# Patient Record
Sex: FEMALE | Race: WHITE | NOT HISPANIC OR LATINO | Employment: UNEMPLOYED | URBAN - METROPOLITAN AREA
[De-identification: names, ages, dates, MRNs, and addresses within clinical notes are randomized per-mention and may not be internally consistent; named-entity substitution may affect disease eponyms.]

---

## 2017-03-22 RX ORDER — INSULIN ASPART 100 [IU]/ML
INJECTION, SOLUTION INTRAVENOUS; SUBCUTANEOUS
Qty: 30 ML | Refills: 6 | Status: SHIPPED | OUTPATIENT
Start: 2017-03-22 | End: 2017-05-03 | Stop reason: SDUPTHER

## 2017-03-31 ENCOUNTER — APPOINTMENT (OUTPATIENT)
Dept: ENDOCRINOLOGY | Facility: MEDICAL CENTER | Age: 72
End: 2017-03-31
Payer: COMMERCIAL

## 2017-05-03 ENCOUNTER — OFFICE VISIT (OUTPATIENT)
Dept: ENDOCRINOLOGY | Facility: MEDICAL CENTER | Age: 72
End: 2017-05-03
Payer: COMMERCIAL

## 2017-05-03 VITALS
DIASTOLIC BLOOD PRESSURE: 86 MMHG | BODY MASS INDEX: 44.41 KG/M2 | SYSTOLIC BLOOD PRESSURE: 142 MMHG | HEIGHT: 68 IN | WEIGHT: 293 LBS

## 2017-05-03 DIAGNOSIS — I10 ESSENTIAL HYPERTENSION: ICD-10-CM

## 2017-05-03 DIAGNOSIS — E03.9 ACQUIRED HYPOTHYROIDISM: ICD-10-CM

## 2017-05-03 DIAGNOSIS — Z79.4 TYPE 2 DIABETES MELLITUS WITH HYPERGLYCEMIA, WITH LONG-TERM CURRENT USE OF INSULIN (HCC): ICD-10-CM

## 2017-05-03 DIAGNOSIS — E11.65 TYPE 2 DIABETES MELLITUS WITH HYPERGLYCEMIA, WITH LONG-TERM CURRENT USE OF INSULIN (HCC): ICD-10-CM

## 2017-05-03 LAB
HBA1C MFR BLD: 7 % (ref ?–5.8)
INT CON NEG: NORMAL
INT CON POS: NORMAL

## 2017-05-03 PROCEDURE — 1101F PT FALLS ASSESS-DOCD LE1/YR: CPT | Mod: 8P | Performed by: INTERNAL MEDICINE

## 2017-05-03 PROCEDURE — 3017F COLORECTAL CA SCREEN DOC REV: CPT | Mod: 8P | Performed by: INTERNAL MEDICINE

## 2017-05-03 PROCEDURE — 3014F SCREEN MAMMO DOC REV: CPT | Mod: 8P | Performed by: INTERNAL MEDICINE

## 2017-05-03 PROCEDURE — 4040F PNEUMOC VAC/ADMIN/RCVD: CPT | Mod: 8P | Performed by: INTERNAL MEDICINE

## 2017-05-03 PROCEDURE — 3045F PR MOST RECENT HEMOGLOBIN A1C LEVEL 7.0-9.0%: CPT | Performed by: INTERNAL MEDICINE

## 2017-05-03 PROCEDURE — 1036F TOBACCO NON-USER: CPT | Performed by: INTERNAL MEDICINE

## 2017-05-03 PROCEDURE — G8432 DEP SCR NOT DOC, RNG: HCPCS | Performed by: INTERNAL MEDICINE

## 2017-05-03 PROCEDURE — G8417 CALC BMI ABV UP PARAM F/U: HCPCS | Performed by: INTERNAL MEDICINE

## 2017-05-03 PROCEDURE — 99214 OFFICE O/P EST MOD 30 MIN: CPT | Performed by: INTERNAL MEDICINE

## 2017-05-03 PROCEDURE — 83036 HEMOGLOBIN GLYCOSYLATED A1C: CPT | Performed by: INTERNAL MEDICINE

## 2017-05-03 RX ORDER — CARVEDILOL 6.25 MG/1
6.25 TABLET ORAL 2 TIMES DAILY
Qty: 120 TAB | Refills: 3 | Status: SHIPPED
Start: 2017-05-03 | End: 2017-10-30 | Stop reason: SDUPTHER

## 2017-05-03 RX ORDER — BENAZEPRIL HYDROCHLORIDE 40 MG/1
40 TABLET ORAL DAILY
Qty: 90 TAB | Refills: 3 | Status: SHIPPED
Start: 2017-05-03 | End: 2017-08-08 | Stop reason: SDUPTHER

## 2017-05-03 RX ORDER — LEVOTHYROXINE SODIUM 0.2 MG/1
200 TABLET ORAL DAILY
Qty: 90 TAB | Refills: 3 | Status: SHIPPED
Start: 2017-05-03 | End: 2017-08-08 | Stop reason: SDUPTHER

## 2017-05-03 NOTE — MR AVS SNAPSHOT
"        Maira Dodd   5/3/2017 1:20 PM   Office Visit   MRN: [id number]    Department:  Endocrinology Med [facility]   Dept Phone:  [phone]    Description:  Female : 1945   Provider:  Nora Mejia R.N.; Cecelia Meyers M.D.           Reason for Visit     Diabetes Mellitus           Allergies as of 5/3/2017     Allergen Noted Reactions    Betadine [Povidone Iodine] 07/10/2014       Iodine 07/10/2014   Swelling    Neomycin 07/10/2014   Swelling      You were diagnosed with     Type 2 diabetes mellitus with hyperglycemia, with long-term current use of insulin (CMS-HCC)   [7009072]       Acquired hypothyroidism   [8249282]       Essential hypertension   [1630050]         Vital Signs     Blood Pressure Height Weight Body Mass Index Smoking Status       142/86 mmHg 1.715 m (5' 7.5\") 169.373 kg (373 lb 6.4 oz) 57.59 kg/m2 Never Smoker        Basic Information     Date Of Birth Sex Race Ethnicity Preferred Language    1945 Female White Non- English      Your appointments     Sep 07, 2017 12:40 PM   Diabetes Care Visit with Cecelia Meyers M.D., Nora Mejia R.N.   [facility]    [address], [address]  [facility] [place] [address] [phone]           You will be receiving a confirmation call a few days before your appointment from our automated call confirmation system.              Problem List              ICD-10-CM Priority Class Noted - Resolved    Type 2 diabetes mellitus, uncontrolled (CMS-HCC) E11.65   2014 - Present    Dyslipidemia E78.5   2014 - Present    Hypothyroidism E03.9   2014 - Present    Obesities, morbid (CMS-HCC) E66.01   2014 - Present    Elevated liver enzymes R74.8   2015 - Present      Health Maintenance        Date Due Completion Dates    IMM DTaP/Tdap/Td Vaccine (1 - Tdap) 1966 ---    PAP SMEAR 1968 ---    MAMMOGRAM 1987 ---    COLONOSCOPY 1997 ---    IMM ZOSTER VACCINE 2007 " ---    BONE DENSITY 8/6/2012 ---    IMM PNEUMOCOCCAL 65+ (ADULT) LOW/MEDIUM RISK SERIES (1 of 2 - PCV13) 8/6/2012 ---    DIABETES MONOFILAMENT / LE EXAM 7/13/2015 7/13/2014    FASTING LIPID PROFILE 12/16/2015 12/16/2014    URINE ACR / MICROALBUMIN 12/16/2015 12/16/2014    SERUM CREATININE 12/16/2015 12/16/2014    A1C SCREENING 4/19/2017 10/19/2016, 12/16/2014, 10/9/2014    RETINAL SCREENING 11/16/2017 11/16/2016            Results     POCT Hemoglobin A1C      Component    Glycohemoglobin    7.0    Comment:     lot [id number]  11/8    Internal Control Negative    Internal Control Positive                        Current Immunizations     No immunizations on file.      Below and/or attached are the medications your provider expects you to take. Review all of your home medications and newly ordered medications with your provider and/or pharmacist. Follow medication instructions as directed by your provider and/or pharmacist. Please keep your medication list with you and share with your provider. Update the information when medications are discontinued, doses are changed, or new medications (including over-the-counter products) are added; and carry medication information at all times in the event of emergency situations     Allergies:  BETADINE - (reactions not documented)     IODINE - Swelling     NEOMYCIN - Swelling               Medications  Valid as of: May 03, 2017 -  1:47 PM    Generic Name Brand Name Tablet Size Instructions for use    Aspirin (Tab)  MG Take 325 mg by mouth every 6 hours as needed.        Benazepril HCl (Tab) LOTENSIN 40 MG Take 1 Tab by mouth every day.        Carvedilol (Tab) COREG 6.25 MG Take 1 Tab by mouth 2 times a day.        Insulin Aspart (Solution Pen-injector) NOVOLOG 100 UNIT/ML Using up to 22units 3 times per day        Insulin Glargine (Solution Pen-injector) LANTUS 100 UNIT/ML Using 65 units per day as directed.        Levothyroxine Sodium (Tab) SYNTHROID 200 MCG Take 1 Tab by  mouth every day.        MetFORMIN HCl (Tab) GLUCOPHAGE 1000 MG Take 1 Tab by mouth 2 times a day, with meals.        .                 Medicines prescribed today were sent to:     [id number] - [place], [place] - [address]    [address] [place] [address]    Phone: [phone] Fax: [phone]    Open 24 Hours?: No    [id number] - [place], [place] - [address]    [address] [facility] [place] [address]    Phone: [phone] Fax: [phone]    Open 24 Hours?: No      Medication refill instructions:       If your prescription bottle indicates you have medication refills left, it is not necessary to call your provider’s office. Please contact your pharmacy and they will refill your medication.    If your prescription bottle indicates you do not have any refills left, you may request refills at any time through one of the following ways: The online [employer] system (except Urgent Care), by calling your provider’s office, or by asking your pharmacy to contact your provider’s office with a refill request. Medication refills are processed only during regular business hours and may not be available until the next business day. Your provider may request additional information or to have a follow-up visit with you prior to refilling your medication.   *Please Note: Medication refills are assigned a new Rx number when refilled electronically. Your pharmacy may indicate that no refills were authorized even though a new prescription for the same medication is available at the pharmacy. Please request the medicine by name with the pharmacy before contacting your provider for a refill.        Your To Do List     Future Labs/Procedures Complete By Expires    BASIC METABOLIC PANEL  As directed 5/3/2019    FREE THYROXINE  As directed 5/3/2018    LIPID PROFILE  As directed 5/3/2019    MICROALBUMIN CREAT RATIO URINE  As directed 5/3/2018       TSH  As directed 5/3/2018         Clink Access Code: Activation code not generated  Current Clink Status: Active

## 2017-05-03 NOTE — PROGRESS NOTES
Endocrinology Clinic Progress Note  PCP: Manan Ortiz M.D.    CC: Type 2 diabetes    HPI:  Maira Dodd is a 71 y.o. old patient who comes in today for routine follow up.     Type 2 diabetes: She is currently on Levemir 32 units every morning and 33 units every evening. Since switching to Levemir she is having issues with diarrhea, Basaglar is also on her formulary. We will switch to Basaglar. She is currently on NovoLog 18 units 3 times a day with meals plus mid dose correction factor. Most commonly she takes 20-22 units of NovoLog at each meal. Fasting blood sugar in the morning is mostly in the range of 150-170. Also on metformin 1000 mg twice a day.    Hypothyroidism: She is currently on levothyroxine 200 µg daily, reports compliance with medications.    Hypertension: Blood pressure is higher than goal in the clinic today, she is on Coreg, benazepril. She reports compliance with medications.     ROS:  Constitutional: No weight loss  Cardiac: No palpitations or racing heart    Past Medical History:  Patient Active Problem List    Diagnosis Date Noted   • Elevated liver enzymes 01/07/2015   • Type 2 diabetes mellitus, uncontrolled (CMS-[facility]) 07/13/2014   • Dyslipidemia 07/13/2014   • Hypothyroidism 07/13/2014   • Obesities, morbid (CMS-[facility]) 07/13/2014       Medications:    Current outpatient prescriptions:   •  insulin glargine (BASAGLAR KWIKPEN) 100 UNIT/ML Solution Pen-injector injection, Using 65 units per day as directed., Disp: 60 mL, Rfl: 3  •  NOVOLOG, insulin aspart, (NOVOLOG FLEXPEN) 100 UNIT/ML Solution Pen-injector injection, Using up to 22units 3 times per day, Disp: 60 mL, Rfl: 3  •  levothyroxine (SYNTHROID) 200 MCG Tab, Take 1 Tab by mouth every day., Disp: 90 Tab, Rfl: 3  •  carvedilol (COREG) 6.25 MG Tab, Take 1 Tab by mouth 2 times a day., Disp: 120 Tab, Rfl: 3  •  benazepril (LOTENSIN) 40 MG tablet, Take 1 Tab by mouth every day., Disp: 90 Tab, Rfl: 3  •  metformin (GLUCOPHAGE)  "1000 MG tablet, Take 1 Tab by mouth 2 times a day, with meals., Disp: 180 Tab, Rfl: 3  •  aspirin (ASA) 325 MG Tab, Take 325 mg by mouth every 6 hours as needed., Disp: , Rfl:     Labs:  Results for ELIN JOHNSTON (MRN [id number]) as of 5/3/2017 13:47   Ref. Range 10/19/2016 09:19 5/3/2017 13:30   Glycohemoglobin Unknown 7.2 7.0     Physical Examination:  Vital signs: /86 mmHg  Ht 1.715 m (5' 7.5\")  Wt 169.373 kg (373 lb 6.4 oz)  BMI 57.59 kg/m2  General: Well nourished, no apparent distress, cooperative  Eyes: No scleral icterus, no discharge  CVS: Regular rate and rhythm, S1 S2 normal, no murmur  Resp: Normal effort, clear to auscultation bilaterally  Psych: Alert and oriented, normal mood and affect    Assessment and Plan:    Uncontrolled type 2 diabetes mellitus with diabetic neuropathy, with long-term current use of insulin (HCC)  · Hemoglobin A1c today in the clinic is 7.0%  · Goal hemoglobin A1c less than 7%  · Switched to Basaglar 34 units units twice a day, and we discussed treat to target recommendations  · Increased NovoLog to 20 units 3 times a day with meals plus mid dose correction factor  · Advised to continue checking blood sugar 4 times a day  · Continue metformin 1000 mg twice a day  · Repeat labs  -     LIPID PROFILE; Future  -     MICROALBUMIN CREAT RATIO URINE; Future  -     COMP METABOLIC PANEL; Future    Hypothyroidism, unspecified type  · Repeat labs, for now continue on levothyroxine 200 µg daily  -     FREE THYROXINE; Future  -     TSH; Future    Essential hypertension  · Blood pressure is slightly higher than goal in the clinic today  · For now continue on benazepril and Coreg    Return in about 4 months (around 9/3/2017).    Thank you for allowing me to participate in the care of this patient.    Cecelia Meyers M.D.     CC:   Manan Ortiz M.D.    This note was created using voice recognition software (Dragon). The accuracy of the dictation is limited by the abilities " of the software. I have reviewed the note prior to signing, however some errors in grammar and context are still possible. If you have any questions related to this note please do not hesitate to contact our office.

## 2017-05-03 NOTE — PROGRESS NOTES
Patient with existing type diabetes:  Type 2 diabetes here for follow up     Patient's health status since last visit: has been ill with cold symptoms for the past month.   Issues with diabetes since last visit none.   Current Diabetes Medications: Levemir 65 units per day (states it gives her diarrhea), Novolog using about 22 units with meals. Metformin 1000 mg bid.     HbA1c: @hba1c@  Lab Results   Component Value Date/Time    GLYCOHEMOGLOBIN 7.0 05/03/2017 01:30 PM        FSBS  Testing: testing blood sugars about 4 times per day    Hypoglycemia: denies.   Exercise: none.     Retinal Exam:current    Daily Foot Exam: checks feet most days.     Routine Dental Exams: past du  Flu vaccine: current.   Pneumonia vaccine unknown.         Plan: states she did not get lab request at her last visit.  Lab slips reprinted and provided to patient today  Switch to Basaglar insulin

## 2017-05-12 DIAGNOSIS — Z79.4 TYPE 2 DIABETES MELLITUS WITH HYPERGLYCEMIA, WITH LONG-TERM CURRENT USE OF INSULIN (HCC): ICD-10-CM

## 2017-05-12 DIAGNOSIS — E11.65 TYPE 2 DIABETES MELLITUS WITH HYPERGLYCEMIA, WITH LONG-TERM CURRENT USE OF INSULIN (HCC): ICD-10-CM

## 2017-05-18 ENCOUNTER — TELEPHONE (OUTPATIENT)
Dept: ENDOCRINOLOGY | Facility: MEDICAL CENTER | Age: 72
End: 2017-05-18

## 2017-05-18 DIAGNOSIS — Z79.4 TYPE 2 DIABETES MELLITUS WITH HYPERGLYCEMIA, WITH LONG-TERM CURRENT USE OF INSULIN (HCC): ICD-10-CM

## 2017-05-18 DIAGNOSIS — E11.65 TYPE 2 DIABETES MELLITUS WITH HYPERGLYCEMIA, WITH LONG-TERM CURRENT USE OF INSULIN (HCC): ICD-10-CM

## 2017-05-18 NOTE — TELEPHONE ENCOUNTER
Pt called and she mentioned that Basaglar is not covered by her Insurance.    Preferred brand is Lantus.    If [facility] could [facility]    Thank you  Ana

## 2017-06-02 LAB
CHOLEST SERPL-MCNC: 187 MG/DL
CREAT SERPL-MCNC: 0.86 MG/DL
HDLC SERPL-MCNC: 41 MG/DL
LDLC SERPL CALC-MCNC: 118 MG/DL
MICROAL CRT RATIO 4634: 25.2
TRIGL SERPL-MCNC: 141 MG/DL

## 2017-08-08 DIAGNOSIS — I10 ESSENTIAL HYPERTENSION: ICD-10-CM

## 2017-08-08 DIAGNOSIS — E03.9 ACQUIRED HYPOTHYROIDISM: ICD-10-CM

## 2017-08-08 DIAGNOSIS — E11.65 TYPE 2 DIABETES MELLITUS WITH HYPERGLYCEMIA, WITH LONG-TERM CURRENT USE OF INSULIN (HCC): ICD-10-CM

## 2017-08-08 DIAGNOSIS — Z79.4 TYPE 2 DIABETES MELLITUS WITH HYPERGLYCEMIA, WITH LONG-TERM CURRENT USE OF INSULIN (HCC): ICD-10-CM

## 2017-08-08 RX ORDER — BENAZEPRIL HYDROCHLORIDE 40 MG/1
40 TABLET ORAL DAILY
Qty: 90 TAB | Refills: 3 | Status: SHIPPED | OUTPATIENT
Start: 2017-08-08 | End: 2018-05-31 | Stop reason: SDUPTHER

## 2017-08-08 RX ORDER — LEVOTHYROXINE SODIUM 0.2 MG/1
200 TABLET ORAL DAILY
Qty: 90 TAB | Refills: 3 | Status: SHIPPED | OUTPATIENT
Start: 2017-08-08 | End: 2018-05-31 | Stop reason: SDUPTHER

## 2017-08-22 ENCOUNTER — TELEPHONE (OUTPATIENT)
Dept: ENDOCRINOLOGY | Facility: MEDICAL CENTER | Age: 72
End: 2017-08-22

## 2017-08-22 DIAGNOSIS — Z79.4 TYPE 2 DIABETES MELLITUS WITH HYPERGLYCEMIA, WITH LONG-TERM CURRENT USE OF INSULIN (HCC): ICD-10-CM

## 2017-08-22 DIAGNOSIS — E11.65 TYPE 2 DIABETES MELLITUS WITH HYPERGLYCEMIA, WITH LONG-TERM CURRENT USE OF INSULIN (HCC): ICD-10-CM

## 2017-08-22 NOTE — TELEPHONE ENCOUNTER
Pt need a new Rx for Basaglar.    Pt using Lantus 36 units BID. Rx do 30 days    Rx will be send to [facility]    Thank you  Ana

## 2017-09-05 DIAGNOSIS — E11.65 TYPE 2 DIABETES MELLITUS WITH HYPERGLYCEMIA, WITH LONG-TERM CURRENT USE OF INSULIN (HCC): ICD-10-CM

## 2017-09-05 DIAGNOSIS — Z79.4 TYPE 2 DIABETES MELLITUS WITH HYPERGLYCEMIA, WITH LONG-TERM CURRENT USE OF INSULIN (HCC): ICD-10-CM

## 2017-09-07 ENCOUNTER — APPOINTMENT (OUTPATIENT)
Dept: ENDOCRINOLOGY | Facility: MEDICAL CENTER | Age: 72
End: 2017-09-07
Payer: COMMERCIAL

## 2017-10-30 DIAGNOSIS — Z79.4 TYPE 2 DIABETES MELLITUS WITH HYPERGLYCEMIA, WITH LONG-TERM CURRENT USE OF INSULIN (HCC): ICD-10-CM

## 2017-10-30 DIAGNOSIS — E11.65 TYPE 2 DIABETES MELLITUS WITH HYPERGLYCEMIA, WITH LONG-TERM CURRENT USE OF INSULIN (HCC): ICD-10-CM

## 2017-10-30 DIAGNOSIS — I10 ESSENTIAL HYPERTENSION: ICD-10-CM

## 2017-10-30 RX ORDER — CARVEDILOL 6.25 MG/1
6.25 TABLET ORAL 2 TIMES DAILY
Qty: 120 TAB | Refills: 0 | Status: SHIPPED | OUTPATIENT
Start: 2017-10-30 | End: 2017-11-30 | Stop reason: SDUPTHER

## 2017-11-30 DIAGNOSIS — I10 ESSENTIAL HYPERTENSION: ICD-10-CM

## 2017-11-30 RX ORDER — CARVEDILOL 6.25 MG/1
TABLET ORAL
Qty: 120 TAB | Refills: 3 | Status: SHIPPED | OUTPATIENT
Start: 2017-11-30 | End: 2017-12-07 | Stop reason: SDUPTHER

## 2017-12-07 ENCOUNTER — OFFICE VISIT (OUTPATIENT)
Dept: ENDOCRINOLOGY | Facility: MEDICAL CENTER | Age: 72
End: 2017-12-07
Payer: COMMERCIAL

## 2017-12-07 VITALS
OXYGEN SATURATION: 96 % | HEIGHT: 68 IN | WEIGHT: 293 LBS | HEART RATE: 100 BPM | DIASTOLIC BLOOD PRESSURE: 88 MMHG | SYSTOLIC BLOOD PRESSURE: 128 MMHG | BODY MASS INDEX: 44.41 KG/M2

## 2017-12-07 DIAGNOSIS — Z79.4 TYPE 2 DIABETES MELLITUS WITH HYPERGLYCEMIA, WITH LONG-TERM CURRENT USE OF INSULIN (HCC): ICD-10-CM

## 2017-12-07 DIAGNOSIS — E03.9 ACQUIRED HYPOTHYROIDISM: ICD-10-CM

## 2017-12-07 DIAGNOSIS — I10 ESSENTIAL HYPERTENSION: ICD-10-CM

## 2017-12-07 DIAGNOSIS — E11.65 TYPE 2 DIABETES MELLITUS WITH HYPERGLYCEMIA, WITH LONG-TERM CURRENT USE OF INSULIN (HCC): ICD-10-CM

## 2017-12-07 LAB
HBA1C MFR BLD: 6.6 % (ref ?–5.8)
INT CON NEG: NORMAL
INT CON POS: NORMAL

## 2017-12-07 PROCEDURE — 83036 HEMOGLOBIN GLYCOSYLATED A1C: CPT | Performed by: INTERNAL MEDICINE

## 2017-12-07 PROCEDURE — 99214 OFFICE O/P EST MOD 30 MIN: CPT | Performed by: INTERNAL MEDICINE

## 2017-12-07 RX ORDER — CARVEDILOL 12.5 MG/1
12.5 TABLET ORAL 2 TIMES DAILY WITH MEALS
Qty: 180 TAB | Refills: 2 | Status: SHIPPED | OUTPATIENT
Start: 2017-12-07 | End: 2018-08-01

## 2017-12-07 NOTE — PROGRESS NOTES
Patient with existing type diabetes:  Type 2 diabetes well controlled, here for follow up.       Patient's health status since last visit: complaining of edema in her feet, complaining of right hip pain radiating to her back.   Wants to discuss weight loss medications  States hospitalized in July for hypertension.   Issues with diabetes since last visit occasional lows.   Current Diabetes Medications: Basaglar 36 units bid and Novolog 20 units with meals.  Metformin 1000 mg bid.     HbA1c: @hba1c@  Lab Results   Component Value Date/Time    HBA1C 6.6 12/07/2017 09:03 AM        FSBS  Testing: states she is checking 4-5 times per day, did not bring logs with her.     Hypoglycemia: states she has low 1-2 times week, usually associated with only having a salad for a meal and taking full dose of Novolog.   Exercise: none.     Retinal Exam:current.     Daily Foot Exam: complains of swelling in both feet.   Foot Exam:  Monofilament: done  Monofilament testing with a 10 gram force: sensation intact: intact bilaterally  Visual Inspection: Feet without maceration, ulcers, fissures.  Pedal pulses: decreased bilaterally        Education provided by RN, CDE: discussed cutting back on Novolog to only 15 units if she is only having a salad

## 2017-12-07 NOTE — PROGRESS NOTES
"Endocrinology Clinic Progress Note    CC: Diabetes    HPI:  Maira Dodd is a 72 y.o. old patient who comes in today for routine follow up.     Type 2 diabetes: She is currently on Basaglar 36 units twice a day, NovoLog 20 units with each meal, and metformin 1000 mg twice a day. Reports compliance with medications. He checks blood sugars 4-5 times a day. Blood sugars are very well controlled, she does have occasional hypoglycemia especially when she eats only salad for a meal. She can feel the low blood sugar symptoms well. She is up-to-date with eye exam.    Hypertension: Blood pressure is well controlled. She reports compliance with medications.    Hypothyroidism: She is currently on levothyroxine 200 µg daily.    ROS:  Constitutional: No unintentional weight loss  Endo: Denies excessive thirst or frequent urination    PMH:  Type 2 diabetes  Hypothyroidism  Hypertension  Obesity    EXAM:  Vital signs: /88   Pulse 100   Ht 1.715 m (5' 7.5\")   Wt (!) 145.1 kg (319 lb 12.8 oz)   SpO2 96%   BMI 49.35 kg/m²   General: No apparent distress, cooperative  Eyes: No scleral icterus, no discharge  Neck: Normal on external inspection  Resp: Normal effort  Psych: Alert and oriented, normal mood and affect    Assessment and Plan:    1. Type 2 diabetes mellitus with hyperglycemia, with long-term current use of insulin (CMS-[facility])  · Hemoglobin A1c today in the clinic is 6.6%  · Goal A1c less than 7-7.5%  · Continue Basaglar 36 units twice a day and NovoLog 20 units with each meal  · She is interested in starting some medications to help with weight loss, we will try Trulicity 0.75 mg once a week, we discussed about potential side effects including nausea/vomiting, no family history of thyroid cancer  · We discussed about keeping a close eye on blood sugar readings especially after starting Trulicity  · Repeat labs just before next appointment  - Diabetic Monofilament LE Exam  - POCT Hemoglobin A1C  - " Dulaglutide (TRULICITY) 0.75 MG/0.5ML Solution Pen-injector; Inject 0.75 mg as instructed every 7 days.  Dispense: 12 PEN; Refill: 3  - BASIC METABOLIC PANEL; Future  - MICROALBUMIN CREAT RATIO URINE; Future  - LIPID PROFILE; Future    2. Acquired hypothyroidism  · Continue levothyroxine 200 µg daily  · Repeat labs just before next appointment  - TSH; Future  - FREE THYROXINE; Future    3. Essential hypertension  · Blood pressure is well controlled  · Continue benazepril, carvedilol    Return in about 4 months (around 4/7/2018).    Thank you for allowing me to participate in the care of this patient.    Cecelia Meyers M.D.    CC:   Manan Ortiz M.D.    This note was created using voice recognition software (Dragon). The accuracy of the dictation is limited by the abilities of the software. I have reviewed the note prior to signing, however some errors in grammar and context are still possible. If you have any questions related to this note please do not hesitate to contact our office.

## 2017-12-15 DIAGNOSIS — E11.65 TYPE 2 DIABETES MELLITUS WITH HYPERGLYCEMIA, WITH LONG-TERM CURRENT USE OF INSULIN (HCC): ICD-10-CM

## 2017-12-15 DIAGNOSIS — Z79.4 TYPE 2 DIABETES MELLITUS WITH HYPERGLYCEMIA, WITH LONG-TERM CURRENT USE OF INSULIN (HCC): ICD-10-CM

## 2017-12-18 DIAGNOSIS — E11.65 TYPE 2 DIABETES MELLITUS WITH HYPERGLYCEMIA, WITH LONG-TERM CURRENT USE OF INSULIN (HCC): ICD-10-CM

## 2017-12-18 DIAGNOSIS — Z79.4 TYPE 2 DIABETES MELLITUS WITH HYPERGLYCEMIA, WITH LONG-TERM CURRENT USE OF INSULIN (HCC): ICD-10-CM

## 2017-12-18 RX ORDER — INSULIN GLARGINE 100 [IU]/ML
INJECTION, SOLUTION SUBCUTANEOUS
Qty: 75 ML | Refills: 3 | Status: SHIPPED | OUTPATIENT
Start: 2017-12-18 | End: 2019-01-31 | Stop reason: SDUPTHER

## 2017-12-25 DIAGNOSIS — E11.65 TYPE 2 DIABETES MELLITUS WITH HYPERGLYCEMIA, WITH LONG-TERM CURRENT USE OF INSULIN (HCC): ICD-10-CM

## 2017-12-25 DIAGNOSIS — Z79.4 TYPE 2 DIABETES MELLITUS WITH HYPERGLYCEMIA, WITH LONG-TERM CURRENT USE OF INSULIN (HCC): ICD-10-CM

## 2017-12-27 RX ORDER — INSULIN ASPART 100 [IU]/ML
INJECTION, SOLUTION INTRAVENOUS; SUBCUTANEOUS
Qty: 60 ML | Refills: 1 | Status: SHIPPED | OUTPATIENT
Start: 2017-12-27 | End: 2018-05-31 | Stop reason: SDUPTHER

## 2018-06-20 ENCOUNTER — TELEPHONE (OUTPATIENT)
Dept: ENDOCRINOLOGY | Facility: MEDICAL CENTER | Age: 73
End: 2018-06-20

## 2018-06-20 NOTE — TELEPHONE ENCOUNTER
Trulicity does not have any such side effect, I would recommend prompt evaluation by primary care physician.

## 2018-06-20 NOTE — TELEPHONE ENCOUNTER
Pt states her Lt foot is swelling and felt mushy at first but is becoming more solid now. Says it is the size of an egg right on top. Is wondering if Trulicity can cause this? Noticed 2-3 weeks after starting Trulicity. No pain or redness around it. Please advise.

## 2018-07-27 ENCOUNTER — APPOINTMENT (OUTPATIENT)
Dept: ENDOCRINOLOGY | Facility: MEDICAL CENTER | Age: 73
End: 2018-07-27
Payer: COMMERCIAL

## 2018-07-31 NOTE — TELEPHONE ENCOUNTER
Was the patient seen in the last year in this department? Yes    Does patient have an active prescription for medications requested? No     Received Request Via: Pharmacy     Patient still has a month left of medication needs the refill for 10/1

## 2018-08-01 RX ORDER — CARVEDILOL 6.25 MG/1
6.25 TABLET ORAL 2 TIMES DAILY
Qty: 180 TAB | Refills: 0 | Status: SHIPPED | OUTPATIENT
Start: 2018-08-01 | End: 2019-01-31 | Stop reason: SDUPTHER

## 2018-08-16 DIAGNOSIS — E03.9 ACQUIRED HYPOTHYROIDISM: ICD-10-CM

## 2018-08-16 DIAGNOSIS — E11.65 TYPE 2 DIABETES MELLITUS WITH HYPERGLYCEMIA, WITH LONG-TERM CURRENT USE OF INSULIN (HCC): ICD-10-CM

## 2018-08-16 DIAGNOSIS — Z79.4 TYPE 2 DIABETES MELLITUS WITH HYPERGLYCEMIA, WITH LONG-TERM CURRENT USE OF INSULIN (HCC): ICD-10-CM

## 2018-09-07 ENCOUNTER — OFFICE VISIT (OUTPATIENT)
Dept: ENDOCRINOLOGY | Facility: MEDICAL CENTER | Age: 73
End: 2018-09-07
Payer: COMMERCIAL

## 2018-09-07 VITALS
DIASTOLIC BLOOD PRESSURE: 70 MMHG | WEIGHT: 293 LBS | HEART RATE: 101 BPM | OXYGEN SATURATION: 94 % | SYSTOLIC BLOOD PRESSURE: 108 MMHG | HEIGHT: 68 IN | BODY MASS INDEX: 44.41 KG/M2

## 2018-09-07 DIAGNOSIS — E03.9 ACQUIRED HYPOTHYROIDISM: ICD-10-CM

## 2018-09-07 DIAGNOSIS — Z79.4 TYPE 2 DIABETES MELLITUS WITHOUT COMPLICATION, WITH LONG-TERM CURRENT USE OF INSULIN (HCC): ICD-10-CM

## 2018-09-07 DIAGNOSIS — I10 ESSENTIAL HYPERTENSION: ICD-10-CM

## 2018-09-07 DIAGNOSIS — E11.9 TYPE 2 DIABETES MELLITUS WITHOUT COMPLICATION, WITH LONG-TERM CURRENT USE OF INSULIN (HCC): ICD-10-CM

## 2018-09-07 LAB
HBA1C MFR BLD: 6.8 % (ref ?–5.8)
INT CON NEG: NORMAL
INT CON POS: NORMAL

## 2018-09-07 PROCEDURE — 83036 HEMOGLOBIN GLYCOSYLATED A1C: CPT | Performed by: INTERNAL MEDICINE

## 2018-09-07 PROCEDURE — 99214 OFFICE O/P EST MOD 30 MIN: CPT | Performed by: INTERNAL MEDICINE

## 2018-09-07 RX ORDER — DICYCLOMINE HCL 20 MG
20 TABLET ORAL EVERY 6 HOURS
COMMUNITY

## 2018-09-07 NOTE — PROGRESS NOTES
"Endocrinology Clinic Progress Note    CC: Diabetes    HPI:  Maira Dodd is a 72 y.o. old patient who comes in today for routine follow up.     Type 2 diabetes: She is currently on Basaglar 30-35 units twice a day, NovoLog 17-24 units with each meal, and metformin 1000 mg twice a day and Trulicity 0.75 mg weekly.  She has not been very good about taking metformin.  She checks blood sugars 4-5 times a day.  Very rare hypoglycemia, she can feel the low blood sugar symptoms well. She is up-to-date with eye exam.    Hypertension: Blood pressure is well controlled. She reports compliance with medications.    Hypothyroidism: She is currently on levothyroxine 200 µg daily.  Recent labs show slightly suppressed TSH of 0.28.  She denies palpitations or racing heart.  No unintentional weight loss.    ROS:  Constitutional: No unintentional weight loss  Cardiac: Negative for palpitations    PMH:  Type 2 diabetes  Hypothyroidism  Hypertension  Obesity    EXAM:  Vital signs: /70   Pulse (!) 101   Ht 1.715 m (5' 7.5\")   Wt (!) 143.8 kg (317 lb)   SpO2 94%   BMI 48.92 kg/m²   General: No apparent distress, cooperative  Eyes: No scleral icterus, no discharge  Neck: Normal on external inspection  Resp: Normal effort  Psych: Alert and oriented, normal mood and affect    LABS:  Labs from August 2018: Total cholesterol 198, HDL 43, triglycerides 135, , glucose 118, creatinine 1.09, calcium 9.4, TSH 0.28, free T4 1.5    Assessment and Plan:    1. Type 2 diabetes mellitus without complications, with long-term current use of insulin (CMS-[facility])  · Hemoglobin A1c today in the clinic is 6.8%  · Goal A1c less than 7-7.5%  · Continue Basaglar 30-35 units twice a day and NovoLog 17-24 units with each meal  · Advised to take metformin 1000 mg twice a day  · Continue Trulicity 0.75 mg weekly, she has some occasional abdominal discomfort so we are not increasing the dose of Trulicity at this time    2. Acquired " hypothyroidism  · Continue levothyroxine 200 µg daily  · Repeat labs for TSH and free T4 in 6 week  - TSH; Future  - FREE THYROXINE; Future    3. Essential hypertension  · Blood pressure is well controlled  · Continue ACEi    Return in about 4 months (around 1/7/2019).    Thank you for allowing me to participate in the care of this patient.    Cecelia Meyers M.D.    CC:   Manan Ortiz M.D.    This note was created using voice recognition software (Dragon). The accuracy of the dictation is limited by the abilities of the software. I have reviewed the note prior to signing, however some errors in grammar and context are still possible. If you have any questions related to this note please do not hesitate to contact our office.

## 2018-09-07 NOTE — PROGRESS NOTES
"RN-CDE Note    Subjective:     Health changes since last visit/interval Hx: complaining of pain in her side.     Medications (including changes made today)  Current Outpatient Prescriptions   Medication Sig Dispense Refill   • dicyclomine (BENTYL) 20 MG Tab Take 20 mg by mouth every 6 hours.     • carvedilol (COREG) 6.25 MG Tab Take 1 Tab by mouth 2 Times a Day. 180 Tab 0   • benazepril (LOTENSIN) 40 MG tablet TAKE 1 TABLET BY MOUTH  EVERY DAY 90 Tab 1   • NOVOLOG FLEXPEN 100 UNIT/ML Solution Pen-injector solution for injection INJECT SUBCUTANEOUSLY UP TO 22 UNITS 3 TIMES DAILY PER  DAY 15 PEN 3   • levothyroxine (SYNTHROID) 200 MCG Tab TAKE 1 TABLET BY MOUTH  EVERY DAY 90 Tab 1   • BASAGLAR KWIKPEN 100 UNIT/ML Solution Pen-injector injection INJECT 36 UNITS  SUBCUTANEOUSLY AS  INSTRUCTED 2 TIMES A DAY. 75 mL 3   • aspirin (ASA) 325 MG Tab Take 325 mg by mouth every 6 hours as needed.     • metformin (GLUCOPHAGE) 1000 MG tablet TAKE 1 TABLET BY MOUTH TWO  TIMES DAILY WITH MEALS 180 Tab 2   • Dulaglutide (TRULICITY) 0.75 MG/0.5ML Solution Pen-injector Inject 0.75 mg as instructed every 7 days. 12 PEN 3     No current facility-administered medications for this visit.        Taking daily ASA: Yes  Taking above medications as prescribed: no only taking Metformin prn  SIDE EFFECTS: Patient denies side effects to medications    Exercise: sporadic irregular exercise, <half hour walking weekly  Diet: \"healthy\" diet  in general  Has been eating more often recently, states it is too hot to cook at home.   Patient's body mass index is 48.92 kg/m². Exercise and nutrition counseling were performed at this visit.      Health Maintenance:   Health Maintenance Due   Topic Date Due   • IMM HEP B VACCINE (1 of 3 - Risk 3-dose series) 08/06/1966   • IMM DTaP/Tdap/Td Vaccine (1 - Tdap) 08/06/1966   • PAP SMEAR  08/06/1968   • MAMMOGRAM  08/06/1987   • COLONOSCOPY  08/06/1997   • IMM ZOSTER VACCINES (1 of 2) 08/06/1997   • BONE DENSITY  " 08/06/2012   • IMM PNEUMOCOCCAL 65+ (ADULT) LOW/MEDIUM RISK SERIES (1 of 2 - PCV13) 08/06/2012   • RETINAL SCREENING  05/25/2018   • URINE ACR / MICROALBUMIN  06/02/2018   • A1C SCREENING  06/07/2018   • IMM INFLUENZA (1) 09/01/2018       DM:   Last A1c:   Lab Results   Component Value Date/Time    HBA1C 6.8 09/07/2018 01:10 PM      A1C GOAL: < 7    Glucose monitoring frequency: testing blood sugars 4 times per day  did not bring logs in for review  Hypoglycemic episodes: yes - states blood sugar went down to 47 a couple of weeks ago.      Last Retinal Exam: in the spring Provider: jorge Abnormal: states she has a small growth on her retina that they are watching.   Daily Foot Exam: Yes , complains of feeling as if she is walking on pillows at times.   Routine Dental Exams: N/A she has dentures.     Lab Results   Component Value Date/Time    MICROALBCALC 25.2 06/02/2017        ACR Albumin/Creatinine Ratio goal <30     HTN:   Blood pressure goal <140/<80 at goal.   Currently Rx ACE/ARB: Yes    Dyslipidemia:    Lab Results   Component Value Date/Time    CHOLSTRLTOT 187 06/02/2017     06/02/2017    HDL 41 06/02/2017    TRIGLYCERIDE 141 06/02/2017       Lab Results   Component Value Date/Time    CREATININE 0.86 06/02/2017     No results found for: ALKPHOSPHAT, ASTSGOT, ALTSGPT, TBILIRUBIN     Currently Rx Statin: No    She  reports that she has never smoked. She has never used smokeless tobacco.    Objective:     Exam:  Monofilament: not done    Plan:     Discussed and educated on:   - All medications, side effects and compliance (discussed carefully)  - Annual eye examinations at Ophthalmology  - Foot Care: what to look for when checking feet every day and when to contact HCP  - HbA1C: target  - Home glucose monitoring emphasized  - Reminded pt to bring in BS diary at next visit  - Weight control and daily exercise    Recommended medication changes: none

## 2018-12-07 DIAGNOSIS — E11.65 TYPE 2 DIABETES MELLITUS WITH HYPERGLYCEMIA, WITH LONG-TERM CURRENT USE OF INSULIN (HCC): ICD-10-CM

## 2018-12-07 DIAGNOSIS — Z79.4 TYPE 2 DIABETES MELLITUS WITH HYPERGLYCEMIA, WITH LONG-TERM CURRENT USE OF INSULIN (HCC): ICD-10-CM

## 2018-12-07 NOTE — TELEPHONE ENCOUNTER
Was the patient seen in the last year in this department? Yes    Does patient have an active prescription for medications requested? Yes    Received Request Via: Pharmacy     **90 day supply. Dr. Meyers patient**

## 2019-01-05 DIAGNOSIS — Z79.4 TYPE 2 DIABETES MELLITUS WITH HYPERGLYCEMIA, WITH LONG-TERM CURRENT USE OF INSULIN (HCC): ICD-10-CM

## 2019-01-05 DIAGNOSIS — E11.65 TYPE 2 DIABETES MELLITUS WITH HYPERGLYCEMIA, WITH LONG-TERM CURRENT USE OF INSULIN (HCC): ICD-10-CM

## 2019-01-07 RX ORDER — DULAGLUTIDE 0.75 MG/.5ML
INJECTION, SOLUTION SUBCUTANEOUS
Qty: 2 ML | Refills: 2 | Status: SHIPPED | OUTPATIENT
Start: 2019-01-07 | End: 2019-03-31 | Stop reason: SDUPTHER

## 2019-01-07 NOTE — TELEPHONE ENCOUNTER
Was the patient seen in the last year in this department? Yes    Does patient have an active prescription for medications requested? No     Received Request Via: Pharmacy     TRULICITY 0.75 MG/0.5ML Solution Pen-injector  The source prescription was reordered on 12/7/2018 by Joseph Avila P.A.-C..  INJECT 0.75 MG AS INSTUCTED EVERY 7 DAYS

## 2019-01-17 ENCOUNTER — APPOINTMENT (OUTPATIENT)
Dept: ENDOCRINOLOGY | Facility: MEDICAL CENTER | Age: 74
End: 2019-01-17
Payer: COMMERCIAL

## 2019-01-31 DIAGNOSIS — Z79.4 TYPE 2 DIABETES MELLITUS WITH HYPERGLYCEMIA, WITH LONG-TERM CURRENT USE OF INSULIN (HCC): ICD-10-CM

## 2019-01-31 DIAGNOSIS — E11.65 TYPE 2 DIABETES MELLITUS WITH HYPERGLYCEMIA, WITH LONG-TERM CURRENT USE OF INSULIN (HCC): ICD-10-CM

## 2019-01-31 RX ORDER — CARVEDILOL 6.25 MG/1
6.25 TABLET ORAL 2 TIMES DAILY
Qty: 180 TAB | Refills: 0 | Status: SHIPPED | OUTPATIENT
Start: 2019-01-31 | End: 2019-04-08 | Stop reason: SDUPTHER

## 2019-01-31 RX ORDER — INSULIN GLARGINE 100 [IU]/ML
INJECTION, SOLUTION SUBCUTANEOUS
Qty: 75 ML | Refills: 3 | Status: SHIPPED | OUTPATIENT
Start: 2019-01-31 | End: 2019-04-08 | Stop reason: SDUPTHER

## 2019-01-31 NOTE — TELEPHONE ENCOUNTER
Was the patient seen in the last year in this department? Yes  Last visit 9/7/19  No future appt yet    Does patient have an active prescription for medications requested? Yes    Received Request Via: Pharmacy

## 2019-03-31 DIAGNOSIS — Z79.4 TYPE 2 DIABETES MELLITUS WITH HYPERGLYCEMIA, WITH LONG-TERM CURRENT USE OF INSULIN (HCC): ICD-10-CM

## 2019-03-31 DIAGNOSIS — E11.65 TYPE 2 DIABETES MELLITUS WITH HYPERGLYCEMIA, WITH LONG-TERM CURRENT USE OF INSULIN (HCC): ICD-10-CM

## 2019-04-01 RX ORDER — DULAGLUTIDE 0.75 MG/.5ML
INJECTION, SOLUTION SUBCUTANEOUS
Qty: 2 ML | Refills: 1 | Status: SHIPPED | OUTPATIENT
Start: 2019-04-01 | End: 2019-04-08

## 2019-04-08 ENCOUNTER — OFFICE VISIT (OUTPATIENT)
Dept: ENDOCRINOLOGY | Facility: MEDICAL CENTER | Age: 74
End: 2019-04-08
Payer: COMMERCIAL

## 2019-04-08 VITALS
WEIGHT: 293 LBS | HEART RATE: 100 BPM | SYSTOLIC BLOOD PRESSURE: 116 MMHG | HEIGHT: 68 IN | BODY MASS INDEX: 44.41 KG/M2 | DIASTOLIC BLOOD PRESSURE: 60 MMHG | OXYGEN SATURATION: 93 %

## 2019-04-08 DIAGNOSIS — I10 ESSENTIAL HYPERTENSION: ICD-10-CM

## 2019-04-08 DIAGNOSIS — E11.649 UNCONTROLLED TYPE 2 DIABETES MELLITUS WITH HYPOGLYCEMIA, UNSPECIFIED HYPOGLYCEMIA COMA STATUS (HCC): ICD-10-CM

## 2019-04-08 DIAGNOSIS — Z79.4 ENCOUNTER FOR LONG-TERM (CURRENT) USE OF INSULIN (HCC): ICD-10-CM

## 2019-04-08 DIAGNOSIS — E03.9 ACQUIRED HYPOTHYROIDISM: ICD-10-CM

## 2019-04-08 DIAGNOSIS — E11.65 TYPE 2 DIABETES MELLITUS WITH HYPERGLYCEMIA, WITH LONG-TERM CURRENT USE OF INSULIN (HCC): ICD-10-CM

## 2019-04-08 DIAGNOSIS — Z79.4 TYPE 2 DIABETES MELLITUS WITH HYPERGLYCEMIA, WITH LONG-TERM CURRENT USE OF INSULIN (HCC): ICD-10-CM

## 2019-04-08 LAB
HBA1C MFR BLD: 6.8 % (ref 0–5.6)
INT CON NEG: NEGATIVE
INT CON POS: POSITIVE

## 2019-04-08 PROCEDURE — 83036 HEMOGLOBIN GLYCOSYLATED A1C: CPT | Performed by: PHYSICIAN ASSISTANT

## 2019-04-08 PROCEDURE — 99214 OFFICE O/P EST MOD 30 MIN: CPT | Performed by: PHYSICIAN ASSISTANT

## 2019-04-08 RX ORDER — INSULIN GLARGINE 100 [IU]/ML
INJECTION, SOLUTION SUBCUTANEOUS
Qty: 75 ML | Refills: 3 | Status: SHIPPED | OUTPATIENT
Start: 2019-04-08 | End: 2019-09-19

## 2019-04-08 RX ORDER — BENAZEPRIL HYDROCHLORIDE 40 MG/1
40 TABLET ORAL
Qty: 90 TAB | Refills: 2 | Status: SHIPPED | OUTPATIENT
Start: 2019-04-08

## 2019-04-08 RX ORDER — CARVEDILOL 6.25 MG/1
6.25 TABLET ORAL 2 TIMES DAILY
Qty: 180 TAB | Refills: 0 | Status: SHIPPED | OUTPATIENT
Start: 2019-04-08

## 2019-04-08 RX ORDER — METFORMIN HYDROCHLORIDE 500 MG/1
1000 TABLET, EXTENDED RELEASE ORAL 2 TIMES DAILY
Qty: 120 TAB | Refills: 6 | Status: SHIPPED | OUTPATIENT
Start: 2019-04-08 | End: 2019-04-08 | Stop reason: SDUPTHER

## 2019-04-08 RX ORDER — METFORMIN HYDROCHLORIDE 500 MG/1
1000 TABLET, EXTENDED RELEASE ORAL 2 TIMES DAILY
Qty: 360 TAB | Refills: 4 | Status: SHIPPED | OUTPATIENT
Start: 2019-04-08 | End: 2020-03-27 | Stop reason: SDUPTHER

## 2019-04-08 RX ORDER — LEVOTHYROXINE SODIUM 0.2 MG/1
200 TABLET ORAL
Qty: 90 TAB | Refills: 1 | Status: SHIPPED | OUTPATIENT
Start: 2019-04-08

## 2019-04-08 NOTE — TELEPHONE ENCOUNTER
1. Caller Name: Maira Lisy Carlson                                              Call Back Number: [phone] (home)       Patient approves a detailed voicemail message: yes    Medication refills 90 day refill for all medications   Pt was seen today but needed refills on all mediations and her mail order pharmacy only got 2, she needs the following refilled.     Insulin Glargine (BASAGLAR KWIKPEN) 100 UNIT/ML Solution Pen-injector  Sig: INJECT 36 UNITS  SUBCUTANEOUSLY AS  INSTRUCTED 2 TIMES A DAY.       carvedilol (COREG) 6.25 MG Tab 180 Tab    Sig - Route: Take 1 Tab by mouth 2 Times a Day.      levothyroxine (SYNTHROID) 200 MCG Tab     Sig: TAKE 1 TABLET BY MOUTH  EVERY DAY     benazepril (LOTENSIN) 40 MG tablet    Sig: TAKE 1 TABLET BY MOUTH  EVERY DAY     NOVOLOG FLEXPEN 100 UNIT/ML Solution Pen-injector solution for injection  Sig: INJECT SUBCUTANEOUSLY UP TO 22 UNITS 3 TIMES DAILY PER  DAY

## 2019-04-08 NOTE — PROGRESS NOTES
New Patient Consult Note  Referred by: Manan Ortiz M.D.    Reason for consult: Diabetes Management Type 2    HPI:  Maira Dodd is a 73 y.o. old patient who is seeing us today for diabetes care.  This is a pleasant patient with diabetes and I appreciate the opportunity to participate in the care of this patient.  This is a new patient with me today.    Labs of 4/8/19 HbA1c is 6.8  Labs of 9/7/18 HbA1c was 6.8, GFR 51    BG Diary:4/8/2019  In the AM:  Just before meal:  Just before Bed:    Has been Diabetic since  Has a Glucagon pen at home: no    1. Uncontrolled type 2 diabetes mellitus with hypoglycemia, unspecified hypoglycemia coma status (HCC)  This is a new patient with me on 4/8/2019  They are on:  1.  Novolog 14-22 units three times a day before eating  2.  Trulicity 0.75 once a week  3.  Basaglar  35 twice a day  4.  Metformin 1000mg twice a day      2. Encounter for long-term (current) use of insulin ([facility])  Is on a high risk medication Insulin and we will continue to follow       ROS:   Constitutional: No change in weight , No fatigue, No night sweats.  HEENT: No Headache.  Eyes:  No blurred vision, No visual changes.  Cardiac: No chest pain, No palpitations.  Resp: No shortness of breath, No cough,   Gastro: No nausea or vomiting, No diarrhea.  Neuro: Denies numbness or tinging in bilateral feet or hands, and no loss of sensation.  Endo: No heat or cold intolerance.  : No polyuria, No polydipsia, No chronic UTI's.  Lower extremities: No lower leg edema bilateral.  All other systems were reviewed and were negative.    Past Medical History:  Patient Active Problem List    Diagnosis Date Noted   • Encounter for long-term (current) use of insulin ([facility]) 04/08/2019   • Elevated liver enzymes 01/07/2015   • Type 2 diabetes mellitus, uncontrolled ([facility]) 07/13/2014   • Dyslipidemia 07/13/2014   • Hypothyroidism 07/13/2014   • Obesities, morbid ([facility]) 07/13/2014       Past Surgical History:  Past  Surgical History:   Procedure Laterality Date   • APPENDECTOMY     • CHOLECYSTECTOMY         Allergies:  Statins [hmg-coa-r inhibitors]; Betadine [povidone iodine]; Iodine; and Neomycin    Social History:  Social History     Social History   • Marital status:      Spouse name: N/A   • Number of children: N/A   • Years of education: N/A     Occupational History   • Not on file.     Social History Main Topics   • Smoking status: Never Smoker   • Smokeless tobacco: Never Used   • Alcohol use Yes   • Drug use: No   • Sexual activity: Not on file     Other Topics Concern   • Not on file     Social History Narrative   • No narrative on file       Family History:  Family History   Problem Relation Age of Onset   • Hypertension Mother    • Diabetes Mother    • Stroke Mother    • Heart Attack Maternal Grandmother    • Heart Attack Maternal Grandfather    • Heart Attack Paternal Grandmother    • Heart Attack Paternal Grandfather        Medications:    Current Outpatient Prescriptions:   •  Dulaglutide (TRULICITY) 1.5 MG/0.5ML Solution Pen-injector, Inject 0.5 mL as instructed every 7 days., Disp: 4 PEN, Rfl: 6  •  metFORMIN ER (GLUCOPHAGE XR) 500 MG TABLET SR 24 HR, Take 2 Tabs by mouth 2 times a day. Please give Generic XR Metformin, Disp: 360 Tab, Rfl: 4  •  Insulin Glargine (BASAGLAR KWIKPEN) 100 UNIT/ML Solution Pen-injector, INJECT 36 UNITS  SUBCUTANEOUSLY AS  INSTRUCTED 2 TIMES A DAY., Disp: 75 mL, Rfl: 3  •  carvedilol (COREG) 6.25 MG Tab, Take 1 Tab by mouth 2 Times a Day., Disp: 180 Tab, Rfl: 0  •  levothyroxine (SYNTHROID) 200 MCG Tab, TAKE 1 TABLET BY MOUTH  EVERY DAY, Disp: 90 Tab, Rfl: 1  •  benazepril (LOTENSIN) 40 MG tablet, TAKE 1 TABLET BY MOUTH  EVERY DAY, Disp: 90 Tab, Rfl: 2  •  dicyclomine (BENTYL) 20 MG Tab, Take 20 mg by mouth every 6 hours., Disp: , Rfl:   •  NOVOLOG FLEXPEN 100 UNIT/ML Solution Pen-injector solution for injection, INJECT SUBCUTANEOUSLY UP TO 22 UNITS 3 TIMES DAILY PER  DAY,  "Disp: 15 PEN, Rfl: 3  •  aspirin (ASA) 325 MG Tab, Take 325 mg by mouth every 6 hours as needed., Disp: , Rfl:       Physical Examination:   Vital signs: /60 (BP Location: Left arm, Patient Position: Sitting)   Pulse 100   Ht 1.715 m (5' 7.5\")   Wt (!) 145.2 kg (320 lb)   SpO2 93%   BMI 49.38 kg/m²   General: No distress, cooperative, well dressed and well nourished.   Eyes: No scleral icterus or discharge, No hyposphagma  ENMT: Normal on external inspection of nose, lips, No nasal drainage   Neck: No abnormal masses on inspection  Resp: Normal effort, Bilateral clear to auscultation, No wheezing, No rales  CVS: Regular rate and rhythm, S1 S2 normal, No murmur. No gallop  Extremities: No edema bilateral extremities  Neuro: Alert and oriented  Skin: No rash, No Ulcers  Psych: Normal mood and affect      Assessment and Plan:    1. Uncontrolled type 2 diabetes mellitus with hypoglycemia, unspecified hypoglycemia coma status (HCC)  They are on:  1.  Novolog 14-22 units three times a day before eating  2.  Trulicity 0.75 once a week  (INCREASE to 1.5)  3.  Basaglar  35 twice a day  4.  Metformin XR 500mg two in the AM two in the PM    2. Encounter for long-term (current) use of insulin (HCC)  Is on a high risk medication Insulin and we will continue to follow       Return in about 3 months (around 7/8/2019).    Thank you kindly for allowing me to participate in the diabetes care plan for this patient.    Joseph Avila PA-C, BC-ADM  Board Certified - [facility]  04/08/19    CC:   Manan Ortiz M.D.    "

## 2019-07-12 ENCOUNTER — APPOINTMENT (OUTPATIENT)
Dept: RADIOLOGY | Facility: MEDICAL CENTER | Age: 74
DRG: 958 | End: 2019-07-12
Attending: ORTHOPAEDIC SURGERY
Payer: COMMERCIAL

## 2019-07-12 ENCOUNTER — APPOINTMENT (OUTPATIENT)
Dept: RADIOLOGY | Facility: MEDICAL CENTER | Age: 74
DRG: 958 | End: 2019-07-12
Attending: SURGERY
Payer: COMMERCIAL

## 2019-07-12 ENCOUNTER — HOSPITAL ENCOUNTER (INPATIENT)
Facility: MEDICAL CENTER | Age: 74
LOS: 11 days | DRG: 958 | End: 2019-07-23
Attending: EMERGENCY MEDICINE | Admitting: SURGERY
Payer: COMMERCIAL

## 2019-07-12 ENCOUNTER — APPOINTMENT (OUTPATIENT)
Dept: RADIOLOGY | Facility: MEDICAL CENTER | Age: 74
DRG: 958 | End: 2019-07-12
Payer: COMMERCIAL

## 2019-07-12 ENCOUNTER — APPOINTMENT (OUTPATIENT)
Dept: RADIOLOGY | Facility: MEDICAL CENTER | Age: 74
DRG: 958 | End: 2019-07-12
Attending: EMERGENCY MEDICINE
Payer: COMMERCIAL

## 2019-07-12 ENCOUNTER — APPOINTMENT (OUTPATIENT)
Dept: RADIOLOGY | Facility: MEDICAL CENTER | Age: 74
DRG: 958 | End: 2019-07-12
Attending: INTERNAL MEDICINE
Payer: COMMERCIAL

## 2019-07-12 DIAGNOSIS — E87.20 LACTIC ACIDOSIS: ICD-10-CM

## 2019-07-12 DIAGNOSIS — S22.42XA CLOSED FRACTURE OF MULTIPLE RIBS OF LEFT SIDE, INITIAL ENCOUNTER: ICD-10-CM

## 2019-07-12 DIAGNOSIS — S41.112A LACERATION OF LEFT UPPER EXTREMITY, INITIAL ENCOUNTER: ICD-10-CM

## 2019-07-12 DIAGNOSIS — S12.9XXA CLOSED FRACTURE OF MULTIPLE CERVICAL VERTEBRAE, INITIAL ENCOUNTER (HCC): ICD-10-CM

## 2019-07-12 DIAGNOSIS — S22.009A CLOSED FRACTURE OF TRANSVERSE PROCESS OF THORACIC VERTEBRA, INITIAL ENCOUNTER (HCC): ICD-10-CM

## 2019-07-12 DIAGNOSIS — T14.90XA TRAUMA: ICD-10-CM

## 2019-07-12 DIAGNOSIS — S06.6X0A SUBARACHNOID HEMORRHAGE FOLLOWING INJURY, NO LOSS OF CONSCIOUSNESS, INITIAL ENCOUNTER (HCC): ICD-10-CM

## 2019-07-12 DIAGNOSIS — S42.002A CLOSED DISPLACED FRACTURE OF LEFT CLAVICLE, UNSPECIFIED PART OF CLAVICLE, INITIAL ENCOUNTER: ICD-10-CM
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
## 2019-07-12 DIAGNOSIS — I60.9 SUBARACHNOID HEMORRHAGE (HCC): ICD-10-CM

## 2019-07-12 PROBLEM — S27.0XXA TRAUMATIC PNEUMOTHORAX: Status: ACTIVE | Noted: 2019-07-12

## 2019-07-12 PROBLEM — E66.01 MORBID OBESITY WITH BMI OF 45.0-49.9, ADULT (HCC): Status: ACTIVE | Noted: 2019-07-12

## 2019-07-12 PROBLEM — S22.5XXA CLOSED FRACTURE OF MULTIPLE RIBS WITH FLAIL CHEST: Status: ACTIVE | Noted: 2019-07-12

## 2019-07-12 PROBLEM — R93.5 ABNORMAL CT OF THE ABDOMEN: Status: ACTIVE | Noted: 2019-07-12

## 2019-07-12 PROBLEM — T50.905A PLATELET DYSFUNCTION DUE TO DRUGS: Status: ACTIVE | Noted: 2019-07-12

## 2019-07-12 PROBLEM — D69.59 PLATELET DYSFUNCTION DUE TO DRUGS: Status: ACTIVE | Noted: 2019-07-12

## 2019-07-12 PROBLEM — E03.9 HYPOTHYROID: Status: ACTIVE | Noted: 2019-07-12

## 2019-07-12 PROBLEM — Z53.09 CONTRAINDICATION TO DEEP VEIN THROMBOSIS (DVT) PROPHYLAXIS: Status: ACTIVE | Noted: 2019-07-12

## 2019-07-12 PROBLEM — I10 ESSENTIAL HYPERTENSION: Status: ACTIVE | Noted: 2019-07-12

## 2019-07-12 PROBLEM — E11.9 DM (DIABETES MELLITUS) (HCC): Status: ACTIVE | Noted: 2019-07-12

## 2019-07-12 LAB
ABO + RH BLD: NORMAL
ABO GROUP BLD: NORMAL
ALBUMIN SERPL BCP-MCNC: 3.5 G/DL (ref 3.2–4.9)
ALBUMIN/GLOB SERPL: 1.1 G/DL
ALP SERPL-CCNC: 93 U/L (ref 30–99)
ALT SERPL-CCNC: 28 U/L (ref 2–50)
ANION GAP SERPL CALC-SCNC: 11 MMOL/L (ref 0–11.9)
APTT PPP: 26.5 SEC (ref 24.7–36)
AST SERPL-CCNC: 31 U/L (ref 12–45)
BARCODED ABORH UBTYP: 9500
BARCODED PRD CODE UBPRD: NORMAL
BARCODED UNIT NUM UBUNT: NORMAL
BASE EXCESS BLDA CALC-SCNC: -7 MMOL/L (ref -4–3)
BILIRUB SERPL-MCNC: 0.9 MG/DL (ref 0.1–1.5)
BLD GP AB SCN SERPL QL: NORMAL
BODY TEMPERATURE: ABNORMAL CENTIGRADE
BUN SERPL-MCNC: 23 MG/DL (ref 8–22)
CALCIUM SERPL-MCNC: 9.5 MG/DL (ref 8.5–10.5)
CFT BLD TEG: 5.5 MIN (ref 5–10)
CHLORIDE SERPL-SCNC: 109 MMOL/L (ref 96–112)
CLOT ANGLE BLD TEG: 67.5 DEGREES (ref 53–72)
CLOT LYSIS 30M P MA LENFR BLD TEG: 0 % (ref 0–8)
CO2 SERPL-SCNC: 17 MMOL/L (ref 20–33)
COMPONENT P 8504P: NORMAL
CREAT SERPL-MCNC: 1.13 MG/DL (ref 0.5–1.4)
CT.EXTRINSIC BLD ROTEM: 1.6 MIN (ref 1–3)
ERYTHROCYTE [DISTWIDTH] IN BLOOD BY AUTOMATED COUNT: 45.1 FL (ref 35.9–50)
ETHANOL BLD-MCNC: 0.01 G/DL
GLOBULIN SER CALC-MCNC: 3.3 G/DL (ref 1.9–3.5)
GLUCOSE BLD-MCNC: 238 MG/DL (ref 65–99)
GLUCOSE SERPL-MCNC: 268 MG/DL (ref 65–99)
HCO3 BLDA-SCNC: 17 MMOL/L (ref 17–25)
HCT VFR BLD AUTO: 41.5 % (ref 37–47)
HGB BLD-MCNC: 11.7 G/DL (ref 12–16)
HGB BLD-MCNC: 12.5 G/DL (ref 12–16)
HGB BLD-MCNC: 14 G/DL (ref 12–16)
INR PPP: 1.01 (ref 0.87–1.13)
LACTATE BLD-SCNC: 2.1 MMOL/L (ref 0.5–2)
LACTATE BLD-SCNC: 2.2 MMOL/L (ref 0.5–2)
LACTATE BLD-SCNC: 4.4 MMOL/L (ref 0.5–2)
MCF BLD TEG: 72.6 MM (ref 50–70)
MCH RBC QN AUTO: 31.8 PG (ref 27–33)
MCHC RBC AUTO-ENTMCNC: 33.7 G/DL (ref 33.6–35)
MCV RBC AUTO: 94.3 FL (ref 81.4–97.8)
PA AA BLD-ACNC: 77.9 %
PA ADP BLD-ACNC: 93.3 %
PCO2 BLDA: 29.4 MMHG (ref 26–37)
PH BLDA: 7.38 [PH] (ref 7.4–7.5)
PLATELET # BLD AUTO: 291 K/UL (ref 164–446)
PMV BLD AUTO: 10.9 FL (ref 9–12.9)
PO2 BLDA: 88.5 MMHG (ref 64–87)
POTASSIUM SERPL-SCNC: 5 MMOL/L (ref 3.6–5.5)
PRODUCT TYPE UPROD: NORMAL
PROT SERPL-MCNC: 6.8 G/DL (ref 6–8.2)
PROTHROMBIN TIME: 13.5 SEC (ref 12–14.6)
RBC # BLD AUTO: 4.4 M/UL (ref 4.2–5.4)
RH BLD: NORMAL
SAO2 % BLDA: 95.8 % (ref 93–99)
SODIUM SERPL-SCNC: 137 MMOL/L (ref 135–145)
TEG ALGORITHM TGALG: ABNORMAL
UNIT STATUS USTAT: NORMAL
WBC # BLD AUTO: 23.8 K/UL (ref 4.8–10.8)

## 2019-07-12 PROCEDURE — 99291 CRITICAL CARE FIRST HOUR: CPT

## 2019-07-12 PROCEDURE — 73090 X-RAY EXAM OF FOREARM: CPT | Mod: LT

## 2019-07-12 PROCEDURE — 96365 THER/PROPH/DIAG IV INF INIT: CPT

## 2019-07-12 PROCEDURE — 80053 COMPREHEN METABOLIC PANEL: CPT

## 2019-07-12 PROCEDURE — 700111 HCHG RX REV CODE 636 W/ 250 OVERRIDE (IP): Performed by: SURGERY

## 2019-07-12 PROCEDURE — 76705 ECHO EXAM OF ABDOMEN: CPT

## 2019-07-12 PROCEDURE — 85018 HEMOGLOBIN: CPT | Mod: 91

## 2019-07-12 PROCEDURE — 700117 HCHG RX CONTRAST REV CODE 255: Performed by: INTERNAL MEDICINE

## 2019-07-12 PROCEDURE — G0390 TRAUMA RESPONS W/HOSP CRITI: HCPCS

## 2019-07-12 PROCEDURE — 86850 RBC ANTIBODY SCREEN: CPT

## 2019-07-12 PROCEDURE — 85610 PROTHROMBIN TIME: CPT

## 2019-07-12 PROCEDURE — 86900 BLOOD TYPING SEROLOGIC ABO: CPT

## 2019-07-12 PROCEDURE — 82962 GLUCOSE BLOOD TEST: CPT

## 2019-07-12 PROCEDURE — 770022 HCHG ROOM/CARE - ICU (200)

## 2019-07-12 PROCEDURE — 83036 HEMOGLOBIN GLYCOSYLATED A1C: CPT

## 2019-07-12 PROCEDURE — 85730 THROMBOPLASTIN TIME PARTIAL: CPT

## 2019-07-12 PROCEDURE — 70450 CT HEAD/BRAIN W/O DYE: CPT

## 2019-07-12 PROCEDURE — 73060 X-RAY EXAM OF HUMERUS: CPT | Mod: LT

## 2019-07-12 PROCEDURE — 36430 TRANSFUSION BLD/BLD COMPNT: CPT

## 2019-07-12 PROCEDURE — 85347 COAGULATION TIME ACTIVATED: CPT | Mod: 91

## 2019-07-12 PROCEDURE — P9034 PLATELETS, PHERESIS: HCPCS

## 2019-07-12 PROCEDURE — 73000 X-RAY EXAM OF COLLAR BONE: CPT | Mod: LT

## 2019-07-12 PROCEDURE — 83605 ASSAY OF LACTIC ACID: CPT | Mod: 91

## 2019-07-12 PROCEDURE — 86901 BLOOD TYPING SEROLOGIC RH(D): CPT

## 2019-07-12 PROCEDURE — 72128 CT CHEST SPINE W/O DYE: CPT

## 2019-07-12 PROCEDURE — A9270 NON-COVERED ITEM OR SERVICE: HCPCS | Performed by: NURSE PRACTITIONER

## 2019-07-12 PROCEDURE — 85027 COMPLETE CBC AUTOMATED: CPT

## 2019-07-12 PROCEDURE — 700101 HCHG RX REV CODE 250: Performed by: SURGERY

## 2019-07-12 PROCEDURE — 82803 BLOOD GASES ANY COMBINATION: CPT

## 2019-07-12 PROCEDURE — 96375 TX/PRO/DX INJ NEW DRUG ADDON: CPT

## 2019-07-12 PROCEDURE — 71045 X-RAY EXAM CHEST 1 VIEW: CPT

## 2019-07-12 PROCEDURE — 72170 X-RAY EXAM OF PELVIS: CPT

## 2019-07-12 PROCEDURE — 71260 CT THORAX DX C+: CPT

## 2019-07-12 PROCEDURE — 72131 CT LUMBAR SPINE W/O DYE: CPT

## 2019-07-12 PROCEDURE — 80307 DRUG TEST PRSMV CHEM ANLYZR: CPT

## 2019-07-12 PROCEDURE — 700105 HCHG RX REV CODE 258: Performed by: SURGERY

## 2019-07-12 PROCEDURE — 700111 HCHG RX REV CODE 636 W/ 250 OVERRIDE (IP): Performed by: EMERGENCY MEDICINE

## 2019-07-12 PROCEDURE — A9270 NON-COVERED ITEM OR SERVICE: HCPCS | Performed by: SURGERY

## 2019-07-12 PROCEDURE — 85576 BLOOD PLATELET AGGREGATION: CPT | Mod: 91

## 2019-07-12 PROCEDURE — 700102 HCHG RX REV CODE 250 W/ 637 OVERRIDE(OP): Performed by: SURGERY

## 2019-07-12 PROCEDURE — 72125 CT NECK SPINE W/O DYE: CPT

## 2019-07-12 PROCEDURE — 700102 HCHG RX REV CODE 250 W/ 637 OVERRIDE(OP): Performed by: NURSE PRACTITIONER

## 2019-07-12 PROCEDURE — 85384 FIBRINOGEN ACTIVITY: CPT | Mod: 91

## 2019-07-12 RX ORDER — POLYETHYLENE GLYCOL 3350 17 G/17G
1 POWDER, FOR SOLUTION ORAL 2 TIMES DAILY
Status: DISCONTINUED | OUTPATIENT
Start: 2019-07-12 | End: 2019-07-15

## 2019-07-12 RX ORDER — CEFAZOLIN SODIUM 2 G/100ML
2 INJECTION, SOLUTION INTRAVENOUS ONCE
Status: COMPLETED | OUTPATIENT
Start: 2019-07-12 | End: 2019-07-12

## 2019-07-12 RX ORDER — LEVOTHYROXINE SODIUM 0.2 MG/1
200 TABLET ORAL
Status: ON HOLD | COMMUNITY
End: 2019-07-23

## 2019-07-12 RX ORDER — ENEMA 19; 7 G/133ML; G/133ML
1 ENEMA RECTAL
Status: DISCONTINUED | OUTPATIENT
Start: 2019-07-12 | End: 2019-07-23 | Stop reason: HOSPADM

## 2019-07-12 RX ORDER — ASPIRIN 325 MG
325 TABLET ORAL DAILY
Status: ON HOLD | COMMUNITY
End: 2019-07-23

## 2019-07-12 RX ORDER — BENAZEPRIL HYDROCHLORIDE 20 MG/1
40 TABLET ORAL DAILY
Status: DISCONTINUED | OUTPATIENT
Start: 2019-07-13 | End: 2019-07-12

## 2019-07-12 RX ORDER — DIPHENHYDRAMINE HYDROCHLORIDE 50 MG/ML
50 INJECTION INTRAMUSCULAR; INTRAVENOUS ONCE
Status: COMPLETED | OUTPATIENT
Start: 2019-07-12 | End: 2019-07-12

## 2019-07-12 RX ORDER — BISACODYL 10 MG
10 SUPPOSITORY, RECTAL RECTAL
Status: DISCONTINUED | OUTPATIENT
Start: 2019-07-12 | End: 2019-07-23 | Stop reason: HOSPADM

## 2019-07-12 RX ORDER — CARVEDILOL 6.25 MG/1
6.25 TABLET ORAL 2 TIMES DAILY WITH MEALS
Status: DISCONTINUED | OUTPATIENT
Start: 2019-07-12 | End: 2019-07-15

## 2019-07-12 RX ORDER — LEVOTHYROXINE SODIUM 0.2 MG/1
200 TABLET ORAL
Status: DISCONTINUED | OUTPATIENT
Start: 2019-07-13 | End: 2019-07-15

## 2019-07-12 RX ORDER — ONDANSETRON 2 MG/ML
4 INJECTION INTRAMUSCULAR; INTRAVENOUS EVERY 4 HOURS PRN
Status: DISCONTINUED | OUTPATIENT
Start: 2019-07-12 | End: 2019-07-23 | Stop reason: HOSPADM

## 2019-07-12 RX ORDER — LABETALOL HYDROCHLORIDE 5 MG/ML
5-10 INJECTION, SOLUTION INTRAVENOUS EVERY 4 HOURS PRN
Status: DISCONTINUED | OUTPATIENT
Start: 2019-07-12 | End: 2019-07-23 | Stop reason: HOSPADM

## 2019-07-12 RX ORDER — AMOXICILLIN 250 MG
1 CAPSULE ORAL
Status: DISCONTINUED | OUTPATIENT
Start: 2019-07-12 | End: 2019-07-15

## 2019-07-12 RX ORDER — AMOXICILLIN 250 MG
1 CAPSULE ORAL NIGHTLY
Status: DISCONTINUED | OUTPATIENT
Start: 2019-07-12 | End: 2019-07-15

## 2019-07-12 RX ORDER — BENAZEPRIL HYDROCHLORIDE 40 MG/1
40 TABLET ORAL DAILY
Status: ON HOLD | COMMUNITY
End: 2019-07-23

## 2019-07-12 RX ORDER — SODIUM CHLORIDE 9 MG/ML
INJECTION, SOLUTION INTRAVENOUS CONTINUOUS
Status: DISCONTINUED | OUTPATIENT
Start: 2019-07-12 | End: 2019-07-14

## 2019-07-12 RX ORDER — FAMOTIDINE 20 MG/1
20 TABLET, FILM COATED ORAL 2 TIMES DAILY
Status: DISCONTINUED | OUTPATIENT
Start: 2019-07-12 | End: 2019-07-13

## 2019-07-12 RX ORDER — INSULIN GLARGINE 100 [IU]/ML
36 INJECTION, SOLUTION SUBCUTANEOUS 2 TIMES DAILY
Status: ON HOLD | COMMUNITY
End: 2019-07-23

## 2019-07-12 RX ORDER — METFORMIN HYDROCHLORIDE 500 MG/1
1000 TABLET, EXTENDED RELEASE ORAL 2 TIMES DAILY
Status: ON HOLD | COMMUNITY
End: 2019-07-23

## 2019-07-12 RX ORDER — BENAZEPRIL HYDROCHLORIDE 20 MG/1
40 TABLET ORAL DAILY
Status: DISCONTINUED | OUTPATIENT
Start: 2019-07-12 | End: 2019-07-15

## 2019-07-12 RX ORDER — HYDRALAZINE HYDROCHLORIDE 20 MG/ML
10 INJECTION INTRAMUSCULAR; INTRAVENOUS EVERY 4 HOURS PRN
Status: DISCONTINUED | OUTPATIENT
Start: 2019-07-12 | End: 2019-07-19

## 2019-07-12 RX ORDER — ACETAMINOPHEN 325 MG/1
650 TABLET ORAL EVERY 6 HOURS
Status: DISCONTINUED | OUTPATIENT
Start: 2019-07-12 | End: 2019-07-15

## 2019-07-12 RX ORDER — DOCUSATE SODIUM 100 MG/1
100 CAPSULE, LIQUID FILLED ORAL 2 TIMES DAILY
Status: DISCONTINUED | OUTPATIENT
Start: 2019-07-12 | End: 2019-07-15

## 2019-07-12 RX ORDER — HYDROMORPHONE HYDROCHLORIDE 2 MG/1
2-4 TABLET ORAL
Status: DISCONTINUED | OUTPATIENT
Start: 2019-07-12 | End: 2019-07-15

## 2019-07-12 RX ORDER — OXYCODONE HYDROCHLORIDE 5 MG/1
5 TABLET ORAL
Status: DISCONTINUED | OUTPATIENT
Start: 2019-07-12 | End: 2019-07-12

## 2019-07-12 RX ORDER — CARVEDILOL 6.25 MG/1
6.25 TABLET ORAL 2 TIMES DAILY WITH MEALS
Status: ON HOLD | COMMUNITY
End: 2019-07-23

## 2019-07-12 RX ORDER — BACITRACIN ZINC AND POLYMYXIN B SULFATE 500; 1000 [USP'U]/G; [USP'U]/G
OINTMENT TOPICAL 3 TIMES DAILY
Status: DISCONTINUED | OUTPATIENT
Start: 2019-07-12 | End: 2019-07-23 | Stop reason: HOSPADM

## 2019-07-12 RX ADMIN — CEFAZOLIN SODIUM 2 G: 2 INJECTION, SOLUTION INTRAVENOUS at 14:00

## 2019-07-12 RX ADMIN — FENTANYL CITRATE 50 MCG: 0.05 INJECTION, SOLUTION INTRAMUSCULAR; INTRAVENOUS at 15:24

## 2019-07-12 RX ADMIN — BENAZEPRIL HYDROCHLORIDE 40 MG: 20 TABLET ORAL at 22:41

## 2019-07-12 RX ADMIN — SODIUM CHLORIDE 500 MG: 9 INJECTION, SOLUTION INTRAVENOUS at 19:16

## 2019-07-12 RX ADMIN — INSULIN HUMAN 2 UNITS: 100 INJECTION, SOLUTION PARENTERAL at 18:32

## 2019-07-12 RX ADMIN — FENTANYL CITRATE 50 MCG: 0.05 INJECTION, SOLUTION INTRAMUSCULAR; INTRAVENOUS at 18:54

## 2019-07-12 RX ADMIN — DIPHENHYDRAMINE HYDROCHLORIDE 50 MG: 50 INJECTION INTRAMUSCULAR; INTRAVENOUS at 13:40

## 2019-07-12 RX ADMIN — FENTANYL CITRATE 50 MCG: 0.05 INJECTION, SOLUTION INTRAMUSCULAR; INTRAVENOUS at 22:35

## 2019-07-12 RX ADMIN — CARVEDILOL 6.25 MG: 6.25 TABLET, FILM COATED ORAL at 19:15

## 2019-07-12 RX ADMIN — Medication 1 EACH: at 18:42

## 2019-07-12 RX ADMIN — LABETALOL HYDROCHLORIDE 10 MG: 5 INJECTION INTRAVENOUS at 18:27

## 2019-07-12 RX ADMIN — INSULIN HUMAN 2 UNITS: 100 INJECTION, SOLUTION PARENTERAL at 23:44

## 2019-07-12 RX ADMIN — HYDRALAZINE HYDROCHLORIDE 10 MG: 20 INJECTION INTRAMUSCULAR; INTRAVENOUS at 19:09

## 2019-07-12 RX ADMIN — HYDROCORTISONE SODIUM SUCCINATE 100 MG: 100 INJECTION, POWDER, FOR SOLUTION INTRAMUSCULAR; INTRAVENOUS at 13:40

## 2019-07-12 RX ADMIN — SODIUM CHLORIDE: 9 INJECTION, SOLUTION INTRAVENOUS at 16:49

## 2019-07-12 RX ADMIN — FAMOTIDINE 20 MG: 10 INJECTION INTRAVENOUS at 18:41

## 2019-07-12 RX ADMIN — ACETAMINOPHEN 650 MG: 325 TABLET, FILM COATED ORAL at 23:37

## 2019-07-12 RX ADMIN — FENTANYL CITRATE 50 MCG: 0.05 INJECTION, SOLUTION INTRAMUSCULAR; INTRAVENOUS at 20:35

## 2019-07-12 RX ADMIN — IOHEXOL 100 ML: 350 INJECTION, SOLUTION INTRAVENOUS at 14:09

## 2019-07-12 ASSESSMENT — LIFESTYLE VARIABLES: EVER_SMOKED: YES

## 2019-07-12 ASSESSMENT — COPD QUESTIONNAIRES
HAVE YOU SMOKED AT LEAST 100 CIGARETTES IN YOUR ENTIRE LIFE: YES
DO YOU EVER COUGH UP ANY MUCUS OR PHLEGM?: NO/ONLY WITH OCCASIONAL COLDS OR INFECTIONS
COPD SCREENING SCORE: 4
DURING THE PAST 4 WEEKS HOW MUCH DID YOU FEEL SHORT OF BREATH: NONE/LITTLE OF THE TIME

## 2019-07-12 NOTE — ASSESSMENT & PLAN NOTE
Trace left pneumothorax noted on CT  Chest tube not indicated at time of admission   Aggressive pulmonary hygiene and serial chest radiography.  7/18 Chest x-ray without pneumothorax .

## 2019-07-12 NOTE — ED NOTES
The Medication Reconciliation process has been completed by interviewing the patient    Allergies have been reviewed  Antibiotic use in 30 days - none    Home Pharmacy:  [facility] [address] [facility] [address] in [place]

## 2019-07-12 NOTE — ASSESSMENT & PLAN NOTE
Chronic condition treated with Dulaglutide, Novolog, Lantus and Metformin.  Hold metformin  Insulin sliding scale during acute hospitalization.   7/14 Increased sliding scale dosing  7/16 Lantus added  7/17 increase Lantus, sugars remain high.  7/21 Blood sugars remain high - diet initiated  7/22 Metformin resumed  Hemoglobin A1C 6.6  (143)

## 2019-07-12 NOTE — ASSESSMENT & PLAN NOTE
Tiny focus of subarachnoid or subpial hemorrhage over the right central sulcus.  Vague small area of hemorrhagic density over the right parietal cortex which may represent subarachnoid, subpial, or parenchymal hemorrhage  Repeat head CT without significant change   Non-operative management.  Prophylactic Keppra x 7 days  7/21 Speech Language Pathology cognitive evaluation pending  Julio Ricardo MD. Neurosurgery.
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
## 2019-07-12 NOTE — ED NOTES
Robinson from Lab called with critical result of lactic at 4.4. Critical lab result read back to Robinson.   Dr. Pritchett notified of critical lab result at 1401.  Critical lab result read back by Dr. Pritchett.
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
## 2019-07-12 NOTE — DISCHARGE PLANNING
Ongoing: SW was able to get in touch w/ pt's spouse who states that he is on his way to  their son and will come to ER right after that.

## 2019-07-12 NOTE — RESPIRATORY CARE
Respiratory Trauma Red Note    Intubation No    Patient placed on 4L NC. No other respiratory interventions required at this time. See trauma narrator notes for full details.

## 2019-07-12 NOTE — ASSESSMENT & PLAN NOTE
Fractures of the left transverse processes of C7 and T1  Cervical collar placed prior to admission  7/16 CTA refused by patient  Non-operative management.   Cervical collar immobilization x 2 weeks, then repeat imaging.  Julio Ricardo MD. Neurosurgery.

## 2019-07-12 NOTE — ASSESSMENT & PLAN NOTE
Takes ASA premorbid  TEG with platelet mapping with elevated AA inhibition  Transfused 1 unit platelets on admission    7/13 Repeat platelet mapping with decreased AA inhibition

## 2019-07-12 NOTE — ASSESSMENT & PLAN NOTE
Fractures of the anterior segments of the left third through seventh ribs.   Fractures of the posterior segments of the left fourth, fifth, sixth ribs.  Fracture medial segment right first rib.  Aggressive pulmonary hygiene and multimodal pain management and serial chest radiography.

## 2019-07-13 ENCOUNTER — APPOINTMENT (OUTPATIENT)
Dept: RADIOLOGY | Facility: MEDICAL CENTER | Age: 74
DRG: 958 | End: 2019-07-13
Attending: SURGERY
Payer: COMMERCIAL

## 2019-07-13 PROBLEM — S51.812A LACERATION OF LEFT FOREARM: Status: ACTIVE | Noted: 2019-07-13

## 2019-07-13 LAB
ALBUMIN SERPL BCP-MCNC: 3 G/DL (ref 3.2–4.9)
ALBUMIN/GLOB SERPL: 1.1 G/DL
ALP SERPL-CCNC: 66 U/L (ref 30–99)
ALT SERPL-CCNC: 26 U/L (ref 2–50)
ANION GAP SERPL CALC-SCNC: 9 MMOL/L (ref 0–11.9)
AST SERPL-CCNC: 43 U/L (ref 12–45)
BASOPHILS # BLD AUTO: 0.3 % (ref 0–1.8)
BASOPHILS # BLD: 0.05 K/UL (ref 0–0.12)
BILIRUB SERPL-MCNC: 0.9 MG/DL (ref 0.1–1.5)
BUN SERPL-MCNC: 28 MG/DL (ref 8–22)
CALCIUM SERPL-MCNC: 8.4 MG/DL (ref 8.5–10.5)
CFT BLD TEG: 2.5 MIN (ref 5–10)
CHLORIDE SERPL-SCNC: 112 MMOL/L (ref 96–112)
CLOT ANGLE BLD TEG: 73.8 DEGREES (ref 53–72)
CLOT LYSIS 30M P MA LENFR BLD TEG: 0 % (ref 0–8)
CO2 SERPL-SCNC: 18 MMOL/L (ref 20–33)
CREAT SERPL-MCNC: 1.13 MG/DL (ref 0.5–1.4)
CT.EXTRINSIC BLD ROTEM: 1.2 MIN (ref 1–3)
EOSINOPHIL # BLD AUTO: 0 K/UL (ref 0–0.51)
EOSINOPHIL NFR BLD: 0 % (ref 0–6.9)
ERYTHROCYTE [DISTWIDTH] IN BLOOD BY AUTOMATED COUNT: 47.3 FL (ref 35.9–50)
EST. AVERAGE GLUCOSE BLD GHB EST-MCNC: 143 MG/DL
GLOBULIN SER CALC-MCNC: 2.7 G/DL (ref 1.9–3.5)
GLUCOSE BLD-MCNC: 213 MG/DL (ref 65–99)
GLUCOSE BLD-MCNC: 235 MG/DL (ref 65–99)
GLUCOSE BLD-MCNC: 248 MG/DL (ref 65–99)
GLUCOSE BLD-MCNC: 257 MG/DL (ref 65–99)
GLUCOSE BLD-MCNC: 261 MG/DL (ref 65–99)
GLUCOSE SERPL-MCNC: 238 MG/DL (ref 65–99)
HBA1C MFR BLD: 6.6 % (ref 0–5.6)
HCT VFR BLD AUTO: 32.6 % (ref 37–47)
HGB BLD-MCNC: 10.6 G/DL (ref 12–16)
IMM GRANULOCYTES # BLD AUTO: 0.04 K/UL (ref 0–0.11)
IMM GRANULOCYTES NFR BLD AUTO: 0.3 % (ref 0–0.9)
LACTATE BLD-SCNC: 2.3 MMOL/L (ref 0.5–2)
LACTATE BLD-SCNC: 2.7 MMOL/L (ref 0.5–2)
LYMPHOCYTES # BLD AUTO: 2.14 K/UL (ref 1–4.8)
LYMPHOCYTES NFR BLD: 14.2 % (ref 22–41)
MAGNESIUM SERPL-MCNC: 1.9 MG/DL (ref 1.5–2.5)
MCF BLD TEG: 69.5 MM (ref 50–70)
MCH RBC QN AUTO: 31.4 PG (ref 27–33)
MCHC RBC AUTO-ENTMCNC: 32.5 G/DL (ref 33.6–35)
MCV RBC AUTO: 96.4 FL (ref 81.4–97.8)
MONOCYTES # BLD AUTO: 1.37 K/UL (ref 0–0.85)
MONOCYTES NFR BLD AUTO: 9.1 % (ref 0–13.4)
NEUTROPHILS # BLD AUTO: 11.49 K/UL (ref 2–7.15)
NEUTROPHILS NFR BLD: 76.1 % (ref 44–72)
NRBC # BLD AUTO: 0 K/UL
NRBC BLD-RTO: 0 /100 WBC
PA AA BLD-ACNC: 41.4 %
PA ADP BLD-ACNC: 55.3 %
PHOSPHATE SERPL-MCNC: 4.3 MG/DL (ref 2.5–4.5)
PLATELET # BLD AUTO: 231 K/UL (ref 164–446)
PMV BLD AUTO: 10.8 FL (ref 9–12.9)
POTASSIUM SERPL-SCNC: 5.2 MMOL/L (ref 3.6–5.5)
PROT SERPL-MCNC: 5.7 G/DL (ref 6–8.2)
RBC # BLD AUTO: 3.38 M/UL (ref 4.2–5.4)
SODIUM SERPL-SCNC: 139 MMOL/L (ref 135–145)
TEG ALGORITHM TGALG: ABNORMAL
WBC # BLD AUTO: 15.1 K/UL (ref 4.8–10.8)

## 2019-07-13 PROCEDURE — A6250 SKIN SEAL PROTECT MOISTURIZR: HCPCS | Performed by: SURGERY

## 2019-07-13 PROCEDURE — 71045 X-RAY EXAM CHEST 1 VIEW: CPT

## 2019-07-13 PROCEDURE — A9270 NON-COVERED ITEM OR SERVICE: HCPCS | Performed by: NURSE PRACTITIONER

## 2019-07-13 PROCEDURE — 700102 HCHG RX REV CODE 250 W/ 637 OVERRIDE(OP): Performed by: SURGERY

## 2019-07-13 PROCEDURE — A9270 NON-COVERED ITEM OR SERVICE: HCPCS | Performed by: SURGERY

## 2019-07-13 PROCEDURE — 80053 COMPREHEN METABOLIC PANEL: CPT

## 2019-07-13 PROCEDURE — 700105 HCHG RX REV CODE 258: Performed by: SURGERY

## 2019-07-13 PROCEDURE — 92610 EVALUATE SWALLOWING FUNCTION: CPT

## 2019-07-13 PROCEDURE — 770022 HCHG ROOM/CARE - ICU (200)

## 2019-07-13 PROCEDURE — 83605 ASSAY OF LACTIC ACID: CPT | Mod: 91

## 2019-07-13 PROCEDURE — 82962 GLUCOSE BLOOD TEST: CPT

## 2019-07-13 PROCEDURE — 85025 COMPLETE CBC W/AUTO DIFF WBC: CPT

## 2019-07-13 PROCEDURE — 83735 ASSAY OF MAGNESIUM: CPT

## 2019-07-13 PROCEDURE — 700101 HCHG RX REV CODE 250

## 2019-07-13 PROCEDURE — 700101 HCHG RX REV CODE 250: Performed by: SURGERY

## 2019-07-13 PROCEDURE — 700112 HCHG RX REV CODE 229: Performed by: SURGERY

## 2019-07-13 PROCEDURE — 84100 ASSAY OF PHOSPHORUS: CPT

## 2019-07-13 PROCEDURE — 700102 HCHG RX REV CODE 250 W/ 637 OVERRIDE(OP): Performed by: NURSE PRACTITIONER

## 2019-07-13 PROCEDURE — 700111 HCHG RX REV CODE 636 W/ 250 OVERRIDE (IP): Performed by: SURGERY

## 2019-07-13 PROCEDURE — 12002 RPR S/N/AX/GEN/TRNK2.6-7.5CM: CPT | Performed by: SURGERY

## 2019-07-13 PROCEDURE — 99233 SBSQ HOSP IP/OBS HIGH 50: CPT | Mod: 25 | Performed by: SURGERY

## 2019-07-13 RX ORDER — LIDOCAINE HYDROCHLORIDE AND EPINEPHRINE 10; 10 MG/ML; UG/ML
INJECTION, SOLUTION INFILTRATION; PERINEURAL
Status: COMPLETED
Start: 2019-07-13 | End: 2019-07-13

## 2019-07-13 RX ORDER — LEVETIRACETAM 500 MG/1
500 TABLET ORAL 2 TIMES DAILY
Status: DISCONTINUED | OUTPATIENT
Start: 2019-07-13 | End: 2019-07-14

## 2019-07-13 RX ORDER — NYSTATIN 100000 [USP'U]/G
POWDER TOPICAL 2 TIMES DAILY
Status: DISCONTINUED | OUTPATIENT
Start: 2019-07-13 | End: 2019-07-23 | Stop reason: HOSPADM

## 2019-07-13 RX ADMIN — SODIUM CHLORIDE 500 MG: 9 INJECTION, SOLUTION INTRAVENOUS at 05:40

## 2019-07-13 RX ADMIN — LIDOCAINE HYDROCHLORIDE,EPINEPHRINE BITARTRATE: 10; .01 INJECTION, SOLUTION INFILTRATION; PERINEURAL at 10:45

## 2019-07-13 RX ADMIN — INSULIN HUMAN 3 UNITS: 100 INJECTION, SOLUTION PARENTERAL at 23:29

## 2019-07-13 RX ADMIN — HYDROMORPHONE HYDROCHLORIDE 4 MG: 2 TABLET ORAL at 02:50

## 2019-07-13 RX ADMIN — DOCUSATE SODIUM 100 MG: 100 CAPSULE, LIQUID FILLED ORAL at 18:11

## 2019-07-13 RX ADMIN — NYSTATIN: 100000 POWDER TOPICAL at 20:30

## 2019-07-13 RX ADMIN — Medication 1 EACH: at 05:40

## 2019-07-13 RX ADMIN — INSULIN HUMAN 2 UNITS: 100 INJECTION, SOLUTION PARENTERAL at 05:45

## 2019-07-13 RX ADMIN — HYDROMORPHONE HYDROCHLORIDE 2 MG: 2 TABLET ORAL at 23:25

## 2019-07-13 RX ADMIN — INSULIN HUMAN 3 UNITS: 100 INJECTION, SOLUTION PARENTERAL at 18:13

## 2019-07-13 RX ADMIN — ONDANSETRON 4 MG: 2 INJECTION INTRAMUSCULAR; INTRAVENOUS at 13:14

## 2019-07-13 RX ADMIN — LEVETIRACETAM 500 MG: 500 TABLET ORAL at 18:11

## 2019-07-13 RX ADMIN — CARVEDILOL 6.25 MG: 6.25 TABLET, FILM COATED ORAL at 18:11

## 2019-07-13 RX ADMIN — HYDROMORPHONE HYDROCHLORIDE 2 MG: 2 TABLET ORAL at 13:11

## 2019-07-13 RX ADMIN — LEVOTHYROXINE SODIUM 200 MCG: 200 TABLET ORAL at 05:39

## 2019-07-13 RX ADMIN — ACETAMINOPHEN 650 MG: 325 TABLET, FILM COATED ORAL at 12:23

## 2019-07-13 RX ADMIN — ACETAMINOPHEN 650 MG: 325 TABLET, FILM COATED ORAL at 23:25

## 2019-07-13 RX ADMIN — Medication 1 EACH: at 18:11

## 2019-07-13 RX ADMIN — FENTANYL CITRATE 50 MCG: 0.05 INJECTION, SOLUTION INTRAMUSCULAR; INTRAVENOUS at 01:21

## 2019-07-13 RX ADMIN — ONDANSETRON 4 MG: 2 INJECTION INTRAMUSCULAR; INTRAVENOUS at 06:49

## 2019-07-13 RX ADMIN — DOCUSATE SODIUM 100 MG: 100 CAPSULE, LIQUID FILLED ORAL at 05:39

## 2019-07-13 RX ADMIN — Medication 1 EACH: at 12:23

## 2019-07-13 RX ADMIN — ACETAMINOPHEN 650 MG: 325 TABLET, FILM COATED ORAL at 05:37

## 2019-07-13 RX ADMIN — ONDANSETRON 4 MG: 2 INJECTION INTRAMUSCULAR; INTRAVENOUS at 02:40

## 2019-07-13 RX ADMIN — SENNOSIDES, DOCUSATE SODIUM 1 TABLET: 50; 8.6 TABLET, FILM COATED ORAL at 21:30

## 2019-07-13 RX ADMIN — CARVEDILOL 6.25 MG: 6.25 TABLET, FILM COATED ORAL at 06:44

## 2019-07-13 RX ADMIN — FAMOTIDINE 20 MG: 20 TABLET, FILM COATED ORAL at 05:40

## 2019-07-13 RX ADMIN — POLYETHYLENE GLYCOL 3350 1 PACKET: 17 POWDER, FOR SOLUTION ORAL at 18:11

## 2019-07-13 RX ADMIN — INSULIN HUMAN 2 UNITS: 100 INJECTION, SOLUTION PARENTERAL at 12:25

## 2019-07-13 RX ADMIN — SODIUM CHLORIDE: 9 INJECTION, SOLUTION INTRAVENOUS at 08:19

## 2019-07-13 RX ADMIN — ACETAMINOPHEN 650 MG: 325 TABLET, FILM COATED ORAL at 18:11

## 2019-07-13 ASSESSMENT — ENCOUNTER SYMPTOMS
FATIGUE: 1
NUMBNESS: 0
ARTHRALGIAS: 1
NECK STIFFNESS: 1
WEAKNESS: 0
ABDOMINAL PAIN: 0
ABDOMINAL DISTENTION: 0
MYALGIAS: 1
TROUBLE SWALLOWING: 0
NAUSEA: 1
SPEECH DIFFICULTY: 0
HEADACHES: 1
NECK PAIN: 1

## 2019-07-13 NOTE — PROGRESS NOTES
"TRAUMA TERTIARY SURVEY     Mental status adequate for full examination?: Yes, but sleepy    Spine cleared (radiologically and/or clinically): No    PHYSICAL EXAMINATION:  Vitals: /53   Pulse 85   Temp 36.1 °C (97 °F) (Temporal)   Resp 12   Ht 1.702 m (5' 7\")   Wt (!) 145.4 kg (320 lb 8.8 oz)   SpO2 95%   BMI 50.21 kg/m²   Constitutional:     General Appearance: appears stated age, is in no apparent distress.  HEENT:     No significant external craniofacial trauma. The pupils are equal, round, and reactive to light bilaterally. The extraocular muscles are intact bilaterally. The nares and oropharynx are clear. The midface and jaw are stable. No malocclusion is evident.  Neck:    The cervical spine is immobilized with a hard collar.  Respiratory:   Inspection: Unlabored respirations, no intercostal retractions, paradoxical motion, or accessory muscle use.   Palpation:  The chest is tender near rib fractures. Midshaft left clavicle pain on palpation .   Auscultation: normal.  Cardiovascular:   Auscultation: normal and regular rate and rhythm.   Peripheral Pulses: normal  Abdomen:   Abdomen is soft, nontender, without organomegaly or masses.  Genitourinary:   (FEMALE): normal female external genitalia without lesions.  Musculoskeletal:   The pelvis is stable. right forearm laceration with significant subq bruising   Back:   The thoracolumbar spine was examined. Examination is remarkable for tenderness in the cervical region.  Skin:   The skin is warm, dry and  remarkable for abrasions of the left forearm.  Neurologic:    [place] Coma Scale (GCS) 15  . Neurologic examination revealed no focal deficits noted, mental status intact.  Psychiatric:   The patient does not appear depressed or anxious.    IMAGING:  DX-CHEST-PORTABLE (1 VIEW)   Final Result         1.  New 6.8 mm left pneumothorax.   2.  Hazy linear density at the left lung base compatible with atelectasis and/or infiltrate.   3.  Cardiomegaly   4.  " Atherosclerosis      These findings were discussed with the patient's clinician, Omkar Melvin, on 7/13/2019 6:11 AM.      CT-HEAD W/O   Final Result         1.  Subtle hyperdensity in the bilateral parietal sulci, appearance most compatible with subarachnoid hemorrhage.      DX-CLAVICLE LEFT   Final Result         1.  Mid shaft left clavicular diaphyseal fracture.      US-ABDOMEN [facility].A.S[facility] LTD (FOR ED USE ONLY)   Final Result      No free fluid identified.      CT-CHEST,ABDOMEN,PELVIS WITH   Final Result   Addendum 1 of 1   Findings were discussed with OMKAR MELVIN on 7/12/2019 2:55 PM.      Final      Fractures of the left transverse processes of C7 and T1.   Fracture medial segment right first rib.   Fractures of the anterior segments of the left third through seventh ribs.   Fractures of the posterior segments of the left fourth, fifth, sixth ribs.   Small contusions adjacent to the posterior rib fractures.   Trace left pneumothorax and possible trace left pleural effusion.   Atelectasis both lower lobes and lingula. Mild pneumonitis not excluded.   Aorta appears intact. No mediastinal or retroperitoneal hematoma.   No intra-abdominal solid organ injury no free fluid within the abdomen and pelvis.   Hepatic steatosis.   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   1.1 cm enhancing nodule left kidney with neoplasm not excluded. Follow-up imaging recommended.      CT-TSPINE W/O PLUS RECONS   Final Result      Fractures of the transverse processes of C7 and T1 on the left.      Fractures of the right first rib and the fourth, fifth and sixth ribs on the left.      Trace left pneumothorax.      Degenerative changes in the thoracic spine.         CT-CSPINE WITHOUT PLUS RECONS   Final Result      Fractures of the left transverse processes of C7 and T1.   Fracture medial segment of the right first rib.   No other fractures identified.   Motion and misregistration artifact on some images.   Mild  C6-7 retrolisthesis and C6-7 degenerative disc disease.      CT-HEAD W/O   Final Result      1.  Scalp hematomas over the left parietal region and right frontal convexity.   2.  Tiny focus of subarachnoid or subpial hemorrhage over the right central sulcus.   3.  Vague small area of hemorrhagic density over the right parietal cortex which may represent subarachnoid, subpial, or parenchymal hemorrhage.   4.  Possible nonhemorrhagic acute posttraumatic brain contusion in the left low anterior frontal lobe.      CT-LSPINE W/O PLUS RECONS   Final Result      Multilevel degenerative changes as above described.      8 x 3.2 cm left adrenal lesion likely represents a myelolipoma given the presence of intralesional fat.      1.2 cm heterogeneously enhancing lesion in the posterior lower pole of the left kidney suspicious for renal cell carcinoma. Interventional radiology consultation recommended.      Atherosclerotic plaque.      Hepatic steatosis.         DX-FOREARM LEFT   Final Result      No evidence of acute fracture or dislocation.      Soft tissue swelling with a lateral proximal forearm laceration with overlying debris.      DX-HUMERUS 2+ LEFT   Final Result      Displaced and distracted Fracture of the midshaft of the left clavicle.      Acromioclavicular and mild glenohumeral degenerative changes.      Soft tissue swelling.         DX-PELVIS-1 OR 2 VIEWS   Final Result      No fracture or dislocation is seen.      Mild degenerative changes.         DX-CHEST-LIMITED (1 VIEW)   Final Result      Mild cardiomegaly.      Atherosclerotic plaque.      Displaced fracture of the left clavicle.            US-TRAUMA VEIN SCREEN LOWER BILAT EXTREMITY    (Results Pending)     All current laboratory studies/radiology exams reviewed: Yes    Completed Consultations:  Neurosurgical  Orthopedics  Pending Consultations:  none    Newly Identified Diagnoses and Injuries:  Forearm laceration- stapled    Total Score: 12      ETOH  Screening     Assessment complete date: 7/13/2019

## 2019-07-13 NOTE — PROGRESS NOTES
Neurosurgery    Patient seen and examined  She is sleepy and sore.   No severe headache    Alert  Perrl  Speech fluent  Motor 5/5        Stable  Trace sah over convexity unchanged  C7-T1 fx which is probably stable.   Will leave in cervical collar  Ok to q 4 neuro checks

## 2019-07-13 NOTE — DIETARY
NUTRITION SERVICES: BMI - Pt with BMI >40 (=Body mass index is 50.21 kg/m².), morbid obesity. Weight loss counseling not appropriate in acute care setting.     RECOMMEND - Referral to outpatient nutrition services for weight management after D/C.

## 2019-07-13 NOTE — ASSESSMENT & PLAN NOTE
Systemic anticoagulation contraindicated secondary to elevated bleeding risk.  7/15 Trauma screening bilateral lower extremity venous duplex negative for above knee DVT.  7/16 Prophylactic Lovenox initiated
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
## 2019-07-13 NOTE — CARE PLAN
Problem: Pain Management  Goal: Pain level will decrease to patient's comfort goal    Intervention: Educate and implement non-pharmacologic comfort measures. Examples: relaxation, distration, play therapy, activity therapy, massage, etc.  Pharmacological and non pharmacological interventions in place.       Problem: Mobility  Goal: Risk for activity intolerance will decrease    Intervention: Encourage patient to increase activity level in collaboration with Interdisciplinary Team  Encouraged patient to increase activity. Education provided on importance of mobilizing and health benefits.

## 2019-07-13 NOTE — CARE PLAN
Problem: Mobility  Goal: Risk for activity intolerance will decrease  Patient mobilize every shift, 2 person assist.  Patient up to commode and edge of bed,     Problem: Skin Integrity  Goal: Risk for impaired skin integrity will decrease  Patient has low air loss bed, left forearm laceration, staples placed.  Interdry and nystatin powder ordered for panus, assessed around all medical devices every shift, mepilex in place underneath c-collar.
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
## 2019-07-13 NOTE — PROGRESS NOTES
Rita HUANG at bedside, left forearm laceration irrigated and stapled.     “You can access the Follow[employer] Patient Portal, offered by [facility], by registering with the following website: [email or web address]”
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
## 2019-07-13 NOTE — PROGRESS NOTES
MD updated on pts blood pressures. Per MD, will place PRNs with parameters. In addition it is okay to D/C log roll precautions.
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
## 2019-07-13 NOTE — PROGRESS NOTES
2 RN skin check complete with ALEX King.  Devices in place:   *C-collar   *nasal cannula   *BP cuff   *Pulse ox   *Cardiac leads   *SCDs  Skin assessed under the devices listed above    Following areas of concern:    *Right ear abrasion, scant drainage/old drainage.   *Hair on right side of scalp has dried blood   *Bruising and redness around c-collar   *Brusing noted on back and scattered on trunk   *LUE abrasion, scant drainage. LUE forearm laceration   *RUE bruising    *Area under panus is moist and has skin tears on the right region.    *Sacral region is blanching and intact   *Lower extremities have scattered bruising     The following interventions in place:   *Saline irrigated abrasion on right ear   *Shower cap to head   *Preventative mepilex placed around c-collar   *Redressed right forearm laceration with wet to dry and curlex gauze. Dressing is now CDI.   *Area around panus cleaned with soap and water. Wound photos taken.    *Preventative mepilex placed on sacral region   *Pillos used to float elbows and heels.   *Patient participating in Q 2 hour turning     Wound consult placedYES/NO: yes    Wound reported YES/NO: yes  Appropriate LDAs opened YES/NO: yes

## 2019-07-13 NOTE — ASSESSMENT & PLAN NOTE
1.2 cm heterogeneously enhancing lesion in the posterior lower pole of the left kidney   Further outpatient imaging recommended

## 2019-07-13 NOTE — PROGRESS NOTES
Pt transported back to S107 from CT with ACLS RN and transport tech on monitor. VSS stable. AOx4. 3L NC.

## 2019-07-13 NOTE — PROGRESS NOTES
2 RN skin check complete with ALEX Shepard.  Devices in place [place] J, bilateral sequential stockings, nasal cannula, blood pressure cuff, pulse oximeter, PIV x2 .   Skin assessed under the following devices all listed above.   Preventative measures in place including mepilex around c-collar, mepilex to coccyx, low air loss mattress, waffle cushion behind head.  Following areas of concern:   · Generalized Bruising to back, upper extremities, left shoulder and forearm, laceration to left forearm with staples.        Skin tears and redness underneath panus.  The following interventions in place interdry placed underneath panus, mepilex padding around c-collar, mepilex to coccyx, waffle pillow, turns every two hours.    Wound consult placedYES/NO: yes    Wound reported YES/NO: yes  Appropriate LDAs opened YES/NO: yes

## 2019-07-13 NOTE — PROGRESS NOTES
Bedside neuro exam completed at ER bedside. Pt AO4, moves all, follows commands, eyes are equal round and reactive, denies numbness and tinging except in her left arm. Pt states that she tries not to move that extremity d/t pain r/t clavicle fracture on L side and because of that sometimes experiences numbness until she moves it again.     Pt arrived to SICU at 1630. Pt transported with 2 ACLS RNs, VSS throughout transport. Skin assessed on arrival to ICU. All belonging brought with pt.     2 RN skin check complete with ALEX Newman.  Devices in place   -SCDs   -bp cuff   -C-collar   -EKG leads  Skin assessed under the following devices.  Preventative measures in place including   -q2h turns   -skin assessed under pressure points and under medical equipment  Following areas of concern:   ·skin under c-collar red but blanching  - back and sacrum red but blanching  -skin under panus red- powder applied   -laceration to right ear- bleeding, site open to air  -laceration to left arm covered with roll gauze  -generalized bruises and abrasions     Wound consult placedYES/NO: N\A    Wound reported YES/NO: N\A  Appropriate LDAs opened YES/NO: yes
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
## 2019-07-13 NOTE — OP REPORT
Preoperative diagnosis: 5 cm left forearm laceration  Postoperative diagnosis same  Operation closure of 5 cm left forearm laceration  Surgeon; Dr. Cook  Operative note:  Patient was found to have a 5 cm simple left forearm laceration, this was decontaminated with Betadine and anesthetized with half percent Xylocaine with epinephrine and closed using an automatic stapling device  Sterile dressing was applied
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
## 2019-07-13 NOTE — THERAPY
"  Speech Language Therapy Clinical Swallow Evaluation completed.  Functional Status: Patient was seen for a clinical bedside swallow evaluation this date. Patient very lethargic max stimulation to sustain NACHO. Patient was unable to follow commands and would fall back asleep without stimulation. Patient consumed one ice chip and 1/2 tsp of thin liquids, limited to NACHO and lethargy. Patient with inconsistent abnormal responses. Recommend NPO and re-eval in AM. If patients NACHO and lethargy does not improve consider NPO and alternative source of nutrition. SLP to re-assess in AM.   Recommendations - Diet:                            Strategies: to be assessed                          Medication Administration:    Plan of Care: Will benefit from Speech Therapy 3 times per week  Post-Acute Therapy: Discharge to a transitional care facility for continued skilled therapy services.    See \"Rehab Therapy-Acute\" Patient Summary Report for complete documentation.   "

## 2019-07-13 NOTE — PROGRESS NOTES
RN attempt to draw blood for labs.  Notification of charge RN of unsuccessful lab draw. Charge RN attempted to draw labs. Unsuccessful.   Phlebotomy paged for assistance with lab draws, unsuccessful.   Dr. Dumont notified.   RN page to phlebotomy for one more attempt after head CT. Phlebotomist able to get blood for P.M. Labs.

## 2019-07-14 ENCOUNTER — ANESTHESIA (OUTPATIENT)
Dept: SURGERY | Facility: MEDICAL CENTER | Age: 74
DRG: 958 | End: 2019-07-14
Payer: COMMERCIAL

## 2019-07-14 ENCOUNTER — APPOINTMENT (OUTPATIENT)
Dept: RADIOLOGY | Facility: MEDICAL CENTER | Age: 74
DRG: 958 | End: 2019-07-14
Attending: ORTHOPAEDIC SURGERY
Payer: COMMERCIAL

## 2019-07-14 ENCOUNTER — ANESTHESIA EVENT (OUTPATIENT)
Dept: SURGERY | Facility: MEDICAL CENTER | Age: 74
DRG: 958 | End: 2019-07-14
Payer: COMMERCIAL

## 2019-07-14 ENCOUNTER — APPOINTMENT (OUTPATIENT)
Dept: RADIOLOGY | Facility: MEDICAL CENTER | Age: 74
DRG: 958 | End: 2019-07-14
Attending: SURGERY
Payer: COMMERCIAL

## 2019-07-14 PROBLEM — R13.10 DYSPHAGIA: Status: ACTIVE | Noted: 2019-07-14

## 2019-07-14 LAB
ALBUMIN SERPL BCP-MCNC: 3.2 G/DL (ref 3.2–4.9)
ALBUMIN/GLOB SERPL: 1.1 G/DL
ALP SERPL-CCNC: 66 U/L (ref 30–99)
ALT SERPL-CCNC: 29 U/L (ref 2–50)
ANION GAP SERPL CALC-SCNC: 8 MMOL/L (ref 0–11.9)
AST SERPL-CCNC: 59 U/L (ref 12–45)
BASOPHILS # BLD AUTO: 0.5 % (ref 0–1.8)
BASOPHILS # BLD: 0.07 K/UL (ref 0–0.12)
BILIRUB SERPL-MCNC: 1.1 MG/DL (ref 0.1–1.5)
BUN SERPL-MCNC: 34 MG/DL (ref 8–22)
CALCIUM SERPL-MCNC: 8.1 MG/DL (ref 8.5–10.5)
CHLORIDE SERPL-SCNC: 109 MMOL/L (ref 96–112)
CO2 SERPL-SCNC: 20 MMOL/L (ref 20–33)
CREAT SERPL-MCNC: 0.97 MG/DL (ref 0.5–1.4)
EOSINOPHIL # BLD AUTO: 0.02 K/UL (ref 0–0.51)
EOSINOPHIL NFR BLD: 0.2 % (ref 0–6.9)
ERYTHROCYTE [DISTWIDTH] IN BLOOD BY AUTOMATED COUNT: 47.6 FL (ref 35.9–50)
GLOBULIN SER CALC-MCNC: 2.9 G/DL (ref 1.9–3.5)
GLUCOSE BLD-MCNC: 236 MG/DL (ref 65–99)
GLUCOSE BLD-MCNC: 242 MG/DL (ref 65–99)
GLUCOSE BLD-MCNC: 283 MG/DL (ref 65–99)
GLUCOSE SERPL-MCNC: 274 MG/DL (ref 65–99)
HCT VFR BLD AUTO: 31.1 % (ref 37–47)
HGB BLD-MCNC: 10 G/DL (ref 12–16)
IMM GRANULOCYTES # BLD AUTO: 0.07 K/UL (ref 0–0.11)
IMM GRANULOCYTES NFR BLD AUTO: 0.5 % (ref 0–0.9)
LYMPHOCYTES # BLD AUTO: 2.05 K/UL (ref 1–4.8)
LYMPHOCYTES NFR BLD: 15.8 % (ref 22–41)
MAGNESIUM SERPL-MCNC: 2.1 MG/DL (ref 1.5–2.5)
MCH RBC QN AUTO: 31.7 PG (ref 27–33)
MCHC RBC AUTO-ENTMCNC: 32.2 G/DL (ref 33.6–35)
MCV RBC AUTO: 98.7 FL (ref 81.4–97.8)
MONOCYTES # BLD AUTO: 1.34 K/UL (ref 0–0.85)
MONOCYTES NFR BLD AUTO: 10.3 % (ref 0–13.4)
NEUTROPHILS # BLD AUTO: 9.41 K/UL (ref 2–7.15)
NEUTROPHILS NFR BLD: 72.7 % (ref 44–72)
NRBC # BLD AUTO: 0 K/UL
NRBC BLD-RTO: 0 /100 WBC
PHOSPHATE SERPL-MCNC: 3 MG/DL (ref 2.5–4.5)
PLATELET # BLD AUTO: 218 K/UL (ref 164–446)
PMV BLD AUTO: 11 FL (ref 9–12.9)
POTASSIUM SERPL-SCNC: 4.7 MMOL/L (ref 3.6–5.5)
PROT SERPL-MCNC: 6.1 G/DL (ref 6–8.2)
RBC # BLD AUTO: 3.15 M/UL (ref 4.2–5.4)
SODIUM SERPL-SCNC: 137 MMOL/L (ref 135–145)
WBC # BLD AUTO: 13 K/UL (ref 4.8–10.8)

## 2019-07-14 PROCEDURE — 700105 HCHG RX REV CODE 258: Performed by: ORTHOPAEDIC SURGERY

## 2019-07-14 PROCEDURE — A9270 NON-COVERED ITEM OR SERVICE: HCPCS | Performed by: SURGERY

## 2019-07-14 PROCEDURE — 92526 ORAL FUNCTION THERAPY: CPT

## 2019-07-14 PROCEDURE — 99233 SBSQ HOSP IP/OBS HIGH 50: CPT | Mod: 24 | Performed by: SURGERY

## 2019-07-14 PROCEDURE — 160035 HCHG PACU - 1ST 60 MINS PHASE I: Performed by: ORTHOPAEDIC SURGERY

## 2019-07-14 PROCEDURE — 500447 HCHG DRESSING, TEGADERM 8X12: Performed by: ORTHOPAEDIC SURGERY

## 2019-07-14 PROCEDURE — 51798 US URINE CAPACITY MEASURE: CPT

## 2019-07-14 PROCEDURE — 83735 ASSAY OF MAGNESIUM: CPT

## 2019-07-14 PROCEDURE — 700105 HCHG RX REV CODE 258: Performed by: SURGERY

## 2019-07-14 PROCEDURE — 82962 GLUCOSE BLOOD TEST: CPT | Mod: 91

## 2019-07-14 PROCEDURE — 30233R1 TRANSFUSION OF NONAUTOLOGOUS PLATELETS INTO PERIPHERAL VEIN, PERCUTANEOUS APPROACH: ICD-10-PCS | Performed by: INTERNAL MEDICINE

## 2019-07-14 PROCEDURE — 700111 HCHG RX REV CODE 636 W/ 250 OVERRIDE (IP): Performed by: SURGERY

## 2019-07-14 PROCEDURE — 84100 ASSAY OF PHOSPHORUS: CPT

## 2019-07-14 PROCEDURE — 160048 HCHG OR STATISTICAL LEVEL 1-5: Performed by: ORTHOPAEDIC SURGERY

## 2019-07-14 PROCEDURE — A9270 NON-COVERED ITEM OR SERVICE: HCPCS | Performed by: NURSE PRACTITIONER

## 2019-07-14 PROCEDURE — 700112 HCHG RX REV CODE 229: Performed by: SURGERY

## 2019-07-14 PROCEDURE — 700101 HCHG RX REV CODE 250: Performed by: SURGERY

## 2019-07-14 PROCEDURE — 700101 HCHG RX REV CODE 250: Performed by: ORTHOPAEDIC SURGERY

## 2019-07-14 PROCEDURE — A6402 STERILE GAUZE <= 16 SQ IN: HCPCS | Performed by: ORTHOPAEDIC SURGERY

## 2019-07-14 PROCEDURE — 700111 HCHG RX REV CODE 636 W/ 250 OVERRIDE (IP): Performed by: ORTHOPAEDIC SURGERY

## 2019-07-14 PROCEDURE — 700105 HCHG RX REV CODE 258: Performed by: ANESTHESIOLOGY

## 2019-07-14 PROCEDURE — 770022 HCHG ROOM/CARE - ICU (200)

## 2019-07-14 PROCEDURE — 160041 HCHG SURGERY MINUTES - EA ADDL 1 MIN LEVEL 4: Performed by: ORTHOPAEDIC SURGERY

## 2019-07-14 PROCEDURE — 71045 X-RAY EXAM CHEST 1 VIEW: CPT

## 2019-07-14 PROCEDURE — 700102 HCHG RX REV CODE 250 W/ 637 OVERRIDE(OP): Performed by: SURGERY

## 2019-07-14 PROCEDURE — 306396 CUSHION, WAFFLE ADULT 17X17: Performed by: SURGERY

## 2019-07-14 PROCEDURE — 700102 HCHG RX REV CODE 250 W/ 637 OVERRIDE(OP): Performed by: NURSE PRACTITIONER

## 2019-07-14 PROCEDURE — 94760 N-INVAS EAR/PLS OXIMETRY 1: CPT

## 2019-07-14 PROCEDURE — 700111 HCHG RX REV CODE 636 W/ 250 OVERRIDE (IP): Performed by: ANESTHESIOLOGY

## 2019-07-14 PROCEDURE — 160009 HCHG ANES TIME/MIN: Performed by: ORTHOPAEDIC SURGERY

## 2019-07-14 PROCEDURE — 73020 X-RAY EXAM OF SHOULDER: CPT | Mod: LT

## 2019-07-14 PROCEDURE — 0PSB04Z REPOSITION LEFT CLAVICLE WITH INTERNAL FIXATION DEVICE, OPEN APPROACH: ICD-10-PCS | Performed by: ORTHOPAEDIC SURGERY

## 2019-07-14 PROCEDURE — C1713 ANCHOR/SCREW BN/BN,TIS/BN: HCPCS | Performed by: ORTHOPAEDIC SURGERY

## 2019-07-14 PROCEDURE — 85025 COMPLETE CBC W/AUTO DIFF WBC: CPT

## 2019-07-14 PROCEDURE — 501838 HCHG SUTURE GENERAL: Performed by: ORTHOPAEDIC SURGERY

## 2019-07-14 PROCEDURE — 160036 HCHG PACU - EA ADDL 30 MINS PHASE I: Performed by: ORTHOPAEDIC SURGERY

## 2019-07-14 PROCEDURE — 500891 HCHG PACK, ORTHO MAJOR: Performed by: ORTHOPAEDIC SURGERY

## 2019-07-14 PROCEDURE — 160029 HCHG SURGERY MINUTES - 1ST 30 MINS LEVEL 4: Performed by: ORTHOPAEDIC SURGERY

## 2019-07-14 PROCEDURE — 80053 COMPREHEN METABOLIC PANEL: CPT

## 2019-07-14 PROCEDURE — A4565 SLINGS: HCPCS | Performed by: ORTHOPAEDIC SURGERY

## 2019-07-14 PROCEDURE — A6454 SELF-ADHER BAND W>=3" <5"/YD: HCPCS | Performed by: ORTHOPAEDIC SURGERY

## 2019-07-14 PROCEDURE — 700101 HCHG RX REV CODE 250: Performed by: ANESTHESIOLOGY

## 2019-07-14 PROCEDURE — 160002 HCHG RECOVERY MINUTES (STAT): Performed by: ORTHOPAEDIC SURGERY

## 2019-07-14 DEVICE — IMPLANTABLE DEVICE: Type: IMPLANTABLE DEVICE | Site: CLAVICLE | Status: FUNCTIONAL

## 2019-07-14 DEVICE — SCREW CORT 3.5X16MM ST HEX (4TX6+1TX4+1TX3=31)(SDS=22): Type: IMPLANTABLE DEVICE | Site: CLAVICLE | Status: FUNCTIONAL

## 2019-07-14 DEVICE — SCREW LOCK 3.5X18MM ST T15 - (4SFLX6+LPPELVX4=28): Type: IMPLANTABLE DEVICE | Site: CLAVICLE | Status: FUNCTIONAL

## 2019-07-14 DEVICE — SCREW LOCK 3.5X16MM ST T15 - (4SFLX6+LPPELVX4=28): Type: IMPLANTABLE DEVICE | Site: CLAVICLE | Status: FUNCTIONAL

## 2019-07-14 RX ORDER — ONDANSETRON 2 MG/ML
4 INJECTION INTRAMUSCULAR; INTRAVENOUS
Status: DISCONTINUED | OUTPATIENT
Start: 2019-07-14 | End: 2019-07-14 | Stop reason: HOSPADM

## 2019-07-14 RX ORDER — DEXAMETHASONE SODIUM PHOSPHATE 4 MG/ML
INJECTION, SOLUTION INTRA-ARTICULAR; INTRALESIONAL; INTRAMUSCULAR; INTRAVENOUS; SOFT TISSUE PRN
Status: DISCONTINUED | OUTPATIENT
Start: 2019-07-14 | End: 2019-07-14 | Stop reason: SURG

## 2019-07-14 RX ORDER — SODIUM CHLORIDE, SODIUM LACTATE, POTASSIUM CHLORIDE, CALCIUM CHLORIDE 600; 310; 30; 20 MG/100ML; MG/100ML; MG/100ML; MG/100ML
INJECTION, SOLUTION INTRAVENOUS CONTINUOUS
Status: DISCONTINUED | OUTPATIENT
Start: 2019-07-14 | End: 2019-07-14 | Stop reason: HOSPADM

## 2019-07-14 RX ORDER — DEXTROSE, SODIUM CHLORIDE, SODIUM LACTATE, POTASSIUM CHLORIDE, AND CALCIUM CHLORIDE 5; .6; .31; .03; .02 G/100ML; G/100ML; G/100ML; G/100ML; G/100ML
INJECTION, SOLUTION INTRAVENOUS CONTINUOUS
Status: ACTIVE | OUTPATIENT
Start: 2019-07-14 | End: 2019-07-14

## 2019-07-14 RX ORDER — HYDROMORPHONE HYDROCHLORIDE 1 MG/ML
0.4 INJECTION, SOLUTION INTRAMUSCULAR; INTRAVENOUS; SUBCUTANEOUS
Status: DISCONTINUED | OUTPATIENT
Start: 2019-07-14 | End: 2019-07-14 | Stop reason: HOSPADM

## 2019-07-14 RX ORDER — HYDROMORPHONE HYDROCHLORIDE 1 MG/ML
0.1 INJECTION, SOLUTION INTRAMUSCULAR; INTRAVENOUS; SUBCUTANEOUS
Status: DISCONTINUED | OUTPATIENT
Start: 2019-07-14 | End: 2019-07-14 | Stop reason: HOSPADM

## 2019-07-14 RX ORDER — CEFAZOLIN SODIUM 1 G/3ML
INJECTION, POWDER, FOR SOLUTION INTRAMUSCULAR; INTRAVENOUS PRN
Status: DISCONTINUED | OUTPATIENT
Start: 2019-07-14 | End: 2019-07-14 | Stop reason: SURG

## 2019-07-14 RX ORDER — OXYCODONE HYDROCHLORIDE AND ACETAMINOPHEN 5; 325 MG/1; MG/1
2 TABLET ORAL
Status: DISCONTINUED | OUTPATIENT
Start: 2019-07-14 | End: 2019-07-14 | Stop reason: HOSPADM

## 2019-07-14 RX ORDER — HALOPERIDOL 5 MG/ML
1 INJECTION INTRAMUSCULAR
Status: DISCONTINUED | OUTPATIENT
Start: 2019-07-14 | End: 2019-07-14 | Stop reason: HOSPADM

## 2019-07-14 RX ORDER — HYDROMORPHONE HYDROCHLORIDE 1 MG/ML
0.2 INJECTION, SOLUTION INTRAMUSCULAR; INTRAVENOUS; SUBCUTANEOUS
Status: DISCONTINUED | OUTPATIENT
Start: 2019-07-14 | End: 2019-07-14 | Stop reason: HOSPADM

## 2019-07-14 RX ORDER — DIPHENHYDRAMINE HYDROCHLORIDE 50 MG/ML
12.5 INJECTION INTRAMUSCULAR; INTRAVENOUS
Status: DISCONTINUED | OUTPATIENT
Start: 2019-07-14 | End: 2019-07-14 | Stop reason: HOSPADM

## 2019-07-14 RX ORDER — OXYCODONE HYDROCHLORIDE AND ACETAMINOPHEN 5; 325 MG/1; MG/1
1 TABLET ORAL
Status: DISCONTINUED | OUTPATIENT
Start: 2019-07-14 | End: 2019-07-14 | Stop reason: HOSPADM

## 2019-07-14 RX ORDER — CEFAZOLIN SODIUM 2 G/100ML
2 INJECTION, SOLUTION INTRAVENOUS EVERY 8 HOURS
Status: COMPLETED | OUTPATIENT
Start: 2019-07-14 | End: 2019-07-15

## 2019-07-14 RX ORDER — BUPIVACAINE HYDROCHLORIDE 5 MG/ML
INJECTION, SOLUTION EPIDURAL; INTRACAUDAL
Status: DISCONTINUED | OUTPATIENT
Start: 2019-07-14 | End: 2019-07-14 | Stop reason: HOSPADM

## 2019-07-14 RX ORDER — SODIUM CHLORIDE, SODIUM LACTATE, POTASSIUM CHLORIDE, CALCIUM CHLORIDE 600; 310; 30; 20 MG/100ML; MG/100ML; MG/100ML; MG/100ML
INJECTION, SOLUTION INTRAVENOUS
Status: DISCONTINUED | OUTPATIENT
Start: 2019-07-14 | End: 2019-07-14 | Stop reason: SURG

## 2019-07-14 RX ORDER — LIDOCAINE HYDROCHLORIDE AND EPINEPHRINE 10; 10 MG/ML; UG/ML
INJECTION, SOLUTION INFILTRATION; PERINEURAL
Status: DISCONTINUED | OUTPATIENT
Start: 2019-07-14 | End: 2019-07-14 | Stop reason: HOSPADM

## 2019-07-14 RX ORDER — PHENYLEPHRINE HYDROCHLORIDE 10 MG/ML
INJECTION, SOLUTION INTRAMUSCULAR; INTRAVENOUS; SUBCUTANEOUS PRN
Status: DISCONTINUED | OUTPATIENT
Start: 2019-07-14 | End: 2019-07-14 | Stop reason: SURG

## 2019-07-14 RX ADMIN — LABETALOL HYDROCHLORIDE 10 MG: 5 INJECTION INTRAVENOUS at 15:47

## 2019-07-14 RX ADMIN — CEFAZOLIN SODIUM 2 G: 2 INJECTION, SOLUTION INTRAVENOUS at 18:30

## 2019-07-14 RX ADMIN — FENTANYL CITRATE 150 MCG: 0.05 INJECTION, SOLUTION INTRAMUSCULAR; INTRAVENOUS at 12:44

## 2019-07-14 RX ADMIN — CARVEDILOL 6.25 MG: 6.25 TABLET, FILM COATED ORAL at 06:16

## 2019-07-14 RX ADMIN — FENTANYL CITRATE 50 MCG: 0.05 INJECTION, SOLUTION INTRAMUSCULAR; INTRAVENOUS at 23:33

## 2019-07-14 RX ADMIN — BENAZEPRIL HYDROCHLORIDE 40 MG: 20 TABLET ORAL at 05:13

## 2019-07-14 RX ADMIN — INSULIN HUMAN 2 UNITS: 100 INJECTION, SOLUTION PARENTERAL at 05:31

## 2019-07-14 RX ADMIN — FENTANYL CITRATE 25 MCG: 0.05 INJECTION, SOLUTION INTRAMUSCULAR; INTRAVENOUS at 13:56

## 2019-07-14 RX ADMIN — FENTANYL CITRATE 50 MCG: 0.05 INJECTION, SOLUTION INTRAMUSCULAR; INTRAVENOUS at 19:30

## 2019-07-14 RX ADMIN — POLYETHYLENE GLYCOL 3350 1 PACKET: 17 POWDER, FOR SOLUTION ORAL at 05:13

## 2019-07-14 RX ADMIN — CEFAZOLIN 3 G: 330 INJECTION, POWDER, FOR SOLUTION INTRAMUSCULAR; INTRAVENOUS at 11:40

## 2019-07-14 RX ADMIN — Medication 1 EACH: at 05:13

## 2019-07-14 RX ADMIN — PHENYLEPHRINE HYDROCHLORIDE 100 MCG: 10 INJECTION INTRAVENOUS at 12:27

## 2019-07-14 RX ADMIN — PROPOFOL 200 MG: 10 INJECTION, EMULSION INTRAVENOUS at 11:40

## 2019-07-14 RX ADMIN — LEVOTHYROXINE SODIUM 200 MCG: 200 TABLET ORAL at 05:13

## 2019-07-14 RX ADMIN — HYDRALAZINE HYDROCHLORIDE 10 MG: 20 INJECTION INTRAMUSCULAR; INTRAVENOUS at 18:07

## 2019-07-14 RX ADMIN — FENTANYL CITRATE 100 MCG: 0.05 INJECTION, SOLUTION INTRAMUSCULAR; INTRAVENOUS at 11:40

## 2019-07-14 RX ADMIN — ACETAMINOPHEN 650 MG: 325 TABLET, FILM COATED ORAL at 05:13

## 2019-07-14 RX ADMIN — SODIUM CHLORIDE, SODIUM LACTATE, POTASSIUM CHLORIDE, CALCIUM CHLORIDE AND DEXTROSE MONOHYDRATE: 5; 600; 310; 30; 20 INJECTION, SOLUTION INTRAVENOUS at 15:15

## 2019-07-14 RX ADMIN — ROCURONIUM BROMIDE 30 MG: 10 INJECTION, SOLUTION INTRAVENOUS at 11:40

## 2019-07-14 RX ADMIN — ONDANSETRON 4 MG: 2 INJECTION INTRAMUSCULAR; INTRAVENOUS at 11:40

## 2019-07-14 RX ADMIN — SODIUM CHLORIDE 500 MG: 9 INJECTION, SOLUTION INTRAVENOUS at 17:16

## 2019-07-14 RX ADMIN — NYSTATIN: 100000 POWDER TOPICAL at 05:12

## 2019-07-14 RX ADMIN — Medication 1 EACH: at 17:24

## 2019-07-14 RX ADMIN — NYSTATIN: 100000 POWDER TOPICAL at 17:24

## 2019-07-14 RX ADMIN — LABETALOL HYDROCHLORIDE 10 MG: 5 INJECTION INTRAVENOUS at 05:19

## 2019-07-14 RX ADMIN — FENTANYL CITRATE 50 MCG: 0.05 INJECTION, SOLUTION INTRAMUSCULAR; INTRAVENOUS at 02:35

## 2019-07-14 RX ADMIN — LEVETIRACETAM 500 MG: 500 TABLET ORAL at 05:13

## 2019-07-14 RX ADMIN — DOCUSATE SODIUM 100 MG: 100 CAPSULE, LIQUID FILLED ORAL at 05:13

## 2019-07-14 RX ADMIN — DEXAMETHASONE SODIUM PHOSPHATE 8 MG: 4 INJECTION, SOLUTION INTRA-ARTICULAR; INTRALESIONAL; INTRAMUSCULAR; INTRAVENOUS; SOFT TISSUE at 11:40

## 2019-07-14 RX ADMIN — SODIUM CHLORIDE, POTASSIUM CHLORIDE, SODIUM LACTATE AND CALCIUM CHLORIDE: 600; 310; 30; 20 INJECTION, SOLUTION INTRAVENOUS at 11:31

## 2019-07-14 RX ADMIN — MAGNESIUM HYDROXIDE 30 ML: 400 SUSPENSION ORAL at 05:12

## 2019-07-14 RX ADMIN — PHENYLEPHRINE HYDROCHLORIDE 100 MCG: 10 INJECTION INTRAVENOUS at 12:28

## 2019-07-14 RX ADMIN — SODIUM CHLORIDE: 9 INJECTION, SOLUTION INTRAVENOUS at 04:35

## 2019-07-14 ASSESSMENT — ENCOUNTER SYMPTOMS
FATIGUE: 1
NUMBNESS: 0
NECK PAIN: 1
NAUSEA: 1
ABDOMINAL PAIN: 0
WEAKNESS: 0
ABDOMINAL DISTENTION: 0
HEADACHES: 1
MYALGIAS: 1
NECK STIFFNESS: 1
SPEECH DIFFICULTY: 0
TROUBLE SWALLOWING: 0
ARTHRALGIAS: 1

## 2019-07-14 ASSESSMENT — COGNITIVE AND FUNCTIONAL STATUS - GENERAL
TOILETING: A LOT
DRESSING REGULAR LOWER BODY CLOTHING: A LOT
CLIMB 3 TO 5 STEPS WITH RAILING: A LOT
SUGGESTED CMS G CODE MODIFIER DAILY ACTIVITY: CL
PERSONAL GROOMING: A LOT
MOBILITY SCORE: 12
MOVING TO AND FROM BED TO CHAIR: A LOT
DAILY ACTIVITIY SCORE: 13
WALKING IN HOSPITAL ROOM: A LOT
MOVING FROM LYING ON BACK TO SITTING ON SIDE OF FLAT BED: A LOT
SUGGESTED CMS G CODE MODIFIER MOBILITY: CL
EATING MEALS: A LITTLE
TURNING FROM BACK TO SIDE WHILE IN FLAT BAD: A LOT
DRESSING REGULAR UPPER BODY CLOTHING: A LOT
HELP NEEDED FOR BATHING: A LOT
STANDING UP FROM CHAIR USING ARMS: A LOT

## 2019-07-14 ASSESSMENT — LIFESTYLE VARIABLES: ALCOHOL_USE: NO

## 2019-07-14 ASSESSMENT — PAIN SCALES - GENERAL: PAIN_LEVEL: 3

## 2019-07-14 ASSESSMENT — PATIENT HEALTH QUESTIONNAIRE - PHQ9
2. FEELING DOWN, DEPRESSED, IRRITABLE, OR HOPELESS: NOT AT ALL
1. LITTLE INTEREST OR PLEASURE IN DOING THINGS: NOT AT ALL
SUM OF ALL RESPONSES TO PHQ9 QUESTIONS 1 AND 2: 0

## 2019-07-14 NOTE — ANESTHESIA PREPROCEDURE EVALUATION
Relevant Problems   (+) Essential hypertension   (+) Hypothyroid       Physical Exam    Airway   Mallampati: II  TM distance: >3 FB  Neck ROM: limited       Cardiovascular   Rhythm: regular  Rate: normal     Dental    Pulmonary   Breath sounds clear to auscultation     Abdominal - normal exam     Neurological - normal exam                 Anesthesia Plan    ASA 3   ASA physical status 3 criteria: morbid obesity - BMI greater than or equal to 40    Plan - general           Plan Factors:   Patient was not previously instructed to abstain from smoking on day of procedure.  Patient did not smoke on day of procedure.    Induction: intravenous          Informed Consent:    Anesthetic plan and risks discussed with patient.    Use of blood products discussed with: patient whom.

## 2019-07-14 NOTE — ANESTHESIA PROCEDURE NOTES
Airway  Date/Time: 7/14/2019 11:42 AM  Performed by: DIVYA DE LUNA  Authorized by: DIVYA DE LUNA     Location:  OR  Urgency:  Elective  Difficult Airway: No    Indications for Airway Management:  Anesthesia  Spontaneous Ventilation: present    Sedation Level:  Deep  Preoxygenated: Yes    Patient Position:  Sniffing  MILS Maintained Throughout: No    Mask Difficulty Assessment:  0 - not attempted  Final Airway Type:  Endotracheal airway  Final Endotracheal Airway:  ETT  Cuffed: Yes    Technique Used for Successful ETT Placement:  Video laryngoscopy  Blade Type:  Justo  Laryngoscope Blade/Videolaryngoscope Blade Size:  3  ETT Size (mm):  7.0  Placement Verified by: auscultation and capnometry    Number of Attempts at Approach:  1
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
## 2019-07-14 NOTE — PROGRESS NOTES
1415  Pt arrived from PACU with PACU RNs.  Per report, right IJTL placed under fluoroscopy in OR, no need for follow up x-ray.  Pt very lethargic, only answered her name but follow commands in all four extremities.

## 2019-07-14 NOTE — ANESTHESIA POSTPROCEDURE EVALUATION
Patient: Maira Dodd    Procedure Summary     Date:  07/14/19 Room / Location:  [place] [address] / SURGERY [facility]    Anesthesia Start:  1131 Anesthesia Stop:  1306    Procedure:  ORIF, FRACTURE, CLAVICLE (Left Chest) Diagnosis:  (left clavicle fracture)    Surgeon:  Manuel Gauthier M.D. Responsible Provider:  Johnathan Lopez M.D.    Anesthesia Type:  general ASA Status:  3          Final Anesthesia Type: general  Last vitals  BP   Blood Pressure : (!) 167/65, NIBP: 149/66    Temp   36.1 °C (97 °F)    Pulse   Pulse: (!) 103, Heart Rate (Monitored): (!) 103   Resp   16    SpO2   98 %      Anesthesia Post Evaluation    Patient location during evaluation: PACU  Patient participation: complete - patient participated  Level of consciousness: lethargic  Pain score: 3    Airway patency: patent  Anesthetic complications: no  Cardiovascular status: adequate  Respiratory status: acceptable  Hydration status: acceptable    PONV: none           Nurse Pain Score: 0 (NPRS)

## 2019-07-14 NOTE — PROGRESS NOTES
XL arm sling applied to pt's L UE at bedside. Any questions regarding sling please [place] [facility] [place] [phone].

## 2019-07-14 NOTE — THERAPY
"Speech Language Therapy dysphagia treatment completed.   Functional Status:  Patient was seen for swallow re-assess this AM. Covering RN okay to proceed with CSE, however after PO trials of ice and thin liquids primary RN came in room and informed SLP patient is NPO pending surgery. Limited assessment 2/2 to this. Patient demonstrated delayed onset of swallow, tongue pumping, decreased laryngeal elevation and hyolaryngeal excursion, 2-3 swallows to clear, audible gulping was noted. At this time patient presents with inconsistent s/sx of aspiration recommend continue NPO and consider a FEES after surgery to further assess oropharyngeal swallow function and r/o aspiration. Discussed with RN   Recommendations: NPO pending FEES    Plan of Care: Will benefit from Speech Therapy 3 times per week  Post-Acute Therapy: Discharge to a transitional care facility for continued skilled therapy services.    See \"Rehab Therapy-Acute\" Patient Summary Report for complete documentation.     "

## 2019-07-14 NOTE — OR SURGEON
Immediate Post OP Note    PreOp Diagnosis: left clavicle fracture     PostOp Diagnosis: same     Procedure(s):  ORIF, FRACTURE, CLAVICLE - Wound Class: Clean    Surgeon(s):  Manuel Gauthier M.D.    Anesthesiologist/Type of Anesthesia:  Anesthesiologist: Johnathan Lopez M.D./General    Surgical Staff:  Assistant: Nancy Pardo CFA  Circulator: Karley Brice R.N.  Scrub Person: Gabe Jasso  Radiology Technologist: Shawnee Mcintosh    Specimens removed if any:  * No specimens in log *    Estimated Blood Loss: minimal     Findings: displaced left clavicle fracture    Complications: no apparent         7/14/2019 1:00 PM Manuel Gauthier M.D.

## 2019-07-14 NOTE — ANESTHESIA TIME REPORT
Anesthesia Start and Stop Event Times     Date Time Event    7/14/2019 1131 Anesthesia Start     1306 Anesthesia Stop        Responsible Staff  07/14/19    Name Role Begin End    Johnathan Lopez M.D. Anesth 1131 1306        Preop Diagnosis (Free Text):  Pre-op Diagnosis     left clavicle fracture        Preop Diagnosis (Codes):  Diagnosis Information     Diagnosis Code(s):         Post op Diagnosis  Fracture of unspecified part of left clavicle, initial encounter for open fracture      Premium Reason  Non-Premium    Comments:
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
## 2019-07-14 NOTE — ANESTHESIA PROCEDURE NOTES
Central Venous Line  Performed by: DIVYA DE LUNA  Authorized by: DIVYA DE LUNA     Start Time:  7/14/2019 11:50 AM  End Time:  7/14/2019 11:55 AM  Patient Location:  OR  Indication: central venous access    provider hand hygiene performed prior to central venous catheter insertion, all 5 sterile barriers used (gloves, gown, cap, mask, large sterile drape) during central venous catheter insertion and skin prep agent completely dried prior to procedure    Medical Reason for Not Performing Maximal Sterile Barrier Technique: No    Patient Position:  Trendelenburg  Laterality:  Right  Site:  Internal jugular  Prep:  Chlorhexidine  Catheter Size:  7 Fr  Catheter Length (cm):  20  Number of Lumens:  Triple lumen  target vein identified, needle advanced into vein and blood aspirated and guidewire advanced into vein    Seldinger Technique?: Yes    Ultrasound-Guided: ultrasound-guided  Image captured, interpreted and electronically stored.

## 2019-07-14 NOTE — CARE PLAN
Problem: Oxygenation:  Goal: Maintain adequate oxygenation dependent on patient condition  5 lpm NC    Problem: Hyperinflation:  Goal: Prevent or improve atelectasis  IS [id number]
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
## 2019-07-14 NOTE — ASSESSMENT & PLAN NOTE
Failed swallow, probably secondary to collar   7/15 Failed FEES - NPO and place cortrack feeding tube  7/18 much more awake, per speech will try ice chips.  7/20 Diet intitiated  7/21 Cortrak removed   Speech therapy following.

## 2019-07-14 NOTE — ANESTHESIA QCDR
2019 [facility] Clinical Data Registry (for Quality Improvement)     Postoperative nausea/vomiting risk protocol (Adult = 18 yrs and Pediatric 3-17 yrs)- (430 and 463)  General inhalation anesthetic (NOT TIVA) with PONV risk factors: Yes  Provision of anti-emetic therapy with at least 2 different classes of agents: Yes   Patient DID NOT receive anti-emetic therapy and reason is documented in Medical Record:  N/A    Multimodal Pain Management- (AQI59)  Patient undergoing Elective Surgery (i.e. Outpatient, or ASC, or Prescheduled Surgery prior to Hospital Admission): Yes  Use of Multimodal Pain Management, two or more drugs and/or interventions, NOT including systemic opioids: No   Exception: Documented allergy to multiple classes of analgesics:         PACU assessment of acute postoperative pain prior to Anesthesia Care End- Applies to Patients Age = 18- (ABG7)  Initial PACU pain score is which of the following: < 7/10  Patient unable to report pain score: N/A    Post-anesthetic transfer of care checklist/protocol to PACU/ICU- (426 and 427)  Upon conclusion of case, patient transferred to which of the following locations: PACU/Non-ICU  Use of transfer checklist/protocol: Yes  Exclusion: Service Performed in Patient Hospital Room (and thus did not require transfer): N/A    PACU Reintubation- (AQI31)  General anesthesia requiring endotracheal intubation (ETT) along with subsequent extubation in OR or PACU: Yes  Required reintubation in the PACU: No   Extubation was a planned trial documented in the medical record prior to removal of the original airway device:  N/A    Unplanned admission to ICU related to anesthesia service up through end of PACU care- (MD51)  Unplanned admission to ICU (not initially anticipated at anesthesia start time): No
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
## 2019-07-14 NOTE — CARE PLAN
Problem: Bowel/Gastric:  Goal: Will not experience complications related to bowel motility    Intervention: Assess baseline bowel pattern  Assessment of baseline bowel patterns. Discussion with patient regarding medication side effects of constipation. Bowel protocol implemented       Problem: Mobility  Goal: Risk for activity intolerance will decrease    Intervention: Encourage patient to increase activity level in collaboration with Interdisciplinary Team  Encouraged patient to increase activity level. Edge of bed preformed and ambulation to commode

## 2019-07-14 NOTE — PROGRESS NOTES
Confirm pt already has arm sling applied to L UE. Needed strap adjustment. Questions call ortho tech at [phone].

## 2019-07-14 NOTE — CARE PLAN
Problem: Pain Management  Goal: Pain level will decrease to patient's comfort goal    Intervention: Follow pain managment plan developed in collaboration with patient and Interdisciplinary Team  Pt educated on pain management options.  Pt verbalized understanding and participates in her care.      Problem: Skin Integrity  Goal: Risk for impaired skin integrity will decrease    Intervention: Assess risk factors for impaired skin integrity and/or pressure ulcers  mepilex under c-collar and pillows to float pressure points.

## 2019-07-14 NOTE — CARE PLAN
Problem: Oxygenation:  Goal: Maintain adequate oxygenation dependent on patient condition  Outcome: PROGRESSING AS EXPECTED    Intervention: Manage oxygen therapy by monitoring pulse oximetry and/or ABG values  Pt on 3L NC, will titrate as tolerated      Problem: Hyperinflation:  Goal: Prevent or improve atelectasis  Outcome: PROGRESSING AS EXPECTED    Intervention: Instruct incentive spirometry usage  IS with RT QID

## 2019-07-14 NOTE — PROGRESS NOTES
2 RN skin check complete with ALEX Zamora.  Devices in place C-collar, nasal cannula, sling on right arm, SCDs.   Skin assessed under the following devices C-collar, nasal cannula, sling on right arm, SCDs.   Preventative measures in place including mepilex under c-collar, nasal cannula holding stickers and pillows  Following areas of concern:   Laceration/bruising on right ear  Bruising on/behing left ear  Bruising behind neck: mepilex under c-collar areas  Moisture fissures under abdomen pannus: inter try placed  Surgical incision on left shoulder, dressing clean,dry and intact  Abrasion on left forearm, dressing intact    The following interventions in place  mepilex under c-collar, nasal cannula holding stickers and pillows    Wound consult placedYES/NO: N\A    Wound reported YES/NO: N\A  Appropriate LDAs opened YES/NO: yes

## 2019-07-15 ENCOUNTER — APPOINTMENT (OUTPATIENT)
Dept: RADIOLOGY | Facility: MEDICAL CENTER | Age: 74
DRG: 958 | End: 2019-07-15
Attending: NURSE PRACTITIONER
Payer: COMMERCIAL

## 2019-07-15 ENCOUNTER — APPOINTMENT (OUTPATIENT)
Dept: RADIOLOGY | Facility: MEDICAL CENTER | Age: 74
DRG: 958 | End: 2019-07-15
Attending: SURGERY
Payer: COMMERCIAL

## 2019-07-15 PROBLEM — E83.39 HYPOPHOSPHATEMIA: Status: ACTIVE | Noted: 2019-07-15

## 2019-07-15 LAB
ALBUMIN SERPL BCP-MCNC: 3.4 G/DL (ref 3.2–4.9)
ALBUMIN/GLOB SERPL: 1.1 G/DL
ALP SERPL-CCNC: 73 U/L (ref 30–99)
ALT SERPL-CCNC: 31 U/L (ref 2–50)
ANION GAP SERPL CALC-SCNC: 10 MMOL/L (ref 0–11.9)
APPEARANCE UR: CLEAR
AST SERPL-CCNC: 42 U/L (ref 12–45)
BASOPHILS # BLD AUTO: 0.3 % (ref 0–1.8)
BASOPHILS # BLD: 0.04 K/UL (ref 0–0.12)
BILIRUB SERPL-MCNC: 0.7 MG/DL (ref 0.1–1.5)
BILIRUB UR QL STRIP.AUTO: NEGATIVE
BUN SERPL-MCNC: 28 MG/DL (ref 8–22)
CALCIUM SERPL-MCNC: 8.3 MG/DL (ref 8.5–10.5)
CHLORIDE SERPL-SCNC: 108 MMOL/L (ref 96–112)
CO2 SERPL-SCNC: 21 MMOL/L (ref 20–33)
COLOR UR: YELLOW
CREAT SERPL-MCNC: 1.01 MG/DL (ref 0.5–1.4)
EOSINOPHIL # BLD AUTO: 0 K/UL (ref 0–0.51)
EOSINOPHIL NFR BLD: 0 % (ref 0–6.9)
ERYTHROCYTE [DISTWIDTH] IN BLOOD BY AUTOMATED COUNT: 46.9 FL (ref 35.9–50)
GLOBULIN SER CALC-MCNC: 3.2 G/DL (ref 1.9–3.5)
GLUCOSE BLD-MCNC: 244 MG/DL (ref 65–99)
GLUCOSE BLD-MCNC: 284 MG/DL (ref 65–99)
GLUCOSE BLD-MCNC: 290 MG/DL (ref 65–99)
GLUCOSE BLD-MCNC: 294 MG/DL (ref 65–99)
GLUCOSE BLD-MCNC: 302 MG/DL (ref 65–99)
GLUCOSE SERPL-MCNC: 292 MG/DL (ref 65–99)
GLUCOSE UR STRIP.AUTO-MCNC: >=1000 MG/DL
HCT VFR BLD AUTO: 30.7 % (ref 37–47)
HGB BLD-MCNC: 10.2 G/DL (ref 12–16)
IMM GRANULOCYTES # BLD AUTO: 0.16 K/UL (ref 0–0.11)
IMM GRANULOCYTES NFR BLD AUTO: 1 % (ref 0–0.9)
KETONES UR STRIP.AUTO-MCNC: 15 MG/DL
LEUKOCYTE ESTERASE UR QL STRIP.AUTO: NEGATIVE
LYMPHOCYTES # BLD AUTO: 1.79 K/UL (ref 1–4.8)
LYMPHOCYTES NFR BLD: 11.6 % (ref 22–41)
MAGNESIUM SERPL-MCNC: 2.2 MG/DL (ref 1.5–2.5)
MCH RBC QN AUTO: 32.2 PG (ref 27–33)
MCHC RBC AUTO-ENTMCNC: 33.2 G/DL (ref 33.6–35)
MCV RBC AUTO: 96.8 FL (ref 81.4–97.8)
MICRO URNS: ABNORMAL
MONOCYTES # BLD AUTO: 1.17 K/UL (ref 0–0.85)
MONOCYTES NFR BLD AUTO: 7.6 % (ref 0–13.4)
NEUTROPHILS # BLD AUTO: 12.21 K/UL (ref 2–7.15)
NEUTROPHILS NFR BLD: 79.5 % (ref 44–72)
NITRITE UR QL STRIP.AUTO: NEGATIVE
NRBC # BLD AUTO: 0 K/UL
NRBC BLD-RTO: 0 /100 WBC
PH UR STRIP.AUTO: 5 [PH]
PHOSPHATE SERPL-MCNC: 1.9 MG/DL (ref 2.5–4.5)
PLATELET # BLD AUTO: 261 K/UL (ref 164–446)
PMV BLD AUTO: 10.8 FL (ref 9–12.9)
POTASSIUM SERPL-SCNC: 4.3 MMOL/L (ref 3.6–5.5)
PROT SERPL-MCNC: 6.6 G/DL (ref 6–8.2)
PROT UR QL STRIP: NEGATIVE MG/DL
RBC # BLD AUTO: 3.17 M/UL (ref 4.2–5.4)
RBC UR QL AUTO: NEGATIVE
SODIUM SERPL-SCNC: 139 MMOL/L (ref 135–145)
SP GR UR STRIP.AUTO: 1.03
UROBILINOGEN UR STRIP.AUTO-MCNC: 0.2 MG/DL
WBC # BLD AUTO: 15.4 K/UL (ref 4.8–10.8)

## 2019-07-15 PROCEDURE — 93970 EXTREMITY STUDY: CPT

## 2019-07-15 PROCEDURE — 700111 HCHG RX REV CODE 636 W/ 250 OVERRIDE (IP): Performed by: SURGERY

## 2019-07-15 PROCEDURE — 94760 N-INVAS EAR/PLS OXIMETRY 1: CPT

## 2019-07-15 PROCEDURE — 93970 EXTREMITY STUDY: CPT | Mod: 26 | Performed by: INTERNAL MEDICINE

## 2019-07-15 PROCEDURE — 83735 ASSAY OF MAGNESIUM: CPT

## 2019-07-15 PROCEDURE — 81003 URINALYSIS AUTO W/O SCOPE: CPT

## 2019-07-15 PROCEDURE — 82962 GLUCOSE BLOOD TEST: CPT | Mod: 91

## 2019-07-15 PROCEDURE — 84100 ASSAY OF PHOSPHORUS: CPT

## 2019-07-15 PROCEDURE — 97166 OT EVAL MOD COMPLEX 45 MIN: CPT

## 2019-07-15 PROCEDURE — 99233 SBSQ HOSP IP/OBS HIGH 50: CPT | Performed by: SURGERY

## 2019-07-15 PROCEDURE — 87086 URINE CULTURE/COLONY COUNT: CPT

## 2019-07-15 PROCEDURE — 302146: Performed by: SURGERY

## 2019-07-15 PROCEDURE — 87186 SC STD MICRODIL/AGAR DIL: CPT

## 2019-07-15 PROCEDURE — 700111 HCHG RX REV CODE 636 W/ 250 OVERRIDE (IP): Performed by: ORTHOPAEDIC SURGERY

## 2019-07-15 PROCEDURE — 700102 HCHG RX REV CODE 250 W/ 637 OVERRIDE(OP): Performed by: SURGERY

## 2019-07-15 PROCEDURE — 770022 HCHG ROOM/CARE - ICU (200)

## 2019-07-15 PROCEDURE — 700101 HCHG RX REV CODE 250: Performed by: SURGERY

## 2019-07-15 PROCEDURE — 700105 HCHG RX REV CODE 258: Performed by: SURGERY

## 2019-07-15 PROCEDURE — A9270 NON-COVERED ITEM OR SERVICE: HCPCS | Performed by: SURGERY

## 2019-07-15 PROCEDURE — 87077 CULTURE AEROBIC IDENTIFY: CPT

## 2019-07-15 PROCEDURE — 92612 ENDOSCOPY SWALLOW (FEES) VID: CPT

## 2019-07-15 PROCEDURE — 71045 X-RAY EXAM CHEST 1 VIEW: CPT

## 2019-07-15 PROCEDURE — 80053 COMPREHEN METABOLIC PANEL: CPT

## 2019-07-15 PROCEDURE — 302136 NUTRITION PUMP: Performed by: SURGERY

## 2019-07-15 PROCEDURE — 85025 COMPLETE CBC W/AUTO DIFF WBC: CPT

## 2019-07-15 RX ORDER — SULFAMETHOXAZOLE AND TRIMETHOPRIM 800; 160 MG/1; MG/1
1 TABLET ORAL EVERY 12 HOURS
Status: DISCONTINUED | OUTPATIENT
Start: 2019-07-15 | End: 2019-07-15

## 2019-07-15 RX ORDER — BENAZEPRIL HYDROCHLORIDE 20 MG/1
40 TABLET ORAL DAILY
Status: DISCONTINUED | OUTPATIENT
Start: 2019-07-15 | End: 2019-07-21

## 2019-07-15 RX ORDER — DOCUSATE SODIUM 50 MG/5ML
100 LIQUID ORAL 2 TIMES DAILY
Status: DISCONTINUED | OUTPATIENT
Start: 2019-07-15 | End: 2019-07-21

## 2019-07-15 RX ORDER — ACETAMINOPHEN 325 MG/1
650 TABLET ORAL EVERY 6 HOURS
Status: DISCONTINUED | OUTPATIENT
Start: 2019-07-15 | End: 2019-07-15

## 2019-07-15 RX ORDER — POLYETHYLENE GLYCOL 3350 17 G/17G
1 POWDER, FOR SOLUTION ORAL 2 TIMES DAILY
Status: DISCONTINUED | OUTPATIENT
Start: 2019-07-15 | End: 2019-07-21

## 2019-07-15 RX ORDER — CARVEDILOL 6.25 MG/1
6.25 TABLET ORAL 2 TIMES DAILY WITH MEALS
Status: DISCONTINUED | OUTPATIENT
Start: 2019-07-15 | End: 2019-07-21

## 2019-07-15 RX ORDER — SULFAMETHOXAZOLE AND TRIMETHOPRIM 800; 160 MG/1; MG/1
1 TABLET ORAL EVERY 12 HOURS
Status: DISCONTINUED | OUTPATIENT
Start: 2019-07-15 | End: 2019-07-16

## 2019-07-15 RX ORDER — BENAZEPRIL HYDROCHLORIDE 20 MG/1
40 TABLET ORAL DAILY
Status: DISCONTINUED | OUTPATIENT
Start: 2019-07-16 | End: 2019-07-15

## 2019-07-15 RX ORDER — LEVOTHYROXINE SODIUM 0.2 MG/1
200 TABLET ORAL
Status: DISCONTINUED | OUTPATIENT
Start: 2019-07-16 | End: 2019-07-15

## 2019-07-15 RX ORDER — AMOXICILLIN 250 MG
1 CAPSULE ORAL NIGHTLY
Status: DISCONTINUED | OUTPATIENT
Start: 2019-07-15 | End: 2019-07-21

## 2019-07-15 RX ORDER — CARVEDILOL 6.25 MG/1
6.25 TABLET ORAL 2 TIMES DAILY WITH MEALS
Status: DISCONTINUED | OUTPATIENT
Start: 2019-07-15 | End: 2019-07-15

## 2019-07-15 RX ORDER — ACETAMINOPHEN 325 MG/1
650 TABLET ORAL EVERY 6 HOURS
Status: COMPLETED | OUTPATIENT
Start: 2019-07-15 | End: 2019-07-17

## 2019-07-15 RX ORDER — HYDROMORPHONE HYDROCHLORIDE 2 MG/1
2-4 TABLET ORAL
Status: DISCONTINUED | OUTPATIENT
Start: 2019-07-15 | End: 2019-07-21

## 2019-07-15 RX ORDER — LEVOTHYROXINE SODIUM 0.2 MG/1
200 TABLET ORAL
Status: DISCONTINUED | OUTPATIENT
Start: 2019-07-15 | End: 2019-07-21

## 2019-07-15 RX ORDER — AMOXICILLIN 250 MG
1 CAPSULE ORAL
Status: DISCONTINUED | OUTPATIENT
Start: 2019-07-15 | End: 2019-07-21

## 2019-07-15 RX ADMIN — MAGNESIUM HYDROXIDE 30 ML: 400 SUSPENSION ORAL at 13:09

## 2019-07-15 RX ADMIN — SODIUM CHLORIDE 500 MG: 9 INJECTION, SOLUTION INTRAVENOUS at 05:17

## 2019-07-15 RX ADMIN — CARVEDILOL 6.25 MG: 6.25 TABLET, FILM COATED ORAL at 13:05

## 2019-07-15 RX ADMIN — Medication 1 EACH: at 05:17

## 2019-07-15 RX ADMIN — LABETALOL HYDROCHLORIDE 10 MG: 5 INJECTION INTRAVENOUS at 07:03

## 2019-07-15 RX ADMIN — NYSTATIN: 100000 POWDER TOPICAL at 05:17

## 2019-07-15 RX ADMIN — NYSTATIN: 100000 POWDER TOPICAL at 17:24

## 2019-07-15 RX ADMIN — LABETALOL HYDROCHLORIDE 10 MG: 5 INJECTION INTRAVENOUS at 02:10

## 2019-07-15 RX ADMIN — ACETAMINOPHEN 650 MG: 325 TABLET, FILM COATED ORAL at 17:23

## 2019-07-15 RX ADMIN — Medication 1 EACH: at 13:06

## 2019-07-15 RX ADMIN — HYDRALAZINE HYDROCHLORIDE 10 MG: 20 INJECTION INTRAMUSCULAR; INTRAVENOUS at 10:21

## 2019-07-15 RX ADMIN — ACETAMINOPHEN 650 MG: 325 TABLET, FILM COATED ORAL at 13:06

## 2019-07-15 RX ADMIN — LEVOTHYROXINE SODIUM 200 MCG: 200 TABLET ORAL at 13:05

## 2019-07-15 RX ADMIN — BENAZEPRIL HYDROCHLORIDE 40 MG: 20 TABLET ORAL at 13:05

## 2019-07-15 RX ADMIN — SULFAMETHOXAZOLE AND TRIMETHOPRIM 1 TABLET: 800; 160 TABLET ORAL at 13:41

## 2019-07-15 RX ADMIN — SODIUM CHLORIDE 500 MG: 9 INJECTION, SOLUTION INTRAVENOUS at 17:31

## 2019-07-15 RX ADMIN — LABETALOL HYDROCHLORIDE 10 MG: 5 INJECTION INTRAVENOUS at 22:04

## 2019-07-15 RX ADMIN — HYDRALAZINE HYDROCHLORIDE 10 MG: 20 INJECTION INTRAMUSCULAR; INTRAVENOUS at 04:05

## 2019-07-15 RX ADMIN — DIBASIC SODIUM PHOSPHATE, MONOBASIC POTASSIUM PHOSPHATE AND MONOBASIC SODIUM PHOSPHATE 2 TABLET: 852; 155; 130 TABLET ORAL at 13:05

## 2019-07-15 RX ADMIN — ACETAMINOPHEN 650 MG: 325 TABLET, FILM COATED ORAL at 23:09

## 2019-07-15 RX ADMIN — CARVEDILOL 6.25 MG: 6.25 TABLET, FILM COATED ORAL at 20:05

## 2019-07-15 RX ADMIN — FENTANYL CITRATE 50 MCG: 0.05 INJECTION, SOLUTION INTRAMUSCULAR; INTRAVENOUS at 04:10

## 2019-07-15 RX ADMIN — CEFAZOLIN SODIUM 2 G: 2 INJECTION, SOLUTION INTRAVENOUS at 00:00

## 2019-07-15 ASSESSMENT — ACTIVITIES OF DAILY LIVING (ADL): TOILETING: INDEPENDENT

## 2019-07-15 ASSESSMENT — COGNITIVE AND FUNCTIONAL STATUS - GENERAL
HELP NEEDED FOR BATHING: A LOT
DAILY ACTIVITIY SCORE: 14
DRESSING REGULAR LOWER BODY CLOTHING: A LOT
EATING MEALS: A LITTLE
TURNING FROM BACK TO SIDE WHILE IN FLAT BAD: A LOT
MOVING TO AND FROM BED TO CHAIR: UNABLE
MOVING FROM LYING ON BACK TO SITTING ON SIDE OF FLAT BED: UNABLE
DRESSING REGULAR UPPER BODY CLOTHING: A LOT
SUGGESTED CMS G CODE MODIFIER DAILY ACTIVITY: CK
STANDING UP FROM CHAIR USING ARMS: A LOT
WALKING IN HOSPITAL ROOM: A LOT
MOBILITY SCORE: 9
PERSONAL GROOMING: A LITTLE
CLIMB 3 TO 5 STEPS WITH RAILING: TOTAL
SUGGESTED CMS G CODE MODIFIER MOBILITY: CM
TOILETING: A LOT

## 2019-07-15 ASSESSMENT — GAIT ASSESSMENTS: GAIT LEVEL OF ASSIST: UNABLE TO PARTICIPATE

## 2019-07-15 ASSESSMENT — ENCOUNTER SYMPTOMS
FEVER: 0
SHORTNESS OF BREATH: 0
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
## 2019-07-15 NOTE — CARE PLAN
Problem: Safety  Goal: Will remain free from injury    Intervention: Provide assistance with mobility  bedalarm activated, room near nursing station and pt educated on fall prevention measures.      Problem: Mobility  Goal: Risk for activity intolerance will decrease    Intervention: Assess and monitor signs of activity intolerance  Pt mobilizes up to commode with 2 person assist.

## 2019-07-15 NOTE — PROGRESS NOTES
Neuro surgery NP at the bedside.  Updated on pt's lethargy but oriented x 4 and QUINTEROS and no focal neuro deficits.  Mobilizes to bedside commode.  NP to clarify Lovenox order with Dr. Ricardo.
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
## 2019-07-15 NOTE — PROGRESS NOTES
2 RN skin check complete with ALEX Salinas.  Devices in place:              *C-collar              *Nasal cannula              *BP cuff              *Pulse ox              *Cardiac leads              *PIVs              *SCDs  Skin assessed under the devices listed above     Following areas of concern:               *Right ear abrasion              *Bruising and redness around c-collar    *Left shoulder surgical incision               *Brusing noted on back and scattered on trunk              *LUE abrasion and skin tear. Areas is pink/red/light purple/and black. scant drainage noted from tear site.    *LUE forearm laceration closed with staples.              *RUE bruising               *Area under panus has skin tears on the right region.               *Sacral region is blanching and intact              *Lower extremities have scattered bruising R>L     The following interventions in place:               *Preventative mepilex placed around c-collar              *Redressed right forearm laceration.    *Took wound photos of left upper extremity abrasion and skin tear. Irrigated area and placed dressing.               *Area around panus; nystatin powder and interdry applied               *Preventative mepilex placed on sacral region              *Pillows used to float elbows and heels.              *Patient participating in Q 2 hour turning      Wound consult placedYES/NO: yes    Wound reported YES/NO: yes  Appropriate LDAs opened YES/NO: yes

## 2019-07-15 NOTE — CARE PLAN
Problem: Bowel/Gastric:  Goal: Will not experience complications related to bowel motility    Intervention: Assess baseline bowel pattern  Bowel protocol initiated       Problem: Skin Integrity  Goal: Risk for impaired skin integrity will decrease    Intervention: Assess risk factors for impaired skin integrity and/or pressure ulcers  Assessment of skin integrity. Interventions in place to prevent skin breakdown. Wound team consulted.

## 2019-07-15 NOTE — PROGRESS NOTES
2 RN skin check complete with ALEX Medeiros.  Devices in place C-collar, nasal cannula, sling on right arm, SCDs.              Skin assessed under the following devices C-collar, nasal cannula, sling on right arm, SCDs.              Preventative measures in place including mepilex under c-collar, nasal cannula holding stickers and pillows  Following areas of concern:   Laceration/bruising on right ear  Bruising on/behing bilateral ear  Bruising behind neck: mepilex under c-collar areas  Moisture fissures under abdomen pannus: inter try placed  Surgical incision on left shoulder, dressing clean,dry and intact  Abrasions/skin tears on left forearm, dressing intact     The following interventions in place  mepilex under c-collar, nasal cannula holding stickers and pillows     Wound consult placedYES/NO: N\A    Wound reported YES/NO: N\A  Appropriate LDAs opened YES/NO: yes

## 2019-07-15 NOTE — THERAPY
"  Speech Language Therapy FEES completed.  Functional Status: Pt with excessive, dry, yllwish-brown secretions noted within the pharynx, with mult ice chip presentations prior to partially clearing.  Pt with penetration of ntl before and during the swallow, to mid level epiglottis, and 1x deep penetration of tsp ntl to both the lat channel R and over the epiglottis with possible aspiration during the swallow.  Deep penetration of thin liquids to base of epiglottis. Silent penetration.  Pt cannot currently eat to meet nutritional needs, however 5 ice chips per hr, prefdg with SLP is appropriate as acute status improves.  RT and RN aware of copious thick secretions within the pharynx.  1x with productive cough to move yllw glob from below airway, into hypopharynx.    Recommendations - Diet: Cortrtak, NPO, Pre-Feeding Trials with SLP Only.  Ok for limited ice; note placed above HOB.                          Strategies: Head of Bed at 90 Degrees; up as tolerated to facilitate secretion/saliva management                          Medication Administration: Alon   Plan of Care: Will benefit from Speech Therapy 3 - 5 times per week  Post-Acute Therapy: Discharge to a transitional care facility for continued skilled therapy services.    See \"Rehab Therapy-Acute\" Patient Summary Report for complete documentation.   "
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
## 2019-07-15 NOTE — CARE PLAN
Problem: Oxygenation:  Goal: Maintain adequate oxygenation dependent on patient condition  Outcome: PROGRESSING AS EXPECTED    Intervention: Manage oxygen therapy by monitoring pulse oximetry and/or ABG values  60% of predicted IS value 1290, pt's best value today 1000  Intervention: Levels of oxygenation will improve to baseline  IS with RT QID

## 2019-07-16 ENCOUNTER — APPOINTMENT (OUTPATIENT)
Dept: RADIOLOGY | Facility: MEDICAL CENTER | Age: 74
DRG: 958 | End: 2019-07-16
Attending: NEUROLOGICAL SURGERY
Payer: COMMERCIAL

## 2019-07-16 ENCOUNTER — APPOINTMENT (OUTPATIENT)
Dept: RADIOLOGY | Facility: MEDICAL CENTER | Age: 74
DRG: 958 | End: 2019-07-16
Attending: SURGERY
Payer: COMMERCIAL

## 2019-07-16 PROBLEM — Z78.9 NO CONTRAINDICATION TO DEEP VEIN THROMBOSIS (DVT) PROPHYLAXIS: Status: ACTIVE | Noted: 2019-07-12

## 2019-07-16 LAB
ALBUMIN SERPL BCP-MCNC: 3 G/DL (ref 3.2–4.9)
ALBUMIN/GLOB SERPL: 1 G/DL
ALP SERPL-CCNC: 76 U/L (ref 30–99)
ALT SERPL-CCNC: 22 U/L (ref 2–50)
ANION GAP SERPL CALC-SCNC: 7 MMOL/L (ref 0–11.9)
AST SERPL-CCNC: 29 U/L (ref 12–45)
BASOPHILS # BLD AUTO: 0.5 % (ref 0–1.8)
BASOPHILS # BLD: 0.05 K/UL (ref 0–0.12)
BILIRUB SERPL-MCNC: 0.9 MG/DL (ref 0.1–1.5)
BUN SERPL-MCNC: 33 MG/DL (ref 8–22)
CALCIUM SERPL-MCNC: 8.3 MG/DL (ref 8.5–10.5)
CHLORIDE SERPL-SCNC: 109 MMOL/L (ref 96–112)
CO2 SERPL-SCNC: 23 MMOL/L (ref 20–33)
CREAT SERPL-MCNC: 0.86 MG/DL (ref 0.5–1.4)
CRP SERPL HS-MCNC: 4.99 MG/DL (ref 0–0.75)
EOSINOPHIL # BLD AUTO: 0.03 K/UL (ref 0–0.51)
EOSINOPHIL NFR BLD: 0.3 % (ref 0–6.9)
ERYTHROCYTE [DISTWIDTH] IN BLOOD BY AUTOMATED COUNT: 47.8 FL (ref 35.9–50)
GLOBULIN SER CALC-MCNC: 2.9 G/DL (ref 1.9–3.5)
GLUCOSE BLD-MCNC: 253 MG/DL (ref 65–99)
GLUCOSE BLD-MCNC: 279 MG/DL (ref 65–99)
GLUCOSE BLD-MCNC: 298 MG/DL (ref 65–99)
GLUCOSE BLD-MCNC: 318 MG/DL (ref 65–99)
GLUCOSE SERPL-MCNC: 320 MG/DL (ref 65–99)
HCT VFR BLD AUTO: 29.7 % (ref 37–47)
HGB BLD-MCNC: 9.7 G/DL (ref 12–16)
IMM GRANULOCYTES # BLD AUTO: 0.11 K/UL (ref 0–0.11)
IMM GRANULOCYTES NFR BLD AUTO: 1.1 % (ref 0–0.9)
LYMPHOCYTES # BLD AUTO: 1.64 K/UL (ref 1–4.8)
LYMPHOCYTES NFR BLD: 16.4 % (ref 22–41)
MCH RBC QN AUTO: 32.1 PG (ref 27–33)
MCHC RBC AUTO-ENTMCNC: 32.7 G/DL (ref 33.6–35)
MCV RBC AUTO: 98.3 FL (ref 81.4–97.8)
MONOCYTES # BLD AUTO: 0.96 K/UL (ref 0–0.85)
MONOCYTES NFR BLD AUTO: 9.6 % (ref 0–13.4)
NEUTROPHILS # BLD AUTO: 7.19 K/UL (ref 2–7.15)
NEUTROPHILS NFR BLD: 72.1 % (ref 44–72)
NRBC # BLD AUTO: 0.02 K/UL
NRBC BLD-RTO: 0.2 /100 WBC
PHOSPHATE SERPL-MCNC: 2.1 MG/DL (ref 2.5–4.5)
PLATELET # BLD AUTO: 250 K/UL (ref 164–446)
PMV BLD AUTO: 11 FL (ref 9–12.9)
POTASSIUM SERPL-SCNC: 4 MMOL/L (ref 3.6–5.5)
PREALB SERPL-MCNC: 10 MG/DL (ref 18–38)
PROT SERPL-MCNC: 5.9 G/DL (ref 6–8.2)
RBC # BLD AUTO: 3.02 M/UL (ref 4.2–5.4)
SODIUM SERPL-SCNC: 139 MMOL/L (ref 135–145)
WBC # BLD AUTO: 10 K/UL (ref 4.8–10.8)

## 2019-07-16 PROCEDURE — 80053 COMPREHEN METABOLIC PANEL: CPT

## 2019-07-16 PROCEDURE — A9270 NON-COVERED ITEM OR SERVICE: HCPCS | Performed by: SURGERY

## 2019-07-16 PROCEDURE — 770001 HCHG ROOM/CARE - MED/SURG/GYN PRIV*

## 2019-07-16 PROCEDURE — 71045 X-RAY EXAM CHEST 1 VIEW: CPT

## 2019-07-16 PROCEDURE — 84100 ASSAY OF PHOSPHORUS: CPT

## 2019-07-16 PROCEDURE — 700101 HCHG RX REV CODE 250: Performed by: SURGERY

## 2019-07-16 PROCEDURE — 86140 C-REACTIVE PROTEIN: CPT

## 2019-07-16 PROCEDURE — 85025 COMPLETE CBC W/AUTO DIFF WBC: CPT

## 2019-07-16 PROCEDURE — 700111 HCHG RX REV CODE 636 W/ 250 OVERRIDE (IP): Performed by: NURSE PRACTITIONER

## 2019-07-16 PROCEDURE — 700111 HCHG RX REV CODE 636 W/ 250 OVERRIDE (IP): Performed by: SURGERY

## 2019-07-16 PROCEDURE — 700102 HCHG RX REV CODE 250 W/ 637 OVERRIDE(OP): Performed by: SURGERY

## 2019-07-16 PROCEDURE — 82962 GLUCOSE BLOOD TEST: CPT

## 2019-07-16 PROCEDURE — 99233 SBSQ HOSP IP/OBS HIGH 50: CPT | Performed by: SURGERY

## 2019-07-16 PROCEDURE — 84134 ASSAY OF PREALBUMIN: CPT

## 2019-07-16 RX ORDER — INSULIN GLARGINE 100 [IU]/ML
20 INJECTION, SOLUTION SUBCUTANEOUS
Status: DISCONTINUED | OUTPATIENT
Start: 2019-07-16 | End: 2019-07-17

## 2019-07-16 RX ORDER — LEVETIRACETAM 100 MG/ML
500 SOLUTION ORAL EVERY 12 HOURS
Status: COMPLETED | OUTPATIENT
Start: 2019-07-16 | End: 2019-07-18

## 2019-07-16 RX ORDER — LEVETIRACETAM 100 MG/ML
500 SOLUTION ORAL EVERY 12 HOURS
Status: DISCONTINUED | OUTPATIENT
Start: 2019-07-16 | End: 2019-07-16

## 2019-07-16 RX ADMIN — ACETAMINOPHEN 650 MG: 325 TABLET, FILM COATED ORAL at 23:26

## 2019-07-16 RX ADMIN — ENOXAPARIN SODIUM 40 MG: 100 INJECTION SUBCUTANEOUS at 17:11

## 2019-07-16 RX ADMIN — CARVEDILOL 6.25 MG: 6.25 TABLET, FILM COATED ORAL at 08:48

## 2019-07-16 RX ADMIN — HYDROMORPHONE HYDROCHLORIDE 2 MG: 2 TABLET ORAL at 01:05

## 2019-07-16 RX ADMIN — FENTANYL CITRATE 25 MCG: 0.05 INJECTION, SOLUTION INTRAMUSCULAR; INTRAVENOUS at 12:25

## 2019-07-16 RX ADMIN — INSULIN HUMAN 10 UNITS: 100 INJECTION, SOLUTION PARENTERAL at 12:38

## 2019-07-16 RX ADMIN — HYDRALAZINE HYDROCHLORIDE 10 MG: 20 INJECTION INTRAMUSCULAR; INTRAVENOUS at 00:03

## 2019-07-16 RX ADMIN — LEVOTHYROXINE SODIUM 200 MCG: 200 TABLET ORAL at 05:15

## 2019-07-16 RX ADMIN — SULFAMETHOXAZOLE AND TRIMETHOPRIM 1 TABLET: 800; 160 TABLET ORAL at 05:15

## 2019-07-16 RX ADMIN — HYDROMORPHONE HYDROCHLORIDE 2 MG: 2 TABLET ORAL at 12:25

## 2019-07-16 RX ADMIN — LEVETIRACETAM 500 MG: 100 SOLUTION ORAL at 05:15

## 2019-07-16 RX ADMIN — LABETALOL HYDROCHLORIDE 10 MG: 5 INJECTION INTRAVENOUS at 02:12

## 2019-07-16 RX ADMIN — ACETAMINOPHEN 650 MG: 325 TABLET, FILM COATED ORAL at 05:14

## 2019-07-16 RX ADMIN — INSULIN HUMAN 7 UNITS: 100 INJECTION, SOLUTION PARENTERAL at 17:15

## 2019-07-16 RX ADMIN — NYSTATIN: 100000 POWDER TOPICAL at 05:14

## 2019-07-16 RX ADMIN — NYSTATIN: 100000 POWDER TOPICAL at 17:11

## 2019-07-16 RX ADMIN — BENAZEPRIL HYDROCHLORIDE 40 MG: 20 TABLET ORAL at 05:15

## 2019-07-16 RX ADMIN — LEVETIRACETAM 500 MG: 100 SOLUTION ORAL at 17:13

## 2019-07-16 RX ADMIN — INSULIN GLARGINE 20 UNITS: 100 INJECTION, SOLUTION SUBCUTANEOUS at 12:36

## 2019-07-16 RX ADMIN — Medication 1 EACH: at 12:42

## 2019-07-16 RX ADMIN — DIBASIC SODIUM PHOSPHATE, MONOBASIC POTASSIUM PHOSPHATE AND MONOBASIC SODIUM PHOSPHATE 2 TABLET: 852; 155; 130 TABLET ORAL at 17:11

## 2019-07-16 RX ADMIN — ACETAMINOPHEN 650 MG: 325 TABLET, FILM COATED ORAL at 12:35

## 2019-07-16 RX ADMIN — DIBASIC SODIUM PHOSPHATE, MONOBASIC POTASSIUM PHOSPHATE AND MONOBASIC SODIUM PHOSPHATE 2 TABLET: 852; 155; 130 TABLET ORAL at 05:15

## 2019-07-16 RX ADMIN — ACETAMINOPHEN 650 MG: 325 TABLET, FILM COATED ORAL at 17:11

## 2019-07-16 RX ADMIN — Medication 1 EACH: at 05:15

## 2019-07-16 RX ADMIN — INSULIN HUMAN 7 UNITS: 100 INJECTION, SOLUTION PARENTERAL at 23:32

## 2019-07-16 RX ADMIN — POLYETHYLENE GLYCOL 3350 1 PACKET: 17 POWDER, FOR SOLUTION ORAL at 17:11

## 2019-07-16 RX ADMIN — CARVEDILOL 6.25 MG: 6.25 TABLET, FILM COATED ORAL at 17:12

## 2019-07-16 ASSESSMENT — ENCOUNTER SYMPTOMS
FEVER: 0
DOUBLE VISION: 0
NECK PAIN: 1
SENSORY CHANGE: 0
FOCAL WEAKNESS: 0
NAUSEA: 0
VOMITING: 0
PALPITATIONS: 0
ABDOMINAL PAIN: 0
MYALGIAS: 1
CHILLS: 0

## 2019-07-16 NOTE — CARE PLAN
Problem: Nutritional:  Goal: Nutrition support tolerated and meeting greater than 85% of estimated needs  Outcome: MET Date Met: 07/16/19

## 2019-07-16 NOTE — DISCHARGE PLANNING
Legal NOK is pt's spouse, Felipe Dodd [phone]    LSW met with pt at bedside for assessment. Face sheet verified. Pt lives in a single level house with her spouse & son, MIKE Dodd [phone]. Prior to hospitalization pt was independent with ADLs/IADLs. Pt has cane, wc and FWW at home. No o2 at baseline. No other DME in the home. Pharmacy is [facility] in [place]. Denies substance abuse & mh hx. DC support is spouse & child. No financial concerns at this time. Family has been to bedside & is supportive. Pt's dc goal is to return home.    Plan: assist as needed     Care Transition Team Assessment    Information Source  Orientation : Disoriented to Time  Information Given By: Patient  Who is responsible for making decisions for patient? : Patient    Readmission Evaluation  Is this a readmission?: No    Elopement Risk  Legal Hold: No  Ambulatory or Self Mobile in Wheelchair: No-Not an Elopement Risk  Elopement Risk: Not at Risk for Elopement    Interdisciplinary Discharge Planning  Lives with - Patient's Self Care Capacity: Spouse  Patient or legal guardian wants to designate a caregiver (see row info): No  Housing / Facility: [address] [facility]  Prior Services: Unable To Determine At This Time    Discharge Preparedness  What is your plan after discharge?: Uncertain - pending medical team collaboration, Skilled nursing facility, Home health care  What are your discharge supports?: Child, Spouse  Prior Functional Level: Ambulatory, Drives Self, Independent with Activities of Daily Living, Independent with Medication Management  Difficulity with ADLs: Dressing, Toileting, Walking  Difficulity with IADLs: Driving    Functional Assesment  Prior Functional Level: Ambulatory, Drives Self, Independent with Activities of Daily Living, Independent with Medication Management    Finances  Financial Barriers to Discharge: No  Prescription Coverage: Yes              Advance Directive  Advance Directive?: None  Advance  Directive offered?: AD Booklet refused         Psychological Assessment  History of Substance Abuse: None  History of Psychiatric Problems: No  Non-compliant with Treatment: No  Newly Diagnosed Illness: Yes    Discharge Risks or Barriers  Discharge risks or barriers?: No    Anticipated Discharge Information  Anticipated discharge disposition: Acute rehab, Discharge needs currently unknown, C, SNF
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
## 2019-07-16 NOTE — CARE PLAN
Problem: Venous Thromboembolism (VTW)/Deep Vein Thrombosis (DVT) Prevention:  Goal: Patient will participate in Venous Thrombosis (VTE)/Deep Vein Thrombosis (DVT)Prevention Measures  Outcome: PROGRESSING AS EXPECTED   07/15/19 2000 07/16/19 0000   Mechanical/VTE Prophylaxis   Mechanical Prophylaxis  --  SCDs, Sequential Compression Device   AV Foot Pumps --  Off   MONA Hose (Graduated Compression Stockings) --  Off   SCDs, Sequential Compression Device --  On   OTHER   Risk Assessment Score 3 --    VTE RISK High --    Pharmacologic Prophylaxis Used Contraindicated per MD --        Problem: Bowel/Gastric:  Goal: Normal bowel function is maintained or improved  Outcome: PROGRESSING AS EXPECTED   07/16/19 0400   OTHER   Last BM 07/15/19   Number of Times Stooled 0

## 2019-07-16 NOTE — CARE PLAN
Problem: Oxygenation:  Goal: Maintain adequate oxygenation dependent on patient condition  Outcome: PROGRESSING AS EXPECTED    Intervention: Manage oxygen therapy by monitoring pulse oximetry and/or ABG values  Pt on 1L NC, will continue to titrate as tolerated      Problem: Hyperinflation:  Goal: Prevent or improve atelectasis  Outcome: PROGRESSING AS EXPECTED    Intervention: Instruct incentive spirometry usage  60% of predicted IS value 1290, pt's best value today 1100

## 2019-07-16 NOTE — PROGRESS NOTES
2 RN skin check complete with ALEX Schilling.    Devices in place: Pulse ox, cardiac leads, BP cuff, R IJTL, SCDs, C collar, L sling.     Skin assessed under the following devices: Pulse ox, cardiac leads, BP cuff, R IJTL, SCDs, C collar, L sling.    Preventative measures in place including mepilex on sacrum; cord and devices frequently repositioned and assessed; and padding under devices and c-collar.    Following areas of concern:    -Laceration and bruising to right ear   -Bruising to left ear   -Bruising to left upper back and left flank   -Moisture fissure and redness under pannus. Interdry placed.    -Skin under c collar intact, mepilex in place for padding   -Surgical incision to L shoulder/clavicle area. Surgical dressing in place, C/D/I.    -Abrasions, bruising, skin tear to LUE, mepilex over skin tear, C/D/I.       The following interventions in place:   -Q2 turns   -Pillows used to float heels and elbows   -Interventions as stated above.     Wound consult placedYES/NO: N\A    Wound reported YES/NO: N\A  Appropriate LDAs opened YES/NO: N\A

## 2019-07-16 NOTE — PROGRESS NOTES
"Orthopaedic Progress Note    Author: Miguel Jansen Date & Time created: 7/15/2019   6:46 PM     Interval Events:  Doing well POD#1 S/P ORIF  L clavicle    Review of Systems   Constitutional: Negative for fever.   Respiratory: Negative for shortness of breath.    Cardiovascular: Negative for chest pain.     Hemodynamics:  BP (!) 183/74   Pulse (!) 102   Temp 36.8 °C (98.2 °F) (Temporal)   Resp 18   Ht 1.702 m (5' 7\")   Wt (!) 151.3 kg (333 lb 8.9 oz)   SpO2 96%      No Active Precaution Orders    Respiratory:    Respiration: 18, Pulse Oximetry: 96 %, O2 Daily Delivery Respiratory : Silicone Nasal Cannula     Work Of Breathing / Effort: Mild (mild with excertion )  RUL Breath Sounds: Clear, RML Breath Sounds: Diminished, RLL Breath Sounds: Diminished, AINSLEY Breath Sounds: Clear, LLL Breath Sounds: Diminished  Fluids:    Intake/Output Summary (Last 24 hours) at 07/15/19 1846  Last data filed at 07/15/19 1800   Gross per 24 hour   Intake           980.83 ml   Output             2950 ml   Net         -1969.17 ml     Admit Weight: (!) 140.6 kg (310 lb)  Current Weight: (!) 151.3 kg (333 lb 8.9 oz)    Physical Exam   Musculoskeletal:   Surgical L clavicle dressing is clean, dry, and intact. Patient clearly fires forearm flexors, forearm extensors, and moves all five fingers without issue . Sensation is intact to light touch throughout median, ulnar, and radial nerve distributions. Strong and palpable radial pulses with capillary refill less than 2 seconds. No arm or hand discomfort.       Labs:  Recent Labs      07/13/19   0516  07/14/19   0425  07/15/19   0355   WBC  15.1*  13.0*  15.4*   RBC  3.38*  3.15*  3.17*   HEMOGLOBIN  10.6*  10.0*  10.2*   HEMATOCRIT  32.6*  31.1*  30.7*   MCV  96.4  98.7*  96.8   MCH  31.4  31.7  32.2   MCHC  32.5*  32.2*  33.2*   RDW  47.3  47.6  46.9   PLATELETCT  231  218  261   MPV  10.8  11.0  10.8     Recent Labs      07/13/19   0516  07/14/19   0425  07/15/19   0355   SODIUM  " 139  137  139   POTASSIUM  5.2  4.7  4.3   CHLORIDE  112  109  108   CO2  18*  20  21   GLUCOSE  238*  274*  292*   BUN  28*  34*  28*   CREATININE  1.13  0.97  1.01   CALCIUM  8.4*  8.1*  8.3*       Medical Decision Making/Problem List:    Active Hospital Problems    Diagnosis   • Dysphagia [R13.10]   • Subarachnoid hemorrhage following injury, no loss of consciousness ([facility]) [S06.6X0A]   • Closed fracture of multiple ribs with flail chest [S22.5XXA]   • Morbid obesity with BMI of 45.0-49.9, adult ([facility]) [E66.01, Z68.42]   • Laceration of left forearm [S51.812A]   • Multiple fractures of cervical spine ([facility]) [S12.9XXA]   • Fracture of left clavicle [S42.002A]   • Platelet dysfunction due to drugs [D69.59, T50.905A]   • DM (diabetes mellitus) ([facility]) [E11.9]   • Traumatic pneumothorax [S27.0XXA]   • Essential hypertension [I10]   • Contraindication to deep vein thrombosis (DVT) prophylaxis [Z53.09]   • Hypothyroid [E03.9]   • Abnormal CT of the abdomen [R93.5]   • Trauma [T14.90XA]     Core Measures & Quality Metrics:  Current DVT prophylaxis: per Trauma  Discussed patient condition with RN, Patient and orthopedics.  Clearance for lovenox/heparin: yes  Weight Bearing Status: NWB LUE, ROM ok  Wounds & Drains: DRessing left in place  Disposition and Follow-up: per Trauma

## 2019-07-16 NOTE — THERAPY
pt sleepy today and per nurs not at the level for tx or eval. Nurs has not given ice chips. Will hold ice chip recommendations and attempt to see pt Tyra spears
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
## 2019-07-16 NOTE — CARE PLAN
Problem: Venous Thromboembolism (VTW)/Deep Vein Thrombosis (DVT) Prevention:  Goal: Patient will participate in Venous Thrombosis (VTE)/Deep Vein Thrombosis (DVT)Prevention Measures    Intervention: Ensure patient wears graduated elastic stockings (MONA hose) and/or SCDs, if ordered, when in bed or chair (Remove at least once per shift for skin check)  SCDs on. Pt encouraged to move frequently move legs and change positions.         Problem: Skin Integrity  Goal: Risk for impaired skin integrity will decrease    Intervention: Assess risk factors for impaired skin integrity and/or pressure ulcers  Pt turned every two hours. Pillows used to offload arms, hips, and heels. Mepilex in place on bony prominences. Skin assessed under devices and devices frequently repositioned.
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
## 2019-07-17 ENCOUNTER — APPOINTMENT (OUTPATIENT)
Dept: RADIOLOGY | Facility: MEDICAL CENTER | Age: 74
DRG: 958 | End: 2019-07-17
Attending: SURGERY
Payer: COMMERCIAL

## 2019-07-17 LAB
ALBUMIN SERPL BCP-MCNC: 3.1 G/DL (ref 3.2–4.9)
ALBUMIN/GLOB SERPL: 1.1 G/DL
ALP SERPL-CCNC: 80 U/L (ref 30–99)
ALT SERPL-CCNC: 23 U/L (ref 2–50)
ANION GAP SERPL CALC-SCNC: 6 MMOL/L (ref 0–11.9)
AST SERPL-CCNC: 25 U/L (ref 12–45)
BASOPHILS # BLD AUTO: 0.8 % (ref 0–1.8)
BASOPHILS # BLD: 0.07 K/UL (ref 0–0.12)
BILIRUB SERPL-MCNC: 1.2 MG/DL (ref 0.1–1.5)
BUN SERPL-MCNC: 34 MG/DL (ref 8–22)
CALCIUM SERPL-MCNC: 8.7 MG/DL (ref 8.5–10.5)
CHLORIDE SERPL-SCNC: 108 MMOL/L (ref 96–112)
CO2 SERPL-SCNC: 25 MMOL/L (ref 20–33)
CREAT SERPL-MCNC: 0.78 MG/DL (ref 0.5–1.4)
EOSINOPHIL # BLD AUTO: 0.08 K/UL (ref 0–0.51)
EOSINOPHIL NFR BLD: 0.9 % (ref 0–6.9)
ERYTHROCYTE [DISTWIDTH] IN BLOOD BY AUTOMATED COUNT: 47 FL (ref 35.9–50)
GLOBULIN SER CALC-MCNC: 2.9 G/DL (ref 1.9–3.5)
GLUCOSE BLD-MCNC: 304 MG/DL (ref 65–99)
GLUCOSE BLD-MCNC: 305 MG/DL (ref 65–99)
GLUCOSE BLD-MCNC: 316 MG/DL (ref 65–99)
GLUCOSE BLD-MCNC: 327 MG/DL (ref 65–99)
GLUCOSE SERPL-MCNC: 330 MG/DL (ref 65–99)
HCT VFR BLD AUTO: 30.3 % (ref 37–47)
HGB BLD-MCNC: 9.8 G/DL (ref 12–16)
IMM GRANULOCYTES # BLD AUTO: 0.18 K/UL (ref 0–0.11)
IMM GRANULOCYTES NFR BLD AUTO: 2 % (ref 0–0.9)
LYMPHOCYTES # BLD AUTO: 2.33 K/UL (ref 1–4.8)
LYMPHOCYTES NFR BLD: 26.4 % (ref 22–41)
MCH RBC QN AUTO: 31.3 PG (ref 27–33)
MCHC RBC AUTO-ENTMCNC: 32.3 G/DL (ref 33.6–35)
MCV RBC AUTO: 96.8 FL (ref 81.4–97.8)
MONOCYTES # BLD AUTO: 0.83 K/UL (ref 0–0.85)
MONOCYTES NFR BLD AUTO: 9.4 % (ref 0–13.4)
NEUTROPHILS # BLD AUTO: 5.32 K/UL (ref 2–7.15)
NEUTROPHILS NFR BLD: 60.5 % (ref 44–72)
NRBC # BLD AUTO: 0.03 K/UL
NRBC BLD-RTO: 0.3 /100 WBC
PHOSPHATE SERPL-MCNC: 2.4 MG/DL (ref 2.5–4.5)
PLATELET # BLD AUTO: 269 K/UL (ref 164–446)
PMV BLD AUTO: 10.6 FL (ref 9–12.9)
POTASSIUM SERPL-SCNC: 4 MMOL/L (ref 3.6–5.5)
PROT SERPL-MCNC: 6 G/DL (ref 6–8.2)
RBC # BLD AUTO: 3.13 M/UL (ref 4.2–5.4)
SODIUM SERPL-SCNC: 139 MMOL/L (ref 135–145)
WBC # BLD AUTO: 8.8 K/UL (ref 4.8–10.8)

## 2019-07-17 PROCEDURE — 700102 HCHG RX REV CODE 250 W/ 637 OVERRIDE(OP): Performed by: SURGERY

## 2019-07-17 PROCEDURE — 770022 HCHG ROOM/CARE - ICU (200)

## 2019-07-17 PROCEDURE — A9270 NON-COVERED ITEM OR SERVICE: HCPCS | Performed by: SURGERY

## 2019-07-17 PROCEDURE — 700101 HCHG RX REV CODE 250: Performed by: SURGERY

## 2019-07-17 PROCEDURE — 85025 COMPLETE CBC W/AUTO DIFF WBC: CPT

## 2019-07-17 PROCEDURE — 99233 SBSQ HOSP IP/OBS HIGH 50: CPT | Performed by: SURGERY

## 2019-07-17 PROCEDURE — 700102 HCHG RX REV CODE 250 W/ 637 OVERRIDE(OP): Performed by: NURSE PRACTITIONER

## 2019-07-17 PROCEDURE — 84100 ASSAY OF PHOSPHORUS: CPT

## 2019-07-17 PROCEDURE — 97530 THERAPEUTIC ACTIVITIES: CPT

## 2019-07-17 PROCEDURE — 92526 ORAL FUNCTION THERAPY: CPT

## 2019-07-17 PROCEDURE — 71045 X-RAY EXAM CHEST 1 VIEW: CPT

## 2019-07-17 PROCEDURE — 700112 HCHG RX REV CODE 229: Performed by: SURGERY

## 2019-07-17 PROCEDURE — 700111 HCHG RX REV CODE 636 W/ 250 OVERRIDE (IP): Performed by: NURSE PRACTITIONER

## 2019-07-17 PROCEDURE — 80053 COMPREHEN METABOLIC PANEL: CPT

## 2019-07-17 PROCEDURE — 97535 SELF CARE MNGMENT TRAINING: CPT

## 2019-07-17 PROCEDURE — 82962 GLUCOSE BLOOD TEST: CPT

## 2019-07-17 RX ORDER — INSULIN GLARGINE 100 [IU]/ML
20 INJECTION, SOLUTION SUBCUTANEOUS 2 TIMES DAILY
Status: DISCONTINUED | OUTPATIENT
Start: 2019-07-17 | End: 2019-07-17

## 2019-07-17 RX ORDER — INSULIN GLARGINE 100 [IU]/ML
25 INJECTION, SOLUTION SUBCUTANEOUS 2 TIMES DAILY
Status: DISCONTINUED | OUTPATIENT
Start: 2019-07-17 | End: 2019-07-18

## 2019-07-17 RX ADMIN — BENAZEPRIL HYDROCHLORIDE 40 MG: 20 TABLET ORAL at 05:59

## 2019-07-17 RX ADMIN — ENOXAPARIN SODIUM 40 MG: 100 INJECTION SUBCUTANEOUS at 05:58

## 2019-07-17 RX ADMIN — NYSTATIN: 100000 POWDER TOPICAL at 06:00

## 2019-07-17 RX ADMIN — Medication 1 EACH: at 18:24

## 2019-07-17 RX ADMIN — Medication 1 EACH: at 12:14

## 2019-07-17 RX ADMIN — DIBASIC SODIUM PHOSPHATE, MONOBASIC POTASSIUM PHOSPHATE AND MONOBASIC SODIUM PHOSPHATE 2 TABLET: 852; 155; 130 TABLET ORAL at 18:23

## 2019-07-17 RX ADMIN — ACETAMINOPHEN 650 MG: 325 TABLET, FILM COATED ORAL at 05:58

## 2019-07-17 RX ADMIN — INSULIN GLARGINE 25 UNITS: 100 INJECTION, SOLUTION SUBCUTANEOUS at 18:13

## 2019-07-17 RX ADMIN — LEVOTHYROXINE SODIUM 200 MCG: 200 TABLET ORAL at 05:59

## 2019-07-17 RX ADMIN — INSULIN HUMAN 10 UNITS: 100 INJECTION, SOLUTION PARENTERAL at 06:06

## 2019-07-17 RX ADMIN — ENOXAPARIN SODIUM 40 MG: 100 INJECTION SUBCUTANEOUS at 18:24

## 2019-07-17 RX ADMIN — CARVEDILOL 6.25 MG: 6.25 TABLET, FILM COATED ORAL at 08:52

## 2019-07-17 RX ADMIN — LEVETIRACETAM 500 MG: 100 SOLUTION ORAL at 05:59

## 2019-07-17 RX ADMIN — INSULIN HUMAN 10 UNITS: 100 INJECTION, SOLUTION PARENTERAL at 23:05

## 2019-07-17 RX ADMIN — HYDROMORPHONE HYDROCHLORIDE 2 MG: 2 TABLET ORAL at 19:47

## 2019-07-17 RX ADMIN — LEVETIRACETAM 500 MG: 100 SOLUTION ORAL at 18:24

## 2019-07-17 RX ADMIN — NYSTATIN: 100000 POWDER TOPICAL at 18:24

## 2019-07-17 RX ADMIN — Medication 1 EACH: at 05:58

## 2019-07-17 RX ADMIN — DIBASIC SODIUM PHOSPHATE, MONOBASIC POTASSIUM PHOSPHATE AND MONOBASIC SODIUM PHOSPHATE 2 TABLET: 852; 155; 130 TABLET ORAL at 05:58

## 2019-07-17 RX ADMIN — DOCUSATE SODIUM 100 MG: 50 LIQUID ORAL at 05:58

## 2019-07-17 RX ADMIN — INSULIN HUMAN 10 UNITS: 100 INJECTION, SOLUTION PARENTERAL at 12:10

## 2019-07-17 RX ADMIN — INSULIN GLARGINE 20 UNITS: 100 INJECTION, SOLUTION SUBCUTANEOUS at 06:08

## 2019-07-17 RX ADMIN — CARVEDILOL 6.25 MG: 6.25 TABLET, FILM COATED ORAL at 18:24

## 2019-07-17 RX ADMIN — DOCUSATE SODIUM 100 MG: 50 LIQUID ORAL at 18:24

## 2019-07-17 RX ADMIN — INSULIN HUMAN 10 UNITS: 100 INJECTION, SOLUTION PARENTERAL at 18:12

## 2019-07-17 ASSESSMENT — ENCOUNTER SYMPTOMS
SENSORY CHANGE: 0
MYALGIAS: 1
FEVER: 0
GASTROINTESTINAL NEGATIVE: 1
NAUSEA: 0
NECK PAIN: 1
FOCAL WEAKNESS: 0
CONSTITUTIONAL NEGATIVE: 1
NEUROLOGICAL NEGATIVE: 1
PALPITATIONS: 0
CARDIOVASCULAR NEGATIVE: 1
DOUBLE VISION: 0
ABDOMINAL PAIN: 0

## 2019-07-17 ASSESSMENT — COGNITIVE AND FUNCTIONAL STATUS - GENERAL
CLIMB 3 TO 5 STEPS WITH RAILING: TOTAL
SUGGESTED CMS G CODE MODIFIER MOBILITY: CM
MOVING TO AND FROM BED TO CHAIR: UNABLE
DRESSING REGULAR UPPER BODY CLOTHING: A LOT
SUGGESTED CMS G CODE MODIFIER DAILY ACTIVITY: CK
PERSONAL GROOMING: A LITTLE
STANDING UP FROM CHAIR USING ARMS: A LOT
DAILY ACTIVITIY SCORE: 14
TURNING FROM BACK TO SIDE WHILE IN FLAT BAD: UNABLE
MOVING FROM LYING ON BACK TO SITTING ON SIDE OF FLAT BED: UNABLE
WALKING IN HOSPITAL ROOM: A LOT
EATING MEALS: A LITTLE
DRESSING REGULAR LOWER BODY CLOTHING: A LOT
HELP NEEDED FOR BATHING: A LOT
MOBILITY SCORE: 8
TOILETING: A LOT

## 2019-07-17 ASSESSMENT — GAIT ASSESSMENTS: GAIT LEVEL OF ASSIST: UNABLE TO PARTICIPATE

## 2019-07-17 NOTE — PROGRESS NOTES
Neurosurgery Progress Note    Subjective:  Patient is more awake and with it today. Still has core track in place.   Otherwise has been neurologically intact.       Exam:  Alert  Perrl  Speech is fluent  Motor 5/5 and follows commands       BP  Min: 144/73  Max: 157/80  Pulse  Av.5  Min: 89  Max: 108  Resp  Av.3  Min: 20  Max: 45  Temp  Av.6 °C (97.9 °F)  Min: 36.2 °C (97.1 °F)  Max: 36.9 °C (98.5 °F)  SpO2  Av.3 %  Min: 90 %  Max: 96 %    No Data Recorded    Recent Labs      07/15/19   0355  19   0500  19   0550   WBC  15.4*  10.0  8.8   RBC  3.17*  3.02*  3.13*   HEMOGLOBIN  10.2*  9.7*  9.8*   HEMATOCRIT  30.7*  29.7*  30.3*   MCV  96.8  98.3*  96.8   MCH  32.2  32.1  31.3   MCHC  33.2*  32.7*  32.3*   RDW  46.9  47.8  47.0   PLATELETCT  261  250  269   MPV  10.8  11.0  10.6     Recent Labs      07/15/19   0355  19   0500  19   0550   SODIUM  139  139  139   POTASSIUM  4.3  4.0  4.0   CHLORIDE  108  109  108   CO2  21  23  25   GLUCOSE  292*  320*  330*   BUN  28*  33*  34*   CREATININE  1.01  0.86  0.78   CALCIUM  8.3*  8.3*  8.7               Intake/Output       19 - 19 0659 19 07 - 19 0659       Total  Total       Intake    Other  300  210 510  120  -- 120    Medications (PO/Enteral Liquids) 300 210 510 60 -- 60    Flush / Irrigation Volume (Cortrak) -- -- -- 60 -- 60    NG/GT  750  455 1205  390  -- 390    Intake (mL) ([REMOVED] Enteral Tube 07/15/19 Cortrak - Gastric Right nare) 750 -- 750 -- -- --    Intake (mL) (Enteral Tube 19 Cortrak - Small Bowel/Transpyloric 10 Fr. Right nare) -- 455 455 390 -- 390    Total Intake 3049 592 3582 510 -- 510       Output    Urine  500  1250 1750  300  -- 300    Number of Times Voided 2 x 5 x 7 x 2 x -- 2 x    Number of Times Incontinent of Urine 2 x -- 2 x -- -- --    Urine Void (mL) 500 1250 1750 300 -- 300    Stool  --  -- --  --  -- --    Number of  Times Stooled 0 x 0 x 0 x 1 x -- 1 x    Total Output 500 1250 1750 300 -- 300       Net I/O     550 -585 -35 210 -- 210            Intake/Output Summary (Last 24 hours) at 07/17/19 1633  Last data filed at 07/17/19 1345   Gross per 24 hour   Intake             1425 ml   Output             1550 ml   Net             -125 ml            • insulin glargine  25 Units BID   • levETIRAcetam  500 mg Q12HRS   • insulin regular  3-14 Units Q6HRS    And   • glucose  16 g Q15 MIN PRN    And   • dextrose 10% bolus  250 mL Q15 MIN PRN   • enoxaparin (LOVENOX) injection  40 mg Q12HRS   • fentaNYL  25-50 mcg Q4HRS PRN   • Pharmacy  1 Each PHARMACY TO DOSE   • HYDROmorphone  2-4 mg Q3HRS PRN   • polyethylene glycol/lytes  1 Packet BID   • senna-docusate  1 Tab Q24HRS PRN   • senna-docusate  1 Tab Nightly   • docusate sodium 100mg/10mL  100 mg BID   • benazepril  40 mg DAILY   • carvedilol  6.25 mg BID WITH MEALS   • levothyroxine  200 mcg AM ES   • magnesium hydroxide  30 mL DAILY   • phosphorus  2 Tab BID   • nystatin   BID   • Respiratory Care per Protocol   Continuous RT   • Pharmacy Consult Request  1 Each PHARMACY TO DOSE   • bisacodyl  10 mg Q24HRS PRN   • fleet  1 Each Once PRN   • ondansetron  4 mg Q4HRS PRN   • bacitracin-polymyxin b   TID   • labetalol  5-10 mg Q4HRS PRN   • hydrALAZINE  10 mg Q4HRS PRN       Assessment and Plan:  Hospital day #5  POD #na  Patient has been stable from a neurologic standpoint and is more awake today  Does not have focal deficits.   Will follow.

## 2019-07-17 NOTE — THERAPY
"Speech Language Therapy dysphagia treatment completed.   Functional Status:  Pt up to antonina chair. Pt awake and alert. Pt able to state month, day of week, year, and president without cues. Pt reading clock accurately. Pt given single ice chips with 2 swallows triggered for each ice chip. No coughing post swallow. Pt WNL for vocal quality. Pt given 5-1/3 tsp amounts of thick cold water using the spoon. Pt following directive for 3 sec oral prep followed by double swallow with verbal cues prior to each swallow. No coughing and pt with clear voice. Nurs advised, pt fatigued, and asking to return to bed. Sign posted and SLP collaborated with nurs regarding restarting 5 single and small ice chips every hour with staff as tolerated.   Recommendations: limited ice chips with staff, pre-feeding, Cog eval moderate to higher level.   Plan of Care: Will benefit from Speech Therapy 3 times per week  Post-Acute Therapy: dysphagia. Thanks, Donnell    See \"Rehab Therapy-Acute\" Patient Summary Report for complete documentation.     "
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
## 2019-07-17 NOTE — CARE PLAN
Problem: Respiratory:  Goal: Respiratory status will improve  Outcome: PROGRESSING AS EXPECTED  Continuous pulse ox in place. Pt encouraged to use IS and cough deep breathe - taught to splint with pillow for chest discomfort.    Problem: Skin Integrity  Goal: Risk for impaired skin integrity will decrease  Outcome: PROGRESSING AS EXPECTED  Q2h turns in place. Driflow pads utilized and changed frequently to help with moisture. Wound care per WOCN order. Full skin check performed every shift and pressure points assessed throughout shift.

## 2019-07-17 NOTE — CARE PLAN
Problem: Venous Thromboembolism (VTW)/Deep Vein Thrombosis (DVT) Prevention:  Goal: Patient will participate in Venous Thrombosis (VTE)/Deep Vein Thrombosis (DVT)Prevention Measures    Intervention: Ensure patient wears graduated elastic stockings (MONA hose) and/or SCDs, if ordered, when in bed or chair (Remove at least once per shift for skin check)  SCDs on. Pt encouraged to move frequently move legs and change positions.         Problem: Pain Management  Goal: Pain level will decrease to patient's comfort goal    Intervention: Follow pain managment plan developed in collaboration with patient and Interdisciplinary Team  Pain assessments completed and medication administered per MAR.

## 2019-07-17 NOTE — PROGRESS NOTES
Cortrak Placement    Tube Team verified patient name and medical record number prior to tube placement.  Cortrak tube (55 inches, 10 Fijian) placed at 70 cm in right nare.  Per Cortrak picture, tube appears to be in the stomach.  Nursing Instructions: Awaiting KUB to confirm placement before use for medications or feeding. Once placement confirmed, flush tube with 30 ml of water, and then remove and save stylet, in patient medication drawer.
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
## 2019-07-17 NOTE — THERAPY
"Occupational Therapy Treatment completed with focus on ADLs, ADL transfers.  Status: Max A UB dressing, sling, and C-collar management. Min A Stand pivot EOB>Chair   Plan of Care: Will benefit from Occupational Therapy 3 times per week  Discharge Recommendations:  Equipment Will Continue to Assess for Equipment Needs. Post-acute therapy Recommend post-acute placement for additional occupational therapy services prior to discharge home. Patient can tolerate post-acute therapies at a 5x/week frequency.    See \"Rehab Therapy-Acute\" Patient Summary Report for complete documentation.     Pt eager and motivated to participate in OT tx session, primarily limited by pain in L UE. Pt required max A to tighten C-collar, traction present reported that they would attempt w/ aspen collar. Pt requires sling as a reminder of NWB status on L UE. Pt sat in and performed seated grooming w/ set up. Pt demo'd impaired balance, functional mobility, activity tolerance, knowledge of post op precautions and L UE function impacting functional independence. Will continue to follow. Recommend post acute placement.   "
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
## 2019-07-18 ENCOUNTER — APPOINTMENT (OUTPATIENT)
Dept: RADIOLOGY | Facility: MEDICAL CENTER | Age: 74
DRG: 958 | End: 2019-07-18
Attending: SURGERY
Payer: COMMERCIAL

## 2019-07-18 LAB
ALBUMIN SERPL BCP-MCNC: 3.1 G/DL (ref 3.2–4.9)
ALBUMIN/GLOB SERPL: 1 G/DL
ALP SERPL-CCNC: 93 U/L (ref 30–99)
ALT SERPL-CCNC: 25 U/L (ref 2–50)
ANION GAP SERPL CALC-SCNC: 9 MMOL/L (ref 0–11.9)
AST SERPL-CCNC: 22 U/L (ref 12–45)
BACTERIA UR CULT: ABNORMAL
BACTERIA UR CULT: ABNORMAL
BASOPHILS # BLD AUTO: 0.5 % (ref 0–1.8)
BASOPHILS # BLD: 0.06 K/UL (ref 0–0.12)
BILIRUB SERPL-MCNC: 1.4 MG/DL (ref 0.1–1.5)
BUN SERPL-MCNC: 34 MG/DL (ref 8–22)
CALCIUM SERPL-MCNC: 9 MG/DL (ref 8.5–10.5)
CHLORIDE SERPL-SCNC: 107 MMOL/L (ref 96–112)
CO2 SERPL-SCNC: 25 MMOL/L (ref 20–33)
CREAT SERPL-MCNC: 0.82 MG/DL (ref 0.5–1.4)
EOSINOPHIL # BLD AUTO: 0.16 K/UL (ref 0–0.51)
EOSINOPHIL NFR BLD: 1.4 % (ref 0–6.9)
ERYTHROCYTE [DISTWIDTH] IN BLOOD BY AUTOMATED COUNT: 46 FL (ref 35.9–50)
GLOBULIN SER CALC-MCNC: 3.2 G/DL (ref 1.9–3.5)
GLUCOSE BLD-MCNC: 296 MG/DL (ref 65–99)
GLUCOSE BLD-MCNC: 329 MG/DL (ref 65–99)
GLUCOSE BLD-MCNC: 345 MG/DL (ref 65–99)
GLUCOSE SERPL-MCNC: 323 MG/DL (ref 65–99)
HCT VFR BLD AUTO: 32.6 % (ref 37–47)
HGB BLD-MCNC: 10.3 G/DL (ref 12–16)
IMM GRANULOCYTES # BLD AUTO: 0.37 K/UL (ref 0–0.11)
IMM GRANULOCYTES NFR BLD AUTO: 3.3 % (ref 0–0.9)
LYMPHOCYTES # BLD AUTO: 3.07 K/UL (ref 1–4.8)
LYMPHOCYTES NFR BLD: 27.4 % (ref 22–41)
MAGNESIUM SERPL-MCNC: 1.9 MG/DL (ref 1.5–2.5)
MCH RBC QN AUTO: 30.1 PG (ref 27–33)
MCHC RBC AUTO-ENTMCNC: 31.6 G/DL (ref 33.6–35)
MCV RBC AUTO: 95.3 FL (ref 81.4–97.8)
MONOCYTES # BLD AUTO: 0.96 K/UL (ref 0–0.85)
MONOCYTES NFR BLD AUTO: 8.6 % (ref 0–13.4)
NEUTROPHILS # BLD AUTO: 6.59 K/UL (ref 2–7.15)
NEUTROPHILS NFR BLD: 58.8 % (ref 44–72)
NRBC # BLD AUTO: 0.09 K/UL
NRBC BLD-RTO: 0.8 /100 WBC
PHOSPHATE SERPL-MCNC: 2.9 MG/DL (ref 2.5–4.5)
PLATELET # BLD AUTO: 306 K/UL (ref 164–446)
PMV BLD AUTO: 10.6 FL (ref 9–12.9)
POTASSIUM SERPL-SCNC: 4 MMOL/L (ref 3.6–5.5)
PROT SERPL-MCNC: 6.3 G/DL (ref 6–8.2)
RBC # BLD AUTO: 3.42 M/UL (ref 4.2–5.4)
SIGNIFICANT IND 70042: ABNORMAL
SITE SITE: ABNORMAL
SODIUM SERPL-SCNC: 141 MMOL/L (ref 135–145)
SOURCE SOURCE: ABNORMAL
WBC # BLD AUTO: 11.2 K/UL (ref 4.8–10.8)

## 2019-07-18 PROCEDURE — 84100 ASSAY OF PHOSPHORUS: CPT

## 2019-07-18 PROCEDURE — 80053 COMPREHEN METABOLIC PANEL: CPT

## 2019-07-18 PROCEDURE — 700102 HCHG RX REV CODE 250 W/ 637 OVERRIDE(OP): Performed by: SURGERY

## 2019-07-18 PROCEDURE — 700111 HCHG RX REV CODE 636 W/ 250 OVERRIDE (IP): Performed by: NURSE PRACTITIONER

## 2019-07-18 PROCEDURE — 700102 HCHG RX REV CODE 250 W/ 637 OVERRIDE(OP): Performed by: NURSE PRACTITIONER

## 2019-07-18 PROCEDURE — 82962 GLUCOSE BLOOD TEST: CPT

## 2019-07-18 PROCEDURE — 99233 SBSQ HOSP IP/OBS HIGH 50: CPT | Performed by: SURGERY

## 2019-07-18 PROCEDURE — A9270 NON-COVERED ITEM OR SERVICE: HCPCS | Performed by: SURGERY

## 2019-07-18 PROCEDURE — 71045 X-RAY EXAM CHEST 1 VIEW: CPT

## 2019-07-18 PROCEDURE — 700101 HCHG RX REV CODE 250: Performed by: SURGERY

## 2019-07-18 PROCEDURE — 770001 HCHG ROOM/CARE - MED/SURG/GYN PRIV*

## 2019-07-18 PROCEDURE — 83735 ASSAY OF MAGNESIUM: CPT

## 2019-07-18 PROCEDURE — 85025 COMPLETE CBC W/AUTO DIFF WBC: CPT

## 2019-07-18 RX ORDER — INSULIN GLARGINE 100 [IU]/ML
30 INJECTION, SOLUTION SUBCUTANEOUS 2 TIMES DAILY
Status: DISCONTINUED | OUTPATIENT
Start: 2019-07-18 | End: 2019-07-23 | Stop reason: HOSPADM

## 2019-07-18 RX ORDER — SULFAMETHOXAZOLE AND TRIMETHOPRIM 800; 160 MG/1; MG/1
1 TABLET ORAL EVERY 12 HOURS
Status: DISCONTINUED | OUTPATIENT
Start: 2019-07-18 | End: 2019-07-21

## 2019-07-18 RX ADMIN — INSULIN HUMAN 7 UNITS: 100 INJECTION, SOLUTION PARENTERAL at 23:53

## 2019-07-18 RX ADMIN — INSULIN HUMAN 10 UNITS: 100 INJECTION, SOLUTION PARENTERAL at 12:00

## 2019-07-18 RX ADMIN — DIBASIC SODIUM PHOSPHATE, MONOBASIC POTASSIUM PHOSPHATE AND MONOBASIC SODIUM PHOSPHATE 2 TABLET: 852; 155; 130 TABLET ORAL at 18:02

## 2019-07-18 RX ADMIN — HYDROMORPHONE HYDROCHLORIDE 4 MG: 2 TABLET ORAL at 23:40

## 2019-07-18 RX ADMIN — NYSTATIN: 100000 POWDER TOPICAL at 18:02

## 2019-07-18 RX ADMIN — LEVETIRACETAM 500 MG: 100 SOLUTION ORAL at 05:05

## 2019-07-18 RX ADMIN — INSULIN HUMAN 10 UNITS: 100 INJECTION, SOLUTION PARENTERAL at 18:14

## 2019-07-18 RX ADMIN — BENAZEPRIL HYDROCHLORIDE 40 MG: 20 TABLET ORAL at 05:05

## 2019-07-18 RX ADMIN — SULFAMETHOXAZOLE AND TRIMETHOPRIM 1 TABLET: 800; 160 TABLET ORAL at 12:03

## 2019-07-18 RX ADMIN — Medication 1 EACH: at 05:04

## 2019-07-18 RX ADMIN — CARVEDILOL 6.25 MG: 6.25 TABLET, FILM COATED ORAL at 06:37

## 2019-07-18 RX ADMIN — DIBASIC SODIUM PHOSPHATE, MONOBASIC POTASSIUM PHOSPHATE AND MONOBASIC SODIUM PHOSPHATE 2 TABLET: 852; 155; 130 TABLET ORAL at 05:06

## 2019-07-18 RX ADMIN — NYSTATIN: 100000 POWDER TOPICAL at 05:05

## 2019-07-18 RX ADMIN — INSULIN HUMAN 7 UNITS: 100 INJECTION, SOLUTION PARENTERAL at 05:16

## 2019-07-18 RX ADMIN — LEVETIRACETAM 500 MG: 100 SOLUTION ORAL at 18:03

## 2019-07-18 RX ADMIN — ENOXAPARIN SODIUM 40 MG: 100 INJECTION SUBCUTANEOUS at 05:05

## 2019-07-18 RX ADMIN — LABETALOL HYDROCHLORIDE 10 MG: 5 INJECTION INTRAVENOUS at 03:06

## 2019-07-18 RX ADMIN — INSULIN GLARGINE 25 UNITS: 100 INJECTION, SOLUTION SUBCUTANEOUS at 05:17

## 2019-07-18 RX ADMIN — CARVEDILOL 6.25 MG: 6.25 TABLET, FILM COATED ORAL at 18:03

## 2019-07-18 RX ADMIN — Medication 1 EACH: at 18:02

## 2019-07-18 RX ADMIN — INSULIN GLARGINE 30 UNITS: 100 INJECTION, SOLUTION SUBCUTANEOUS at 18:14

## 2019-07-18 RX ADMIN — LEVOTHYROXINE SODIUM 200 MCG: 200 TABLET ORAL at 05:05

## 2019-07-18 RX ADMIN — SULFAMETHOXAZOLE AND TRIMETHOPRIM 1 TABLET: 800; 160 TABLET ORAL at 18:03

## 2019-07-18 RX ADMIN — Medication 1 EACH: at 12:03

## 2019-07-18 RX ADMIN — ENOXAPARIN SODIUM 40 MG: 100 INJECTION SUBCUTANEOUS at 18:02

## 2019-07-18 ASSESSMENT — ENCOUNTER SYMPTOMS
DOUBLE VISION: 0
CARDIOVASCULAR NEGATIVE: 1
FEVER: 0
NEUROLOGICAL NEGATIVE: 1
ABDOMINAL PAIN: 0
NAUSEA: 0
SENSORY CHANGE: 0
NECK PAIN: 1
PALPITATIONS: 0
CONSTITUTIONAL NEGATIVE: 1
MYALGIAS: 1
FOCAL WEAKNESS: 0
GASTROINTESTINAL NEGATIVE: 1

## 2019-07-18 NOTE — CARE PLAN
Problem: Safety  Goal: Will remain free from injury  Safety precautions in place    Problem: Skin Integrity  Goal: Risk for impaired skin integrity will decrease    Intervention: Assess risk factors for impaired skin integrity and/or pressure ulcers  Repositioned as needed and every 2 hours
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
## 2019-07-18 NOTE — PROGRESS NOTES
2 RN skin check complete with ALEX Mcfarland.  Devices in place:   -C-collar, SCDs, arm sling, central line, silicone nasal cannula, EKG leads, bp cuff, pulse ox  Skin assessed under the following devices.  Preventative measures in place including:   -Q2h turns, pillows used for support and repositioning, medical devices offloaded from skin.  Following areas of concern:    -moisture fissures to pannus, and buttocks- interdry, barrier cream, and nystatin powder in use   - left elbow and forearm wounds- dressings in place per wound team   -bilateral bruising behind ears   -scattered bruising and abrasions throughout      Wound consult placedYES/NO: N\A    Wound reported YES/NO: N\A  Appropriate LDAs opened YES/NO: N\A

## 2019-07-18 NOTE — PROGRESS NOTES
2 RN skin check complete with ALEX Donaldson.  Devices in place SCD's, C-collar, cortrak, sling, BP cuff, pulse ox, cardiac leads, left arm dressing.   Skin assessed under the following devices SCD's, C-collar, cortrak, sling, BP cuff, pulse ox, cardiac leads.   Preventative measures in place including Q2 turns, mobility, mepilex, waffle cushion under head  Following areas of concern:   · Left arm has multiple areas of open oozing wounds, dressings to be changed Q48, changed on previous shift, skin not assessed under dressings, wound following, however arm bruised and edematous throughout   -c-collar removed and all mepiliex lifted up to assess skin underneath, scattered bruising all around neck, chin intact  -bruising to upper back  -pink moisture areas in panus folds  -in and around right ear bruising  -scattered bruising and scabs to arms and shins  -Cortrak floated off nose  -Back of head intact, unamble to locate hematoma mentioned on arrival  -all other pressure areas intact   The following interventions in place Q2 turns, mobility, mepilex, waffle cushion under head    Wound consult placedYES/NO: N\A    Wound reported YES/NO: N\A  Appropriate LDAs opened YES/NO: yes

## 2019-07-18 NOTE — CARE PLAN
Problem: Safety  Goal: Will remain free from falls    Intervention: Implement fall precautions  Treaded slipper socks on pt, bed locked and in lowest position, pt educated to call prior to getting out of bed, bed alarm in reach.      Problem: Skin Integrity  Goal: Risk for impaired skin integrity will decrease    Intervention: Assess risk factors for impaired skin integrity and/or pressure ulcers  Skin assessed during each pt encounter and a comprehensive 2 RN skin assessment completed each shift taking special precaution to under medical devices and beneath pressure points.

## 2019-07-19 ENCOUNTER — APPOINTMENT (OUTPATIENT)
Dept: RADIOLOGY | Facility: MEDICAL CENTER | Age: 74
DRG: 958 | End: 2019-07-19
Attending: SURGERY
Payer: COMMERCIAL

## 2019-07-19 PROBLEM — N39.0 UTI (URINARY TRACT INFECTION): Status: ACTIVE | Noted: 2019-07-19

## 2019-07-19 LAB
ALBUMIN SERPL BCP-MCNC: 3.1 G/DL (ref 3.2–4.9)
ALBUMIN/GLOB SERPL: 1.1 G/DL
ALP SERPL-CCNC: 90 U/L (ref 30–99)
ALT SERPL-CCNC: 25 U/L (ref 2–50)
ANION GAP SERPL CALC-SCNC: 5 MMOL/L (ref 0–11.9)
AST SERPL-CCNC: 24 U/L (ref 12–45)
BASOPHILS # BLD AUTO: 0.6 % (ref 0–1.8)
BASOPHILS # BLD: 0.07 K/UL (ref 0–0.12)
BILIRUB SERPL-MCNC: 1.2 MG/DL (ref 0.1–1.5)
BUN SERPL-MCNC: 41 MG/DL (ref 8–22)
CALCIUM SERPL-MCNC: 8.5 MG/DL (ref 8.5–10.5)
CHLORIDE SERPL-SCNC: 108 MMOL/L (ref 96–112)
CO2 SERPL-SCNC: 28 MMOL/L (ref 20–33)
CREAT SERPL-MCNC: 0.88 MG/DL (ref 0.5–1.4)
EOSINOPHIL # BLD AUTO: 0.28 K/UL (ref 0–0.51)
EOSINOPHIL NFR BLD: 2.4 % (ref 0–6.9)
ERYTHROCYTE [DISTWIDTH] IN BLOOD BY AUTOMATED COUNT: 48.3 FL (ref 35.9–50)
FERRITIN SERPL-MCNC: 189.6 NG/ML (ref 10–291)
GLOBULIN SER CALC-MCNC: 2.8 G/DL (ref 1.9–3.5)
GLUCOSE BLD-MCNC: 206 MG/DL (ref 65–99)
GLUCOSE BLD-MCNC: 260 MG/DL (ref 65–99)
GLUCOSE BLD-MCNC: 266 MG/DL (ref 65–99)
GLUCOSE BLD-MCNC: 274 MG/DL (ref 65–99)
GLUCOSE BLD-MCNC: 283 MG/DL (ref 65–99)
GLUCOSE SERPL-MCNC: 301 MG/DL (ref 65–99)
HCT VFR BLD AUTO: 32.8 % (ref 37–47)
HGB BLD-MCNC: 10.2 G/DL (ref 12–16)
HGB RETIC QN AUTO: 34.7 PG/CELL (ref 29–35)
IMM GRANULOCYTES # BLD AUTO: 0.37 K/UL (ref 0–0.11)
IMM GRANULOCYTES NFR BLD AUTO: 3.2 % (ref 0–0.9)
IMM RETICS NFR: 36.9 % (ref 9.3–17.4)
IRON SATN MFR SERPL: 24 % (ref 15–55)
IRON SERPL-MCNC: 75 UG/DL (ref 40–170)
LYMPHOCYTES # BLD AUTO: 2.79 K/UL (ref 1–4.8)
LYMPHOCYTES NFR BLD: 23.8 % (ref 22–41)
MCH RBC QN AUTO: 30.6 PG (ref 27–33)
MCHC RBC AUTO-ENTMCNC: 31.1 G/DL (ref 33.6–35)
MCV RBC AUTO: 98.5 FL (ref 81.4–97.8)
MONOCYTES # BLD AUTO: 1 K/UL (ref 0–0.85)
MONOCYTES NFR BLD AUTO: 8.5 % (ref 0–13.4)
NEUTROPHILS # BLD AUTO: 7.23 K/UL (ref 2–7.15)
NEUTROPHILS NFR BLD: 61.5 % (ref 44–72)
NRBC # BLD AUTO: 0.05 K/UL
NRBC BLD-RTO: 0.4 /100 WBC
PLATELET # BLD AUTO: 291 K/UL (ref 164–446)
PMV BLD AUTO: 10.9 FL (ref 9–12.9)
POTASSIUM SERPL-SCNC: 4.4 MMOL/L (ref 3.6–5.5)
PROT SERPL-MCNC: 5.9 G/DL (ref 6–8.2)
RBC # BLD AUTO: 3.33 M/UL (ref 4.2–5.4)
RETICS # AUTO: 0.16 M/UL (ref 0.04–0.06)
RETICS/RBC NFR: 4.9 % (ref 0.8–2.1)
SODIUM SERPL-SCNC: 141 MMOL/L (ref 135–145)
TIBC SERPL-MCNC: 314 UG/DL (ref 250–450)
WBC # BLD AUTO: 11.7 K/UL (ref 4.8–10.8)

## 2019-07-19 PROCEDURE — 700112 HCHG RX REV CODE 229: Performed by: SURGERY

## 2019-07-19 PROCEDURE — 700102 HCHG RX REV CODE 250 W/ 637 OVERRIDE(OP): Performed by: SURGERY

## 2019-07-19 PROCEDURE — A9270 NON-COVERED ITEM OR SERVICE: HCPCS | Performed by: SURGERY

## 2019-07-19 PROCEDURE — 82962 GLUCOSE BLOOD TEST: CPT | Mod: 91

## 2019-07-19 PROCEDURE — 97530 THERAPEUTIC ACTIVITIES: CPT

## 2019-07-19 PROCEDURE — 99358 PROLONG SERVICE W/O CONTACT: CPT | Performed by: PHYSICAL MEDICINE & REHABILITATION

## 2019-07-19 PROCEDURE — 700102 HCHG RX REV CODE 250 W/ 637 OVERRIDE(OP): Performed by: NURSE PRACTITIONER

## 2019-07-19 PROCEDURE — 71045 X-RAY EXAM CHEST 1 VIEW: CPT

## 2019-07-19 PROCEDURE — 85025 COMPLETE CBC W/AUTO DIFF WBC: CPT

## 2019-07-19 PROCEDURE — 83550 IRON BINDING TEST: CPT

## 2019-07-19 PROCEDURE — 82728 ASSAY OF FERRITIN: CPT

## 2019-07-19 PROCEDURE — 700101 HCHG RX REV CODE 250: Performed by: SURGERY

## 2019-07-19 PROCEDURE — 770001 HCHG ROOM/CARE - MED/SURG/GYN PRIV*

## 2019-07-19 PROCEDURE — A9270 NON-COVERED ITEM OR SERVICE: HCPCS | Performed by: NURSE PRACTITIONER

## 2019-07-19 PROCEDURE — 97116 GAIT TRAINING THERAPY: CPT

## 2019-07-19 PROCEDURE — 83540 ASSAY OF IRON: CPT

## 2019-07-19 PROCEDURE — 85046 RETICYTE/HGB CONCENTRATE: CPT

## 2019-07-19 PROCEDURE — 92526 ORAL FUNCTION THERAPY: CPT

## 2019-07-19 PROCEDURE — 700111 HCHG RX REV CODE 636 W/ 250 OVERRIDE (IP): Performed by: NURSE PRACTITIONER

## 2019-07-19 PROCEDURE — 80053 COMPREHEN METABOLIC PANEL: CPT

## 2019-07-19 RX ADMIN — DOCUSATE SODIUM 100 MG: 50 LIQUID ORAL at 04:51

## 2019-07-19 RX ADMIN — DIBASIC SODIUM PHOSPHATE, MONOBASIC POTASSIUM PHOSPHATE AND MONOBASIC SODIUM PHOSPHATE 2 TABLET: 852; 155; 130 TABLET ORAL at 17:25

## 2019-07-19 RX ADMIN — CARVEDILOL 6.25 MG: 6.25 TABLET, FILM COATED ORAL at 08:28

## 2019-07-19 RX ADMIN — SULFAMETHOXAZOLE AND TRIMETHOPRIM 1 TABLET: 800; 160 TABLET ORAL at 04:51

## 2019-07-19 RX ADMIN — HYDROMORPHONE HYDROCHLORIDE 4 MG: 2 TABLET ORAL at 08:38

## 2019-07-19 RX ADMIN — LEVOTHYROXINE SODIUM 200 MCG: 200 TABLET ORAL at 04:51

## 2019-07-19 RX ADMIN — Medication 1 EACH: at 17:27

## 2019-07-19 RX ADMIN — MAGNESIUM HYDROXIDE 30 ML: 400 SUSPENSION ORAL at 04:51

## 2019-07-19 RX ADMIN — ENOXAPARIN SODIUM 40 MG: 100 INJECTION SUBCUTANEOUS at 17:30

## 2019-07-19 RX ADMIN — DOCUSATE SODIUM 100 MG: 50 LIQUID ORAL at 17:36

## 2019-07-19 RX ADMIN — INSULIN GLARGINE 30 UNITS: 100 INJECTION, SOLUTION SUBCUTANEOUS at 17:33

## 2019-07-19 RX ADMIN — DIBASIC SODIUM PHOSPHATE, MONOBASIC POTASSIUM PHOSPHATE AND MONOBASIC SODIUM PHOSPHATE 2 TABLET: 852; 155; 130 TABLET ORAL at 06:00

## 2019-07-19 RX ADMIN — NYSTATIN: 100000 POWDER TOPICAL at 04:53

## 2019-07-19 RX ADMIN — POLYETHYLENE GLYCOL 3350 1 PACKET: 17 POWDER, FOR SOLUTION ORAL at 04:51

## 2019-07-19 RX ADMIN — ENOXAPARIN SODIUM 40 MG: 100 INJECTION SUBCUTANEOUS at 06:00

## 2019-07-19 RX ADMIN — INSULIN HUMAN 7 UNITS: 100 INJECTION, SOLUTION PARENTERAL at 08:19

## 2019-07-19 RX ADMIN — INSULIN HUMAN 7 UNITS: 100 INJECTION, SOLUTION PARENTERAL at 12:17

## 2019-07-19 RX ADMIN — Medication 1 EACH: at 04:51

## 2019-07-19 RX ADMIN — BENAZEPRIL HYDROCHLORIDE 40 MG: 20 TABLET ORAL at 04:52

## 2019-07-19 RX ADMIN — SULFAMETHOXAZOLE AND TRIMETHOPRIM 1 TABLET: 800; 160 TABLET ORAL at 17:25

## 2019-07-19 RX ADMIN — Medication 1 EACH: at 12:14

## 2019-07-19 RX ADMIN — INSULIN HUMAN 7 UNITS: 100 INJECTION, SOLUTION PARENTERAL at 17:34

## 2019-07-19 RX ADMIN — INSULIN GLARGINE 30 UNITS: 100 INJECTION, SOLUTION SUBCUTANEOUS at 08:18

## 2019-07-19 ASSESSMENT — ENCOUNTER SYMPTOMS
EYES NEGATIVE: 1
CONSTITUTIONAL NEGATIVE: 1
NEUROLOGICAL NEGATIVE: 1
ROS GI COMMENTS: BM 7/19
CARDIOVASCULAR NEGATIVE: 1
GASTROINTESTINAL NEGATIVE: 1
NECK PAIN: 1
MYALGIAS: 1
PSYCHIATRIC NEGATIVE: 1
RESPIRATORY NEGATIVE: 1

## 2019-07-19 ASSESSMENT — COGNITIVE AND FUNCTIONAL STATUS - GENERAL
STANDING UP FROM CHAIR USING ARMS: A LITTLE
SUGGESTED CMS G CODE MODIFIER MOBILITY: CM
TURNING FROM BACK TO SIDE WHILE IN FLAT BAD: UNABLE
MOVING TO AND FROM BED TO CHAIR: UNABLE
WALKING IN HOSPITAL ROOM: A LOT
CLIMB 3 TO 5 STEPS WITH RAILING: TOTAL
MOBILITY SCORE: 9
MOVING FROM LYING ON BACK TO SITTING ON SIDE OF FLAT BED: UNABLE

## 2019-07-19 ASSESSMENT — GAIT ASSESSMENTS
ASSISTIVE DEVICE: HAND HELD ASSIST
DEVIATION: INCREASED BASE OF SUPPORT;BRADYKINETIC;SHUFFLED GAIT
GAIT LEVEL OF ASSIST: MINIMAL ASSIST
DISTANCE (FEET): 5

## 2019-07-19 NOTE — PREADMISSION SCREENING NOTE
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 of Rehabilitation Admission: 2019  Patient/Family oriented IRF level of care/facility/plan: Yes  Patient/Family willing to participate in IRF care/facility/plan: Yes  Patient able to tolerate IRF level of care proposed: Yes  Patient has potential to benefit IRF level of care proposed: Yes  Comments: Not Applicable    Special Needs or Precautions - Medical Necessity:  Safety Concerns/Precautions:  Fall Risk / High Risk for Falls, Balance and Cognition  Complex Wound Care: Left clavicle  incision care; left forearm laceration care.  Pain Management  Requires Oxygen  Weight-bearing Status: Non Weight Bearing, Left, Upper extremity.  Splints/Braces/Orthotics: C-Collar  Flail chest precautions     Diet:   DIET ORDERS (Through next 24h)    Start     Ordered    07/15/19 1612  Diet Tube Feeding  CONTINUOUS DIET     Question Answer Comment   Which Rate/Volume? 65 mL/hr    Formula: PEPTAMEN BARIATRIC    Specify Type: CONTINUOUS        07/15/19 1612    07/14/19 1449  Diet NPO  ALL MEALS     Question:  Restrict to:  Answer:  Strict    07/14/19 1448          Anticipated Discharge Destination / Patient/Family Goal:  Destination: Home with Assistance Support System: Spouse and Family   Anticipated home health services: OT, PT, SLP and Nursing  Previously used HH service/ provider: Not Applicable  Anticipated DME Needs: To be determined  Outpatient Services: To be determined  Alternative resources to address additional identified needs:   N/A  Pre-Screen Completed: 7/19/2019 1:18 PM Chasidy Carlson R.N.

## 2019-07-19 NOTE — CARE PLAN
Problem: Skin Integrity  Goal: Risk for impaired skin integrity will decrease  Outcome: PROGRESSING AS EXPECTED  Turned & repositioned every 2 hours,kept dry and clean. place pillows on bony prominences to prevent skin breakdown.       Problem: Psychosocial Needs:  Goal: Level of anxiety will decrease  Outcome: PROGRESSING AS EXPECTED  Allowed patient to talk about her/his feelings, educated on deep breathing techniques to cope with the anxiety. Encouraged family to visit with patient for emotional support. Anxiety PRN med given.

## 2019-07-19 NOTE — DISCHARGE PLANNING
TBI ongoing medical management as well as therapy need. Anticipate post acute services to facilitate a successful transition to community home single story with supportive family. RPG to consult per protocol.
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
## 2019-07-19 NOTE — PROGRESS NOTES
Mobility note:  2x assist for patient to sit at edge of bed, patient attempted to stand with 2x assistance and front wheel walker, was unable to stand due to generalized weakness.  Further mobilization attempts refused by patient due to weakness.

## 2019-07-19 NOTE — PROGRESS NOTES
2 RN skin check complete .  Devices in place SCD's, C-collar, cortrak, sling, nasal cannula.               Skin assessed under the following devices SCD's, C-collar, cortrak, sling, and nasal cannula.  Areas of concern:  ·  Left arm has multiple areas of open oozing wounds, dressings changed Q48hrs, last dressing change on 7/18, left arm bruised with edema throughout.  Unable to visualize wounds under dressing.   -c-collar shifted to visualizing bruising around neck and significant bruising to the upper back. back  -pink blanchable areas in scrotum, breasts, and pannus areas.    -Right ear bruising.   -scattered bruising and scabs to arms and shins  -Cortrak taped to nose, no skin breakdown noted.       Patient requests for wound consult to come and trim toenails, stating that they are causing pain.

## 2019-07-19 NOTE — DISCHARGE PLANNING
Anticipated Discharge Disposition:   IRF    Action:   Discussed with Chasidy Carlson at IRF. They do not have a bed at IRF today but possibly on Monday. Also, insurance auth was submitted and is pending.     Barriers to Discharge:   Insurance auth pending  Pending bed at IRF    Plan:   Follow up with IRF
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
## 2019-07-19 NOTE — THERAPY
"Physical Therapy Treatment completed.   Bed Mobility:  Supine to Sit: Minimal Assist  Transfers: Sit to Stand: Minimal Assist  Gait: Level Of Assist: Minimal Assist with Hand Held Assist       Plan of Care: Will benefit from Physical Therapy 3 times per week  Discharge Recommendations: Equipment: Will Continue to Assess for Equipment Needs. Post-acute therapy Recommend post-acute placement for continued physical therapy services prior to discharge home. Patient can tolerate post-acute therapies at a 5x/week frequency.       See \"Rehab Therapy-Acute\" Patient Summary Report for complete documentation.     Pt continues to progress w/ therapy. Pt was able to perform bed mobility w/ greater ease. She requires a lot of cues to manintain NWB of her L UE as she conitnually moves her arm out of the sling. Pt did notice improved pain control once sling was adjusted especially during standing positions. Pt was able to begin ambulating short distances w/ HHA. SHe is very motivated to participate and will benefit from post acute therapy.  "

## 2019-07-19 NOTE — PROGRESS NOTES
Report given to nurse for S148, will transport patient via wheelchair. All belongings with patient.

## 2019-07-19 NOTE — PROGRESS NOTES
Neurosurgery Progress Note    Subjective:  Pt asking when she can eat or drink something. Denies headache    Exam:  AAOX4, cooperative, lue in splint, otherwise intact    BP  Min: 100/48  Max: 110/50  Pulse  Av.4  Min: 64  Max: 102  Resp  Av.3  Min: 18  Max: 31  Temp  Av.4 °C (97.5 °F)  Min: 36.3 °C (97.4 °F)  Max: 36.4 °C (97.5 °F)  SpO2  Av.1 %  Min: 93 %  Max: 99 %    No Data Recorded    Recent Labs      19   0550  19   0453   WBC  8.8  11.2*   RBC  3.13*  3.42*   HEMOGLOBIN  9.8*  10.3*   HEMATOCRIT  30.3*  32.6*   MCV  96.8  95.3   MCH  31.3  30.1   MCHC  32.3*  31.6*   RDW  47.0  46.0   PLATELETCT  269  306   MPV  10.6  10.6     Recent Labs      19   0550  19   0453   SODIUM  139  141   POTASSIUM  4.0  4.0   CHLORIDE  108  107   CO2  25  25   GLUCOSE  330*  323*   BUN  34*  34*   CREATININE  0.78  0.82   CALCIUM  8.7  9.0               Intake/Output       19 - 1959 19 - 1959       [id number] Total  Total       Intake    Total Intake -- -- -- -- -- --       Output    Urine  1500  -- 1500  --  -- --    Number of Times Voided -- 3 x 3 x -- -- --    Urine Void (mL) 1500 -- 1500 -- -- --    Stool  --  -- --  --  -- --    Number of Times Stooled -- 2 x 2 x -- -- --    Total Output 1500 -- 1500 -- -- --       Net I/O     -1500 -- -1500 -- -- --            Intake/Output Summary (Last 24 hours) at 19 0829  Last data filed at 19 1730   Gross per 24 hour   Intake                0 ml   Output             1500 ml   Net            -1500 ml       $ Bladder Scan Results (mL): 228    • sulfamethoxazole-trimethoprim  1 Tab Q12HRS   • insulin glargine  30 Units BID   • insulin regular  3-14 Units Q6HRS    And   • glucose  16 g Q15 MIN PRN    And   • dextrose 10% bolus  250 mL Q15 MIN PRN   • enoxaparin (LOVENOX) injection  40 mg Q12HRS   • fentaNYL  25-50 mcg Q4HRS PRN   • Pharmacy  1 Each PHARMACY TO DOSE    • HYDROmorphone  2-4 mg Q3HRS PRN   • polyethylene glycol/lytes  1 Packet BID   • senna-docusate  1 Tab Q24HRS PRN   • senna-docusate  1 Tab Nightly   • docusate sodium 100mg/10mL  100 mg BID   • benazepril  40 mg DAILY   • carvedilol  6.25 mg BID WITH MEALS   • levothyroxine  200 mcg AM ES   • magnesium hydroxide  30 mL DAILY   • phosphorus  2 Tab BID   • nystatin   BID   • Respiratory Care per Protocol   Continuous RT   • Pharmacy Consult Request  1 Each PHARMACY TO DOSE   • bisacodyl  10 mg Q24HRS PRN   • fleet  1 Each Once PRN   • ondansetron  4 mg Q4HRS PRN   • bacitracin-polymyxin b   TID   • labetalol  5-10 mg Q4HRS PRN   • hydrALAZINE  10 mg Q4HRS PRN       Assessment and Plan:  Hospital day # 8 Very small and maybe some traumatic subarachnoid hemorrhage in a patient   with a Mo coma score of 15. She has a fracture of the left transverse process of C7 and T1  Prophylactic anticoagulation: yes         Start date/time: per trauma  NM stable  Last head CT 7/12 - stable

## 2019-07-19 NOTE — DISCHARGE PLANNING
Submitting case to [facility] for consideration of IRF level care. Pending auth determination, do not anticipate bed availability [facility] until Monday at the earliest.

## 2019-07-19 NOTE — CARE PLAN
Problem: Communication  Goal: The ability to communicate needs accurately and effectively will improve    Intervention: [place] patient and significant other/support system to call light to alert staff of needs  Patient demonstrated understanding on how to use the call light      Problem: Mobility  Goal: Risk for activity intolerance will decrease    Intervention: Assess and monitor signs of activity intolerance  Increase mobility through standing and sitting to build up strength.

## 2019-07-19 NOTE — THERAPY
"Speech Language Therapy dysphagia treatment completed.     Functional Status: Pt seen this date for dysphagia therapy. She was eager for PO. Pt appearing to have more stamina today during PO compared to previous SLP note however she did fatigue by the end of session. Consumed single ice chips x 3 and tsp sips of NTL x 8 that resulted in consistent complete hyolaryngeal elevation upon palpation and timely initiation of swallow trigger. She independently completed second swallow without cueing. Tsp sips of thins resulted in subtle increase in wet vocal quality 25% of trials that was cleared following prompt to throat clear. Pt completed lizet exercise 5 x with \"good\" accuracy; left packet at bedside. At this time, continue to recommend NPO with cortrak. However she may have 5 single ice chips OR tsp sips of NTL per hour, as requested. Pt must be alert. Hold with difficulty. SLP to continue following.     Recommendations:   1. NPO/cortrak. Pt may have 5 single ice chips OR tsp sips of NTL per hour, as requested.  2. Please encourage pt to complete dysphagia exercises as able    Plan of Care: Will benefit from Speech Therapy 3-5 times per week  Post-Acute Therapy: Recommend inpatient transitional care services for continued speech therapy services.      See \"Rehab Therapy-Acute\" Patient Summary Report for complete documentation.     "

## 2019-07-20 LAB
ALBUMIN SERPL BCP-MCNC: 2.9 G/DL (ref 3.2–4.9)
ALBUMIN/GLOB SERPL: 1 G/DL
ALP SERPL-CCNC: 94 U/L (ref 30–99)
ALT SERPL-CCNC: 23 U/L (ref 2–50)
ANION GAP SERPL CALC-SCNC: 4 MMOL/L (ref 0–11.9)
AST SERPL-CCNC: 20 U/L (ref 12–45)
BASOPHILS # BLD AUTO: 0.4 % (ref 0–1.8)
BASOPHILS # BLD: 0.05 K/UL (ref 0–0.12)
BILIRUB SERPL-MCNC: 1.1 MG/DL (ref 0.1–1.5)
BUN SERPL-MCNC: 48 MG/DL (ref 8–22)
CALCIUM SERPL-MCNC: 8.5 MG/DL (ref 8.5–10.5)
CHLORIDE SERPL-SCNC: 105 MMOL/L (ref 96–112)
CO2 SERPL-SCNC: 28 MMOL/L (ref 20–33)
CREAT SERPL-MCNC: 1.02 MG/DL (ref 0.5–1.4)
EOSINOPHIL # BLD AUTO: 0.29 K/UL (ref 0–0.51)
EOSINOPHIL NFR BLD: 2.4 % (ref 0–6.9)
ERYTHROCYTE [DISTWIDTH] IN BLOOD BY AUTOMATED COUNT: 50.3 FL (ref 35.9–50)
GLOBULIN SER CALC-MCNC: 2.8 G/DL (ref 1.9–3.5)
GLUCOSE BLD-MCNC: 285 MG/DL (ref 65–99)
GLUCOSE BLD-MCNC: 289 MG/DL (ref 65–99)
GLUCOSE BLD-MCNC: 302 MG/DL (ref 65–99)
GLUCOSE BLD-MCNC: 329 MG/DL (ref 65–99)
GLUCOSE BLD-MCNC: 353 MG/DL (ref 65–99)
GLUCOSE SERPL-MCNC: 373 MG/DL (ref 65–99)
HCT VFR BLD AUTO: 31.7 % (ref 37–47)
HGB BLD-MCNC: 9.8 G/DL (ref 12–16)
IMM GRANULOCYTES # BLD AUTO: 0.26 K/UL (ref 0–0.11)
IMM GRANULOCYTES NFR BLD AUTO: 2.2 % (ref 0–0.9)
LYMPHOCYTES # BLD AUTO: 2.9 K/UL (ref 1–4.8)
LYMPHOCYTES NFR BLD: 24.1 % (ref 22–41)
MCH RBC QN AUTO: 31.2 PG (ref 27–33)
MCHC RBC AUTO-ENTMCNC: 30.9 G/DL (ref 33.6–35)
MCV RBC AUTO: 101 FL (ref 81.4–97.8)
MONOCYTES # BLD AUTO: 0.96 K/UL (ref 0–0.85)
MONOCYTES NFR BLD AUTO: 8 % (ref 0–13.4)
NEUTROPHILS # BLD AUTO: 7.55 K/UL (ref 2–7.15)
NEUTROPHILS NFR BLD: 62.9 % (ref 44–72)
NRBC # BLD AUTO: 0.04 K/UL
NRBC BLD-RTO: 0.3 /100 WBC
PLATELET # BLD AUTO: 277 K/UL (ref 164–446)
PMV BLD AUTO: 11.3 FL (ref 9–12.9)
POTASSIUM SERPL-SCNC: 4.4 MMOL/L (ref 3.6–5.5)
PROT SERPL-MCNC: 5.7 G/DL (ref 6–8.2)
RBC # BLD AUTO: 3.14 M/UL (ref 4.2–5.4)
SODIUM SERPL-SCNC: 137 MMOL/L (ref 135–145)
WBC # BLD AUTO: 12 K/UL (ref 4.8–10.8)

## 2019-07-20 PROCEDURE — A9270 NON-COVERED ITEM OR SERVICE: HCPCS | Performed by: SURGERY

## 2019-07-20 PROCEDURE — A9270 NON-COVERED ITEM OR SERVICE: HCPCS | Performed by: NURSE PRACTITIONER

## 2019-07-20 PROCEDURE — 700111 HCHG RX REV CODE 636 W/ 250 OVERRIDE (IP): Performed by: NURSE PRACTITIONER

## 2019-07-20 PROCEDURE — 80053 COMPREHEN METABOLIC PANEL: CPT

## 2019-07-20 PROCEDURE — 82962 GLUCOSE BLOOD TEST: CPT | Mod: 91

## 2019-07-20 PROCEDURE — 85025 COMPLETE CBC W/AUTO DIFF WBC: CPT

## 2019-07-20 PROCEDURE — 700102 HCHG RX REV CODE 250 W/ 637 OVERRIDE(OP): Performed by: SURGERY

## 2019-07-20 PROCEDURE — 700112 HCHG RX REV CODE 229: Performed by: SURGERY

## 2019-07-20 PROCEDURE — 700101 HCHG RX REV CODE 250: Performed by: SURGERY

## 2019-07-20 PROCEDURE — 700102 HCHG RX REV CODE 250 W/ 637 OVERRIDE(OP): Performed by: NURSE PRACTITIONER

## 2019-07-20 PROCEDURE — 770001 HCHG ROOM/CARE - MED/SURG/GYN PRIV*

## 2019-07-20 PROCEDURE — 92526 ORAL FUNCTION THERAPY: CPT

## 2019-07-20 RX ADMIN — SENNOSIDES, DOCUSATE SODIUM 1 TABLET: 50; 8.6 TABLET, FILM COATED ORAL at 21:00

## 2019-07-20 RX ADMIN — DOCUSATE SODIUM 100 MG: 50 LIQUID ORAL at 05:06

## 2019-07-20 RX ADMIN — BENAZEPRIL HYDROCHLORIDE 40 MG: 20 TABLET ORAL at 05:11

## 2019-07-20 RX ADMIN — NYSTATIN: 100000 POWDER TOPICAL at 18:19

## 2019-07-20 RX ADMIN — INSULIN HUMAN 10 UNITS: 100 INJECTION, SOLUTION PARENTERAL at 06:59

## 2019-07-20 RX ADMIN — HYDROMORPHONE HYDROCHLORIDE 4 MG: 2 TABLET ORAL at 03:26

## 2019-07-20 RX ADMIN — DOCUSATE SODIUM 100 MG: 50 LIQUID ORAL at 18:26

## 2019-07-20 RX ADMIN — SULFAMETHOXAZOLE AND TRIMETHOPRIM 1 TABLET: 800; 160 TABLET ORAL at 05:11

## 2019-07-20 RX ADMIN — MAGNESIUM HYDROXIDE 30 ML: 400 SUSPENSION ORAL at 05:10

## 2019-07-20 RX ADMIN — ENOXAPARIN SODIUM 40 MG: 100 INJECTION SUBCUTANEOUS at 05:12

## 2019-07-20 RX ADMIN — CARVEDILOL 6.25 MG: 6.25 TABLET, FILM COATED ORAL at 18:07

## 2019-07-20 RX ADMIN — LEVOTHYROXINE SODIUM 200 MCG: 200 TABLET ORAL at 05:12

## 2019-07-20 RX ADMIN — POLYETHYLENE GLYCOL 3350 1 PACKET: 17 POWDER, FOR SOLUTION ORAL at 05:10

## 2019-07-20 RX ADMIN — POLYETHYLENE GLYCOL 3350 1 PACKET: 17 POWDER, FOR SOLUTION ORAL at 18:07

## 2019-07-20 RX ADMIN — INSULIN GLARGINE 30 UNITS: 100 INJECTION, SOLUTION SUBCUTANEOUS at 18:21

## 2019-07-20 RX ADMIN — INSULIN GLARGINE 30 UNITS: 100 INJECTION, SOLUTION SUBCUTANEOUS at 06:59

## 2019-07-20 RX ADMIN — ENOXAPARIN SODIUM 40 MG: 100 INJECTION SUBCUTANEOUS at 18:26

## 2019-07-20 RX ADMIN — INSULIN HUMAN 7 UNITS: 100 INJECTION, SOLUTION PARENTERAL at 00:59

## 2019-07-20 RX ADMIN — DIBASIC SODIUM PHOSPHATE, MONOBASIC POTASSIUM PHOSPHATE AND MONOBASIC SODIUM PHOSPHATE 2 TABLET: 852; 155; 130 TABLET ORAL at 05:11

## 2019-07-20 RX ADMIN — INSULIN HUMAN 7 UNITS: 100 INJECTION, SOLUTION PARENTERAL at 11:36

## 2019-07-20 RX ADMIN — Medication 1 EACH: at 18:07

## 2019-07-20 RX ADMIN — CARVEDILOL 6.25 MG: 6.25 TABLET, FILM COATED ORAL at 11:34

## 2019-07-20 RX ADMIN — Medication 1 EACH: at 05:11

## 2019-07-20 RX ADMIN — DIBASIC SODIUM PHOSPHATE, MONOBASIC POTASSIUM PHOSPHATE AND MONOBASIC SODIUM PHOSPHATE 2 TABLET: 852; 155; 130 TABLET ORAL at 18:07

## 2019-07-20 RX ADMIN — INSULIN HUMAN 10 UNITS: 100 INJECTION, SOLUTION PARENTERAL at 23:12

## 2019-07-20 RX ADMIN — INSULIN HUMAN 12 UNITS: 100 INJECTION, SOLUTION PARENTERAL at 18:21

## 2019-07-20 RX ADMIN — NYSTATIN: 100000 POWDER TOPICAL at 05:06

## 2019-07-20 RX ADMIN — SULFAMETHOXAZOLE AND TRIMETHOPRIM 1 TABLET: 800; 160 TABLET ORAL at 18:06

## 2019-07-20 RX ADMIN — Medication 1 EACH: at 11:36

## 2019-07-20 ASSESSMENT — ENCOUNTER SYMPTOMS
EYES NEGATIVE: 1
RESPIRATORY NEGATIVE: 1
MYALGIAS: 1
GASTROINTESTINAL NEGATIVE: 1
NEUROLOGICAL NEGATIVE: 1
NECK PAIN: 1
CONSTITUTIONAL NEGATIVE: 1
CARDIOVASCULAR NEGATIVE: 1
ROS GI COMMENTS: BM 7/19
PSYCHIATRIC NEGATIVE: 1
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
## 2019-07-20 NOTE — PROGRESS NOTES
"Orthopaedic Progress Note    Author: Jean Alvarez Date & Time created: 7/20/2019   4:32 PM     Interval Events:  POD#6 S/P ORIF left clavicle   Dressings changed by nursing  Pain well controlled  Mobilization improving    Review of Systems   Constitutional: Negative.    Cardiovascular: Negative.    Gastrointestinal: Negative.    Musculoskeletal:        Pain well controlled    Neurological: Negative.      Hemodynamics:  /52   Pulse 89   Temp 36.8 °C (98.3 °F) (Temporal)   Resp 17   Ht 1.702 m (5' 7\")   Wt (!) 141.8 kg (312 lb 9.8 oz)   SpO2 98%      No Active Precaution Orders    Respiratory:    Respiration: 17, Pulse Oximetry: 98 %        RUL Breath Sounds: Clear, RML Breath Sounds: Diminished, RLL Breath Sounds: Diminished, AINSLEY Breath Sounds: Clear, LLL Breath Sounds: Diminished    Physical Exam   Constitutional: She is oriented to person, place, and time. She appears well-developed and well-nourished. No distress.   obese   Cardiovascular: Normal rate.    Pulmonary/Chest: Effort normal. No respiratory distress.   Musculoskeletal:   LUE in sling, mod ecchymosis of arm, clavicle dressing changed by nursing, surgical incision is well approximated and is dry and clean.  There is no erythema, induration, or signs of infection, moves all fingers, DNVI, cap refill <2 sec.    Neurological: She is alert and oriented to person, place, and time.   Skin: She is not diaphoretic.     Labs:  Recent Labs      07/18/19   0453  07/19/19   1130  07/20/19   0740   WBC  11.2*  11.7*  12.0*   RBC  3.42*  3.33*  3.14*   HEMOGLOBIN  10.3*  10.2*  9.8*   HEMATOCRIT  32.6*  32.8*  31.7*   MCV  95.3  98.5*  101.0*   MCH  30.1  30.6  31.2   MCHC  31.6*  31.1*  30.9*   RDW  46.0  48.3  50.3*   PLATELETCT  306  291  277   MPV  10.6  10.9  11.3     Recent Labs      07/18/19   0453  07/19/19   1130  07/20/19   0740   SODIUM  141  141  137   POTASSIUM  4.0  4.4  4.4   CHLORIDE  107  108  105   CO2  25  28  28   GLUCOSE  323*  301*  " 373*   BUN  34*  41*  48*   CREATININE  0.82  0.88  1.02   CALCIUM  9.0  8.5  8.5       Medical Decision Making/Problem List:    Active Hospital Problems    Diagnosis   • Dysphagia [R13.10]   • Subarachnoid hemorrhage following injury, no loss of consciousness ([facility]) [S06.6X0A]   • Closed fracture of multiple ribs with flail chest [S22.5XXA]   • Hypophosphatemia [E83.39]   • Multiple fractures of cervical spine ([facility]) [S12.9XXA]   • Fracture of left clavicle [S42.002A]   • DM (diabetes mellitus) ([facility]) [E11.9]   • Traumatic pneumothorax [S27.0XXA]   • Morbid obesity with BMI of 45.0-49.9, adult ([facility]) [E66.01, Z68.42]   • Laceration of left forearm [S51.812A]   • Platelet dysfunction due to drugs [D69.59, T50.905A]   • Hypothyroid [E03.9]   • Essential hypertension [I10]   • Abnormal CT of the abdomen [R93.5]   • Trauma [T14.90XA]   • No contraindication to deep vein thrombosis (DVT) prophylaxis [Z78.9]     Core Measures & Quality Metrics:  Current DVT prophylaxis: SCDs  Discussed patient condition with Patient and orthopedics.  Clearance for lovenox/heparin: per trauma team  Weight Bearing Status: NWB LUE  Wounds & Drains: dressing left in place  Disposition and Follow-up: pending therapy recs

## 2019-07-20 NOTE — THERAPY
"Speech Language Therapy dysphagia treatment completed.   Functional Status:  Pt seen this date for swallow reassessment per request of trauma [place] HUANG. Pt on 3LO2 via nasal cannula, C-collar and Cortrak in place. Pt was awake, alert, followed directions and participated fully in session, spouse and adult son at bedside. Pt demonstrating increased alertness and reduced fatigue compared to previous notes, family reports improved energy level as well. Pt reports completing Kae exercise throughout the day, and demonstrated Kae x3 with \"good\" accuracy. PO trials of ice, NTL by cup and straw, puree, ground textures and thins via cup and straw assessed. Hyolaryngeal elevation palpated as compete and timely initiation of swallow appreciated. Pt independently used double swallow strategy in 90% of PO trials. Immediate cough appreciated in 30% of trials of thins by cup, and 50% of trials of thins by straw, which is concerning for aspiration. No s/sx of aspiration appreciated with any other consistencies consumed. SLP following.   Recommendations: Recommend initiation of D2/NTL diet with adherence to the following strategies: HOB at 90*, direct supervision and feeding assistance as necessary, must be awake and alert, double swallow, small bites, slow rate, straws okay. Pt okay for 5-7 ice chips per hour between meals with nursing. Okay to remove Cotrak after pt tolerates 2-3 meals without difficulty.    Plan of Care: Will benefit from Speech Therapy 5 times per week  Post-Acute Therapy: Recommend inpatient transitional care services for continued speech therapy services.        See \"Rehab Therapy-Acute\" Patient Summary Report for complete documentation.     "
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
## 2019-07-21 PROBLEM — E83.39 HYPOPHOSPHATEMIA: Status: RESOLVED | Noted: 2019-07-15 | Resolved: 2019-07-21

## 2019-07-21 LAB
GLUCOSE BLD-MCNC: 232 MG/DL (ref 65–99)
GLUCOSE BLD-MCNC: 248 MG/DL (ref 65–99)
GLUCOSE BLD-MCNC: 250 MG/DL (ref 65–99)
GLUCOSE BLD-MCNC: 254 MG/DL (ref 65–99)

## 2019-07-21 PROCEDURE — 700102 HCHG RX REV CODE 250 W/ 637 OVERRIDE(OP): Performed by: NURSE PRACTITIONER

## 2019-07-21 PROCEDURE — A9270 NON-COVERED ITEM OR SERVICE: HCPCS | Performed by: SURGERY

## 2019-07-21 PROCEDURE — 700111 HCHG RX REV CODE 636 W/ 250 OVERRIDE (IP): Performed by: NURSE PRACTITIONER

## 2019-07-21 PROCEDURE — 700101 HCHG RX REV CODE 250: Performed by: SURGERY

## 2019-07-21 PROCEDURE — A9270 NON-COVERED ITEM OR SERVICE: HCPCS | Performed by: NURSE PRACTITIONER

## 2019-07-21 PROCEDURE — 82962 GLUCOSE BLOOD TEST: CPT

## 2019-07-21 PROCEDURE — 770001 HCHG ROOM/CARE - MED/SURG/GYN PRIV*

## 2019-07-21 PROCEDURE — 700102 HCHG RX REV CODE 250 W/ 637 OVERRIDE(OP): Performed by: SURGERY

## 2019-07-21 RX ORDER — AMOXICILLIN 250 MG
1 CAPSULE ORAL
Status: DISCONTINUED | OUTPATIENT
Start: 2019-07-21 | End: 2019-07-23 | Stop reason: HOSPADM

## 2019-07-21 RX ORDER — SULFAMETHOXAZOLE AND TRIMETHOPRIM 800; 160 MG/1; MG/1
1 TABLET ORAL EVERY 12 HOURS
Status: DISCONTINUED | OUTPATIENT
Start: 2019-07-21 | End: 2019-07-22

## 2019-07-21 RX ORDER — DOCUSATE SODIUM 50 MG/5ML
100 LIQUID ORAL 2 TIMES DAILY
Status: DISCONTINUED | OUTPATIENT
Start: 2019-07-21 | End: 2019-07-23 | Stop reason: HOSPADM

## 2019-07-21 RX ORDER — POLYETHYLENE GLYCOL 3350 17 G/17G
1 POWDER, FOR SOLUTION ORAL 2 TIMES DAILY
Status: DISCONTINUED | OUTPATIENT
Start: 2019-07-21 | End: 2019-07-23 | Stop reason: HOSPADM

## 2019-07-21 RX ORDER — LEVOTHYROXINE SODIUM 0.2 MG/1
200 TABLET ORAL
Status: DISCONTINUED | OUTPATIENT
Start: 2019-07-22 | End: 2019-07-23 | Stop reason: HOSPADM

## 2019-07-21 RX ORDER — AMOXICILLIN 250 MG
1 CAPSULE ORAL NIGHTLY
Status: DISCONTINUED | OUTPATIENT
Start: 2019-07-21 | End: 2019-07-23 | Stop reason: HOSPADM

## 2019-07-21 RX ORDER — BENAZEPRIL HYDROCHLORIDE 20 MG/1
40 TABLET ORAL DAILY
Status: DISCONTINUED | OUTPATIENT
Start: 2019-07-22 | End: 2019-07-23 | Stop reason: HOSPADM

## 2019-07-21 RX ORDER — HYDROMORPHONE HYDROCHLORIDE 2 MG/1
2-4 TABLET ORAL
Status: DISCONTINUED | OUTPATIENT
Start: 2019-07-21 | End: 2019-07-23 | Stop reason: HOSPADM

## 2019-07-21 RX ORDER — CARVEDILOL 6.25 MG/1
6.25 TABLET ORAL 2 TIMES DAILY WITH MEALS
Status: DISCONTINUED | OUTPATIENT
Start: 2019-07-21 | End: 2019-07-23 | Stop reason: HOSPADM

## 2019-07-21 RX ADMIN — NYSTATIN: 100000 POWDER TOPICAL at 05:30

## 2019-07-21 RX ADMIN — ENOXAPARIN SODIUM 40 MG: 100 INJECTION SUBCUTANEOUS at 18:00

## 2019-07-21 RX ADMIN — SULFAMETHOXAZOLE AND TRIMETHOPRIM 1 TABLET: 800; 160 TABLET ORAL at 05:32

## 2019-07-21 RX ADMIN — INSULIN HUMAN 7 UNITS: 100 INJECTION, SOLUTION PARENTERAL at 17:52

## 2019-07-21 RX ADMIN — DIBASIC SODIUM PHOSPHATE, MONOBASIC POTASSIUM PHOSPHATE AND MONOBASIC SODIUM PHOSPHATE 2 TABLET: 852; 155; 130 TABLET ORAL at 05:32

## 2019-07-21 RX ADMIN — POLYETHYLENE GLYCOL 3350 1 PACKET: 17 POWDER, FOR SOLUTION ORAL at 18:01

## 2019-07-21 RX ADMIN — INSULIN GLARGINE 30 UNITS: 100 INJECTION, SOLUTION SUBCUTANEOUS at 08:03

## 2019-07-21 RX ADMIN — NYSTATIN: 100000 POWDER TOPICAL at 18:17

## 2019-07-21 RX ADMIN — INSULIN HUMAN 4 UNITS: 100 INJECTION, SOLUTION PARENTERAL at 23:16

## 2019-07-21 RX ADMIN — SULFAMETHOXAZOLE AND TRIMETHOPRIM 1 TABLET: 800; 160 TABLET ORAL at 18:01

## 2019-07-21 RX ADMIN — INSULIN GLARGINE 30 UNITS: 100 INJECTION, SOLUTION SUBCUTANEOUS at 17:51

## 2019-07-21 RX ADMIN — CARVEDILOL 6.25 MG: 6.25 TABLET, FILM COATED ORAL at 08:09

## 2019-07-21 RX ADMIN — INSULIN HUMAN 4 UNITS: 100 INJECTION, SOLUTION PARENTERAL at 12:38

## 2019-07-21 RX ADMIN — HYDROMORPHONE HYDROCHLORIDE 2 MG: 2 TABLET ORAL at 23:16

## 2019-07-21 RX ADMIN — HYDROMORPHONE HYDROCHLORIDE 4 MG: 2 TABLET ORAL at 18:00

## 2019-07-21 RX ADMIN — INSULIN HUMAN 4 UNITS: 100 INJECTION, SOLUTION PARENTERAL at 08:03

## 2019-07-21 RX ADMIN — ENOXAPARIN SODIUM 40 MG: 100 INJECTION SUBCUTANEOUS at 05:30

## 2019-07-21 RX ADMIN — Medication 1 EACH: at 05:32

## 2019-07-21 RX ADMIN — LEVOTHYROXINE SODIUM 200 MCG: 200 TABLET ORAL at 05:32

## 2019-07-21 RX ADMIN — SENNOSIDES, DOCUSATE SODIUM 1 TABLET: 50; 8.6 TABLET, FILM COATED ORAL at 22:00

## 2019-07-21 RX ADMIN — HYDROMORPHONE HYDROCHLORIDE 2 MG: 2 TABLET ORAL at 05:32

## 2019-07-21 RX ADMIN — Medication 1 EACH: at 18:15

## 2019-07-21 RX ADMIN — CARVEDILOL 6.25 MG: 6.25 TABLET, FILM COATED ORAL at 18:01

## 2019-07-21 ASSESSMENT — ENCOUNTER SYMPTOMS
EYES NEGATIVE: 1
NECK PAIN: 1
PSYCHIATRIC NEGATIVE: 1
RESPIRATORY NEGATIVE: 1
ROS GI COMMENTS: BM 7/20
MYALGIAS: 1
CONSTITUTIONAL NEGATIVE: 1
NEUROLOGICAL NEGATIVE: 1
GASTROINTESTINAL NEGATIVE: 1
CARDIOVASCULAR NEGATIVE: 1
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
## 2019-07-21 NOTE — CARE PLAN
Problem: Safety  Goal: Will remain free from injury  Outcome: PROGRESSING AS EXPECTED  Reinforced call light instructions, pt complies with calling, bed alarm on locked and in lowest position, hourly rounding in place    Problem: Infection  Goal: Will remain free from infection  Outcome: PROGRESSING AS EXPECTED  Hand hygiene protocols followed, no s/s of infection at this time, active in IS usage

## 2019-07-21 NOTE — PROGRESS NOTES
RN MOBILITY NOTE     Surgery patient?: yes  Date of surgery: 7/14  Ambulated 50 ft on day of surgery? (N/A if today is not date of surgery): n/a  Number of times ambulated 50 feet or greater today: 0  Patient has been up to chair, edge of bed or HOB 90 degrees for all meals?: yes  Goal met? (goal is ambulating at least 50 feet 2 times on day shift, one time on night shift): no  If patient did not meet mobility goal, why?: pt refused longer ambulation tonight d/t pain and weakness, pt was up to commode multiple times-5ft

## 2019-07-21 NOTE — PROGRESS NOTES
"Orthopaedic Progress Note    Author: Jean Alvarez Date & Time created: 7/21/2019   3:02 PM     Interval Events:  POD#7 S/P ORIF left clavicle   DressingsCDI  Dleared by ortho for DC to SNF tomorrow pending trauma clearance    Review of Systems   Constitutional: Negative.    Cardiovascular: Negative.    Gastrointestinal: Negative.    Musculoskeletal:        Pain well controlled    Neurological: Negative.      Hemodynamics:  /71   Pulse 71   Temp 37.1 °C (98.7 °F) (Temporal)   Resp 17   Ht 1.702 m (5' 7\")   Wt (!) 141.8 kg (312 lb 9.8 oz)   SpO2 96%      No Active Precaution Orders    Respiratory:    Respiration: 17, Pulse Oximetry: 96 %     Work Of Breathing / Effort: Increased Work of Breathing  RUL Breath Sounds: Clear, RML Breath Sounds: Diminished, RLL Breath Sounds: Diminished, AINSLEY Breath Sounds: Clear, LLL Breath Sounds: Diminished    Physical Exam   Constitutional: She is oriented to person, place, and time. She appears well-developed and well-nourished. No distress.   obese   Cardiovascular: Normal rate.    Pulmonary/Chest: Effort normal. No respiratory distress.   Musculoskeletal:   LUE in sling, ecchymosis of arm, clavicle dressing CDI, moves all fingers, DNVI, cap refill <2 sec.    Neurological: She is alert and oriented to person, place, and time.   Skin: She is not diaphoretic.     Labs:  Recent Labs      07/19/19   1130  07/20/19   0740   WBC  11.7*  12.0*   RBC  3.33*  3.14*   HEMOGLOBIN  10.2*  9.8*   HEMATOCRIT  32.8*  31.7*   MCV  98.5*  101.0*   MCH  30.6  31.2   MCHC  31.1*  30.9*   RDW  48.3  50.3*   PLATELETCT  291  277   MPV  10.9  11.3     Recent Labs      07/19/19   1130  07/20/19   0740   SODIUM  141  137   POTASSIUM  4.4  4.4   CHLORIDE  108  105   CO2  28  28   GLUCOSE  301*  373*   BUN  41*  48*   CREATININE  0.88  1.02   CALCIUM  8.5  8.5       Medical Decision Making/Problem List:    Active Hospital Problems    Diagnosis   • Dysphagia [R13.10]   • Subarachnoid hemorrhage " following injury, no loss of consciousness ([facility]) [S06.6X0A]   • Closed fracture of multiple ribs with flail chest [S22.5XXA]   • Hypophosphatemia [E83.39]   • Multiple fractures of cervical spine ([facility]) [S12.9XXA]   • Fracture of left clavicle [S42.002A]   • DM (diabetes mellitus) ([facility]) [E11.9]   • Traumatic pneumothorax [S27.0XXA]   • Morbid obesity with BMI of 45.0-49.9, adult ([facility]) [E66.01, Z68.42]   • Laceration of left forearm [S51.812A]   • Platelet dysfunction due to drugs [D69.59, T50.905A]   • Hypothyroid [E03.9]   • Essential hypertension [I10]   • Abnormal CT of the abdomen [R93.5]   • Trauma [T14.90XA]   • No contraindication to deep vein thrombosis (DVT) prophylaxis [Z78.9]     Core Measures & Quality Metrics:  Current DVT prophylaxis: SCDs  Discussed patient condition with Patient and orthopedics.  Clearance for lovenox/heparin: per trauma team  Weight Bearing Status: NWB NATALIO  Wounds & Drains: dressing left in place  Disposition and Follow-up: pending therapy recs
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
## 2019-07-22 ENCOUNTER — APPOINTMENT (OUTPATIENT)
Dept: RADIOLOGY | Facility: MEDICAL CENTER | Age: 74
DRG: 958 | End: 2019-07-22
Attending: NURSE PRACTITIONER
Payer: COMMERCIAL

## 2019-07-22 LAB
ANION GAP SERPL CALC-SCNC: 5 MMOL/L (ref 0–11.9)
BASOPHILS # BLD AUTO: 0.4 % (ref 0–1.8)
BASOPHILS # BLD: 0.05 K/UL (ref 0–0.12)
BUN SERPL-MCNC: 44 MG/DL (ref 8–22)
CALCIUM SERPL-MCNC: 8.7 MG/DL (ref 8.5–10.5)
CHLORIDE SERPL-SCNC: 104 MMOL/L (ref 96–112)
CO2 SERPL-SCNC: 28 MMOL/L (ref 20–33)
CREAT SERPL-MCNC: 1.14 MG/DL (ref 0.5–1.4)
EOSINOPHIL # BLD AUTO: 0.36 K/UL (ref 0–0.51)
EOSINOPHIL NFR BLD: 3.2 % (ref 0–6.9)
ERYTHROCYTE [DISTWIDTH] IN BLOOD BY AUTOMATED COUNT: 51.7 FL (ref 35.9–50)
GLUCOSE BLD-MCNC: 215 MG/DL (ref 65–99)
GLUCOSE BLD-MCNC: 223 MG/DL (ref 65–99)
GLUCOSE BLD-MCNC: 225 MG/DL (ref 65–99)
GLUCOSE BLD-MCNC: 268 MG/DL (ref 65–99)
GLUCOSE SERPL-MCNC: 213 MG/DL (ref 65–99)
HCT VFR BLD AUTO: 33.2 % (ref 37–47)
HGB BLD-MCNC: 10.4 G/DL (ref 12–16)
IMM GRANULOCYTES # BLD AUTO: 0.18 K/UL (ref 0–0.11)
IMM GRANULOCYTES NFR BLD AUTO: 1.6 % (ref 0–0.9)
LYMPHOCYTES # BLD AUTO: 3.43 K/UL (ref 1–4.8)
LYMPHOCYTES NFR BLD: 30.8 % (ref 22–41)
MCH RBC QN AUTO: 31.4 PG (ref 27–33)
MCHC RBC AUTO-ENTMCNC: 31.3 G/DL (ref 33.6–35)
MCV RBC AUTO: 100.3 FL (ref 81.4–97.8)
MONOCYTES # BLD AUTO: 1.03 K/UL (ref 0–0.85)
MONOCYTES NFR BLD AUTO: 9.2 % (ref 0–13.4)
NEUTROPHILS # BLD AUTO: 6.1 K/UL (ref 2–7.15)
NEUTROPHILS NFR BLD: 54.8 % (ref 44–72)
NRBC # BLD AUTO: 0 K/UL
NRBC BLD-RTO: 0 /100 WBC
PLATELET # BLD AUTO: 299 K/UL (ref 164–446)
PMV BLD AUTO: 10.9 FL (ref 9–12.9)
POTASSIUM SERPL-SCNC: 4.6 MMOL/L (ref 3.6–5.5)
RBC # BLD AUTO: 3.31 M/UL (ref 4.2–5.4)
SODIUM SERPL-SCNC: 137 MMOL/L (ref 135–145)
WBC # BLD AUTO: 11.2 K/UL (ref 4.8–10.8)

## 2019-07-22 PROCEDURE — 80048 BASIC METABOLIC PNL TOTAL CA: CPT

## 2019-07-22 PROCEDURE — 700102 HCHG RX REV CODE 250 W/ 637 OVERRIDE(OP): Performed by: NURSE PRACTITIONER

## 2019-07-22 PROCEDURE — 92526 ORAL FUNCTION THERAPY: CPT

## 2019-07-22 PROCEDURE — 85025 COMPLETE CBC W/AUTO DIFF WBC: CPT

## 2019-07-22 PROCEDURE — A9270 NON-COVERED ITEM OR SERVICE: HCPCS | Performed by: NURSE PRACTITIONER

## 2019-07-22 PROCEDURE — 82962 GLUCOSE BLOOD TEST: CPT | Mod: 91

## 2019-07-22 PROCEDURE — 700102 HCHG RX REV CODE 250 W/ 637 OVERRIDE(OP): Performed by: SURGERY

## 2019-07-22 PROCEDURE — 770001 HCHG ROOM/CARE - MED/SURG/GYN PRIV*

## 2019-07-22 PROCEDURE — 700112 HCHG RX REV CODE 229: Performed by: NURSE PRACTITIONER

## 2019-07-22 PROCEDURE — 700105 HCHG RX REV CODE 258: Performed by: NURSE PRACTITIONER

## 2019-07-22 PROCEDURE — 97530 THERAPEUTIC ACTIVITIES: CPT

## 2019-07-22 PROCEDURE — 71045 X-RAY EXAM CHEST 1 VIEW: CPT

## 2019-07-22 PROCEDURE — 97535 SELF CARE MNGMENT TRAINING: CPT

## 2019-07-22 PROCEDURE — 700101 HCHG RX REV CODE 250: Performed by: SURGERY

## 2019-07-22 PROCEDURE — 700111 HCHG RX REV CODE 636 W/ 250 OVERRIDE (IP): Performed by: NURSE PRACTITIONER

## 2019-07-22 PROCEDURE — 97116 GAIT TRAINING THERAPY: CPT

## 2019-07-22 RX ORDER — NYSTATIN 100000 [USP'U]/G
POWDER TOPICAL 2 TIMES DAILY
Status: CANCELLED | OUTPATIENT
Start: 2019-07-22

## 2019-07-22 RX ORDER — BENAZEPRIL HYDROCHLORIDE 20 MG/1
40 TABLET ORAL DAILY
Status: CANCELLED | OUTPATIENT
Start: 2019-07-23

## 2019-07-22 RX ORDER — SODIUM CHLORIDE 9 MG/ML
1000 INJECTION, SOLUTION INTRAVENOUS ONCE
Status: COMPLETED | OUTPATIENT
Start: 2019-07-22 | End: 2019-07-22

## 2019-07-22 RX ORDER — METFORMIN HYDROCHLORIDE 500 MG/1
1000 TABLET, EXTENDED RELEASE ORAL 2 TIMES DAILY
Status: CANCELLED | OUTPATIENT
Start: 2019-07-23

## 2019-07-22 RX ORDER — CARVEDILOL 6.25 MG/1
6.25 TABLET ORAL 2 TIMES DAILY WITH MEALS
Status: CANCELLED | OUTPATIENT
Start: 2019-07-23

## 2019-07-22 RX ORDER — METFORMIN HYDROCHLORIDE 500 MG/1
1000 TABLET, EXTENDED RELEASE ORAL 2 TIMES DAILY
Status: DISCONTINUED | OUTPATIENT
Start: 2019-07-22 | End: 2019-07-23 | Stop reason: HOSPADM

## 2019-07-22 RX ORDER — HYDROMORPHONE HYDROCHLORIDE 2 MG/1
2-4 TABLET ORAL
Status: CANCELLED | OUTPATIENT
Start: 2019-07-22

## 2019-07-22 RX ORDER — BACITRACIN ZINC AND POLYMYXIN B SULFATE 500; 1000 [USP'U]/G; [USP'U]/G
OINTMENT TOPICAL 3 TIMES DAILY
Status: CANCELLED | OUTPATIENT
Start: 2019-07-22

## 2019-07-22 RX ORDER — INSULIN GLARGINE 100 [IU]/ML
30 INJECTION, SOLUTION SUBCUTANEOUS 2 TIMES DAILY
Status: CANCELLED | OUTPATIENT
Start: 2019-07-22

## 2019-07-22 RX ORDER — LEVOTHYROXINE SODIUM 0.2 MG/1
200 TABLET ORAL
Status: CANCELLED | OUTPATIENT
Start: 2019-07-23

## 2019-07-22 RX ORDER — SULFAMETHOXAZOLE AND TRIMETHOPRIM 800; 160 MG/1; MG/1
1 TABLET ORAL EVERY 12 HOURS
Status: CANCELLED | OUTPATIENT
Start: 2019-07-22 | End: 2019-07-23

## 2019-07-22 RX ADMIN — CARVEDILOL 6.25 MG: 6.25 TABLET, FILM COATED ORAL at 18:51

## 2019-07-22 RX ADMIN — SULFAMETHOXAZOLE AND TRIMETHOPRIM 1 TABLET: 800; 160 TABLET ORAL at 04:01

## 2019-07-22 RX ADMIN — ENOXAPARIN SODIUM 40 MG: 100 INJECTION SUBCUTANEOUS at 04:01

## 2019-07-22 RX ADMIN — INSULIN GLARGINE 30 UNITS: 100 INJECTION, SOLUTION SUBCUTANEOUS at 18:43

## 2019-07-22 RX ADMIN — Medication 1 EACH: at 18:51

## 2019-07-22 RX ADMIN — ENOXAPARIN SODIUM 40 MG: 100 INJECTION SUBCUTANEOUS at 18:50

## 2019-07-22 RX ADMIN — INSULIN HUMAN 4 UNITS: 100 INJECTION, SOLUTION PARENTERAL at 23:11

## 2019-07-22 RX ADMIN — INSULIN HUMAN 4 UNITS: 100 INJECTION, SOLUTION PARENTERAL at 09:34

## 2019-07-22 RX ADMIN — METFORMIN HYDROCHLORIDE 1000 MG: 500 TABLET, EXTENDED RELEASE ORAL at 18:51

## 2019-07-22 RX ADMIN — INSULIN HUMAN 4 UNITS: 100 INJECTION, SOLUTION PARENTERAL at 18:44

## 2019-07-22 RX ADMIN — LEVOTHYROXINE SODIUM 200 MCG: 200 TABLET ORAL at 04:01

## 2019-07-22 RX ADMIN — NYSTATIN: 100000 POWDER TOPICAL at 04:01

## 2019-07-22 RX ADMIN — NYSTATIN: 100000 POWDER TOPICAL at 18:52

## 2019-07-22 RX ADMIN — Medication 1 EACH: at 04:01

## 2019-07-22 RX ADMIN — DOCUSATE SODIUM 100 MG: 50 LIQUID ORAL at 04:01

## 2019-07-22 RX ADMIN — CARVEDILOL 6.25 MG: 6.25 TABLET, FILM COATED ORAL at 09:40

## 2019-07-22 RX ADMIN — MAGNESIUM HYDROXIDE 30 ML: 400 SUSPENSION ORAL at 04:01

## 2019-07-22 RX ADMIN — SODIUM CHLORIDE 1000 ML: 9 INJECTION, SOLUTION INTRAVENOUS at 08:15

## 2019-07-22 RX ADMIN — SULFAMETHOXAZOLE AND TRIMETHOPRIM 1 TABLET: 800; 160 TABLET ORAL at 18:51

## 2019-07-22 RX ADMIN — INSULIN GLARGINE 30 UNITS: 100 INJECTION, SOLUTION SUBCUTANEOUS at 09:34

## 2019-07-22 RX ADMIN — BENAZEPRIL HYDROCHLORIDE 40 MG: 20 TABLET ORAL at 04:01

## 2019-07-22 ASSESSMENT — GAIT ASSESSMENTS
DEVIATION: INCREASED BASE OF SUPPORT;BRADYKINETIC;SHUFFLED GAIT
GAIT LEVEL OF ASSIST: MINIMAL ASSIST
ASSISTIVE DEVICE: HAND HELD ASSIST
DISTANCE (FEET): 15

## 2019-07-22 ASSESSMENT — COGNITIVE AND FUNCTIONAL STATUS - GENERAL
CLIMB 3 TO 5 STEPS WITH RAILING: TOTAL
SUGGESTED CMS G CODE MODIFIER DAILY ACTIVITY: CK
DRESSING REGULAR LOWER BODY CLOTHING: A LOT
DRESSING REGULAR UPPER BODY CLOTHING: A LITTLE
MOVING FROM LYING ON BACK TO SITTING ON SIDE OF FLAT BED: UNABLE
PERSONAL GROOMING: A LITTLE
WALKING IN HOSPITAL ROOM: A LOT
MOBILITY SCORE: 9
DAILY ACTIVITIY SCORE: 15
STANDING UP FROM CHAIR USING ARMS: A LITTLE
SUGGESTED CMS G CODE MODIFIER MOBILITY: CM
TURNING FROM BACK TO SIDE WHILE IN FLAT BAD: UNABLE
MOVING TO AND FROM BED TO CHAIR: UNABLE
HELP NEEDED FOR BATHING: A LOT
TOILETING: A LOT
EATING MEALS: A LITTLE

## 2019-07-22 ASSESSMENT — ENCOUNTER SYMPTOMS
CONSTITUTIONAL NEGATIVE: 1
RESPIRATORY NEGATIVE: 1
CARDIOVASCULAR NEGATIVE: 1
PSYCHIATRIC NEGATIVE: 1
ROS GI COMMENTS: BM 7/20
NEUROLOGICAL NEGATIVE: 1
EYES NEGATIVE: 1
NECK PAIN: 1
MYALGIAS: 1
GASTROINTESTINAL NEGATIVE: 1

## 2019-07-22 NOTE — PROGRESS NOTES
"Orthopaedic Progress Note    Author: Jean Alvarez Date & Time created: 7/22/2019   4:54 PM     Interval Events:  POD#8 S/P ORIF left clavicle   DressingsCDI  Dleared by ortho for DC to SNF pending trauma clearance    Review of Systems   Constitutional: Negative.    Cardiovascular: Negative.    Gastrointestinal: Negative.    Musculoskeletal:        Pain well controlled    Neurological: Negative.      Hemodynamics:  /46   Pulse 79   Temp 37.1 °C (98.8 °F)   Resp 18   Ht 1.702 m (5' 7\")   Wt (!) 144.5 kg (318 lb 9 oz)   SpO2 98%      No Active Precaution Orders    Respiratory:    Respiration: 18, Pulse Oximetry: 98 %     Work Of Breathing / Effort: Increased Work of Breathing  RUL Breath Sounds: Clear, RML Breath Sounds: Diminished, RLL Breath Sounds: Diminished, AINSLEY Breath Sounds: Clear, LLL Breath Sounds: Diminished    Physical Exam   Constitutional: She is oriented to person, place, and time. She appears well-developed and well-nourished. No distress.   obese   Cardiovascular: Normal rate.    Pulmonary/Chest: Effort normal. No respiratory distress.   Musculoskeletal:   LUE in sling, ecchymosis of arm, clavicle dressing CDI, moves all fingers, DNVI, cap refill <2 sec.    Neurological: She is alert and oriented to person, place, and time.   Skin: She is not diaphoretic.     Labs:  Recent Labs      07/20/19   0740  07/22/19   0345   WBC  12.0*  11.2*   RBC  3.14*  3.31*   HEMOGLOBIN  9.8*  10.4*   HEMATOCRIT  31.7*  33.2*   MCV  101.0*  100.3*   MCH  31.2  31.4   MCHC  30.9*  31.3*   RDW  50.3*  51.7*   PLATELETCT  277  299   MPV  11.3  10.9     Recent Labs      07/20/19   0740  07/22/19   0345   SODIUM  137  137   POTASSIUM  4.4  4.6   CHLORIDE  105  104   CO2  28  28   GLUCOSE  373*  213*   BUN  48*  44*   CREATININE  1.02  1.14   CALCIUM  8.5  8.7       Medical Decision Making/Problem List:    Active Hospital Problems    Diagnosis   • Dysphagia [R13.10]   • Subarachnoid hemorrhage following injury, no " loss of consciousness (HCC) [S06.6X0A]   • Closed fracture of multiple ribs with flail chest [S22.5XXA]   • Hypophosphatemia [E83.39]   • Multiple fractures of cervical spine ([facility]) [S12.9XXA]   • Fracture of left clavicle [S42.002A]   • DM (diabetes mellitus) ([facility]) [E11.9]   • Traumatic pneumothorax [S27.0XXA]   • Morbid obesity with BMI of 45.0-49.9, adult ([facility]) [E66.01, Z68.42]   • Laceration of left forearm [S51.812A]   • Platelet dysfunction due to drugs [D69.59, T50.905A]   • Hypothyroid [E03.9]   • Essential hypertension [I10]   • Abnormal CT of the abdomen [R93.5]   • Trauma [T14.90XA]   • No contraindication to deep vein thrombosis (DVT) prophylaxis [Z78.9]     Core Measures & Quality Metrics:  Current DVT prophylaxis: SCDs  Discussed patient condition with Patient and orthopedics.  Clearance for lovenox/heparin: per trauma team  Weight Bearing Status: DEVYN LANCE  Wounds & Drains: dressing left in place  Disposition and Follow-up: pending therapy recs

## 2019-07-22 NOTE — CARE PLAN
Problem: Safety  Goal: Will remain free from injury  Outcome: PROGRESSING AS EXPECTED      Problem: Mobility  Goal: Risk for activity intolerance will decrease  Outcome: PROGRESSING AS EXPECTED  Pt excited that her ambulation and transfers are becoming easier every time, we have made a goal of walking to the bathroom instead of using the bedside commode

## 2019-07-22 NOTE — DISCHARGE PLANNING
Insurance has authorized inpatient rehab. Following for medical clearance/bed availability [facility].

## 2019-07-22 NOTE — PROGRESS NOTES
Patient refused SCDs, educated regarding importance.  Education reinforced by ALEX Sandy.  Patient continues to refuse.

## 2019-07-22 NOTE — DIETARY
Nutrition Services: Brief Update    Pt previously on tube feedings.  Swallow evaluation by SLP 7/20; recommended Dysphagia 2, Nectar thick liquid diet.  Tube feedings are off and Cortrak has been removed.  Diet has been advanced to Diabetic, Dysphagia 2, nectar thick liquids with 1:1 supervision.  Per ADL flow sheet, PO has been >50% for the most recent three meals.  RD will continue to monitor for adequate PO intake.    Recommendations/Plan:  1. Encourage intake of meals.  2. Document intake of all meals as % taken in ADLs to provide interdisciplinary communication across all shifts.   3. Monitor weight.  4. Nutrition rep will continue to see patient for ongoing meal and snack preferences.     RD following.

## 2019-07-22 NOTE — CARE PLAN
Problem: Infection  Goal: Will remain free from infection  Outcome: PROGRESSING AS EXPECTED  Hand hygiene performed before and after pt care.  Gloves worn at all times while caring for pt.  Central line hubs scrubbed prior to access.

## 2019-07-22 NOTE — DISCHARGE PLANNING
Dr. Joshua Matias will accept Maira to inpatient rehab. Transport scheduled 10a Tuesday morning. Nursing to call report to [phone]. VM update to MATT Stephens. TCC will follow to assist as needed with transition to [facility].

## 2019-07-22 NOTE — DISCHARGE PLANNING
F/U with client at bedside to discuss IRF referral.explained specifics of inpatient rehab, answered questions/concerns. Maira is agreeable to IRF admission. She is aware transport is scheduled 10a tomorrow morning. Provided TCC contact information for any additional questions. Bedside RN Vika updated.

## 2019-07-22 NOTE — THERAPY
"Occupational Therapy Treatment completed with focus on ADLs, ADL transfers, patient education, cognition and upper extremity function.  Functional Status: Pt seen today for OT tx. Derrell bed mobility. Derrell sit<>stand. MaxA to adjust sling. Derrell functional mobility with HHA. Setup feeding. Participated in LUE gentle rom from elbow distally to assist with edema. SHe is making progress, remains limited by weakness, fatigue, impaired balance, and impaired cog which impacts independence in self care and functional mobility.  Plan of Care: Will benefit from Occupational Therapy 3 times per week  Discharge Recommendations:  Equipment Will Continue to Assess for Equipment Needs.  Recommend post-acute placement for additional occupational therapy services prior to discharge home. Patient can tolerate post-acute therapies at a 5x/week frequency.      See \"Rehab Therapy-Acute\" Patient Summary Report for complete documentation.   "

## 2019-07-22 NOTE — THERAPY
"Speech Language Therapy dysphagia treatment completed.   Functional Status:  Patient seen for swallowing therapy during breakfast.  Pt awake, alert and oriented in all spheres. She was repositioned to achieve a 90* angle which did allow for better positioning and neck posture.  Presentation of PO consisted of dysphagia II textures, NTL and trials of thin liquids.  Pt with intermittent subtle changes in vocal quality following sips of thin liquids following bites of food, but when taking thin liquids independently, vocal quality was clear and with use of a double swallow there were no overt S/Sx of aspiration noted. Given silent penetration on FEES and increased risk for aspiration, would recommend continuation of dysphagia II diet with NTL during meals.  OK for patient to have SIPS OF THIN LIQUIDS BETWEEN MEALS--must be sitting at 90* angle - no straws for thin liquids.  Education provided to patient regarding aspiration risk and swallowing/diet recommendations with good understanding verbalized.  SLP will follow for dysphagia therapy and comprehensive cognitive evaluation as ordered.     Recommendations: DIET:  Continue with Dys 2/NTL (ADA) diet with monitoring during meals.  OK for sips of thin liquids between meals--no straws on thin liquids. 90* angle for all PO intake.    Plan of Care: Will benefit from Speech Therapy 5 times per week  Post-Acute Therapy: Recommend inpatient transitional care services for continued speech therapy services.        See \"Rehab Therapy-Acute\" Patient Summary Report for complete documentation.     "

## 2019-07-22 NOTE — PROGRESS NOTES
RN MOBILITY NOTE     Surgery patient?: yes  Date of surgery: 7/14  Ambulated 50 ft on day of surgery? (N/A if today is not date of surgery): n/a  Number of times ambulated 50 feet or greater today: 0  Patient has been up to chair, edge of bed or HOB 90 degrees for all meals?: yes  Goal met? (goal is ambulating at least 50 feet 2 times on day shift, one time on night shift): no  If patient did not meet mobility goal, why?: pt refused longer ambulation d/t weakness and pain, stated she would like to increase the distances tomorrow during the day, up to commode for 10ft tonight

## 2019-07-22 NOTE — THERAPY
"Physical Therapy Treatment completed.   Bed Mobility:  Supine to Sit: Minimal Assist  Transfers: Sit to Stand: Minimal Assist  Gait: Level Of Assist: Minimal Assist with HHA       Plan of Care: Will benefit from Physical Therapy 3 times per week  Discharge Recommendations: Equipment: Will Continue to Assess for Equipment Needs. Post-acute therapy Recommend post-acute placement for continued physical therapy services prior to discharge home. Patient can tolerate post-acute therapies at a 5x/week frequency.       See \"Rehab Therapy-Acute\" Patient Summary Report for complete documentation.     Pt continues to progress w/ therapy. She demonstrated improved compensatory strategies and core strength to perform mobility while maintaining NWB LUE. Once in sitting, pt does require cues to maintain NWB as she moves her arm out of the sling. Pt was able to increase ambulation distances today. She uses a WBOS to maintain balance and will attempt to ambulate quickly once fatigued. Pt is very motivated to participate and will benefit from post acute therapy.  "

## 2019-07-22 NOTE — CARE PLAN
Problem: Mobility  Goal: Risk for activity intolerance will decrease  Outcome: PROGRESSING AS EXPECTED  Ambulating with steady gait with x1 assist and FWW.  Tires easily.
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
## 2019-07-23 ENCOUNTER — HOSPITAL ENCOUNTER (INPATIENT)
Facility: REHABILITATION | Age: 74
LOS: 16 days | DRG: 949 | End: 2019-08-08
Attending: PHYSICAL MEDICINE & REHABILITATION | Admitting: PHYSICAL MEDICINE & REHABILITATION
Payer: COMMERCIAL

## 2019-07-23 VITALS
SYSTOLIC BLOOD PRESSURE: 128 MMHG | OXYGEN SATURATION: 99 % | BODY MASS INDEX: 45.99 KG/M2 | WEIGHT: 293 LBS | HEIGHT: 67 IN | RESPIRATION RATE: 18 BRPM | TEMPERATURE: 98.8 F | HEART RATE: 91 BPM | DIASTOLIC BLOOD PRESSURE: 72 MMHG

## 2019-07-23 DIAGNOSIS — S12.9XXA CLOSED FRACTURE OF MULTIPLE CERVICAL VERTEBRAE, INITIAL ENCOUNTER (HCC): ICD-10-CM

## 2019-07-23 DIAGNOSIS — S42.022A CLOSED DISPLACED FRACTURE OF SHAFT OF LEFT CLAVICLE, INITIAL ENCOUNTER: ICD-10-CM

## 2019-07-23 PROBLEM — S06.6X9A SUBARACHNOID HEMORRHAGE FOLLOWING INJURY, WITH LOSS OF CONSCIOUSNESS (HCC): Status: ACTIVE | Noted: 2019-07-12

## 2019-07-23 LAB
GLUCOSE BLD-MCNC: 195 MG/DL (ref 65–99)
GLUCOSE BLD-MCNC: 199 MG/DL (ref 65–99)
GLUCOSE BLD-MCNC: 202 MG/DL (ref 65–99)

## 2019-07-23 PROCEDURE — 99223 1ST HOSP IP/OBS HIGH 75: CPT | Performed by: PHYSICAL MEDICINE & REHABILITATION

## 2019-07-23 PROCEDURE — 700102 HCHG RX REV CODE 250 W/ 637 OVERRIDE(OP): Performed by: SURGERY

## 2019-07-23 PROCEDURE — A9270 NON-COVERED ITEM OR SERVICE: HCPCS | Performed by: NURSE PRACTITIONER

## 2019-07-23 PROCEDURE — 700102 HCHG RX REV CODE 250 W/ 637 OVERRIDE(OP): Performed by: NURSE PRACTITIONER

## 2019-07-23 PROCEDURE — 82962 GLUCOSE BLOOD TEST: CPT

## 2019-07-23 PROCEDURE — 92523 SPEECH SOUND LANG COMPREHEN: CPT

## 2019-07-23 PROCEDURE — 94760 N-INVAS EAR/PLS OXIMETRY 1: CPT

## 2019-07-23 PROCEDURE — 82962 GLUCOSE BLOOD TEST: CPT | Mod: 91

## 2019-07-23 PROCEDURE — 700111 HCHG RX REV CODE 636 W/ 250 OVERRIDE (IP): Performed by: PHYSICAL MEDICINE & REHABILITATION

## 2019-07-23 PROCEDURE — 700101 HCHG RX REV CODE 250: Performed by: SURGERY

## 2019-07-23 PROCEDURE — 700101 HCHG RX REV CODE 250: Performed by: PHYSICAL MEDICINE & REHABILITATION

## 2019-07-23 PROCEDURE — 92610 EVALUATE SWALLOWING FUNCTION: CPT

## 2019-07-23 PROCEDURE — 700102 HCHG RX REV CODE 250 W/ 637 OVERRIDE(OP): Performed by: PHYSICAL MEDICINE & REHABILITATION

## 2019-07-23 PROCEDURE — A9270 NON-COVERED ITEM OR SERVICE: HCPCS | Performed by: PHYSICAL MEDICINE & REHABILITATION

## 2019-07-23 PROCEDURE — 700111 HCHG RX REV CODE 636 W/ 250 OVERRIDE (IP): Performed by: NURSE PRACTITIONER

## 2019-07-23 PROCEDURE — 770010 HCHG ROOM/CARE - REHAB SEMI PRIVAT*

## 2019-07-23 RX ORDER — LEVOTHYROXINE SODIUM 0.07 MG/1
75 TABLET ORAL
Status: DISCONTINUED | OUTPATIENT
Start: 2019-07-24 | End: 2019-08-08 | Stop reason: HOSPADM

## 2019-07-23 RX ORDER — ENEMA 19; 7 G/133ML; G/133ML
1 ENEMA RECTAL
Status: ON HOLD
Start: 2019-07-23 | End: 2019-08-08

## 2019-07-23 RX ORDER — POLYETHYLENE GLYCOL 3350 17 G/17G
17 POWDER, FOR SOLUTION ORAL 2 TIMES DAILY
Refills: 3 | Status: ON HOLD
Start: 2019-07-23 | End: 2019-08-08

## 2019-07-23 RX ORDER — HYDRALAZINE HYDROCHLORIDE 25 MG/1
25 TABLET, FILM COATED ORAL EVERY 8 HOURS PRN
Status: DISCONTINUED | OUTPATIENT
Start: 2019-07-23 | End: 2019-08-08 | Stop reason: HOSPADM

## 2019-07-23 RX ORDER — ONDANSETRON 2 MG/ML
4 INJECTION INTRAMUSCULAR; INTRAVENOUS EVERY 4 HOURS PRN
Qty: 84 ML | Status: ON HOLD
Start: 2019-07-23 | End: 2019-08-08

## 2019-07-23 RX ORDER — OXYCODONE HYDROCHLORIDE 5 MG/1
5 TABLET ORAL
Status: DISCONTINUED | OUTPATIENT
Start: 2019-07-23 | End: 2019-08-08 | Stop reason: HOSPADM

## 2019-07-23 RX ORDER — ONDANSETRON 4 MG/1
4 TABLET, ORALLY DISINTEGRATING ORAL 4 TIMES DAILY PRN
Status: DISCONTINUED | OUTPATIENT
Start: 2019-07-23 | End: 2019-08-08 | Stop reason: HOSPADM

## 2019-07-23 RX ORDER — LEVOTHYROXINE SODIUM 0.2 MG/1
200 TABLET ORAL
Qty: 30 TAB | Status: ON HOLD
Start: 2019-07-24 | End: 2019-08-29

## 2019-07-23 RX ORDER — OXYCODONE HYDROCHLORIDE 10 MG/1
10 TABLET ORAL
Status: DISCONTINUED | OUTPATIENT
Start: 2019-07-23 | End: 2019-08-08 | Stop reason: HOSPADM

## 2019-07-23 RX ORDER — ONDANSETRON 2 MG/ML
4 INJECTION INTRAMUSCULAR; INTRAVENOUS 4 TIMES DAILY PRN
Status: DISCONTINUED | OUTPATIENT
Start: 2019-07-23 | End: 2019-08-08 | Stop reason: HOSPADM

## 2019-07-23 RX ORDER — BENAZEPRIL HYDROCHLORIDE 10 MG/1
40 TABLET ORAL DAILY
Status: DISCONTINUED | OUTPATIENT
Start: 2019-07-24 | End: 2019-07-24

## 2019-07-23 RX ORDER — ECHINACEA PURPUREA EXTRACT 125 MG
2 TABLET ORAL PRN
Status: DISCONTINUED | OUTPATIENT
Start: 2019-07-23 | End: 2019-08-08 | Stop reason: HOSPADM

## 2019-07-23 RX ORDER — LABETALOL HYDROCHLORIDE 5 MG/ML
5-10 INJECTION, SOLUTION INTRAVENOUS EVERY 4 HOURS PRN
Refills: 0 | Status: ON HOLD
Start: 2019-07-23 | End: 2019-08-08

## 2019-07-23 RX ORDER — NYSTATIN 100000 [USP'U]/G
POWDER TOPICAL 2 TIMES DAILY
Status: DISCONTINUED | OUTPATIENT
Start: 2019-07-23 | End: 2019-08-08 | Stop reason: HOSPADM

## 2019-07-23 RX ORDER — BISACODYL 10 MG
10 SUPPOSITORY, RECTAL RECTAL
Refills: 0 | Status: ON HOLD
Start: 2019-07-23 | End: 2019-08-08

## 2019-07-23 RX ORDER — TRAMADOL HYDROCHLORIDE 50 MG/1
50 TABLET ORAL EVERY 4 HOURS PRN
Status: DISCONTINUED | OUTPATIENT
Start: 2019-07-23 | End: 2019-08-08 | Stop reason: HOSPADM

## 2019-07-23 RX ORDER — BACITRACIN ZINC AND POLYMYXIN B SULFATE 500; 1000 [USP'U]/G; [USP'U]/G
OINTMENT TOPICAL 3 TIMES DAILY
Status: DISCONTINUED | OUTPATIENT
Start: 2019-07-23 | End: 2019-08-04

## 2019-07-23 RX ORDER — AMOXICILLIN 250 MG
1 CAPSULE ORAL DAILY
Qty: 30 TAB | Refills: 0 | Status: ON HOLD
Start: 2019-07-23 | End: 2019-08-08

## 2019-07-23 RX ORDER — BENAZEPRIL HYDROCHLORIDE 40 MG/1
40 TABLET ORAL DAILY
Qty: 30 TAB | Status: ON HOLD
Start: 2019-07-24 | End: 2019-08-08

## 2019-07-23 RX ORDER — POLYVINYL ALCOHOL 14 MG/ML
1 SOLUTION/ DROPS OPHTHALMIC PRN
Status: DISCONTINUED | OUTPATIENT
Start: 2019-07-23 | End: 2019-08-08 | Stop reason: HOSPADM

## 2019-07-23 RX ORDER — AMOXICILLIN 250 MG
1 CAPSULE ORAL
Qty: 30 TAB | Refills: 0 | Status: ON HOLD
Start: 2019-07-23 | End: 2019-08-08

## 2019-07-23 RX ORDER — DOCUSATE SODIUM 50 MG/5ML
100 LIQUID ORAL 2 TIMES DAILY
Qty: 450 ML | Status: ON HOLD
Start: 2019-07-23 | End: 2019-08-08

## 2019-07-23 RX ORDER — HYDROMORPHONE HYDROCHLORIDE 2 MG/1
2-4 TABLET ORAL
Qty: 15 TAB | Refills: 0 | Status: ON HOLD
Start: 2019-07-23 | End: 2019-08-08

## 2019-07-23 RX ORDER — INSULIN GLARGINE 100 [IU]/ML
30 INJECTION, SOLUTION SUBCUTANEOUS 2 TIMES DAILY
Qty: 10 ML | Status: ON HOLD
Start: 2019-07-23 | End: 2019-08-08

## 2019-07-23 RX ORDER — LEVOTHYROXINE SODIUM 0.12 MG/1
125 TABLET ORAL
Status: DISCONTINUED | OUTPATIENT
Start: 2019-07-24 | End: 2019-08-08 | Stop reason: HOSPADM

## 2019-07-23 RX ORDER — HYDROMORPHONE HYDROCHLORIDE 2 MG/1
2-4 TABLET ORAL
Status: DISCONTINUED | OUTPATIENT
Start: 2019-07-23 | End: 2019-08-08 | Stop reason: HOSPADM

## 2019-07-23 RX ORDER — CARVEDILOL 6.25 MG/1
6.25 TABLET ORAL 2 TIMES DAILY WITH MEALS
Qty: 60 TAB | Status: ON HOLD
Start: 2019-07-23 | End: 2019-08-08

## 2019-07-23 RX ORDER — INSULIN GLARGINE 100 [IU]/ML
30 INJECTION, SOLUTION SUBCUTANEOUS 2 TIMES DAILY
Status: DISCONTINUED | OUTPATIENT
Start: 2019-07-23 | End: 2019-07-24

## 2019-07-23 RX ORDER — ALUMINA, MAGNESIA, AND SIMETHICONE 2400; 2400; 240 MG/30ML; MG/30ML; MG/30ML
20 SUSPENSION ORAL
Status: DISCONTINUED | OUTPATIENT
Start: 2019-07-23 | End: 2019-08-08 | Stop reason: HOSPADM

## 2019-07-23 RX ORDER — BISACODYL 10 MG
10 SUPPOSITORY, RECTAL RECTAL
Status: DISCONTINUED | OUTPATIENT
Start: 2019-07-23 | End: 2019-08-08 | Stop reason: HOSPADM

## 2019-07-23 RX ORDER — ACETAMINOPHEN 325 MG/1
650 TABLET ORAL EVERY 4 HOURS PRN
Status: DISCONTINUED | OUTPATIENT
Start: 2019-07-23 | End: 2019-08-08 | Stop reason: HOSPADM

## 2019-07-23 RX ORDER — NYSTATIN 100000 [USP'U]/G
POWDER TOPICAL 2 TIMES DAILY
Status: DISCONTINUED | OUTPATIENT
Start: 2019-07-23 | End: 2019-07-23

## 2019-07-23 RX ORDER — POLYETHYLENE GLYCOL 3350 17 G/17G
1 POWDER, FOR SOLUTION ORAL
Status: DISCONTINUED | OUTPATIENT
Start: 2019-07-23 | End: 2019-08-08 | Stop reason: HOSPADM

## 2019-07-23 RX ORDER — TRAZODONE HYDROCHLORIDE 50 MG/1
50 TABLET ORAL
Status: DISCONTINUED | OUTPATIENT
Start: 2019-07-23 | End: 2019-08-08 | Stop reason: HOSPADM

## 2019-07-23 RX ORDER — CARVEDILOL 3.12 MG/1
6.25 TABLET ORAL 2 TIMES DAILY WITH MEALS
Status: DISPENSED | OUTPATIENT
Start: 2019-07-23 | End: 2019-08-05

## 2019-07-23 RX ORDER — SULFAMETHOXAZOLE AND TRIMETHOPRIM 800; 160 MG/1; MG/1
1 TABLET ORAL EVERY 12 HOURS
Status: COMPLETED | OUTPATIENT
Start: 2019-07-23 | End: 2019-07-23

## 2019-07-23 RX ORDER — BACITRACIN ZINC AND POLYMYXIN B SULFATE 500; 1000 [USP'U]/G; [USP'U]/G
1 OINTMENT TOPICAL 3 TIMES DAILY
Qty: 1 TUBE | Refills: 0 | Status: ON HOLD
Start: 2019-07-23 | End: 2019-08-08

## 2019-07-23 RX ORDER — OMEPRAZOLE 20 MG/1
20 CAPSULE, DELAYED RELEASE ORAL DAILY
Status: DISCONTINUED | OUTPATIENT
Start: 2019-07-24 | End: 2019-07-29

## 2019-07-23 RX ORDER — METFORMIN HYDROCHLORIDE EXTENDED-RELEASE TABLETS 1000 MG/1
1000 TABLET, FILM COATED, EXTENDED RELEASE ORAL 2 TIMES DAILY
Qty: 30 TAB | Status: ON HOLD
Start: 2019-07-23 | End: 2019-08-29

## 2019-07-23 RX ORDER — DEXTROSE MONOHYDRATE 25 G/50ML
50 INJECTION, SOLUTION INTRAVENOUS
Status: DISCONTINUED | OUTPATIENT
Start: 2019-07-23 | End: 2019-07-24

## 2019-07-23 RX ORDER — AMOXICILLIN 250 MG
2 CAPSULE ORAL 2 TIMES DAILY
Status: DISCONTINUED | OUTPATIENT
Start: 2019-07-23 | End: 2019-08-08 | Stop reason: HOSPADM

## 2019-07-23 RX ORDER — METFORMIN HYDROCHLORIDE 500 MG/1
1000 TABLET, EXTENDED RELEASE ORAL 2 TIMES DAILY
Status: DISCONTINUED | OUTPATIENT
Start: 2019-07-23 | End: 2019-07-30

## 2019-07-23 RX ADMIN — METFORMIN HYDROCHLORIDE 1000 MG: 500 TABLET, EXTENDED RELEASE ORAL at 06:16

## 2019-07-23 RX ADMIN — BACITRACIN ZINC AND POLYMYXIN B SULFATE: at 15:00

## 2019-07-23 RX ADMIN — NYSTATIN: 100000 POWDER TOPICAL at 06:15

## 2019-07-23 RX ADMIN — LEVOTHYROXINE SODIUM 200 MCG: 200 TABLET ORAL at 06:15

## 2019-07-23 RX ADMIN — INSULIN GLARGINE 30 UNITS: 100 INJECTION, SOLUTION SUBCUTANEOUS at 09:01

## 2019-07-23 RX ADMIN — SULFAMETHOXAZOLE AND TRIMETHOPRIM 1 TABLET: 800; 160 TABLET ORAL at 11:08

## 2019-07-23 RX ADMIN — OXYCODONE HYDROCHLORIDE 10 MG: 10 TABLET ORAL at 11:08

## 2019-07-23 RX ADMIN — HYDROMORPHONE HYDROCHLORIDE 2 MG: 2 TABLET ORAL at 09:11

## 2019-07-23 RX ADMIN — BACITRACIN ZINC AND POLYMYXIN B SULFATE 1 EACH: at 11:09

## 2019-07-23 RX ADMIN — BACITRACIN ZINC AND POLYMYXIN B SULFATE: at 21:27

## 2019-07-23 RX ADMIN — METFORMIN HYDROCHLORIDE 1000 MG: 500 TABLET, EXTENDED RELEASE ORAL at 21:21

## 2019-07-23 RX ADMIN — ENOXAPARIN SODIUM 40 MG: 100 INJECTION SUBCUTANEOUS at 06:14

## 2019-07-23 RX ADMIN — ENOXAPARIN SODIUM 40 MG: 100 INJECTION SUBCUTANEOUS at 21:25

## 2019-07-23 RX ADMIN — HYDROMORPHONE HYDROCHLORIDE 4 MG: 2 TABLET ORAL at 00:25

## 2019-07-23 RX ADMIN — INSULIN GLARGINE 30 UNITS: 100 INJECTION, SOLUTION SUBCUTANEOUS at 21:36

## 2019-07-23 RX ADMIN — NYSTATIN: 100000 POWDER TOPICAL at 21:28

## 2019-07-23 RX ADMIN — CARVEDILOL 6.25 MG: 6.25 TABLET, FILM COATED ORAL at 09:02

## 2019-07-23 RX ADMIN — BENAZEPRIL HYDROCHLORIDE 40 MG: 20 TABLET ORAL at 06:15

## 2019-07-23 RX ADMIN — Medication 1 EACH: at 06:15

## 2019-07-23 RX ADMIN — SENNOSIDES, DOCUSATE SODIUM 2 TABLET: 50; 8.6 TABLET, FILM COATED ORAL at 21:21

## 2019-07-23 RX ADMIN — INSULIN HUMAN 4 UNITS: 100 INJECTION, SOLUTION PARENTERAL at 17:22

## 2019-07-23 RX ADMIN — OXYCODONE HYDROCHLORIDE 5 MG: 5 TABLET ORAL at 17:21

## 2019-07-23 RX ADMIN — INSULIN HUMAN 3 UNITS: 100 INJECTION, SOLUTION PARENTERAL at 09:01

## 2019-07-23 ASSESSMENT — ENCOUNTER SYMPTOMS
COUGH: 0
DIZZINESS: 0
FEVER: 0
ROS GI COMMENTS: BM 7/22
NECK PAIN: 1
SHORTNESS OF BREATH: 0
VOMITING: 0
MYALGIAS: 1
BLURRED VISION: 0
HEADACHES: 0
CONSTIPATION: 0
NAUSEA: 0

## 2019-07-23 ASSESSMENT — BRIEF INTERVIEW FOR MENTAL STATUS (BIMS)
WHAT DAY OF THE WEEK IS IT: INCORRECT
WHAT MONTH IS IT: ACCURATE WITHIN 5 DAYS
WHAT YEAR IS IT: CORRECT
BIMS SUMMARY SCORE: 12
ASKED TO RECALL BLUE: YES, NO CUE REQUIRED
INITIAL REPETITION OF BED BLUE SOCK - FIRST ATTEMPT: 2
ASKED TO RECALL BED: YES, AFTER CUEING (A PIECE OF FURNITURE")"
ASKED TO RECALL SOCK: YES, NO CUE REQUIRED

## 2019-07-23 ASSESSMENT — PATIENT HEALTH QUESTIONNAIRE - PHQ9
1. LITTLE INTEREST OR PLEASURE IN DOING THINGS: NOT AT ALL
SUM OF ALL RESPONSES TO PHQ9 QUESTIONS 1 AND 2: 0
2. FEELING DOWN, DEPRESSED, IRRITABLE, OR HOPELESS: NOT AT ALL

## 2019-07-23 ASSESSMENT — LIFESTYLE VARIABLES
EVER_SMOKED: YES
ALCOHOL_USE: NO

## 2019-07-23 NOTE — PROGRESS NOTES
RN MOBILITY NOTE     Surgery patient?: yes  Date of surgery: 7/14  Ambulated 50 ft on day of surgery? (N/A if today is not date of surgery): n/a  Number of times ambulated 50 feet or greater today: 1  Patient has been up to chair, edge of bed or HOB 90 degrees for all meals?: yes  Goal met? (goal is ambulating at least 50 feet 2 times on day shift, one time on night shift): yes  If patient did not meet mobility goal, why?:
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
## 2019-07-23 NOTE — PROGRESS NOTES
Discharge instructions reviewed with pt and .  Central line d/c per order.  Pt transported to [facility] via [facility] van in stable condition.  Report given to ALEX Souza.
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
## 2019-07-23 NOTE — DISCHARGE PLANNING
Anticipated Discharge Disposition:   IRF accepting    Action:   Received a call from Chasidy Carlson that pt has been accepted by IRF and will  tomorrow at 10am. CN aware.    Barriers to Discharge:   none    Plan:   Dc tomorrow.
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
## 2019-07-23 NOTE — DISCHARGE PLANNING
Anticipated Discharge Disposition:   IRF    Action:   Notified spouse about transfer to [facility].     Barriers to Discharge:   none    Plan:   Dc at 10am

## 2019-07-23 NOTE — DISCHARGE INSTRUCTIONS
Discharge Instructions    Discharged to other by [facility] with escort. Discharged via wheelchair, hospital escort: Yes.  Special equipment needed: C-Collar    Be sure to schedule a follow-up appointment with your primary care doctor or any specialists as instructed.     Discharge Plan:   Diet Plan: Discussed  Activity Level: Discussed  Confirmed Follow up Appointment: Patient to Call and Schedule Appointment  Confirmed Symptoms Management: Discussed  Medication Reconciliation Updated: Yes  Influenza Vaccine Indication: Patient Refuses    I understand that a diet low in cholesterol, fat, and sodium is recommended for good health. Unless I have been given specific instructions below for another diet, I accept this instruction as my diet prescription.   Other diet: Dysphagia II/Nectar thick liquids.  Thin liquids ok between meals.    Special Instructions: None    · Is patient discharged on Warfarin / Coumadin?   No     Depression / Suicide Risk    As you are discharged from this [facility] facility, it is important to learn how to keep safe from harming yourself.    Recognize the warning signs:  · Abrupt changes in personality, positive or negative- including increase in energy   · Giving away possessions  · Change in eating patterns- significant weight changes-  positive or negative  · Change in sleeping patterns- unable to sleep or sleeping all the time   · Unwillingness or inability to communicate  · Depression  · Unusual sadness, discouragement and loneliness  · Talk of wanting to die  · Neglect of personal appearance   · Rebelliousness- reckless behavior  · Withdrawal from people/activities they love  · Confusion- inability to concentrate     If you or a loved one observes any of these behaviors or has concerns about self-harm, here's what you can do:  · Talk about it- your feelings and reasons for harming yourself  · Remove any means that you might use to hurt yourself (examples: pills, rope, extension  cords, firearm)  · Get professional help from the community (Mental Health, Substance Abuse, psychological counseling)  · Do not be alone:Call your Safe Contact- someone whom you trust who will be there for you.  · Call your local CRISIS HOTLINE 533-7321 or 260-075-6698  · Call your local Children's Mobile Crisis Response Team [place] (219) 374-7063 or www.ClauseMatch  · Call the toll free National Suicide Prevention Hotlines   · National Suicide Prevention Lifeline 827-299-AQZN (5188)  · National Hope Line Network 800-SUICIDE (980-8829)

## 2019-07-23 NOTE — CARE PLAN
Problem: Communication  Goal: The ability to communicate needs accurately and effectively will improve  Patient is AOx4, able to communicate needs effectively to staff.    Problem: Safety  Goal: Will remain free from falls  Educated patient of safety precautions and fall prevention. Patient is oriented to new environment. Patient is wearing non skid socks, call light is within easy reach, encouraged pt to use call light when assistance is needed.
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
## 2019-07-23 NOTE — PROGRESS NOTES
-----------Non-Face-to-Face------------  Prolonged non-face-to-face time was spent on 7/19/19 from 1129 to 1149 and 1515 to 1535 by this reviewer.  This time included medical chart review, medication review, and decision making for the appropriateness of transfer to inpatient rehabilitation.  Please see PM&R Chart Review Consult from 7/19/19 by this provider for detailed summation of the medical chart and the reasoning for current recommendations. In addition to the above, time was spent coordinating care with case management as well as transitional care coordination team in the acceptance and transfer of the patient to the inpatient rehabilitation facility setting. Reviewed in admissions rounds and need for IRF. Submitted for authorization and was not approved until 7/22/19, bed available 7/23/19

## 2019-07-23 NOTE — PROGRESS NOTES
1017 Pt arrived at [facility] from [facility] via wheelchair. Pt oriented to room and facility routine and safety measures; pt education binder provided and discussed. Pt A/O x 4, continent of bowel and bladder. min assist for transfers. All wounds photographed and documented; photos uploaded to . Pt complains of pain 8/10 to LUE. Pt positioned for comfort in bed. Call light within reach, safety measures in place. Will continue to monitor.

## 2019-07-24 PROBLEM — R74.8 ALKALINE PHOSPHATASE ELEVATION: Status: ACTIVE | Noted: 2019-07-24

## 2019-07-24 PROBLEM — N28.9 RENAL INSUFFICIENCY: Status: ACTIVE | Noted: 2019-07-24

## 2019-07-24 PROBLEM — D53.9 MACROCYTIC ANEMIA: Status: ACTIVE | Noted: 2019-07-24

## 2019-07-24 PROBLEM — E55.9 VITAMIN D DEFICIENCY: Status: ACTIVE | Noted: 2019-07-24

## 2019-07-24 LAB
25(OH)D3 SERPL-MCNC: 13 NG/ML (ref 30–100)
ALBUMIN SERPL BCP-MCNC: 3.1 G/DL (ref 3.2–4.9)
ALBUMIN/GLOB SERPL: 1 G/DL
ALP SERPL-CCNC: 116 U/L (ref 30–99)
ALT SERPL-CCNC: 26 U/L (ref 2–50)
ANION GAP SERPL CALC-SCNC: 6 MMOL/L (ref 0–11.9)
AST SERPL-CCNC: 24 U/L (ref 12–45)
BASOPHILS # BLD AUTO: 0.5 % (ref 0–1.8)
BASOPHILS # BLD: 0.05 K/UL (ref 0–0.12)
BILIRUB SERPL-MCNC: 1.2 MG/DL (ref 0.1–1.5)
BUN SERPL-MCNC: 35 MG/DL (ref 8–22)
CALCIUM SERPL-MCNC: 8.9 MG/DL (ref 8.5–10.5)
CHLORIDE SERPL-SCNC: 104 MMOL/L (ref 96–112)
CHOLEST SERPL-MCNC: 149 MG/DL (ref 100–199)
CO2 SERPL-SCNC: 27 MMOL/L (ref 20–33)
CREAT SERPL-MCNC: 1.14 MG/DL (ref 0.5–1.4)
EOSINOPHIL # BLD AUTO: 0.17 K/UL (ref 0–0.51)
EOSINOPHIL NFR BLD: 1.6 % (ref 0–6.9)
ERYTHROCYTE [DISTWIDTH] IN BLOOD BY AUTOMATED COUNT: 53.5 FL (ref 35.9–50)
EST. AVERAGE GLUCOSE BLD GHB EST-MCNC: 166 MG/DL
GLOBULIN SER CALC-MCNC: 3 G/DL (ref 1.9–3.5)
GLUCOSE BLD-MCNC: 100 MG/DL (ref 65–99)
GLUCOSE BLD-MCNC: 153 MG/DL (ref 65–99)
GLUCOSE BLD-MCNC: 162 MG/DL (ref 65–99)
GLUCOSE BLD-MCNC: 166 MG/DL (ref 65–99)
GLUCOSE BLD-MCNC: 176 MG/DL (ref 65–99)
GLUCOSE SERPL-MCNC: 121 MG/DL (ref 65–99)
HBA1C MFR BLD: 7.4 % (ref 0–5.6)
HCT VFR BLD AUTO: 34.3 % (ref 37–47)
HDLC SERPL-MCNC: 29 MG/DL
HGB BLD-MCNC: 10.5 G/DL (ref 12–16)
IMM GRANULOCYTES # BLD AUTO: 0.08 K/UL (ref 0–0.11)
IMM GRANULOCYTES NFR BLD AUTO: 0.7 % (ref 0–0.9)
LDLC SERPL CALC-MCNC: 93 MG/DL
LYMPHOCYTES # BLD AUTO: 3.47 K/UL (ref 1–4.8)
LYMPHOCYTES NFR BLD: 32.2 % (ref 22–41)
MCH RBC QN AUTO: 30.8 PG (ref 27–33)
MCHC RBC AUTO-ENTMCNC: 30.6 G/DL (ref 33.6–35)
MCV RBC AUTO: 100.6 FL (ref 81.4–97.8)
MONOCYTES # BLD AUTO: 0.83 K/UL (ref 0–0.85)
MONOCYTES NFR BLD AUTO: 7.7 % (ref 0–13.4)
NEUTROPHILS # BLD AUTO: 6.17 K/UL (ref 2–7.15)
NEUTROPHILS NFR BLD: 57.3 % (ref 44–72)
NRBC # BLD AUTO: 0 K/UL
NRBC BLD-RTO: 0 /100 WBC
PLATELET # BLD AUTO: 278 K/UL (ref 164–446)
PMV BLD AUTO: 10.8 FL (ref 9–12.9)
POTASSIUM SERPL-SCNC: 5 MMOL/L (ref 3.6–5.5)
PROT SERPL-MCNC: 6.1 G/DL (ref 6–8.2)
RBC # BLD AUTO: 3.41 M/UL (ref 4.2–5.4)
SODIUM SERPL-SCNC: 137 MMOL/L (ref 135–145)
TRIGL SERPL-MCNC: 135 MG/DL (ref 0–149)
TSH SERPL DL<=0.005 MIU/L-ACNC: 0.6 UIU/ML (ref 0.38–5.33)
WBC # BLD AUTO: 10.8 K/UL (ref 4.8–10.8)

## 2019-07-24 PROCEDURE — 770010 HCHG ROOM/CARE - REHAB SEMI PRIVAT*

## 2019-07-24 PROCEDURE — A9270 NON-COVERED ITEM OR SERVICE: HCPCS | Performed by: PHYSICAL MEDICINE & REHABILITATION

## 2019-07-24 PROCEDURE — 80053 COMPREHEN METABOLIC PANEL: CPT

## 2019-07-24 PROCEDURE — 700102 HCHG RX REV CODE 250 W/ 637 OVERRIDE(OP): Performed by: HOSPITALIST

## 2019-07-24 PROCEDURE — 92526 ORAL FUNCTION THERAPY: CPT

## 2019-07-24 PROCEDURE — A9270 NON-COVERED ITEM OR SERVICE: HCPCS | Performed by: HOSPITALIST

## 2019-07-24 PROCEDURE — 82306 VITAMIN D 25 HYDROXY: CPT

## 2019-07-24 PROCEDURE — 700111 HCHG RX REV CODE 636 W/ 250 OVERRIDE (IP): Performed by: PHYSICAL MEDICINE & REHABILITATION

## 2019-07-24 PROCEDURE — 700102 HCHG RX REV CODE 250 W/ 637 OVERRIDE(OP): Performed by: PHYSICAL MEDICINE & REHABILITATION

## 2019-07-24 PROCEDURE — 94760 N-INVAS EAR/PLS OXIMETRY 1: CPT

## 2019-07-24 PROCEDURE — 36415 COLL VENOUS BLD VENIPUNCTURE: CPT

## 2019-07-24 PROCEDURE — 82962 GLUCOSE BLOOD TEST: CPT | Mod: 91

## 2019-07-24 PROCEDURE — 83036 HEMOGLOBIN GLYCOSYLATED A1C: CPT

## 2019-07-24 PROCEDURE — 84443 ASSAY THYROID STIM HORMONE: CPT

## 2019-07-24 PROCEDURE — 97162 PT EVAL MOD COMPLEX 30 MIN: CPT

## 2019-07-24 PROCEDURE — 99253 IP/OBS CNSLTJ NEW/EST LOW 45: CPT | Performed by: HOSPITALIST

## 2019-07-24 PROCEDURE — G0515 COGNITIVE SKILLS DEVELOPMENT: HCPCS

## 2019-07-24 PROCEDURE — 99233 SBSQ HOSP IP/OBS HIGH 50: CPT | Performed by: PHYSICAL MEDICINE & REHABILITATION

## 2019-07-24 PROCEDURE — 97167 OT EVAL HIGH COMPLEX 60 MIN: CPT

## 2019-07-24 PROCEDURE — 97530 THERAPEUTIC ACTIVITIES: CPT

## 2019-07-24 PROCEDURE — 97535 SELF CARE MNGMENT TRAINING: CPT

## 2019-07-24 PROCEDURE — 85025 COMPLETE CBC W/AUTO DIFF WBC: CPT

## 2019-07-24 PROCEDURE — 80061 LIPID PANEL: CPT

## 2019-07-24 RX ORDER — INSULIN GLARGINE 100 [IU]/ML
32 INJECTION, SOLUTION SUBCUTANEOUS 2 TIMES DAILY
Status: DISCONTINUED | OUTPATIENT
Start: 2019-07-24 | End: 2019-07-30

## 2019-07-24 RX ORDER — DEXTROSE MONOHYDRATE 25 G/50ML
50 INJECTION, SOLUTION INTRAVENOUS
Status: DISCONTINUED | OUTPATIENT
Start: 2019-07-24 | End: 2019-07-25

## 2019-07-24 RX ORDER — BENAZEPRIL HYDROCHLORIDE 10 MG/1
30 TABLET ORAL DAILY
Status: DISCONTINUED | OUTPATIENT
Start: 2019-07-25 | End: 2019-08-04

## 2019-07-24 RX ADMIN — INSULIN GLARGINE 30 UNITS: 100 INJECTION, SOLUTION SUBCUTANEOUS at 09:12

## 2019-07-24 RX ADMIN — NYSTATIN: 100000 POWDER TOPICAL at 09:11

## 2019-07-24 RX ADMIN — NYSTATIN: 100000 POWDER TOPICAL at 20:42

## 2019-07-24 RX ADMIN — LEVOTHYROXINE SODIUM 75 MCG: 75 TABLET ORAL at 05:12

## 2019-07-24 RX ADMIN — BENAZEPRIL HYDROCHLORIDE 40 MG: 10 TABLET ORAL at 09:02

## 2019-07-24 RX ADMIN — INSULIN HUMAN 3 UNITS: 100 INJECTION, SOLUTION PARENTERAL at 12:01

## 2019-07-24 RX ADMIN — METFORMIN HYDROCHLORIDE 1000 MG: 500 TABLET, EXTENDED RELEASE ORAL at 20:40

## 2019-07-24 RX ADMIN — BACITRACIN ZINC AND POLYMYXIN B SULFATE: at 16:02

## 2019-07-24 RX ADMIN — INSULIN HUMAN 3 UNITS: 100 INJECTION, SOLUTION PARENTERAL at 00:03

## 2019-07-24 RX ADMIN — OXYCODONE HYDROCHLORIDE 10 MG: 10 TABLET ORAL at 05:12

## 2019-07-24 RX ADMIN — BACITRACIN ZINC AND POLYMYXIN B SULFATE: at 20:42

## 2019-07-24 RX ADMIN — METFORMIN HYDROCHLORIDE 1000 MG: 500 TABLET, EXTENDED RELEASE ORAL at 09:02

## 2019-07-24 RX ADMIN — ENOXAPARIN SODIUM 40 MG: 100 INJECTION SUBCUTANEOUS at 20:41

## 2019-07-24 RX ADMIN — OXYCODONE HYDROCHLORIDE 10 MG: 10 TABLET ORAL at 23:26

## 2019-07-24 RX ADMIN — OMEPRAZOLE 20 MG: 20 CAPSULE, DELAYED RELEASE ORAL at 09:02

## 2019-07-24 RX ADMIN — VITAMIN D, TAB 1000IU (100/BT) 2000 UNITS: 25 TAB at 16:02

## 2019-07-24 RX ADMIN — ENOXAPARIN SODIUM 40 MG: 100 INJECTION SUBCUTANEOUS at 09:03

## 2019-07-24 RX ADMIN — INSULIN GLARGINE 32 UNITS: 100 INJECTION, SOLUTION SUBCUTANEOUS at 20:49

## 2019-07-24 RX ADMIN — LEVOTHYROXINE SODIUM 125 MCG: 125 TABLET ORAL at 05:13

## 2019-07-24 RX ADMIN — BACITRACIN ZINC AND POLYMYXIN B SULFATE: at 09:03

## 2019-07-24 RX ADMIN — CARVEDILOL 6.25 MG: 3.12 TABLET, FILM COATED ORAL at 17:02

## 2019-07-24 RX ADMIN — CARVEDILOL 6.25 MG: 3.12 TABLET, FILM COATED ORAL at 09:01

## 2019-07-24 RX ADMIN — SENNOSIDES, DOCUSATE SODIUM 2 TABLET: 50; 8.6 TABLET, FILM COATED ORAL at 09:02

## 2019-07-24 ASSESSMENT — ENCOUNTER SYMPTOMS
FEVER: 0
CHILLS: 0
SHORTNESS OF BREATH: 0
EYES NEGATIVE: 1
NAUSEA: 0
VOMITING: 0
ABDOMINAL PAIN: 0
PALPITATIONS: 0
COUGH: 0
NECK PAIN: 1

## 2019-07-24 ASSESSMENT — ACTIVITIES OF DAILY LIVING (ADL): TOILETING: INDEPENDENT

## 2019-07-24 NOTE — FLOWSHEET NOTE
07/24/19 0948   Events/Summary/Plan   Events/Summary/Plan 02 spot check and wean.  May need more 02 with sleep   Incentive Spirometry Group   Incentive Spirometry Instruction Yes   Breathing Exercises Yes   Incentive Spirometer Volume 1750 mL   Incentive Spirometer Date Last Changed 07/24/19   Incentive Spirometer Next Change Date (Q 30 Days) 08/24/19   Respiratory WDL   Respiratory (WDL) X   Chest Exam   Respiration 18   Pulse 70   Oximetry   #Pulse Oximetry (Single Determination) Yes   Oxygen   Home O2 Use Prior To Admission? No   Pulse Oximetry 99 %   O2 (LPM) 1  (weaned from 2)   O2 Daily Delivery Respiratory  Silicone Nasal Cannula
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
## 2019-07-24 NOTE — ASSESSMENT & PLAN NOTE
HbA1c 7.4  Good serum glucose control on current regimen  Continue Lantus and Metformin  Will not need SSI on discharge

## 2019-07-24 NOTE — PROGRESS NOTES
Received shift report from night RN and assumed patient care. Patient is awake, alert and oriented. Verbalizes acceptable pain to L arm. Reinforced safety precautions to patient, will continue to monitor.

## 2019-07-24 NOTE — CARE PLAN
Problem: Infection  Goal: Will remain free from infection  No acute signs and symptoms of infection noted to patient.     Problem: Skin Integrity  Goal: Risk for impaired skin integrity will decrease  Patient is able to turn from side to side with assistance. Mepilex to buttocks for redness. Interdry cloths on abdominal folds and groin area. L arm wound seen by MD and wound care nurse today.

## 2019-07-24 NOTE — REHAB-PHARMACY IDT TEAM NOTE
Pharmacy   Pharmacy  Antibiotics/Antifungals/Antivirals:  Medication:      Active Orders     None        Route:         None  Stop Date:  N/A  Reason:   Antihypertensives/Cardiac:  Medication:    Orders (72h ago through future)    Start     Ordered    07/25/19 0900  benazepril (LOTENSIN) tablet 30 mg  DAILY     Comments:  Per P&T IV to PO Protocol    07/24/19 1524    07/24/19 0900  benazepril (LOTENSIN) tablet 40 mg  DAILY,   Status:  Discontinued     Comments:  Per P&T IV to PO Protocol    07/23/19 1038    07/23/19 1730  carvedilol (COREG) tablet 6.25 mg  2 TIMES DAILY WITH MEALS     Comments:  Per P&T IV to PO Protocol    07/23/19 1038    07/23/19 1039  hydrALAZINE (APRESOLINE) tablet 25 mg  EVERY 8 HOURS PRN      07/23/19 1039        Patient Vitals for the past 24 hrs:   BP Pulse   07/24/19 1432 125/75 84   07/24/19 1222 116/45 67   07/24/19 1001 (!) 69/25 -   07/24/19 0948 - 70   07/24/19 0656 123/49 67   07/23/19 1910 102/54 76   07/23/19 1721 110/61 76       Anticoagulation:  Medication:  lovenox  INR:      Other key medications:       A review of the medication list reveals no issues at this time. Patient is currently on antihypertensive(s). Recommend home blood pressure monitoring/recording if antihypertensive(s) regimen(s) continue. Patient is currently on diabetic medication(s) and/or Insulin(s). Recommend home blood glucose monitoring/recording if these regimen(s) continue    Section completed by:  Johny Nicholas [facility]

## 2019-07-24 NOTE — THERAPY
Speech Language Pathology  Daily Treatment     Patient Name: Maira Dodd  Age:  73 y.o., Sex:  female  Medical Record #: [id number]  Today's Date: 7/24/2019     Subjective    Pt pleasant and cooperative, more alert this session.      Objective       07/24/19 0804   Dysphagia    Diet / Liquid Recommendation Dysphagia III;Thin Liquid   Nutritional Liquid Intake Rating Scale 3   Nutritional Food Intake Rating Scale 6   SCCAN (Scales of Cognitive and Communicative Ability for Neurorehabilitation)   Oral Expression - Raw Score 16   Oral Expression - Scale Performance Score 84   Orientation - Raw Score 10   Orientation - Scale Performance Score 83   Memory - Raw Score 8   Memory - Scale Performance Score 42   Speech Comprehension - Raw Score 10   Speech Comprehension - Scale Performance Score 77   Reading Comprehension - Raw Score 12   Reading Comprehension - Scale Performance Score 100   Writing - Raw Score 7   Writing - Scale Performance Score 100   Attention - Raw Score 13   Attention - Scale Performance Score 81   Problem Solving - Raw Score 20   Problem Solving - Scale Performance Score 87   SCCAN Total Raw Score 76   SCCAN Degree of Severity Mild Impairment   Interdisciplinary Plan of Care Collaboration   IDT Collaboration with  Nursing   Patient Position at End of Therapy In Bed;Tray Table within Reach;Phone within Reach;Call Light within Reach   Collaboration Comments informed nursing of pt location   SLP Total Time Spent   SLP Individual Total Time Spent (Mins) 60   Charge Group   SLP Swallowing Dysfunction Treatment Swallowing Dysfunction Treatment   SLP Cognitive Skill Development 2           Assessment    Pt assessed with trials of Dys 3 textures, consumed with no overt s/sx of pen/asp, thin liquids via straw, serial sips tolerated without issue. Recommend advance to Dys 3, trials of regular textures scheduled for tomorrow. It is anticipated dysphagia intervention will be short term at this time. Pt  completed remaining subtests of SCCAN. Pt presents with overall MILD impairment with areas of deficit in memory most prevalent.     Plan    Continue to target memory, complex attention, exec function
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
## 2019-07-24 NOTE — CARE PLAN
Problem: Respiratory:  Goal: Respiratory status will improve  Patient is on oxygen at 1 lpm via n/c. No signs and symptoms of respiratory distress noted.
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
## 2019-07-24 NOTE — WOUND TEAM
"[facility]  Inpatient Services  Initial Wound and Skin Care Evaluation    Admission Date: 7/23/2019     Consult Date:  7/23/19   HPI, PMH, SH: Reviewed    Unit where seen by Wound Team: [id number]     WOUND CONSULT RELATED TO:  Evaluation of posterior left elbow wound     SUBJECTIVE:  \"I can't hold my arm up\"      Self Report / Pain Level:  Tolerated wound care well       OBJECTIVE:  Obese woman with deep skin folds that are overall intact with current therapy; orders written; hydrocolloid intact left elbow and Dr. Gaston at bedside to observe and then CSWD    WOUND TYPE, LOCATION, CHARACTERISTICS (Pressure Injuries: location, stage, POA or date identified)       Wound 07/23/19 Full Thickness Wound Elbow full thickness wound posterior left elbow (Active)   Wound Image       7/24/2019 12:00 PM   Site Assessment Clean;Brown;Red;Yellow;Pink;Drainage 7/24/2019 12:00 PM   Ashely-wound Assessment Clean;Intact;Edema 7/24/2019 12:00 PM   Margins Attached edges 7/24/2019 12:00 PM   Wound Length (cm) 11 cm 7/24/2019 12:00 PM   Wound Width (cm) 13.5 cm 7/24/2019 12:00 PM   Wound Surface Area (cm^2) 148.5 cm^2 7/24/2019 12:00 PM   Tunneling 0 cm 7/24/2019 12:00 PM   Undermining 0 cm 7/24/2019 12:00 PM   Closure Secondary intention 7/24/2019 12:00 PM   Drainage Amount Moderate 7/24/2019 12:00 PM   Drainage Description Serous 7/24/2019 12:00 PM   Non-staged Wound Description Full thickness 7/24/2019 12:00 PM   Treatments Cleansed;Site care 7/24/2019 12:00 PM   Cleansing Normal Saline Irrigation 7/24/2019 12:00 PM   Periwound Protectant Not Applicable 7/24/2019 12:00 PM   Dressing Options Hydrocolloid Thick;Hydrocolloid Thin 7/24/2019 12:00 PM   Dressing Cleansing/Solutions Not Applicable 7/24/2019 12:00 PM   Dressing Changed Changed 7/24/2019 12:00 PM   Dressing Status Intact 7/24/2019 12:00 PM   Dressing Change Frequency Daily 7/24/2019 12:00 PM   NEXT Dressing Change  07/25/19 7/24/2019 12:00 PM   NEXT Weekly Photo " (Inpatient Only) 07/31/19 7/24/2019 12:00 PM   WOUND NURSE ONLY - Odor None 7/24/2019 12:00 PM   WOUND NURSE ONLY - Exposed Structures None 7/24/2019 12:00 PM   WOUND NURSE ONLY - Tissue Type and Percentage black/red/yellow/pink 7/24/2019 12:00 PM   WOUND NURSE ONLY - Time Spent with Patient (mins) 60 7/24/2019 12:00 PM         Lab Values:    WBC:       WBC   Date/Time Value Ref Range Status   07/24/2019 05:21 AM 10.8 4.8 - 10.8 K/uL Final     A1C:      Lab Results   Component Value Date/Time    HBA1C 7.4 (H) 07/24/2019 05:21 AM           Culture:  NA    INTERVENTIONS BY WOUND TEAM:  Met with Dr. Gaston at bedside and dressing removed.  No odor noted and wound clean except for eschar.  Assisted him with CSWD of elbow revealing some red tissue.  Plan to continue with hydrocolloid dressing to autolytically debride wound and he will follow up Friday for continued CSWD.  Wound cleansed with NS gauze and measurements and photo taken.  Covered with sacral hydrocolloid dressing.  Discussed with staff RN and orders updated.    Dressing selection:  See above         Interdisciplinary consultation: Patient, Bedside RN, Dr. Gaston    EVALUATION: clean appearing wound that should progress with continued CSWD    Factors affecting wound healing: DM, pressure   Goals: Steady decrease in wound area and depth weekly.    NURSING PLAN OF CARE ORDERS (X):    Dressing changes: See Dressing Care orders: X  Skin care: See Skin Care orders: X  Rectal tube care: See Rectal Tube Care orders:   Other orders:    RSKIN: CURRENT (X) ORDERED (O):   Q shift Clarence:  X  Q shift pressure point assessments:  X  Pressure redistribution mattress    X        ROLANDA          Bariatric ROLANDA         Bariatric foam           Heel float boots          Float Heels off Bed with Pillows    X           Barrier wipes         Barrier Cream         Barrier paste          Sacral silicone dressing   X      Silicone O2 tubing         Anchorfast         Cannula fixation  Device (Tender )          Gray Foam Ear protectors           Trach with Optifoam split foam                 Waffle cushion        Waffle Overlay         Rectal tube or BMS         Antifungal tx      Interdry          Reposition q 2 hours      X  Up to chair     X   Ambulate      PT/OT        Dietician        Diabetes Education      PO X    TF     TPN     NPO   # days   Other        WOUND TEAM PLAN OF CARE (X):   NPWT change 3 x week:        Dressing changes by wound team:       Follow up as needed:   X weekly    Other (explain):     Anticipated discharge plans (X):   SNF:           Home Care:           Outpatient Wound Center:            Self Care:            Other:  TBD with ongoing wound care

## 2019-07-24 NOTE — CARE PLAN
Problem: Safety  Goal: Will remain free from injury  Outcome: PROGRESSING AS EXPECTED  Pt has been compliant with using her call light and waits for staff before attempting to transfer. Bed alarm in place for additional precaution.     Problem: Respiratory:  Goal: Respiratory status will improve  Outcome: PROGRESSING AS EXPECTED  Pt is maintaining an O2 sat>90% on 2L O2. No s/s of respiratory distress noted.
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
## 2019-07-25 LAB
GLUCOSE BLD-MCNC: 103 MG/DL (ref 65–99)
GLUCOSE BLD-MCNC: 123 MG/DL (ref 65–99)
GLUCOSE BLD-MCNC: 129 MG/DL (ref 65–99)
GLUCOSE BLD-MCNC: 140 MG/DL (ref 65–99)

## 2019-07-25 PROCEDURE — 700111 HCHG RX REV CODE 636 W/ 250 OVERRIDE (IP): Performed by: PHYSICAL MEDICINE & REHABILITATION

## 2019-07-25 PROCEDURE — 82962 GLUCOSE BLOOD TEST: CPT | Mod: 91

## 2019-07-25 PROCEDURE — 99233 SBSQ HOSP IP/OBS HIGH 50: CPT | Performed by: PHYSICAL MEDICINE & REHABILITATION

## 2019-07-25 PROCEDURE — 700102 HCHG RX REV CODE 250 W/ 637 OVERRIDE(OP): Performed by: PHYSICAL MEDICINE & REHABILITATION

## 2019-07-25 PROCEDURE — A9270 NON-COVERED ITEM OR SERVICE: HCPCS | Performed by: PHYSICAL MEDICINE & REHABILITATION

## 2019-07-25 PROCEDURE — 97530 THERAPEUTIC ACTIVITIES: CPT

## 2019-07-25 PROCEDURE — 92526 ORAL FUNCTION THERAPY: CPT

## 2019-07-25 PROCEDURE — 97110 THERAPEUTIC EXERCISES: CPT

## 2019-07-25 PROCEDURE — 700102 HCHG RX REV CODE 250 W/ 637 OVERRIDE(OP): Performed by: HOSPITALIST

## 2019-07-25 PROCEDURE — 97535 SELF CARE MNGMENT TRAINING: CPT

## 2019-07-25 PROCEDURE — 770010 HCHG ROOM/CARE - REHAB SEMI PRIVAT*

## 2019-07-25 PROCEDURE — 94760 N-INVAS EAR/PLS OXIMETRY 1: CPT

## 2019-07-25 PROCEDURE — 99233 SBSQ HOSP IP/OBS HIGH 50: CPT | Performed by: HOSPITALIST

## 2019-07-25 PROCEDURE — A9270 NON-COVERED ITEM OR SERVICE: HCPCS | Performed by: HOSPITALIST

## 2019-07-25 RX ORDER — DEXTROSE MONOHYDRATE 25 G/50ML
50 INJECTION, SOLUTION INTRAVENOUS
Status: DISCONTINUED | OUTPATIENT
Start: 2019-07-25 | End: 2019-08-08 | Stop reason: HOSPADM

## 2019-07-25 RX ADMIN — NYSTATIN: 100000 POWDER TOPICAL at 08:14

## 2019-07-25 RX ADMIN — VITAMIN D, TAB 1000IU (100/BT) 2000 UNITS: 25 TAB at 08:15

## 2019-07-25 RX ADMIN — BACITRACIN ZINC AND POLYMYXIN B SULFATE: at 08:16

## 2019-07-25 RX ADMIN — BENAZEPRIL HYDROCHLORIDE 30 MG: 10 TABLET, COATED ORAL at 08:15

## 2019-07-25 RX ADMIN — ENOXAPARIN SODIUM 40 MG: 100 INJECTION SUBCUTANEOUS at 08:18

## 2019-07-25 RX ADMIN — BACITRACIN ZINC AND POLYMYXIN B SULFATE: at 21:11

## 2019-07-25 RX ADMIN — OMEPRAZOLE 20 MG: 20 CAPSULE, DELAYED RELEASE ORAL at 08:14

## 2019-07-25 RX ADMIN — LEVOTHYROXINE SODIUM 75 MCG: 75 TABLET ORAL at 05:51

## 2019-07-25 RX ADMIN — OXYCODONE HYDROCHLORIDE 10 MG: 10 TABLET ORAL at 19:21

## 2019-07-25 RX ADMIN — NYSTATIN: 100000 POWDER TOPICAL at 21:15

## 2019-07-25 RX ADMIN — ENOXAPARIN SODIUM 40 MG: 100 INJECTION SUBCUTANEOUS at 21:11

## 2019-07-25 RX ADMIN — METFORMIN HYDROCHLORIDE 1000 MG: 500 TABLET, EXTENDED RELEASE ORAL at 21:11

## 2019-07-25 RX ADMIN — METFORMIN HYDROCHLORIDE 1000 MG: 500 TABLET, EXTENDED RELEASE ORAL at 08:14

## 2019-07-25 RX ADMIN — LEVOTHYROXINE SODIUM 125 MCG: 125 TABLET ORAL at 05:51

## 2019-07-25 RX ADMIN — CARVEDILOL 6.25 MG: 3.12 TABLET, FILM COATED ORAL at 18:17

## 2019-07-25 RX ADMIN — CARVEDILOL 6.25 MG: 3.12 TABLET, FILM COATED ORAL at 08:15

## 2019-07-25 RX ADMIN — INSULIN GLARGINE 32 UNITS: 100 INJECTION, SOLUTION SUBCUTANEOUS at 08:50

## 2019-07-25 RX ADMIN — BACITRACIN ZINC AND POLYMYXIN B SULFATE: at 15:00

## 2019-07-25 RX ADMIN — INSULIN GLARGINE 32 UNITS: 100 INJECTION, SOLUTION SUBCUTANEOUS at 21:22

## 2019-07-25 NOTE — THERAPY
Occupational Therapy  Daily Treatment     Patient Name: Maira Dodd  Age:  73 y.o., Sex:  female  Medical Record #: [id number]  Today's Date: 7/25/2019     Precautions  Precautions: Fall Risk, Non Weight Bearing Left Upper Extremity, Cervical Collar  , Sling Left Upper Extremity  Comments: orthostatic hypotension with standing, cervical px, rib fx, LUE wounds, O2, dys 2/thin liquids    Safety   Comments: education on and practice with use of sock aid and dressing stick to doff and don socks requires set up with sock aid and min A to adjust socks see FIM. Additional education on potential use of reacher to retrieve LB clothing and adjust.    Subjective    Pt expresses extreme fatigue and sig inc in pain when sitting EOM and with bed <> chair transfer     Objective       07/25/19 0931   Precautions   Precautions Fall Risk;Non Weight Bearing Left Upper Extremity;Cervical Collar  ;Sling Left Upper Extremity   Comments orthostatic hypotension with standing, cervical px, rib fx, LUE wounds, O2, dys 2/thin liquids   Safety    Comments education on and practice with use of sock aid and dressing stick to doff and don socks requires set up with sock aid and min A to adjust socks see FIM. Additional education on potential use of reacher to retrieve LB clothing and adjust.   Pain   Intervention Warm Pack;Repositioned   Pain 0 - 10 Group   Location Rib Cage;Arm   Location Orientation Left   Therapist Pain Assessment During Activity   Supine Upper Body Exercises   Front Arm Raise Weight (See Comments for lbs);Right  (4 sets of 10, 4lb)   Bicep Curl 3 sets of 15;Right;Weight (See Comments for lbs)  (4lb)   Other Exercise side arm raise 4 sets of 10 4lb wt   Comments intermittent cuing to slow movements to elicit mm strengthening    Sitting Lower Body Exercises   Other Exercises Straight leg raises 4 sets of 10 L and R: able to achieve approx 55 deg of hip flex inconsistently with RLE and 45 deg with LLE. Long arch quads  4 sets of 10 L and R: requires intermittent stabalizing assist to faciliate (L>R), occ c/o rib pain. Regular rest breaks.   Bed Mobility    Supine to Sit Minimal Assist   Sit to Supine Moderate Assist  (for BLE management)   Scooting Supervised   Skilled Intervention Verbal Cuing;Tactile Cuing   Comments requires extra time due to pain and fatigue above performed with support of bed rails   Interdisciplinary Plan of Care Collaboration   IDT Collaboration with  Physical Therapist   Patient Position at End of Therapy In Bed;Call Light within Reach;Tray Table within Reach   Collaboration Comments discussed pt performance during tx session   OT Total Time Spent   OT Individual Total Time Spent (Mins) 60   OT Charge Group   OT Self Care / ADL 1   OT Therapy Activity 1   OT Therapeutic Exercise  2       FIM Lower Body Dressing Score:  4 - Minimal Assistance  Lower Body Dressing Description:  Sock aid, Dressing stick (SOCKS only mod I to doff with dressing stick. Set up for sock on sock aid to don socks, min A to adjust. )    Pt education: pt reports having bathtub shower. Discussed plan for further collaboration to determine shower chair or tub transfer bench, grab bar placement in shower, and grab bar placement or padded BSC for toilet. Pt reports limited space by toilet. Requested photos of bathroom from spouse. Provided education on Care Chest.       Assessment    Pt presents with sig pain with mobility this date resulting in crying. However, cont to be motivated and tolerates supine UB and LB ex well. MD plans to check in to clarify LUE AROM/PROM pre. Demonstrates good potential for further education on and practice with AE for LB dressing. Hot packs successfully mitigate pain.     Plan    General strengthening, ADL re-training, and functional mobility. LUE ROM within parameters (need to follow up with MD). Follow up with photos from spouse for further bathroom DME recommendations. Cont education with AE on LB  dressing. Hot packs for pain.

## 2019-07-25 NOTE — CARE PLAN
Problem: Swallowing STGs  Goal: STG-Within one week, patient will  1) Individualized goal:  Safely swallow Dys 3/thin liquids with no overt s/sx of pen/asp for 2/2 meals.   2) Interventions:  SLP Swallowing Dysfunction Treatment and SLP Group Treatment     Outcome: MET Date Met: 07/25/19  Advanced to regular textures    Problem: Problem Solving STGs  Goal: STG-Within one week, patient will  1) Individualized goal:  Complete remaining subtests of SCCAN with goals pending results.   2) Interventions:  SLP Cognitive Skill Development     Outcome: MET Date Met: 07/25/19  Performed within MILD impairment    Problem: Social Interaction STGs  Goal: STG-Within one week, patient will  1) Individualized goal:  Maintain wakefulness for >5 minutes with no verbal/tactile cues.   2) Interventions:  SLP Cognitive Skill Development     Outcome: MET Date Met: 07/25/19  Pt benefits from encouragement to complete therapy outside of bed
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
## 2019-07-25 NOTE — CARE PLAN
Problem: Bathing  Goal: STG-Within one week, patient will bathe  1) Individualized Goal:  With min A using AE and DME as needed.   2) Interventions:  OT Orthotics Training, OT E Stim Attended, OT Self Care/ADL, OT Cognitive Skill Dev, OT Manual Ther Technique, OT Neuro Re-Ed/Balance, OT Therapeutic Activity, OT Evaluation and OT Therapeutic Exercise     Outcome: NOT MET  Newly admitted     Problem: Dressing  Goal: STG-Within one week, patient will dress UB  1) Individualized Goal:  With min A using adaptive strategies as needed.   2) Interventions:  OT Orthotics Training, OT E Stim Attended, OT Self Care/ADL, OT Cognitive Skill Dev, OT Manual Ther Technique, OT Neuro Re-Ed/Balance, OT Therapeutic Activity, OT Evaluation and OT Therapeutic Exercise     Outcome: PROGRESSING AS EXPECTED  Newly admitted   Goal: STG-Within one week, patient will dress LB  1) Individualized Goal:  With mod A using AE and DME as needed.   2) Interventions:  OT Orthotics Training, OT E Stim Attended, OT Self Care/ADL, OT Cognitive Skill Dev, OT Manual Ther Technique, OT Neuro Re-Ed/Balance, OT Therapeutic Activity, OT Evaluation and OT Therapeutic Exercise     Outcome: PROGRESSING AS EXPECTED  Newly admitted    Problem: Functional Transfers  Goal: STG-Within one week, patient will transfer to toilet  1) Individualized Goal:  At a supv level using DME as needed.   2) Interventions:  OT Orthotics Training, OT E Stim Attended, OT Self Care/ADL, OT Cognitive Skill Dev, OT Manual Ther Technique, OT Neuro Re-Ed/Balance, OT Therapeutic Activity, OT Evaluation and OT Therapeutic Exercise     Outcome: NOT MET    Goal: STG-Within one week, patient will transfer to step in shower  1) Individualized Goal:  At a supv level using DME as needed.   2) Interventions:  OT Orthotics Training, OT E Stim Attended, OT Self Care/ADL, OT Cognitive Skill Dev, OT Manual Ther Technique, OT Neuro Re-Ed/Balance, OT Therapeutic Activity, OT Evaluation and OT Therapeutic  Exercise     Outcome: NOT MET

## 2019-07-25 NOTE — REHAB-SLP IDT TEAM NOTE
Speech Therapy   Cognitive Linquistic Functions  Comprehension Initial:  3 - Moderate Assistance  Comprehension Current:  6 - Modified Independent   Comprehension Description:  Glasses  Expression Initial:  5 - Stand-by Prompting/Supervision or Set-up  Expression Current:  6 - Modified Independent   Expression Description:  Verbal cueing  Social Interaction Initial:  3 - Moderate Assistance  Social Interaction Current:  5 - Stand-by Prompting/Supervision or Set-up   Social Interaction Description:  Increased time, Verbal cues  Problem Solving Initial:  2 - Max Assistance  Problem Solving Current:  3 - Moderate Assistance   Problem Solving Description:  Verbal cueing, Therapy schedule, Bed/chair alarm, Increased time, Seat belt  Memory Initial:  2 - Max Assistance  Memory Current:  3 - Moderate Assistance   Memory Description:  Verbal cueing, Therapy schedule, Bed/chair alarm, Seat belt  Executive Functioning / Organization Initial:     Executive Functioning / Organization Current: 3 - Moderate Assistance     Executive Functioning Desciption:  Pt independent prior.   Swallowing  Swallowing Status: Pt advanced to regular/thins as of 7/25/19. No further dysphagia intervention warranted at this time. Pt will require set up A secondary to inability to use LUE at this time.   Orders Placed This Encounter   Procedures   • Diet Order Diabetic     Standing Status:   Standing     Number of Occurrences:   1     Order Specific Question:   Diet:     Answer:   Diabetic [3]     Order Specific Question:   Consistency/Fluid modifications:     Answer:   Thin Liquids [3]     Comments:   straws ok     Behavior Modification Plan  Allow for rest time, Give clear feedback, Set clear goals, Provide reasonable choices, Decrease the chance of failure by offering activities that are within the patient's abilities and Analyze tasks (break down into smaller steps)  Assistive Technology  Low/Impaired vision equipment and Low tech: Calendar,  planner, schedule, alarms/timers, pill organizer, post-it notes, lists  Family Training/Education:  To be completed  DC Recommendations: TBD  Strengths:  Able to follow instructions, Alert and oriented, Effective communication skills, Independent PLOF, Making steady progress towards goals, Pleasant and cooperative, Supportive family and Willingly participates in therapeutic activities  Barriers:  Decreased endurance, Generalized weakness, Poor activity tolerance and Other: STM, complex proble solving  # of short term goals set=3  # of short term goals met=3 (2 new goals added)        Speech Therapy Problems           Problem: Speech/Swallowing LTGs     Dates: Start: 07/23/19       Goal: LTG-By discharge, patient will safely swallow     Dates: Start: 07/23/19       Description: 1) Individualized goal:  regular diet textures and thin liquids for safe return to PLOF   2) Interventions:  SLP Swallowing Dysfunction Treatment, SLP Oral Pharyngeal Evaluation, SLP Cognitive Skill Development and SLP Group Treatment             Goal: LTG-By discharge, patient will solve complex problem     Dates: Start: 07/23/19       Description: 1) Individualized goal: By utilizing compensatory intervention for memory and problem-solving to allow for safe completion of daily activities with SPV in order to prepare for safe d/c home.   2) Interventions:  SLP Cognitive Skill Development and SLP Group Treatment                    Section completed by:  Lala Pavon MS,CCC-SLP

## 2019-07-25 NOTE — THERAPY
"Physical Therapy   Daily Treatment     Patient Name: Maira Dodd  Age:  73 y.o., Sex:  female  Medical Record #: [id number]  Today's Date: 2019     Precautions  Precautions: Fall Risk, Non Weight Bearing Left Upper Extremity, Sling Left Upper Extremity, Cervical Collar    Comments: orthostatic hypotension with standing, cervical px, rib fx, LUE wounds, room air trials, sling PRN    Subjective    Pt in bed, agreeable to PT     Objective       19 1401   Precautions   Precautions Fall Risk;Non Weight Bearing Left Upper Extremity;Sling Left Upper Extremity;Cervical Collar     Comments orthostatic hypotension with standing, cervical px, rib fx, LUE wounds, room air trials, sling PRN   Vitals   Patient BP Position Supine   Blood Pressure  129/46  (sittin/44, standin/45)   Room Air Oximetry 94   Interdisciplinary Plan of Care Collaboration   IDT Collaboration with  Physician   Patient Position at End of Therapy In Bed;Call Light within Reach;Tray Table within Reach   Collaboration Comments Dr Matias spoke with pt regarding CLOF and use of sling on PRN basis   PT Total Time Spent   PT Individual Total Time Spent (Mins) 60   PT Charge Group   PT Therapeutic Activities 4     Pt stood in front of bed for BP reading, reported dizziness and pain and requested to lay down.      Pt's WC switched from 22\" to 24\" for better fit, comfort and decreased skin pressure.     Pt education provided for the following: orthostatic hypotension, WC fitting and comfort, sling fitting, and pain management.     FIM Bed/Chair/Wheelchair Transfers Score: 4 - Minimal Assistance  Bed/Chair/Wheelchair Transfers Description:  Increased time, Set-up of equipment, Verbal cueing (bed <> WC, min A supine > sit, SBA for remainder of SPT without AD)      Assessment    Pt limited by dizziness in standing (decreased from yesterday) and pain this session. Receptive to pt education provided.     Plan    Upright posture " tolerance, adjustment of sling and c-collar for comfort, seated and supine there-ex for strengthening.

## 2019-07-25 NOTE — CARE PLAN
"Problem: Mobility  Goal: STG-Within one week, patient will ambulate household distance  1) Individualized goal:  50' HHA  2) Interventions:  PT Group Therapy, PT Gait Training, PT Therapeutic Exercises, PT TENS Application, PT Neuro Re-Ed/Balance, PT Therapeutic Activity and PT Manual Therapy     Outcome: NOT MET  Eval 7/24  Goal: STG-Within one week, patient will ambulate up/down a curb  1) Individualized goal:  4\" curb, HHA  2) Interventions:   PT Group Therapy, PT Gait Training, PT Therapeutic Exercises, PT TENS Application, PT Neuro Re-Ed/Balance, PT Therapeutic Activity and PT Manual Therapy      Outcome: NOT MET  Eval 7/24    Problem: Mobility Transfers  Goal: STG-Within one week, patient will move supine to sit  1) Individualized goal:  SBA with HOB elevated 30 degrees, supine <> sit   2) Interventions:   PT Group Therapy, PT Gait Training, PT Therapeutic Exercises, PT TENS Application, PT Neuro Re-Ed/Balance, PT Therapeutic Activity and PT Manual Therapy      Outcome: NOT MET  Eval 7/24      "

## 2019-07-25 NOTE — REHAB-CM IDT TEAM NOTE
Case Management      DC Planning    DC destination/dispostion:  Home w/  to [place].    Referrals: TBD.    DC Needs: DME for patient safety & mobility. Family training. HH vs OP therapy. MD f/u appts.    Barriers to discharge: Functional mobility deficits. Pain mgmt.    Strengths: Motivation. PLOF: independent. Supportive family.    Section completed by:  Annalisa Taveras R.N.

## 2019-07-25 NOTE — THERAPY
"Speech Language Pathology  Daily Treatment     Patient Name: Maira Dodd  Age:  73 y.o., Sex:  female  Medical Record #: [id number]  Today's Date: 7/25/2019     Subjective    Pt refusing therapy outside of bed. Reports the workout she received from therapy earlier was \"too much\" - pt agreeable to therapy at bedside. Encouraged pt to be up in w/c for therapies to increase endurance and aid in recovery.      Objective     07/25/19 1104   Dysphagia    Diet / Liquid Recommendation Thin Liquid;Regular   Nutritional Liquid Intake Rating Scale 3   Nutritional Food Intake Rating Scale 7   Interdisciplinary Plan of Care Collaboration   Patient Position at End of Therapy Seated;In Bed;Call Light within Reach;Tray Table within Reach   SLP Total Time Spent   SLP Individual Total Time Spent (Mins) 60   Charge Group   SLP Swallowing Dysfunction Treatment Swallowing Dysfunction Treatment       FIM Eating Score:  5 - Standby Prompting/Supervision or Set-up  Eating Description:       FIM Comprehension Score:  6 - Modified Independent  Comprehension Description:  Glasses    FIM Expression Score:  6 - Modified Independent  Expression Description:  Verbal cueing    FIM Social Interaction Score:  5 - Stand-by Prompting/Supervision or Set-up  Social Interaction Description:  Increased time, Verbal cues    FIM Problem Solving Score:  3 - Moderate Assistance  Problem Solving Description:  Verbal cueing, Therapy schedule, Bed/chair alarm, Increased time, Seat belt    FIM Memory Score:  3 - Moderate Assistance  Memory Description:  Verbal cueing, Therapy schedule, Bed/chair alarm, Seat belt      Assessment    Pt recalled compensatory swallow strategies with 100% accuracy. Pt assessed with therapeutic trials of regular textures, tolerated without overt s/sx of pen/asp. Pt talking consistently with PO in mouth throughout meal despite cueing. Pt was able to tolerate advanced textures despite talking.     Plan    Advance to regular " textures, d/c therapeutic dining and dysphagia intervention

## 2019-07-25 NOTE — FLOWSHEET NOTE
07/25/19 0830   Events/Summary/Plan   Events/Summary/Plan O2 spot check   Chest Exam   Respiration 16   Pulse 84   Oximetry   #Pulse Oximetry (Single Determination) Yes   Oxygen   Home O2 Use Prior To Admission? No   Pulse Oximetry 95 %   O2 (LPM) 1   O2 Daily Delivery Respiratory  Silicone Nasal Cannula

## 2019-07-25 NOTE — REHAB-OT IDT TEAM NOTE
Occupational Therapy   Activities of Daily Living  Eating Initial:  5 - Standby Prompting/Supervision or Set-up  Eating Current:  5 - Standby Prompting/Supervision or Set-up   Eating Description:  Insert dentures, Supervision for safety, Verbal cueing, Modified diet, Increased time, Set-up of equipment or meal/tube feeding  Grooming Initial:  5 - Standby Prompting/Supervision or Set-up  Grooming Current:  5 - Standby Prompting/Supervision or Set-up   Grooming Description:  Supervision for safety, Set-up of equipment  Bathing Initial:  3 - Moderate Assistance  Bathing Current:  3 - Moderate Assistance   Bathing Description:  Grab bar, Tub bench, Hand held shower (requires assistance for washing and drying BLE and buttocks)  Upper Body Dressing Initial:  2 - Max Assistance  Upper Body Dressing Current:  2 - Max Assistance   Upper Body Dressing Description:   (requires heavy assistance for threading LUE and pulling pullover gown down)  Lower Body Dressing Initial:  1 - Total Assistance  Lower Body Dressing Current:  2- Max A able to don socks with min A and AE, total assist for underwear   Toileting Initial:  4 - Minimal Assistance  Toileting Current:  4 - Minimal Assistance   Toileting Description:  Grab bar (CGA throughout)  Toilet Transfer Initial:  4 - Minimal Assistance  Toilet Transfer Current:  4 - Minimal Assistance   Toilet Transfer Description:  4 - Minimal Assistance  Tub / Shower Transfer Initial:  4 - Minimal Assistance  Tub / Shower Transfer Current:  4 - Minimal Assistance   Tub / Shower Transfer Description:  Grab bar (CGA wc <> shower chair)  IADL:  Not yet addressed, newly admitted  Family Training/Education:  TBD  DME/DC Recommendations:  TBD, potential for grab bar in shower, shower chair/tub transfer bench, grab bars by toilet or BSC    Strengths:  Motivated for self care and independence, Pleasant and cooperative and Willingly participates in therapeutic activities  Barriers:  Decreased endurance,  Generalized weakness and Pain poorly managed, impaired LUE mob  # of short term goals set= 5  # of short term goals met= 0, newly addmitted       Occupational Therapy Goals           Problem: Bathing     Dates: Start: 07/24/19       Goal: STG-Within one week, patient will bathe     Dates: Start: 07/24/19       Description: 1) Individualized Goal:  With min A using AE and DME as needed.   2) Interventions:  OT Orthotics Training, OT E Stim Attended, OT Self Care/ADL, OT Cognitive Skill Dev, OT Manual Ther Technique, OT Neuro Re-Ed/Balance, OT Therapeutic Activity, OT Evaluation and OT Therapeutic Exercise       Note:     Goal Note filed on 07/25/19 1154 by Andrey Tatum OT    Goal: STG-Within one week, patient will bathe  Outcome: NOT MET  Newly admitted                   Problem: Dressing     Dates: Start: 07/24/19       Goal: STG-Within one week, patient will dress UB     Dates: Start: 07/24/19       Description: 1) Individualized Goal:  With min A using adaptive strategies as needed.   2) Interventions:  OT Orthotics Training, OT E Stim Attended, OT Self Care/ADL, OT Cognitive Skill Dev, OT Manual Ther Technique, OT Neuro Re-Ed/Balance, OT Therapeutic Activity, OT Evaluation and OT Therapeutic Exercise       Note:     Goal Note filed on 07/25/19 1154 by Andrey Tatum OT    Goal: STG-Within one week, patient will dress UB  Outcome: PROGRESSING AS EXPECTED  Newly admitted                 Goal: STG-Within one week, patient will dress LB     Dates: Start: 07/24/19       Description: 1) Individualized Goal:  With mod A using AE and DME as needed.   2) Interventions:  OT Orthotics Training, OT E Stim Attended, OT Self Care/ADL, OT Cognitive Skill Dev, OT Manual Ther Technique, OT Neuro Re-Ed/Balance, OT Therapeutic Activity, OT Evaluation and OT Therapeutic Exercise       Note:     Goal Note filed on 07/25/19 1154 by Andrey Tatum OT    Goal: STG-Within one week, patient will dress LB  Outcome:  PROGRESSING AS EXPECTED  Newly admitted                  Problem: Functional Transfers     Dates: Start: 07/24/19       Goal: STG-Within one week, patient will transfer to toilet     Dates: Start: 07/24/19       Description: 1) Individualized Goal:  At a supv level using DME as needed.   2) Interventions:  OT Orthotics Training, OT E Stim Attended, OT Self Care/ADL, OT Cognitive Skill Dev, OT Manual Ther Technique, OT Neuro Re-Ed/Balance, OT Therapeutic Activity, OT Evaluation and OT Therapeutic Exercise             Goal: STG-Within one week, patient will transfer to step in shower     Dates: Start: 07/24/19       Description: 1) Individualized Goal:  At a supv level using DME as needed.   2) Interventions:  OT Orthotics Training, OT E Stim Attended, OT Self Care/ADL, OT Cognitive Skill Dev, OT Manual Ther Technique, OT Neuro Re-Ed/Balance, OT Therapeutic Activity, OT Evaluation and OT Therapeutic Exercise               Problem: OT Long Term Goals     Dates: Start: 07/24/19       Goal: LTG-By discharge, patient will complete basic self care tasks     Dates: Start: 07/24/19       Description: 1) Individualized Goal:  At a supv to mod I level using AE and DME as needed.   2) Interventions:  OT Orthotics Training, OT E Stim Attended, OT Self Care/ADL, OT Cognitive Skill Dev, OT Manual Ther Technique, OT Neuro Re-Ed/Balance, OT Therapeutic Activity, OT Evaluation and OT Therapeutic Exercise             Goal: LTG-By discharge, patient will perform bathroom transfers     Dates: Start: 07/24/19       Description: 1) Individualized Goal:  At a supv to mod I level using AE and DME as needed.   2) Interventions:  OT Orthotics Training, OT E Stim Attended, OT Self Care/ADL, OT Cognitive Skill Dev, OT Manual Ther Technique, OT Neuro Re-Ed/Balance, OT Therapeutic Activity, OT Evaluation and OT Therapeutic Exercise             Goal: LTG-By discharge, patient will complete basic home management     Dates: Start: 07/24/19        Description: 1) Individualized Goal:  Tasks necessary for DC at a min A to mod I level using AE and DME as needed.   2) Interventions:  OT Orthotics Training, OT E Stim Attended, OT Self Care/ADL, OT Cognitive Skill Dev, OT Manual Ther Technique, OT Neuro Re-Ed/Balance, OT Therapeutic Activity, OT Evaluation and OT Therapeutic Exercise                   Section completed by:  Andrey Tatum, OT

## 2019-07-25 NOTE — FLOWSHEET NOTE
07/25/19 0813   Events/Summary/Plan   Events/Summary/Plan O2 spot check   Oxygen   Home O2 Use Prior To Admission? No   Pulse Oximetry 95 %   O2 (LPM) 1   O2 Daily Delivery Respiratory  Silicone Nasal Cannula

## 2019-07-25 NOTE — PROGRESS NOTES
Hospital Medicine Daily Progress Note      Chief Complaint  HTN, DM    Interval Problem Update  Pt more awake and less lethargic today.    Review of Systems  Review of Systems   Constitutional: Negative for chills and fever.   HENT: Negative.    Eyes: Negative.    Respiratory: Negative for cough and shortness of breath.    Cardiovascular: Negative for chest pain and palpitations.   Gastrointestinal: Negative for abdominal pain, nausea and vomiting.   Genitourinary: Negative.    Musculoskeletal:        Wound pain   Skin: Negative for itching and rash.        Physical Exam  Temp:  [36.7 °C (98 °F)-36.7 °C (98.1 °F)] 36.7 °C (98 °F)  Pulse:  [86-91] 86  Resp:  [16-18] 16  BP: (111-141)/(51-70) 113/51  SpO2:  [92 %-95 %] 95 %    Physical Exam   Constitutional: She is oriented to person, place, and time. No distress.   HENT:   Head: Normocephalic and atraumatic.   Right Ear: External ear normal.   Left Ear: External ear normal.   Eyes: Conjunctivae and EOM are normal. Right eye exhibits no discharge. Left eye exhibits no discharge.   Neck: Normal range of motion. Neck supple. No tracheal deviation present.   Cardiovascular: Normal rate and regular rhythm.    Pulmonary/Chest: No stridor. No respiratory distress. She has no wheezes.   Decreased BS   Abdominal: Soft. Bowel sounds are normal. She exhibits no distension. There is no tenderness.   Musculoskeletal: She exhibits edema and tenderness.   LUE 1+/2+ edema w/ ecchymoses   Neurological: She is alert and oriented to person, place, and time.   Skin: Skin is warm and dry. She is not diaphoretic.   Vitals reviewed.      Fluids    Intake/Output Summary (Last 24 hours) at 07/26/19 1617  Last data filed at 07/26/19 0900   Gross per 24 hour   Intake              600 ml   Output                0 ml   Net              600 ml       Laboratory  Recent Labs      07/24/19   0521  07/26/19   0539   WBC  10.8  10.6   RBC  3.41*  3.61*   HEMOGLOBIN  10.5*  11.1*   HEMATOCRIT  34.3*   36.8*   MCV  100.6*  101.9*   MCH  30.8  30.7   MCHC  30.6*  30.2*   RDW  53.5*  55.9*   PLATELETCT  278  285   MPV  10.8  11.0     Recent Labs      07/24/19   0521  07/26/19   0539   SODIUM  137  134*   POTASSIUM  5.0  4.2   CHLORIDE  104  102   CO2  27  24   GLUCOSE  121*  90   BUN  35*  31*   CREATININE  1.14  0.98   CALCIUM  8.9  8.8             Recent Labs      07/24/19   0521   TRIGLYCERIDE  135   HDL  29*   LDL  93           Assessment/Plan  * Subarachnoid hemorrhage following injury, with loss of consciousness (HCC)- (present on admission)   Assessment & Plan    Non-surgical management  Completed Keppra x 7 days for SZ proph     Closed fracture of multiple ribs with flail chest- (present on admission)   Assessment & Plan    S/P Left 3rd-7th rib fractures w/ resolved PTX  Aggressive pulmonary toilet, including IS     UTI (urinary tract infection)- (present on admission)   Assessment & Plan    UCx +Proteus  Completed Bactrim prior to Rehab admission     DM (diabetes mellitus) ([facility])- (present on admission)   Assessment & Plan    HbA1c 7.4  Continue Lantus, SSI, and Metformin  Observe serum glucose trends     Fracture of left clavicle- (present on admission)   Assessment & Plan    S/P ORIF on 7/14/19 by Dr. Gauthier  [email or web address]     Multiple fractures of cervical spine ([facility])- (present on admission)   Assessment & Plan    Non-surgical management  Cervical collar x 2 wks followed by F/U imaging     Alkaline phosphatase elevation   Assessment & Plan    Suspect 2/2 traumatic fractures  Work up further if worsens or develops biliary symptoms  Follow LFT's     Macrocytic anemia   Assessment & Plan    Check Vit B12 and Folate levels w/ next draw  Follow H/H     Renal insufficiency   Assessment & Plan    Azotemia vs CKD  Decreased ACE-I for high trending K+  Encouraging PO fluids  Check F/U labs in am     Vitamin D deficiency   Assessment & Plan    Vit D level 13  Continue supplementation     Laceration of left forearm- (present  on admission)   Assessment & Plan    S/P repair  Wound care     Abnormal CT of the abdomen- (present on admission)   Assessment & Plan    Incidental finding of left kidney lesion on Trauma work-up  Needs F/U imaging     Essential hypertension- (present on admission)   Assessment & Plan    Low blood pressures better w/ decreased Benazepril  Continue Coreg     Hypothyroid- (present on admission)   Assessment & Plan    Euthyroid on Synthroid     Full Code

## 2019-07-25 NOTE — PROGRESS NOTES
"Rehab Progress Note     Encounter Date: 7/25/2019    CC: TBI, left arm wound, and left clavicular fracture    Interval Events (Subjective)  Patient sitting up in room. She reports she is doing well except that her sling is \"useless.\"  She reports it continues to get caught on things and wrapped up. She reports she would rather not use it. Discussed she does not need it if supported in bed. Discussed with therapy and will discuss with prosthetist on options available. Discussed will have IDT later today to discuss discharge planning.     IDT Team Meeting 7/25/2019    I, Vince Matias M.D., was present and led the interdisciplinary team conference on 7/25/2019.  I led the IDT conference and agree with the IDT conference documentation and plan of care as noted below.     RN:  Diet Regular   % Meal     Pain Improved in arm   Sleep    Bowel Continent   Bladder Continent   In's & Out's    Hypotension x1 yesterday    PT: Limited by left arm pain  Bed Mobility modA   Transfers    Mobility totA for wheelchair   Stairs NT   Orthostatic hypotension yesterday  Good strength; good balance    OT:  Eating    Grooming    Bathing modA   UB Dressing maxA   LB Dressing maxA   Toileting    Shower & Transfer    Limited by pain today    SLP:  Regular textures - advanced to thins  Mild cognitive deficits, but making fast progress  May decrease time next week as cognitively improving    Resp:  Room air trial this afternoon    CM:  Continues to work on disposition and DME needs.      DC/Disposition:  8/9/19    Objective:  VITAL SIGNS: /66   Pulse 84   Temp 36.6 °C (97.9 °F) (Oral)   Resp 18   Ht 1.702 m (5' 7\")   Wt (!) 139.5 kg (307 lb 8 oz)   SpO2 95%   BMI 48.16 kg/m²   Gen: NAD  Psych: Mood and affect appropriate  CV: RRR, no edema  Resp: CTAB, no upper airway sounds  Abd: NTND  Neuro: AOx4, following simple commands    Recent Results (from the past 72 hour(s))   ACCU-CHEK GLUCOSE    Collection Time: 07/22/19  " 5:40 PM   Result Value Ref Range    Glucose - Accu-Ck 225 (H) 65 - 99 mg/dL   ACCU-CHEK GLUCOSE    Collection Time: 07/22/19 11:10 PM   Result Value Ref Range    Glucose - Accu-Ck 215 (H) 65 - 99 mg/dL   ACCU-CHEK GLUCOSE    Collection Time: 07/23/19  8:53 AM   Result Value Ref Range    Glucose - Accu-Ck 195 (H) 65 - 99 mg/dL   ACCU-CHEK GLUCOSE    Collection Time: 07/23/19 11:15 AM   Result Value Ref Range    Glucose - Accu-Ck 199 (H) 65 - 99 mg/dL   ACCU-CHEK GLUCOSE    Collection Time: 07/23/19  5:00 PM   Result Value Ref Range    Glucose - Accu-Ck 202 (H) 65 - 99 mg/dL   ACCU-CHEK GLUCOSE    Collection Time: 07/24/19 12:02 AM   Result Value Ref Range    Glucose - Accu-Ck 162 (H) 65 - 99 mg/dL   CBC with Differential    Collection Time: 07/24/19  5:21 AM   Result Value Ref Range    WBC 10.8 4.8 - 10.8 K/uL    RBC 3.41 (L) 4.20 - 5.40 M/uL    Hemoglobin 10.5 (L) 12.0 - 16.0 g/dL    Hematocrit 34.3 (L) 37.0 - 47.0 %    .6 (H) 81.4 - 97.8 fL    MCH 30.8 27.0 - 33.0 pg    MCHC 30.6 (L) 33.6 - 35.0 g/dL    RDW 53.5 (H) 35.9 - 50.0 fL    Platelet Count 278 164 - 446 K/uL    MPV 10.8 9.0 - 12.9 fL    Neutrophils-Polys 57.30 44.00 - 72.00 %    Lymphocytes 32.20 22.00 - 41.00 %    Monocytes 7.70 0.00 - 13.40 %    Eosinophils 1.60 0.00 - 6.90 %    Basophils 0.50 0.00 - 1.80 %    Immature Granulocytes 0.70 0.00 - 0.90 %    Nucleated RBC 0.00 /100 WBC    Neutrophils (Absolute) 6.17 2.00 - 7.15 K/uL    Lymphs (Absolute) 3.47 1.00 - 4.80 K/uL    Monos (Absolute) 0.83 0.00 - 0.85 K/uL    Eos (Absolute) 0.17 0.00 - 0.51 K/uL    Baso (Absolute) 0.05 0.00 - 0.12 K/uL    Immature Granulocytes (abs) 0.08 0.00 - 0.11 K/uL    NRBC (Absolute) 0.00 K/uL   Comp Metabolic Panel (CMP)    Collection Time: 07/24/19  5:21 AM   Result Value Ref Range    Sodium 137 135 - 145 mmol/L    Potassium 5.0 3.6 - 5.5 mmol/L    Chloride 104 96 - 112 mmol/L    Co2 27 20 - 33 mmol/L    Anion Gap 6.0 0.0 - 11.9    Glucose 121 (H) 65 - 99 mg/dL    Bun  35 (H) 8 - 22 mg/dL    Creatinine 1.14 0.50 - 1.40 mg/dL    Calcium 8.9 8.5 - 10.5 mg/dL    AST(SGOT) 24 12 - 45 U/L    ALT(SGPT) 26 2 - 50 U/L    Alkaline Phosphatase 116 (H) 30 - 99 U/L    Total Bilirubin 1.2 0.1 - 1.5 mg/dL    Albumin 3.1 (L) 3.2 - 4.9 g/dL    Total Protein 6.1 6.0 - 8.2 g/dL    Globulin 3.0 1.9 - 3.5 g/dL    A-G Ratio 1.0 g/dL   HEMOGLOBIN A1C    Collection Time: 07/24/19  5:21 AM   Result Value Ref Range    Glycohemoglobin 7.4 (H) 0.0 - 5.6 %    Est Avg Glucose 166 mg/dL   Lipid Profile (Lipid Panel)    Collection Time: 07/24/19  5:21 AM   Result Value Ref Range    Cholesterol,Tot 149 100 - 199 mg/dL    Triglycerides 135 0 - 149 mg/dL    HDL 29 (A) >=40 mg/dL    LDL 93 <100 mg/dL   TSH with Reflex to FT4    Collection Time: 07/24/19  5:21 AM   Result Value Ref Range    TSH 0.600 0.380 - 5.330 uIU/mL   Vitamin D, 25-hydroxy (blood)    Collection Time: 07/24/19  5:21 AM   Result Value Ref Range    25-Hydroxy   Vitamin D 25 13 (L) 30 - 100 ng/mL   ESTIMATED GFR    Collection Time: 07/24/19  5:21 AM   Result Value Ref Range    GFR If  57 (A) >60 mL/min/1.73 m 2    GFR If Non  47 (A) >60 mL/min/1.73 m 2   ACCU-CHEK GLUCOSE    Collection Time: 07/24/19  6:32 AM   Result Value Ref Range    Glucose - Accu-Ck 100 (H) 65 - 99 mg/dL   ACCU-CHEK GLUCOSE    Collection Time: 07/24/19 11:58 AM   Result Value Ref Range    Glucose - Accu-Ck 176 (H) 65 - 99 mg/dL   ACCU-CHEK GLUCOSE    Collection Time: 07/24/19  5:00 PM   Result Value Ref Range    Glucose - Accu-Ck 153 (H) 65 - 99 mg/dL   ACCU-CHEK GLUCOSE    Collection Time: 07/24/19 11:28 PM   Result Value Ref Range    Glucose - Accu-Ck 166 (H) 65 - 99 mg/dL   ACCU-CHEK GLUCOSE    Collection Time: 07/25/19  5:50 AM   Result Value Ref Range    Glucose - Accu-Ck 103 (H) 65 - 99 mg/dL   ACCU-CHEK GLUCOSE    Collection Time: 07/25/19 11:15 AM   Result Value Ref Range    Glucose - Accu-Ck 123 (H) 65 - 99 mg/dL       Current  Facility-Administered Medications   Medication Frequency   • insulin regular (HUMULIN R) injection 2-12 Units 4X/DAY ACHS    And   • glucose 4 g chewable tablet 16 g Q15 MIN PRN    And   • dextrose 50% (D50W) injection 50 mL Q15 MIN PRN   • insulin glargine (LANTUS) injection 32 Units BID   • benazepril (LOTENSIN) tablet 30 mg DAILY   • vitamin D (cholecalciferol) tablet 2,000 Units DAILY   • Respiratory Care per Protocol Continuous RT   • Pharmacy Consult Request ...Pain Management Review 1 Each PHARMACY TO DOSE   • oxyCODONE immediate-release (ROXICODONE) tablet 5 mg Q3HRS PRN   • oxyCODONE immediate release (ROXICODONE) tablet 10 mg Q3HRS PRN   • tramadol (ULTRAM) 50 MG tablet 50 mg Q4HRS PRN   • hydrALAZINE (APRESOLINE) tablet 25 mg Q8HRS PRN   • acetaminophen (TYLENOL) tablet 650 mg Q4HRS PRN   • senna-docusate (PERICOLACE or SENOKOT S) 8.6-50 MG per tablet 2 Tab BID    And   • polyethylene glycol/lytes (MIRALAX) PACKET 1 Packet QDAY PRN    And   • magnesium hydroxide (MILK OF MAGNESIA) suspension 30 mL QDAY PRN    And   • bisacodyl (DULCOLAX) suppository 10 mg QDAY PRN   • artificial tears 1.4 % ophthalmic solution 1 Drop PRN   • benzocaine-menthol (CEPACOL) lozenge 1 Lozenge Q2HRS PRN   • mag hydrox-al hydrox-simeth (MAALOX PLUS ES or MYLANTA DS) suspension 20 mL Q2HRS PRN   • ondansetron (ZOFRAN ODT) dispertab 4 mg 4X/DAY PRN    Or   • ondansetron (ZOFRAN) syringe/vial injection 4 mg 4X/DAY PRN   • traZODone (DESYREL) tablet 50 mg QHS PRN   • sodium chloride (OCEAN) 0.65 % nasal spray 2 Spray PRN   • bacitracin-polymyxin b (POLYSPORIN) 500-57310 UNIT/GM ointment TID   • carvedilol (COREG) tablet 6.25 mg BID WITH MEALS   • enoxaparin (LOVENOX) inj 40 mg Q12HRS   • HYDROmorphone (DILAUDID) tablet 2-4 mg Q3HRS PRN   • metFORMIN ER (GLUCOPHAGE XR) tablet 1,000 mg BID   • nystatin (MYCOSTATIN) powder BID   • levothyroxine (SYNTHROID) tablet 125 mcg AM ES    And   • levothyroxine (SYNTHROID) tablet 75 mcg AM ES    • omeprazole (PRILOSEC) capsule 20 mg DAILY       Orders Placed This Encounter   Procedures   • Diet Order Diabetic     Standing Status:   Standing     Number of Occurrences:   1     Order Specific Question:   Diet:     Answer:   Diabetic [3]     Order Specific Question:   Texture/Fiber modifications:     Answer:   Dysphagia 3(Mechanical Soft)specify fluid consistency(question 6) [3]     Order Specific Question:   Consistency/Fluid modifications:     Answer:   Thin Liquids [3]     Comments:   straws ok       Assessment:  Active Hospital Problems    Diagnosis   • *Subarachnoid hemorrhage following injury, with loss of consciousness ([facility])   • Closed fracture of multiple ribs with flail chest   • UTI (urinary tract infection)   • DM (diabetes mellitus) ([facility])   • Fracture of left clavicle   • Morbid obesity with BMI of 45.0-49.9, adult ([facility])   • Multiple fractures of cervical spine ([facility])   • Dysphagia   • Laceration of left forearm   • Essential hypertension   • Hypothyroid   • Trauma   • Abnormal CT of the abdomen   • Vitamin D deficiency   • Renal insufficiency   • Macrocytic anemia   • Alkaline phosphatase elevation       Medical Decision Making and Plan:  TBI - Patient with left sided SAH with loss of consciousness and decreased cognition. Also limited by other orthopedic injuries  -Patient has completed Keppra for seizure prophylaxis.   -PT and OT for mobility and ADLs  -SLP for cognition      Dysphagia - Patient with oropharyngeal dysphagia. Upgraded to Dysphagia 2 with NTL prior to transfer.  SLP for swallow - Advanced diet to regular with thins. Resolved.      C/T Spine fractures - Patient with TP of C7 and T1 fractures in C collar for unknown length of time.    -Continue C collar. Will need follow-up with NSG     Left clavicular fracture - S/p ORIF with Dr. Gauthier on 7/14/19. Also with large left arm laceration  -Continue NWB LULAURA.  Remove staples on shoulder and forearm  -Wound consult - debridement on  7/24/19. Appreciate recommendations.      HTN - Patient on Benazepril 40 mg and Coreg 6.25 mg BID     Orthostatic hypotension - Severe orthostatics on 7/24/19, continue to monitor. May need to reduce BP medications    DM - Patient on Lantus 30 U BID, metformin 1000 mg BID and SSI on transfer. Previously on Trulicity, Metformin  -Will check A1c 7.4. Consult hospitalist, increased Lantus to 32      Hypothyroidism - Patient on 200 mcg on transfer.      Vitamin D deficiency - 12 on admission. Start on 2000 U daily    GI Ppx - Patient on Omeprazole while on dysphagia diet.      DVT Ppx - Patient on Lovenox on transfer.     Total time:  35 minutes.  I spent greater than 50% of the time for patient care, counseling, and coordination on this date, including unit/floor time, and face-to-face time with the patient as per interval events and assessment and plan above. Topics discussed included discharge planning, left shoulder sling and discussed alternatives for arm support. Patient was discussed separately in IDT today; please see details above.    Vince Matias M.D.

## 2019-07-25 NOTE — REHAB-COLLABORATIVE ONGOING IDT TEAM NOTE
Weekly Interdisciplinary Team Conference Note    Weekly Interdisciplinary Team Conference # 1  Date:  7/25/2019    Clinicians present and reporting at team conference include the following:   MD: HARRY Matisa MD    RN:  Princess Shari RN    PT:   Christy Schaffer, PT, DPT  OT:  Andrey Levi, OTR/L    ST:  Lala Pavon, MS, CCC-SLP  CM:  Annalisa Taveras, ALEX, CCM  REC:  Not Applicable  RT:  Clark Rojas, LÓPEZ  RPh:  Rebekah Gutierrez, [facility]  All reporting clinicians have a working knowledge of this patient's plan of care.    Targeted DC Date:  8.9.19     Medical    Patient Active Problem List    Diagnosis Date Noted   • Subarachnoid hemorrhage following injury, with loss of consciousness (HCC) 07/12/2019     Priority: High   • Closed fracture of multiple ribs with flail chest 07/12/2019     Priority: High   • UTI (urinary tract infection) 07/19/2019     Priority: Medium   • Multiple fractures of cervical spine ([facility]) 07/12/2019     Priority: Medium   • Fracture of left clavicle 07/12/2019     Priority: Medium   • DM (diabetes mellitus) ([facility]) 07/12/2019     Priority: Medium   • Traumatic pneumothorax 07/12/2019     Priority: Medium   • Morbid obesity with BMI of 45.0-49.9, adult ([facility]) 07/12/2019     Priority: Medium   • Dysphagia 07/14/2019     Priority: Low   • Laceration of left forearm 07/13/2019     Priority: Low   • Platelet dysfunction due to drugs 07/12/2019     Priority: Low   • Hypothyroid 07/12/2019     Priority: Low   • Essential hypertension 07/12/2019     Priority: Low   • Abnormal CT of the abdomen 07/12/2019     Priority: Low   • Trauma 07/12/2019     Priority: Low   • No contraindication to deep vein thrombosis (DVT) prophylaxis 07/12/2019     Priority: Low   • Vitamin D deficiency 07/24/2019   • Renal insufficiency 07/24/2019   • Macrocytic anemia 07/24/2019   • Alkaline phosphatase elevation 07/24/2019     Results     Procedure Component Value Ref Range Date/Time    ACCU-CHEK GLUCOSE [[id number]]   (Abnormal) Collected:  07/25/19 0550    Order Status:  Completed Lab Status:  Final result Updated:  07/25/19 0608     Glucose - Accu-Ck 103 (H) 65 - 99 mg/dL     ACCU-CHEK GLUCOSE [[id number]]  (Abnormal) Collected:  07/24/19 2328    Order Status:  Completed Lab Status:  Final result Updated:  07/24/19 2345     Glucose - Accu-Ck 166 (H) 65 - 99 mg/dL     ACCU-CHEK GLUCOSE [[id number]]  (Abnormal) Collected:  07/24/19 1700    Order Status:  Completed Lab Status:  Final result Updated:  07/24/19 1738     Glucose - Accu-Ck 153 (H) 65 - 99 mg/dL     HEMOGLOBIN A1C [[id number]]  (Abnormal) Collected:  07/24/19 0521    Order Status:  Completed Lab Status:  Final result Updated:  07/24/19 1221    Specimen:  Blood      Glycohemoglobin 7.4 (H) 0.0 - 5.6 %      Comment: Increased risk for diabetes:  5.7 -6.4%  Diabetes:  >6.4%  Glycemic control for adults with diabetes:  <7.0%  The above interpretations are per ADA guidelines.  Diagnosis  of diabetes mellitus on the basis of elevated Hemoglobin A1c  should be confirmed by repeating the Hb A1c test.          Est Avg Glucose 166 mg/dL      Comment: The eAG calculation is based on the A1c-Derived Daily Glucose  (ADAG) study.  See the ADA's website for additional information.         ACCU-CHEK GLUCOSE [[id number]]  (Abnormal) Collected:  07/24/19 1158    Order Status:  Completed Lab Status:  Final result Updated:  07/24/19 1216     Glucose - Accu-Ck 176 (H) 65 - 99 mg/dL            Nursing  Diet/Nutrition:  Diabetic, Dysphagia III-Mechanical Soft and Thin Liquids  Medication Administration:  Whole with Liquid Wash  % consumed at meals in last 24 hours:  Consumed 25-50% of meals per documentation.  Meal/Snack Consumption for the past 24 hrs:   Oral Nutrition   07/24/19 0900 Breakfast;Between 25-50% Consumed       Snack schedule:  None  Supplement:  Other: N/A  Appetite:  Fair  Fluid Intake/Output in past 24 hours: In: 590 [P.O.:590]  Out: -   Admit Weight:  Weight: (!) 139.5 kg (307  lb 8 oz)  Weight Last 7 Days   [139.5 kg (307 lb 8 oz)] 139.5 kg (307 lb 8 oz) (07/23 1038)    Pain Issues:    Location:  Arm (07/24 0745)  Left (07/24 0745)         Severity:  Moderate   Scheduled pain medications:  None     PRN pain medications used in last 24 hours:  oxycodone immediate release (ROXICODONE)    Non Pharmacologic Interventions:  relaxation, repositioned and rest  Effectiveness of pain management plan:  good=patient states acceptable comfort after interventions    Bowel:    Bowel Assist Initial Score:  4 - Minimal Assistance  Bowel Assist Current Score:  4 - Minimal Assistance  Bowl Accidents in last 7 days:     Last bowel movement: 07/24/19  Stool Description: Formed, Brown     Usual bowel pattern:  daily  Scheduled bowel medications:  senna-docusate (PERICOLACE or SENOKOT S)   PRN bowel medications used in last 24 hours:  None  Nursing Interventions:  Increased time, Scheduled medication, Supervision  Effectiveness of bowel program:   good=regular, predictable, controlled emptying of bowel  Bladder:    Bladder Assist Initial Score:  4 - Minimal Assistance  Bladder Assist Current Score:  4 - Minimal Assistance  Bladder Accidents in last 7 days:     PVR range for past 24-48 hours:  [28-63]  ()  Intermittent Catheterization:  N/A  Medications affecting bladder:  None    Time void schedule/voiding pattern:  Voiding every 2-4 hours  Interventions:  Increased time, Verbal cueing, Supervision  Effectiveness of bladder training:  Good=regular, predictable, emptying of bladder, patient initiates time voiding      Diabetes:  Blood Sugar Frequency:  Other Q 6 hours    Range of BS for last 48 hours:   Recent Labs      07/23/19   0853  07/23/19   1115  07/23/19   1700  07/24/19   0002  07/24/19   0632  07/24/19   1158  07/24/19   1700  07/24/19   2328   POCGLUCOSE  195*  199*  202*  162*  100*  176*  153*  166*     Scheduled diabetic medications:  metformin (GLUCOPHAGE)  and insulin glargine (LANTUS) injection    Sliding scale usage in past 24 hours:  Yes  Total Short acting insulin in the past 24 hours:  Humulin 10 units  Total Long acting insulin in the past 24 hours:  Lantus 32 units    Wound:         Patient Lines/Drains/Airways Status    Active Current Wounds     Name: Placement date: Placement time: Site: Days:    Wound 07/23/19 Full Thickness Wound Elbow full thickness wound posterior left elbow 07/23/19   1809      1    Incision Left Chest        Incision Left Arm        Wound 07/23/19 Sacrum 07/23/19   1843      1                   Interventions:  Left elbow daily dressing change, debrided by MD on 7/24. Staples removed from incisions, Assess q shift  Effectiveness of intervention:  wound is improving     Sleep/wake cycle:   Average hours slept:  Sleeps 4-6 hours without waking  Sleep medication usage:  None    Patient/Family Training/Education:  Diabetes Management, Diet/Nutrition, General Self Care, Pain Management, Safe Transfers, Safety, Skin Care and Wound Care  Discharge Supply Recommendations:  Glucometer and Strips and Wound Care Supplies  Strengths: Alert and oriented, Willingly participates in therapeutic activities, Able to follow instructions, Pleasant and cooperative, Effective communication skills and Motivated for self care and independence   Barriers:   Generalized weakness and Limited mobility            Nursing Problems           Problem: Bowel/Gastric:     Goal: Normal bowel function is maintained or improved           Goal: Will not experience complications related to bowel motility             Problem: Communication     Goal: The ability to communicate needs accurately and effectively will improve             Problem: Discharge Barriers/Planning     Goal: Patient's continuum of care needs will be met             Problem: Infection     Goal: Will remain free from infection             Problem: Knowledge Deficit     Goal: Knowledge of disease process/condition, treatment plan, diagnostic tests,  and medications will improve           Goal: Knowledge of the prescribed therapeutic regimen will improve             Problem: Pain Management     Goal: Pain level will decrease to patient's comfort goal             Problem: Respiratory:     Goal: Respiratory status will improve             Problem: Safety     Goal: Will remain free from injury           Goal: Will remain free from falls             Problem: Skin Integrity     Goal: Risk for impaired skin integrity will decrease             Problem: Venous Thromboembolism (VTW)/Deep Vein Thrombosis (DVT) Prevention:     Goal: Patient will participate in Venous Thrombosis (VTE)/Deep Vein Thrombosis (DVT)Prevention Measures                  Long Term Goals:   At discharge patient will be able to function safely at home and in the community with support.    Section completed by:  Jasmine Kumar R.N.           Respiratory Therapy   Oxygen has been weaned to 1 LPM.  Anticipate weaning to room air soon.  Section completed by:  Clark Rojas, LÓPEZ       Mobility  Bed mobility:   Mod A  Bed /Chair/Wheelchair Transfer Initial:  4 - Minimal Assistance  Bed /Chair/Wheelchair Transfer Current:  3 - Moderate Assistance   Bed/Chair/Wheelchair Transfer Description:  Increased time, Set-up of equipment, Assist with two limbs (bed <> WC, mod A for sit > supine, CGA for remainder of SPT without AD)  Walk Initial:  0 - Not tested,unsafe activity  Walk Current:  0 - Not tested,unsafe activity   Walk Description:     Wheelchair Initial:  1 - Total Assistance  Wheelchair Current:  1 - Total Assistance   Wheelchair Description:  Extra time, Verbal cueing, Supervision for safety (10 ft SBA, using BLEs)  Stairs Initial:  0 - Not tested,unsafe activity  Stairs Current: 0 - Not tested,unsafe activity   Stairs Description:    Patient/Family Training/Education:  TBD  DME/DC Recommendations:  TBD  Strengths:  Independent PLOF, Motivated for self care and independence, Pleasant and  "cooperative and Willingly participates in therapeutic activities  Barriers:   Orthostatic hypotension, Poor activity tolerance and Other: multiple fxs, non-wb LUE, LUE wounds  # of short term goals set= 3  # of short term goals met=0 (eval completed 7/24)       Physical Therapy Problems           Problem: Mobility     Dates: Start: 07/24/19       Goal: STG-Within one week, patient will ambulate household distance     Dates: Start: 07/24/19       Description: 1) Individualized goal:  50' HHA  2) Interventions:  PT Group Therapy, PT Gait Training, PT Therapeutic Exercises, PT TENS Application, PT Neuro Re-Ed/Balance, PT Therapeutic Activity and PT Manual Therapy       Note:     Goal Note filed on 07/25/19 0824 by Gnia Jasmine, Student    Goal: STG-Within one week, patient will ambulate household distance  Outcome: NOT MET  Eval 7/24                Goal: STG-Within one week, patient will ambulate up/down a curb     Dates: Start: 07/24/19       Description: 1) Individualized goal:  4\" curb, HHA  2) Interventions:   PT Group Therapy, PT Gait Training, PT Therapeutic Exercises, PT TENS Application, PT Neuro Re-Ed/Balance, PT Therapeutic Activity and PT Manual Therapy        Note:     Goal Note filed on 07/25/19 0824 by Gina Jasmine, Student    Goal: STG-Within one week, patient will ambulate up/down a curb  Outcome: NOT MET  Eval 7/24                  Problem: Mobility Transfers     Dates: Start: 07/24/19       Goal: STG-Within one week, patient will move supine to sit     Dates: Start: 07/24/19       Description: 1) Individualized goal:  SBA with HOB elevated 30 degrees, supine <> sit   2) Interventions:   PT Group Therapy, PT Gait Training, PT Therapeutic Exercises, PT TENS Application, PT Neuro Re-Ed/Balance, PT Therapeutic Activity and PT Manual Therapy        Note:     Goal Note filed on 07/25/19 0824 by Gina Jasmine, Student    Goal: STG-Within one week, patient will move supine to sit  Outcome: NOT MET  Eval " "7/24                  Problem: PT-Long Term Goals     Dates: Start: 07/24/19       Goal: LTG-By discharge, patient will ambulate     Dates: Start: 07/24/19       Description: 1) Individualized goal:  250' mod I, no AD  2) Interventions:   PT Group Therapy, PT Gait Training, PT Therapeutic Exercises, PT TENS Application, PT Neuro Re-Ed/Balance, PT Therapeutic Activity and PT Manual Therapy              Goal: LTG-By discharge, patient will transfer one surface to another     Dates: Start: 07/24/19       Description: 1) Individualized goal:  Bed <> WC, mod I, no AD  2) Interventions:   PT Group Therapy, PT Gait Training, PT Therapeutic Exercises, PT TENS Application, PT Neuro Re-Ed/Balance, PT Therapeutic Activity and PT Manual Therapy             Goal: LTG-By discharge, patient will ambulate up/down 4-6 stairs     Dates: Start: 07/24/19       Description: 1) Individualized goal:  4 6\" steps, R HR, mod I  2) Interventions:   PT Group Therapy, PT Gait Training, PT Therapeutic Exercises, PT TENS Application, PT Neuro Re-Ed/Balance, PT Therapeutic Activity and PT Manual Therapy               Goal: LTG-By discharge, patient will transfer in/out of a car     Dates: Start: 07/24/19       Description: 1) Individualized goal:  SPV, no AD  2) Interventions:   PT Group Therapy, PT Gait Training, PT Therapeutic Exercises, PT TENS Application, PT Neuro Re-Ed/Balance, PT Therapeutic Activity and PT Manual Therapy                     Section completed by:  Gina Jasmine, Student; Christy Schaffer, PT, DPT       Activities of Daily Living  Eating Initial:  5 - Standby Prompting/Supervision or Set-up  Eating Current:  5 - Standby Prompting/Supervision or Set-up   Eating Description:  Insert dentures, Supervision for safety, Verbal cueing, Modified diet, Increased time, Set-up of equipment or meal/tube feeding  Grooming Initial:  5 - Standby Prompting/Supervision or Set-up  Grooming Current:  5 - Standby Prompting/Supervision or " Set-up   Grooming Description:  Supervision for safety, Set-up of equipment  Bathing Initial:  3 - Moderate Assistance  Bathing Current:  3 - Moderate Assistance   Bathing Description:  Grab bar, Tub bench, Hand held shower (requires assistance for washing and drying BLE and buttocks)  Upper Body Dressing Initial:  2 - Max Assistance  Upper Body Dressing Current:  2 - Max Assistance   Upper Body Dressing Description:   (requires heavy assistance for threading LUE and pulling pullover gown down)  Lower Body Dressing Initial:  1 - Total Assistance  Lower Body Dressing Current:  2- Max A able to don socks with min A and AE, total assist for underwear   Toileting Initial:  4 - Minimal Assistance  Toileting Current:  4 - Minimal Assistance   Toileting Description:  Grab bar (CGA throughout)  Toilet Transfer Initial:  4 - Minimal Assistance  Toilet Transfer Current:  4 - Minimal Assistance   Toilet Transfer Description:  4 - Minimal Assistance  Tub / Shower Transfer Initial:  4 - Minimal Assistance  Tub / Shower Transfer Current:  4 - Minimal Assistance   Tub / Shower Transfer Description:  Grab bar (CGA wc <> shower chair)  IADL:  Not yet addressed, newly admitted  Family Training/Education:  TBD  DME/DC Recommendations:  TBD, potential for grab bar in shower, shower chair/tub transfer bench, grab bars by toilet or BSC    Strengths:  Motivated for self care and independence, Pleasant and cooperative and Willingly participates in therapeutic activities  Barriers:  Decreased endurance, Generalized weakness and Pain poorly managed, impaired LUE mob  # of short term goals set= 5  # of short term goals met= 0, newly addmitted       Occupational Therapy Goals           Problem: Bathing     Dates: Start: 07/24/19       Goal: STG-Within one week, patient will bathe     Dates: Start: 07/24/19       Description: 1) Individualized Goal:  With min A using AE and DME as needed.   2) Interventions:  OT Orthotics Training, OT E Stim  Attended, OT Self Care/ADL, OT Cognitive Skill Dev, OT Manual Ther Technique, OT Neuro Re-Ed/Balance, OT Therapeutic Activity, OT Evaluation and OT Therapeutic Exercise       Note:     Goal Note filed on 07/25/19 1154 by Andrey Tatum OT    Goal: STG-Within one week, patient will bathe  Outcome: NOT MET  Newly admitted                   Problem: Dressing     Dates: Start: 07/24/19       Goal: STG-Within one week, patient will dress UB     Dates: Start: 07/24/19       Description: 1) Individualized Goal:  With min A using adaptive strategies as needed.   2) Interventions:  OT Orthotics Training, OT E Stim Attended, OT Self Care/ADL, OT Cognitive Skill Dev, OT Manual Ther Technique, OT Neuro Re-Ed/Balance, OT Therapeutic Activity, OT Evaluation and OT Therapeutic Exercise       Note:     Goal Note filed on 07/25/19 1154 by Andrey Tatum OT    Goal: STG-Within one week, patient will dress UB  Outcome: PROGRESSING AS EXPECTED  Newly admitted                 Goal: STG-Within one week, patient will dress LB     Dates: Start: 07/24/19       Description: 1) Individualized Goal:  With mod A using AE and DME as needed.   2) Interventions:  OT Orthotics Training, OT E Stim Attended, OT Self Care/ADL, OT Cognitive Skill Dev, OT Manual Ther Technique, OT Neuro Re-Ed/Balance, OT Therapeutic Activity, OT Evaluation and OT Therapeutic Exercise       Note:     Goal Note filed on 07/25/19 1154 by Andrey Tatum OT    Goal: STG-Within one week, patient will dress LB  Outcome: PROGRESSING AS EXPECTED  Newly admitted                  Problem: Functional Transfers     Dates: Start: 07/24/19       Goal: STG-Within one week, patient will transfer to toilet     Dates: Start: 07/24/19       Description: 1) Individualized Goal:  At a supv level using DME as needed.   2) Interventions:  OT Orthotics Training, OT E Stim Attended, OT Self Care/ADL, OT Cognitive Skill Dev, OT Manual Ther Technique, OT Neuro Re-Ed/Balance, OT  Therapeutic Activity, OT Evaluation and OT Therapeutic Exercise             Goal: STG-Within one week, patient will transfer to step in shower     Dates: Start: 07/24/19       Description: 1) Individualized Goal:  At a supv level using DME as needed.   2) Interventions:  OT Orthotics Training, OT E Stim Attended, OT Self Care/ADL, OT Cognitive Skill Dev, OT Manual Ther Technique, OT Neuro Re-Ed/Balance, OT Therapeutic Activity, OT Evaluation and OT Therapeutic Exercise               Problem: OT Long Term Goals     Dates: Start: 07/24/19       Goal: LTG-By discharge, patient will complete basic self care tasks     Dates: Start: 07/24/19       Description: 1) Individualized Goal:  At a supv to mod I level using AE and DME as needed.   2) Interventions:  OT Orthotics Training, OT E Stim Attended, OT Self Care/ADL, OT Cognitive Skill Dev, OT Manual Ther Technique, OT Neuro Re-Ed/Balance, OT Therapeutic Activity, OT Evaluation and OT Therapeutic Exercise             Goal: LTG-By discharge, patient will perform bathroom transfers     Dates: Start: 07/24/19       Description: 1) Individualized Goal:  At a supv to mod I level using AE and DME as needed.   2) Interventions:  OT Orthotics Training, OT E Stim Attended, OT Self Care/ADL, OT Cognitive Skill Dev, OT Manual Ther Technique, OT Neuro Re-Ed/Balance, OT Therapeutic Activity, OT Evaluation and OT Therapeutic Exercise             Goal: LTG-By discharge, patient will complete basic home management     Dates: Start: 07/24/19       Description: 1) Individualized Goal:  Tasks necessary for DC at a min A to mod I level using AE and DME as needed.   2) Interventions:  OT Orthotics Training, OT E Stim Attended, OT Self Care/ADL, OT Cognitive Skill Dev, OT Manual Ther Technique, OT Neuro Re-Ed/Balance, OT Therapeutic Activity, OT Evaluation and OT Therapeutic Exercise                   Section completed by:  Andrey Tatum, ADAM Garnica  Functions  Comprehension Initial:  3 - Moderate Assistance  Comprehension Current:  6 - Modified Independent   Comprehension Description:  Glasses  Expression Initial:  5 - Stand-by Prompting/Supervision or Set-up  Expression Current:  6 - Modified Independent   Expression Description:  Verbal cueing  Social Interaction Initial:  3 - Moderate Assistance  Social Interaction Current:  5 - Stand-by Prompting/Supervision or Set-up   Social Interaction Description:  Increased time, Verbal cues  Problem Solving Initial:  2 - Max Assistance  Problem Solving Current:  3 - Moderate Assistance   Problem Solving Description:  Verbal cueing, Therapy schedule, Bed/chair alarm, Increased time, Seat belt  Memory Initial:  2 - Max Assistance  Memory Current:  3 - Moderate Assistance   Memory Description:  Verbal cueing, Therapy schedule, Bed/chair alarm, Seat belt  Executive Functioning / Organization Initial:     Executive Functioning / Organization Current: 3 - Moderate Assistance     Executive Functioning Desciption:  Pt independent prior.   Swallowing  Swallowing Status: Pt advanced to regular/thins as of 7/25/19. No further dysphagia intervention warranted at this time. Pt will require set up A secondary to inability to use LUE at this time.   Orders Placed This Encounter   Procedures   • Diet Order Diabetic     Standing Status:   Standing     Number of Occurrences:   1     Order Specific Question:   Diet:     Answer:   Diabetic [3]     Order Specific Question:   Consistency/Fluid modifications:     Answer:   Thin Liquids [3]     Comments:   straws ok     Behavior Modification Plan  Allow for rest time, Give clear feedback, Set clear goals, Provide reasonable choices, Decrease the chance of failure by offering activities that are within the patient's abilities and Analyze tasks (break down into smaller steps)  Assistive Technology  Low/Impaired vision equipment and Low tech: Calendar, planner, schedule, alarms/timers, pill  organizer, post-it notes, lists  Family Training/Education:  To be completed  DC Recommendations: TBD  Strengths:  Able to follow instructions, Alert and oriented, Effective communication skills, Independent PLOF, Making steady progress towards goals, Pleasant and cooperative, Supportive family and Willingly participates in therapeutic activities  Barriers:  Decreased endurance, Generalized weakness, Poor activity tolerance and Other: STM, complex proble solving  # of short term goals set=3  # of short term goals met=3 (2 new goals added)        Speech Therapy Problems           Problem: Speech/Swallowing LTGs     Dates: Start: 07/23/19       Goal: LTG-By discharge, patient will safely swallow     Dates: Start: 07/23/19       Description: 1) Individualized goal:  regular diet textures and thin liquids for safe return to PLOF   2) Interventions:  SLP Swallowing Dysfunction Treatment, SLP Oral Pharyngeal Evaluation, SLP Cognitive Skill Development and SLP Group Treatment             Goal: LTG-By discharge, patient will solve complex problem     Dates: Start: 07/23/19       Description: 1) Individualized goal: By utilizing compensatory intervention for memory and problem-solving to allow for safe completion of daily activities with SPV in order to prepare for safe d/c home.   2) Interventions:  SLP Cognitive Skill Development and SLP Group Treatment                    Section completed by:  Lala Pavon MS,[facility]-SLP          REHAB-Pharmacy IDT Team Note by Johny Nicholas RPH at 7/24/2019  4:58 PM  Version 1 of 1    Author:  Johny Nicholas RPH Service:  (none) Author Type:  Pharmacist    Filed:  7/24/2019  4:59 PM Date of Service:  7/24/2019  4:58 PM Status:  Signed    :  Johny Nicholas RPH (Pharmacist)         Pharmacy   Pharmacy  Antibiotics/Antifungals/Antivirals:  Medication:      Active Orders     None        Route:         None  Stop Date:  N/A  Reason:    Antihypertensives/Cardiac:  Medication:    Orders (72h ago through future)    Start     Ordered    07/25/19 0900  benazepril (LOTENSIN) tablet 30 mg  DAILY     Comments:  Per P&T IV to PO Protocol    07/24/19 1524    07/24/19 0900  benazepril (LOTENSIN) tablet 40 mg  DAILY,   Status:  Discontinued     Comments:  Per P&T IV to PO Protocol    07/23/19 1038    07/23/19 1730  carvedilol (COREG) tablet 6.25 mg  2 TIMES DAILY WITH MEALS     Comments:  Per P&T IV to PO Protocol    07/23/19 1038    07/23/19 1039  hydrALAZINE (APRESOLINE) tablet 25 mg  EVERY 8 HOURS PRN      07/23/19 1039        Patient Vitals for the past 24 hrs:   BP Pulse   07/24/19 1432 125/75 84   07/24/19 1222 116/45 67   07/24/19 1001 (!) 69/25 -   07/24/19 0948 - 70   07/24/19 0656 123/49 67   07/23/19 1910 102/54 76   07/23/19 1721 110/61 76       Anticoagulation:  Medication:  lovenox  INR:      Other key medications:       A review of the medication list reveals no issues at this time. Patient is currently on antihypertensive(s). Recommend home blood pressure monitoring/recording if antihypertensive(s) regimen(s) continue. Patient is currently on diabetic medication(s) and/or Insulin(s). Recommend home blood glucose monitoring/recording if these regimen(s) continue    Section completed by:  Johny Nicholas RPH[WR.1]        Attribution Delaney     WR.1 - Johny Nicholas RPH on 7/24/2019  4:58 PM                      DC Planning    DC destination/dispostion:  Home w/  to [place].    Referrals: TBD.    DC Needs: DME for patient safety & mobility. Family training. HH vs OP therapy. MD f/u appts.    Barriers to discharge: Functional mobility deficits. Pain mgmt.    Strengths: Motivation. PLOF: independent. Supportive family.    Section completed by:  Annalisa Taveras R.N.    Physician Summary  HARRY Matias MD  participated and led team conference discussion.

## 2019-07-25 NOTE — REHAB-RESPIRATORY IDT TEAM NOTE
Respiratory Therapy   Respiratory Therapy   Oxygen has been weaned to 1 LPM.  Anticipate weaning to room air soon.  Section completed by:  Clark Rojas, RRT

## 2019-07-25 NOTE — PROGRESS NOTES
Received shift report from night RN and assumed patient care. Patient is awake, alert and oriented. Verbalizes pain to L arm 4/10. Reinforced safety precautions to patient, will continue to monitor.

## 2019-07-25 NOTE — CARE PLAN
Problem: Pain Management  Goal: Pain level will decrease to patient's comfort goal  Outcome: PROGRESSING AS EXPECTED  Pt reports pain to left arm. Worse with movement. Medicated with PRN roxicodone as requested. Pt educated to notify staff of further changes in pain.    Problem: Respiratory:  Goal: Respiratory status will improve  Outcome: PROGRESSING AS EXPECTED  Pt is saturating >90% on 1L O2. No s/s of respiratory distress or labored breathing noted.

## 2019-07-25 NOTE — REHAB-NURSING IDT TEAM NOTE
Nursing   Nursing  Diet/Nutrition:  Diabetic, Dysphagia III-Mechanical Soft and Thin Liquids  Medication Administration:  Whole with Liquid Wash  % consumed at meals in last 24 hours:  Consumed 25-50% of meals per documentation.  Meal/Snack Consumption for the past 24 hrs:   Oral Nutrition   07/24/19 0900 Breakfast;Between 25-50% Consumed       Snack schedule:  None  Supplement:  Other: N/A  Appetite:  Fair  Fluid Intake/Output in past 24 hours: In: 590 [P.O.:590]  Out: -   Admit Weight:  Weight: (!) 139.5 kg (307 lb 8 oz)  Weight Last 7 Days   [139.5 kg (307 lb 8 oz)] 139.5 kg (307 lb 8 oz) (07/23 1038)    Pain Issues:    Location:  Arm (07/24 0745)  Left (07/24 0745)         Severity:  Moderate   Scheduled pain medications:  None     PRN pain medications used in last 24 hours:  oxycodone immediate release (ROXICODONE)    Non Pharmacologic Interventions:  relaxation, repositioned and rest  Effectiveness of pain management plan:  good=patient states acceptable comfort after interventions    Bowel:    Bowel Assist Initial Score:  4 - Minimal Assistance  Bowel Assist Current Score:  4 - Minimal Assistance  Bowl Accidents in last 7 days:     Last bowel movement: 07/24/19  Stool Description: Formed, Brown     Usual bowel pattern:  daily  Scheduled bowel medications:  senna-docusate (PERICOLACE or SENOKOT S)   PRN bowel medications used in last 24 hours:  None  Nursing Interventions:  Increased time, Scheduled medication, Supervision  Effectiveness of bowel program:   good=regular, predictable, controlled emptying of bowel  Bladder:    Bladder Assist Initial Score:  4 - Minimal Assistance  Bladder Assist Current Score:  4 - Minimal Assistance  Bladder Accidents in last 7 days:     PVR range for past 24-48 hours:  [28-63]  ()  Intermittent Catheterization:  N/A  Medications affecting bladder:  None    Time void schedule/voiding pattern:  Voiding every 2-4 hours  Interventions:  Increased time, Verbal cueing,  Supervision  Effectiveness of bladder training:  Good=regular, predictable, emptying of bladder, patient initiates time voiding      Diabetes:  Blood Sugar Frequency:  Other Q 6 hours    Range of BS for last 48 hours:   Recent Labs      07/23/19   0853  07/23/19   1115  07/23/19   1700  07/24/19   0002  07/24/19   0632  07/24/19   1158  07/24/19   1700  07/24/19   2328   POCGLUCOSE  195*  199*  202*  162*  100*  176*  153*  166*     Scheduled diabetic medications:  metformin (GLUCOPHAGE)  and insulin glargine (LANTUS) injection   Sliding scale usage in past 24 hours:  Yes  Total Short acting insulin in the past 24 hours:  Humulin 10 units  Total Long acting insulin in the past 24 hours:  Lantus 32 units    Wound:         Patient Lines/Drains/Airways Status    Active Current Wounds     Name: Placement date: Placement time: Site: Days:    Wound 07/23/19 Full Thickness Wound Elbow full thickness wound posterior left elbow 07/23/19   1809      1    Incision Left Chest        Incision Left Arm        Wound 07/23/19 Sacrum 07/23/19   1843      1                   Interventions:  Left elbow daily dressing change, debrided by MD on 7/24. Staples removed from incisions, Assess q shift  Effectiveness of intervention:  wound is improving     Sleep/wake cycle:   Average hours slept:  Sleeps 4-6 hours without waking  Sleep medication usage:  None    Patient/Family Training/Education:  Diabetes Management, Diet/Nutrition, General Self Care, Pain Management, Safe Transfers, Safety, Skin Care and Wound Care  Discharge Supply Recommendations:  Glucometer and Strips and Wound Care Supplies  Strengths: Alert and oriented, Willingly participates in therapeutic activities, Able to follow instructions, Pleasant and cooperative, Effective communication skills and Motivated for self care and independence   Barriers:   Generalized weakness and Limited mobility            Nursing Problems           Problem: Bowel/Gastric:     Goal: Normal  bowel function is maintained or improved           Goal: Will not experience complications related to bowel motility             Problem: Communication     Goal: The ability to communicate needs accurately and effectively will improve             Problem: Discharge Barriers/Planning     Goal: Patient's continuum of care needs will be met             Problem: Infection     Goal: Will remain free from infection             Problem: Knowledge Deficit     Goal: Knowledge of disease process/condition, treatment plan, diagnostic tests, and medications will improve           Goal: Knowledge of the prescribed therapeutic regimen will improve             Problem: Pain Management     Goal: Pain level will decrease to patient's comfort goal             Problem: Respiratory:     Goal: Respiratory status will improve             Problem: Safety     Goal: Will remain free from injury           Goal: Will remain free from falls             Problem: Skin Integrity     Goal: Risk for impaired skin integrity will decrease             Problem: Venous Thromboembolism (VTW)/Deep Vein Thrombosis (DVT) Prevention:     Goal: Patient will participate in Venous Thrombosis (VTE)/Deep Vein Thrombosis (DVT)Prevention Measures                  Long Term Goals:   At discharge patient will be able to function safely at home and in the community with support.    Section completed by:  Jasmine Kumar R.N.       Read and reviewed by: Princess Shari R.N.

## 2019-07-25 NOTE — REHAB-PT IDT TEAM NOTE
"Physical Therapy   Mobility  Bed mobility:   Mod A  Bed /Chair/Wheelchair Transfer Initial:  4 - Minimal Assistance  Bed /Chair/Wheelchair Transfer Current:  3 - Moderate Assistance   Bed/Chair/Wheelchair Transfer Description:  Increased time, Set-up of equipment, Assist with two limbs (bed <> WC, mod A for sit > supine, CGA for remainder of SPT without AD)  Walk Initial:  0 - Not tested,unsafe activity  Walk Current:  0 - Not tested,unsafe activity   Walk Description:     Wheelchair Initial:  1 - Total Assistance  Wheelchair Current:  1 - Total Assistance   Wheelchair Description:  Extra time, Verbal cueing, Supervision for safety (10 ft SBA, using BLEs)  Stairs Initial:  0 - Not tested,unsafe activity  Stairs Current: 0 - Not tested,unsafe activity   Stairs Description:    Patient/Family Training/Education:  TBD  DME/DC Recommendations:  TBD  Strengths:  Independent PLOF, Motivated for self care and independence, Pleasant and cooperative and Willingly participates in therapeutic activities  Barriers:   Orthostatic hypotension, Poor activity tolerance and Other: multiple fxs, non-wb LUE, LUE wounds  # of short term goals set= 3  # of short term goals met=0 (eval completed 7/24)       Physical Therapy Problems           Problem: Mobility     Dates: Start: 07/24/19       Goal: STG-Within one week, patient will ambulate household distance     Dates: Start: 07/24/19       Description: 1) Individualized goal:  50' HHA  2) Interventions:  PT Group Therapy, PT Gait Training, PT Therapeutic Exercises, PT TENS Application, PT Neuro Re-Ed/Balance, PT Therapeutic Activity and PT Manual Therapy       Note:     Goal Note filed on 07/25/19 0824 by Gina Jasmine, Student    Goal: STG-Within one week, patient will ambulate household distance  Outcome: NOT MET  Eval 7/24                Goal: STG-Within one week, patient will ambulate up/down a curb     Dates: Start: 07/24/19       Description: 1) Individualized goal:  4\" curb, " "HHA  2) Interventions:   PT Group Therapy, PT Gait Training, PT Therapeutic Exercises, PT TENS Application, PT Neuro Re-Ed/Balance, PT Therapeutic Activity and PT Manual Therapy        Note:     Goal Note filed on 07/25/19 0824 by Gina Jasmine, Student    Goal: STG-Within one week, patient will ambulate up/down a curb  Outcome: NOT MET  Eval 7/24                  Problem: Mobility Transfers     Dates: Start: 07/24/19       Goal: STG-Within one week, patient will move supine to sit     Dates: Start: 07/24/19       Description: 1) Individualized goal:  SBA with HOB elevated 30 degrees, supine <> sit   2) Interventions:   PT Group Therapy, PT Gait Training, PT Therapeutic Exercises, PT TENS Application, PT Neuro Re-Ed/Balance, PT Therapeutic Activity and PT Manual Therapy        Note:     Goal Note filed on 07/25/19 0824 by Gina Jasmine, Student    Goal: STG-Within one week, patient will move supine to sit  Outcome: NOT MET  Eval 7/24                  Problem: PT-Long Term Goals     Dates: Start: 07/24/19       Goal: LTG-By discharge, patient will ambulate     Dates: Start: 07/24/19       Description: 1) Individualized goal:  250' mod I, no AD  2) Interventions:   PT Group Therapy, PT Gait Training, PT Therapeutic Exercises, PT TENS Application, PT Neuro Re-Ed/Balance, PT Therapeutic Activity and PT Manual Therapy              Goal: LTG-By discharge, patient will transfer one surface to another     Dates: Start: 07/24/19       Description: 1) Individualized goal:  Bed <> WC, mod I, no AD  2) Interventions:   PT Group Therapy, PT Gait Training, PT Therapeutic Exercises, PT TENS Application, PT Neuro Re-Ed/Balance, PT Therapeutic Activity and PT Manual Therapy             Goal: LTG-By discharge, patient will ambulate up/down 4-6 stairs     Dates: Start: 07/24/19       Description: 1) Individualized goal:  4 6\" steps, R HR, mod I  2) Interventions:   PT Group Therapy, PT Gait Training, PT Therapeutic Exercises, PT " TENS Application, PT Neuro Re-Ed/Balance, PT Therapeutic Activity and PT Manual Therapy               Goal: LTG-By discharge, patient will transfer in/out of a car     Dates: Start: 07/24/19       Description: 1) Individualized goal:  SPV, no AD  2) Interventions:   PT Group Therapy, PT Gait Training, PT Therapeutic Exercises, PT TENS Application, PT Neuro Re-Ed/Balance, PT Therapeutic Activity and PT Manual Therapy                     Section completed by:  Gina Jasmine, Student; Christy Schaffer, PT, DPT

## 2019-07-25 NOTE — CARE PLAN
Problem: Pain Management  Goal: Pain level will decrease to patient's comfort goal  Pt verbalized pain to her LUE has been a lot better since yesterday after staples removal and debridement. Sling is on.    Problem: Skin Integrity  Goal: Risk for impaired skin integrity will decrease  Skin under C-collar checked every shift. Skin is intact, some bruising noted from the previous trauma.     Problem: Respiratory:  Goal: Respiratory status will improve  Pt continues on oxygen at 1 lpm via n/c. No respiratory distress noted to pt.

## 2019-07-26 LAB
ALBUMIN SERPL BCP-MCNC: 3.2 G/DL (ref 3.2–4.9)
ALBUMIN/GLOB SERPL: 0.9 G/DL
ALP SERPL-CCNC: 134 U/L (ref 30–99)
ALT SERPL-CCNC: 22 U/L (ref 2–50)
ANION GAP SERPL CALC-SCNC: 8 MMOL/L (ref 0–11.9)
AST SERPL-CCNC: 23 U/L (ref 12–45)
BILIRUB SERPL-MCNC: 1.3 MG/DL (ref 0.1–1.5)
BUN SERPL-MCNC: 31 MG/DL (ref 8–22)
CALCIUM SERPL-MCNC: 8.8 MG/DL (ref 8.5–10.5)
CHLORIDE SERPL-SCNC: 102 MMOL/L (ref 96–112)
CO2 SERPL-SCNC: 24 MMOL/L (ref 20–33)
CREAT SERPL-MCNC: 0.98 MG/DL (ref 0.5–1.4)
ERYTHROCYTE [DISTWIDTH] IN BLOOD BY AUTOMATED COUNT: 55.9 FL (ref 35.9–50)
GLOBULIN SER CALC-MCNC: 3.4 G/DL (ref 1.9–3.5)
GLUCOSE BLD-MCNC: 105 MG/DL (ref 65–99)
GLUCOSE BLD-MCNC: 132 MG/DL (ref 65–99)
GLUCOSE BLD-MCNC: 170 MG/DL (ref 65–99)
GLUCOSE BLD-MCNC: 80 MG/DL (ref 65–99)
GLUCOSE SERPL-MCNC: 90 MG/DL (ref 65–99)
HCT VFR BLD AUTO: 36.8 % (ref 37–47)
HGB BLD-MCNC: 11.1 G/DL (ref 12–16)
MCH RBC QN AUTO: 30.7 PG (ref 27–33)
MCHC RBC AUTO-ENTMCNC: 30.2 G/DL (ref 33.6–35)
MCV RBC AUTO: 101.9 FL (ref 81.4–97.8)
PLATELET # BLD AUTO: 285 K/UL (ref 164–446)
PMV BLD AUTO: 11 FL (ref 9–12.9)
POTASSIUM SERPL-SCNC: 4.2 MMOL/L (ref 3.6–5.5)
PROT SERPL-MCNC: 6.6 G/DL (ref 6–8.2)
RBC # BLD AUTO: 3.61 M/UL (ref 4.2–5.4)
SODIUM SERPL-SCNC: 134 MMOL/L (ref 135–145)
WBC # BLD AUTO: 10.6 K/UL (ref 4.8–10.8)

## 2019-07-26 PROCEDURE — 82962 GLUCOSE BLOOD TEST: CPT | Mod: 91

## 2019-07-26 PROCEDURE — 97530 THERAPEUTIC ACTIVITIES: CPT

## 2019-07-26 PROCEDURE — 700102 HCHG RX REV CODE 250 W/ 637 OVERRIDE(OP): Performed by: HOSPITALIST

## 2019-07-26 PROCEDURE — 700102 HCHG RX REV CODE 250 W/ 637 OVERRIDE(OP): Performed by: PHYSICAL MEDICINE & REHABILITATION

## 2019-07-26 PROCEDURE — 94760 N-INVAS EAR/PLS OXIMETRY 1: CPT

## 2019-07-26 PROCEDURE — 80053 COMPREHEN METABOLIC PANEL: CPT

## 2019-07-26 PROCEDURE — A9270 NON-COVERED ITEM OR SERVICE: HCPCS | Performed by: HOSPITALIST

## 2019-07-26 PROCEDURE — 97110 THERAPEUTIC EXERCISES: CPT

## 2019-07-26 PROCEDURE — 770010 HCHG ROOM/CARE - REHAB SEMI PRIVAT*

## 2019-07-26 PROCEDURE — G0515 COGNITIVE SKILLS DEVELOPMENT: HCPCS

## 2019-07-26 PROCEDURE — 99232 SBSQ HOSP IP/OBS MODERATE 35: CPT | Performed by: HOSPITALIST

## 2019-07-26 PROCEDURE — 99232 SBSQ HOSP IP/OBS MODERATE 35: CPT | Performed by: PHYSICAL MEDICINE & REHABILITATION

## 2019-07-26 PROCEDURE — 700111 HCHG RX REV CODE 636 W/ 250 OVERRIDE (IP): Performed by: PHYSICAL MEDICINE & REHABILITATION

## 2019-07-26 PROCEDURE — A9270 NON-COVERED ITEM OR SERVICE: HCPCS | Performed by: PHYSICAL MEDICINE & REHABILITATION

## 2019-07-26 PROCEDURE — 85027 COMPLETE CBC AUTOMATED: CPT

## 2019-07-26 PROCEDURE — 36415 COLL VENOUS BLD VENIPUNCTURE: CPT

## 2019-07-26 RX ADMIN — NYSTATIN: 100000 POWDER TOPICAL at 21:52

## 2019-07-26 RX ADMIN — SENNOSIDES, DOCUSATE SODIUM 2 TABLET: 50; 8.6 TABLET, FILM COATED ORAL at 09:12

## 2019-07-26 RX ADMIN — BACITRACIN ZINC AND POLYMYXIN B SULFATE: at 16:35

## 2019-07-26 RX ADMIN — BACITRACIN ZINC AND POLYMYXIN B SULFATE: at 09:14

## 2019-07-26 RX ADMIN — SENNOSIDES, DOCUSATE SODIUM 2 TABLET: 50; 8.6 TABLET, FILM COATED ORAL at 21:47

## 2019-07-26 RX ADMIN — LEVOTHYROXINE SODIUM 75 MCG: 75 TABLET ORAL at 05:40

## 2019-07-26 RX ADMIN — METFORMIN HYDROCHLORIDE 1000 MG: 500 TABLET, EXTENDED RELEASE ORAL at 09:13

## 2019-07-26 RX ADMIN — LEVOTHYROXINE SODIUM 125 MCG: 125 TABLET ORAL at 05:40

## 2019-07-26 RX ADMIN — METFORMIN HYDROCHLORIDE 1000 MG: 500 TABLET, EXTENDED RELEASE ORAL at 21:47

## 2019-07-26 RX ADMIN — NYSTATIN: 100000 POWDER TOPICAL at 09:14

## 2019-07-26 RX ADMIN — INSULIN GLARGINE 32 UNITS: 100 INJECTION, SOLUTION SUBCUTANEOUS at 21:50

## 2019-07-26 RX ADMIN — VITAMIN D, TAB 1000IU (100/BT) 2000 UNITS: 25 TAB at 09:13

## 2019-07-26 RX ADMIN — BACITRACIN ZINC AND POLYMYXIN B SULFATE: at 21:52

## 2019-07-26 RX ADMIN — CARVEDILOL 6.25 MG: 3.12 TABLET, FILM COATED ORAL at 16:35

## 2019-07-26 RX ADMIN — OMEPRAZOLE 20 MG: 20 CAPSULE, DELAYED RELEASE ORAL at 09:13

## 2019-07-26 RX ADMIN — ENOXAPARIN SODIUM 40 MG: 100 INJECTION SUBCUTANEOUS at 21:47

## 2019-07-26 RX ADMIN — ENOXAPARIN SODIUM 40 MG: 100 INJECTION SUBCUTANEOUS at 09:13

## 2019-07-26 RX ADMIN — BENAZEPRIL HYDROCHLORIDE 30 MG: 10 TABLET, COATED ORAL at 09:13

## 2019-07-26 RX ADMIN — CARVEDILOL 6.25 MG: 3.12 TABLET, FILM COATED ORAL at 09:12

## 2019-07-26 RX ADMIN — INSULIN GLARGINE 32 UNITS: 100 INJECTION, SOLUTION SUBCUTANEOUS at 09:14

## 2019-07-26 RX ADMIN — OXYCODONE HYDROCHLORIDE 10 MG: 10 TABLET ORAL at 16:34

## 2019-07-26 ASSESSMENT — ENCOUNTER SYMPTOMS
VOMITING: 0
COUGH: 0
EYES NEGATIVE: 1
PALPITATIONS: 0
ABDOMINAL PAIN: 0
SHORTNESS OF BREATH: 0
NAUSEA: 0
FEVER: 0
CHILLS: 0

## 2019-07-26 NOTE — THERAPY
Occupational Therapy  Daily Treatment     Patient Name: Maira Dodd  Age:  73 y.o., Sex:  female  Medical Record #: [id number]  Today's Date: 7/26/2019     Precautions  Precautions: Fall Risk, Non Weight Bearing Left Upper Extremity, Sling Left Upper Extremity, Cervical Collar    Comments: orthostatic hypotension with standing, cervical px, rib fx, LUE wounds, room air trials, sling PRN, PROM and forearm movement of LUE ok    Safety   Comments: education on and practice with use of sock aid and dressing stick to doff and don socks requires set up with sock aid and min A to adjust socks see FIM. Additional education on potential use of reacher to retrieve LB clothing and adjust.    Subjective    Pt reports recently orthostatic and refuses to sit EOB or attempt standing.      Objective       07/26/19 1401   Precautions   Precautions Fall Risk;Non Weight Bearing Left Upper Extremity;Sling Left Upper Extremity;Cervical Collar     Comments orthostatic hypotension with standing, cervical px, rib fx, LUE wounds, room air trials, sling PRN, PROM and forearm movement of LUE ok   Supine Upper Body Exercises   Front Arm Raise 3 sets of 15;Right;Weight (See Comments for lbs)  (4lb)   Bicep Curl 3 sets of 15;Right;Weight (See Comments for lbs)  (4lb)   Other Exercise side arm raise 4 sets of 15, 4lb. AROM of LUE elbow flex/ext 2 sets of 10.   Comments intermittent cuing to slow movements to elicit mm strengthening    Supine Lower Body Exercise   Short Arc Quad 1 set of 15;Right ;Left   Interdisciplinary Plan of Care Collaboration   IDT Collaboration with  Other (See Comments);Speech Therapist  (Supervisor)   Patient Position at End of Therapy In Bed;Tray Table within Reach;Call Light within Reach;Other (Comments)  (w/ST)   Collaboration Comments Supervisor to ask about ordering smaller collar. Transitioned care to ST   OT Total Time Spent   OT Individual Total Time Spent (Mins) 30   OT Charge Group   OT Therapeutic  Exercise  2           Assessment    Pt cont to be limited by pain and orthostatic hypotension; however, cont to be pleasant and cooperative throughout tx sessions. Demonstrates ability to tolerate LUE AROM of elbow but unable to perform PROM due to pain from recent wound debridement. C-collar too small for pt. Pt requests recliner chair to be able to sit without sig inc in pain.     Plan    General strengthening, ADL re-training, and functional mobility. LUE PROM and forearm/hand AROM as tolerated. Follow up with photos from spouse for further bathroom DME recommendations. Cont education with AE on LB dressing. Hot packs for pain. Smaller c-collar, recliner in RM to inc sit tolerance and strength.

## 2019-07-26 NOTE — THERAPY
Speech Language Pathology  Daily Treatment     Patient Name: Maira Dodd  Age:  73 y.o., Sex:  female  Medical Record #: [id number]  Today's Date: 7/26/2019     Subjective    Pt reports episode of hypotension with PT, returned to bed during physical therapy. Pt agreeable to therapy at bedside. Continue to encourage pt to participate in chair to increase endurance..      Objective       07/26/19 1104   Cognition   Medication Management  Supervision (5)   Interdisciplinary Plan of Care Collaboration   IDT Collaboration with  Physical Therapist;Nursing   Patient Position at End of Therapy In Bed;Call Light within Reach;Tray Table within Reach;Phone within Reach   Collaboration Comments CLOF    SLP Total Time Spent   SLP Individual Total Time Spent (Mins) 30   Charge Group   SLP Cognitive Skill Development 2       FIM Eating Score:  5 - Standby Prompting/Supervision or Set-up  Eating Description:  Set-up of equipment or meal/tube feeding, Insert dentures    FIM Comprehension Score:  6 - Modified Independent  Comprehension Description:       FIM Expression Score:  6 - Modified Independent  Expression Description:       FIM Social Interaction Score:  5 - Stand-by Prompting/Supervision or Set-up  Social Interaction Description:       FIM Problem Solving Score:  5 - Stand-by Prompting/Supervision or Set-up  Problem Solving Description:       FIM Memory Score:  4 - Minimal Assistance  Memory Description:         Assessment    Initiated medication education, pt able to recall purpose of 90% of meds IND, med list created for pt reference and pt would benefit from fxl sorting task.     Plan    fxl med sorting, Medi-Cog, $mngt

## 2019-07-26 NOTE — CARE PLAN
Problem: Pain Management  Goal: Pain level will decrease to patient's comfort goal  Outcome: PROGRESSING AS EXPECTED  Pt reports pain to left arm, especially after dressing change. Medicated with PRN roxicodone with good relief. Arm supported with pillows while in bed.     Problem: Skin Integrity  Goal: Risk for impaired skin integrity will decrease  Outcome: PROGRESSING AS EXPECTED  Pt has redness and moisture noted to abdominal folds. Area cleansed and dried. Nystatin powder applied and interdry placed.

## 2019-07-26 NOTE — THERAPY
Speech Language Pathology  Daily Treatment     Patient Name: Maira Dodd  Age:  73 y.o., Sex:  female  Medical Record #: [id number]  Today's Date: 7/26/2019     Subjective  Patient in bed concluding OT session when approached for SLP session.  OT reports orthostatic and recent wound debridement.  Patient is motivated to work from bed.      Objective   07/26/19 1432   Cognition   Medication Management  Supervision (5)   Interdisciplinary Plan of Care Collaboration   IDT Collaboration with  Occupational Therapist   Patient Position at End of Therapy In Bed;Call Light within Reach;Tray Table within Reach;Phone within Reach;Bed Alarm On   Collaboration Comments w/ OT for bed positioning at session start.    SLP Total Time Spent   SLP Individual Total Time Spent (Mins) 30   Charge Group   SLP Cognitive Skill Development 2       FIM Eating Score:     Eating Description:       FIM Comprehension Score:  6 - Modified Independent  Comprehension Description:  Glasses    FIM Expression Score:  6 - Modified Independent  Expression Description:  Verbal cueing    FIM Social Interaction Score:  5 - Stand-by Prompting/Supervision or Set-up  Social Interaction Description:  Increased time, Verbal cues    FIM Problem Solving Score:  4 - Minimal Assistance  Problem Solving Description:  Verbal cueing, Bed/chair alarm, Therapy schedule, Increased time    FIM Memory Score:  4 - Minimal Assistance  Memory Description:  Verbal cueing, Bed/chair alarm, Therapy schedule      Assessment    Patient participated in 1:1 SLP session targeting functional medication management.  Patient demonstrated the ability to (free) recall medications, either by name or purpose, with 75% accuracy with spv for extra time.  Completed Medication Transfer Screen portion of the MEDI-COG with 4/5 with 1 point deducted for the final task.     Plan    Continue medication managementwith fxl med sorting, money management.

## 2019-07-26 NOTE — PROGRESS NOTES
"Rehab Progress Note     Encounter Date: 7/26/2019    CC: TBI, left arm wound, and left clavicular fracture    Interval Events (Subjective)  Patient sitting up in room. She reports her pain is controlled in her neck. Dr. Gaston evaluated the patient today and did small debridement per patient. She reports her pain is improved in her left arm. She reports therapy is going well. Therapy reports her C collar is very poorly fitting. Discussed speaking with prosthetics.     IDT Team Meeting 7/25/2019  DC/Disposition:  8/9/19    Objective:  VITAL SIGNS: /51 Comment: 144/65 in supine  Pulse 86   Temp 36.7 °C (98 °F) (Temporal)   Resp 16   Ht 1.702 m (5' 7\")   Wt (!) 139.5 kg (307 lb 8 oz)   SpO2 95%   BMI 48.16 kg/m²   Gen: NAD  Psych: Mood and affect appropriate  CV: RRR, no edema  Resp: CTAB, no upper airway sounds  Abd: NTND  Neuro: AOx4, following simple commands  Unchanged 7/25/19    Recent Results (from the past 72 hour(s))   ACCU-CHEK GLUCOSE    Collection Time: 07/23/19  5:00 PM   Result Value Ref Range    Glucose - Accu-Ck 202 (H) 65 - 99 mg/dL   ACCU-CHEK GLUCOSE    Collection Time: 07/24/19 12:02 AM   Result Value Ref Range    Glucose - Accu-Ck 162 (H) 65 - 99 mg/dL   CBC with Differential    Collection Time: 07/24/19  5:21 AM   Result Value Ref Range    WBC 10.8 4.8 - 10.8 K/uL    RBC 3.41 (L) 4.20 - 5.40 M/uL    Hemoglobin 10.5 (L) 12.0 - 16.0 g/dL    Hematocrit 34.3 (L) 37.0 - 47.0 %    .6 (H) 81.4 - 97.8 fL    MCH 30.8 27.0 - 33.0 pg    MCHC 30.6 (L) 33.6 - 35.0 g/dL    RDW 53.5 (H) 35.9 - 50.0 fL    Platelet Count 278 164 - 446 K/uL    MPV 10.8 9.0 - 12.9 fL    Neutrophils-Polys 57.30 44.00 - 72.00 %    Lymphocytes 32.20 22.00 - 41.00 %    Monocytes 7.70 0.00 - 13.40 %    Eosinophils 1.60 0.00 - 6.90 %    Basophils 0.50 0.00 - 1.80 %    Immature Granulocytes 0.70 0.00 - 0.90 %    Nucleated RBC 0.00 /100 WBC    Neutrophils (Absolute) 6.17 2.00 - 7.15 K/uL    Lymphs (Absolute) 3.47 1.00 - " 4.80 K/uL    Monos (Absolute) 0.83 0.00 - 0.85 K/uL    Eos (Absolute) 0.17 0.00 - 0.51 K/uL    Baso (Absolute) 0.05 0.00 - 0.12 K/uL    Immature Granulocytes (abs) 0.08 0.00 - 0.11 K/uL    NRBC (Absolute) 0.00 K/uL   Comp Metabolic Panel (CMP)    Collection Time: 07/24/19  5:21 AM   Result Value Ref Range    Sodium 137 135 - 145 mmol/L    Potassium 5.0 3.6 - 5.5 mmol/L    Chloride 104 96 - 112 mmol/L    Co2 27 20 - 33 mmol/L    Anion Gap 6.0 0.0 - 11.9    Glucose 121 (H) 65 - 99 mg/dL    Bun 35 (H) 8 - 22 mg/dL    Creatinine 1.14 0.50 - 1.40 mg/dL    Calcium 8.9 8.5 - 10.5 mg/dL    AST(SGOT) 24 12 - 45 U/L    ALT(SGPT) 26 2 - 50 U/L    Alkaline Phosphatase 116 (H) 30 - 99 U/L    Total Bilirubin 1.2 0.1 - 1.5 mg/dL    Albumin 3.1 (L) 3.2 - 4.9 g/dL    Total Protein 6.1 6.0 - 8.2 g/dL    Globulin 3.0 1.9 - 3.5 g/dL    A-G Ratio 1.0 g/dL   HEMOGLOBIN A1C    Collection Time: 07/24/19  5:21 AM   Result Value Ref Range    Glycohemoglobin 7.4 (H) 0.0 - 5.6 %    Est Avg Glucose 166 mg/dL   Lipid Profile (Lipid Panel)    Collection Time: 07/24/19  5:21 AM   Result Value Ref Range    Cholesterol,Tot 149 100 - 199 mg/dL    Triglycerides 135 0 - 149 mg/dL    HDL 29 (A) >=40 mg/dL    LDL 93 <100 mg/dL   TSH with Reflex to FT4    Collection Time: 07/24/19  5:21 AM   Result Value Ref Range    TSH 0.600 0.380 - 5.330 uIU/mL   Vitamin D, 25-hydroxy (blood)    Collection Time: 07/24/19  5:21 AM   Result Value Ref Range    25-Hydroxy   Vitamin D 25 13 (L) 30 - 100 ng/mL   ESTIMATED GFR    Collection Time: 07/24/19  5:21 AM   Result Value Ref Range    GFR If  57 (A) >60 mL/min/1.73 m 2    GFR If Non  47 (A) >60 mL/min/1.73 m 2   ACCU-CHEK GLUCOSE    Collection Time: 07/24/19  6:32 AM   Result Value Ref Range    Glucose - Accu-Ck 100 (H) 65 - 99 mg/dL   ACCU-CHEK GLUCOSE    Collection Time: 07/24/19 11:58 AM   Result Value Ref Range    Glucose - Accu-Ck 176 (H) 65 - 99 mg/dL   ACCU-CHEK GLUCOSE     Collection Time: 07/24/19  5:00 PM   Result Value Ref Range    Glucose - Accu-Ck 153 (H) 65 - 99 mg/dL   ACCU-CHEK GLUCOSE    Collection Time: 07/24/19 11:28 PM   Result Value Ref Range    Glucose - Accu-Ck 166 (H) 65 - 99 mg/dL   ACCU-CHEK GLUCOSE    Collection Time: 07/25/19  5:50 AM   Result Value Ref Range    Glucose - Accu-Ck 103 (H) 65 - 99 mg/dL   ACCU-CHEK GLUCOSE    Collection Time: 07/25/19 11:15 AM   Result Value Ref Range    Glucose - Accu-Ck 123 (H) 65 - 99 mg/dL   ACCU-CHEK GLUCOSE    Collection Time: 07/25/19  4:56 PM   Result Value Ref Range    Glucose - Accu-Ck 140 (H) 65 - 99 mg/dL   ACCU-CHEK GLUCOSE    Collection Time: 07/25/19  8:15 PM   Result Value Ref Range    Glucose - Accu-Ck 129 (H) 65 - 99 mg/dL   CBC WITHOUT DIFFERENTIAL    Collection Time: 07/26/19  5:39 AM   Result Value Ref Range    WBC 10.6 4.8 - 10.8 K/uL    RBC 3.61 (L) 4.20 - 5.40 M/uL    Hemoglobin 11.1 (L) 12.0 - 16.0 g/dL    Hematocrit 36.8 (L) 37.0 - 47.0 %    .9 (H) 81.4 - 97.8 fL    MCH 30.7 27.0 - 33.0 pg    MCHC 30.2 (L) 33.6 - 35.0 g/dL    RDW 55.9 (H) 35.9 - 50.0 fL    Platelet Count 285 164 - 446 K/uL    MPV 11.0 9.0 - 12.9 fL   Comp Metabolic Panel    Collection Time: 07/26/19  5:39 AM   Result Value Ref Range    Sodium 134 (L) 135 - 145 mmol/L    Potassium 4.2 3.6 - 5.5 mmol/L    Chloride 102 96 - 112 mmol/L    Co2 24 20 - 33 mmol/L    Anion Gap 8.0 0.0 - 11.9    Glucose 90 65 - 99 mg/dL    Bun 31 (H) 8 - 22 mg/dL    Creatinine 0.98 0.50 - 1.40 mg/dL    Calcium 8.8 8.5 - 10.5 mg/dL    AST(SGOT) 23 12 - 45 U/L    ALT(SGPT) 22 2 - 50 U/L    Alkaline Phosphatase 134 (H) 30 - 99 U/L    Total Bilirubin 1.3 0.1 - 1.5 mg/dL    Albumin 3.2 3.2 - 4.9 g/dL    Total Protein 6.6 6.0 - 8.2 g/dL    Globulin 3.4 1.9 - 3.5 g/dL    A-G Ratio 0.9 g/dL   ESTIMATED GFR    Collection Time: 07/26/19  5:39 AM   Result Value Ref Range    GFR If African American >60 >60 mL/min/1.73 m 2    GFR If Non  56 (A) >60  mL/min/1.73 m 2   ACCU-CHEK GLUCOSE    Collection Time: 07/26/19  7:43 AM   Result Value Ref Range    Glucose - Accu-Ck 80 65 - 99 mg/dL   ACCU-CHEK GLUCOSE    Collection Time: 07/26/19 11:14 AM   Result Value Ref Range    Glucose - Accu-Ck 132 (H) 65 - 99 mg/dL       Current Facility-Administered Medications   Medication Frequency   • insulin regular (HUMULIN R) injection 2-12 Units 4X/DAY ACHS    And   • glucose 4 g chewable tablet 16 g Q15 MIN PRN    And   • dextrose 50% (D50W) injection 50 mL Q15 MIN PRN   • insulin glargine (LANTUS) injection 32 Units BID   • benazepril (LOTENSIN) tablet 30 mg DAILY   • vitamin D (cholecalciferol) tablet 2,000 Units DAILY   • Respiratory Care per Protocol Continuous RT   • Pharmacy Consult Request ...Pain Management Review 1 Each PHARMACY TO DOSE   • oxyCODONE immediate-release (ROXICODONE) tablet 5 mg Q3HRS PRN   • oxyCODONE immediate release (ROXICODONE) tablet 10 mg Q3HRS PRN   • tramadol (ULTRAM) 50 MG tablet 50 mg Q4HRS PRN   • hydrALAZINE (APRESOLINE) tablet 25 mg Q8HRS PRN   • acetaminophen (TYLENOL) tablet 650 mg Q4HRS PRN   • senna-docusate (PERICOLACE or SENOKOT S) 8.6-50 MG per tablet 2 Tab BID    And   • polyethylene glycol/lytes (MIRALAX) PACKET 1 Packet QDAY PRN    And   • magnesium hydroxide (MILK OF MAGNESIA) suspension 30 mL QDAY PRN    And   • bisacodyl (DULCOLAX) suppository 10 mg QDAY PRN   • artificial tears 1.4 % ophthalmic solution 1 Drop PRN   • benzocaine-menthol (CEPACOL) lozenge 1 Lozenge Q2HRS PRN   • mag hydrox-al hydrox-simeth (MAALOX PLUS ES or MYLANTA DS) suspension 20 mL Q2HRS PRN   • ondansetron (ZOFRAN ODT) dispertab 4 mg 4X/DAY PRN    Or   • ondansetron (ZOFRAN) syringe/vial injection 4 mg 4X/DAY PRN   • traZODone (DESYREL) tablet 50 mg QHS PRN   • sodium chloride (OCEAN) 0.65 % nasal spray 2 Spray PRN   • bacitracin-polymyxin b (POLYSPORIN) 500-09861 UNIT/GM ointment TID   • carvedilol (COREG) tablet 6.25 mg BID WITH MEALS   • enoxaparin  (LOVENOX) inj 40 mg Q12HRS   • HYDROmorphone (DILAUDID) tablet 2-4 mg Q3HRS PRN   • metFORMIN ER (GLUCOPHAGE XR) tablet 1,000 mg BID   • nystatin (MYCOSTATIN) powder BID   • levothyroxine (SYNTHROID) tablet 125 mcg AM ES    And   • levothyroxine (SYNTHROID) tablet 75 mcg AM ES   • omeprazole (PRILOSEC) capsule 20 mg DAILY       Orders Placed This Encounter   Procedures   • Diet Order Diabetic     Standing Status:   Standing     Number of Occurrences:   1     Order Specific Question:   Diet:     Answer:   Diabetic [3]     Order Specific Question:   Consistency/Fluid modifications:     Answer:   Thin Liquids [3]     Comments:   straws ok       Assessment:  Active Hospital Problems    Diagnosis   • *Subarachnoid hemorrhage following injury, with loss of consciousness (HCC)   • Closed fracture of multiple ribs with flail chest   • UTI (urinary tract infection)   • DM (diabetes mellitus) ([facility])   • Fracture of left clavicle   • Morbid obesity with BMI of 45.0-49.9, adult ([facility])   • Multiple fractures of cervical spine ([facility])   • Dysphagia   • Laceration of left forearm   • Essential hypertension   • Hypothyroid   • Trauma   • Abnormal CT of the abdomen   • Vitamin D deficiency   • Renal insufficiency   • Macrocytic anemia   • Alkaline phosphatase elevation       Medical Decision Making and Plan:  TBI - Patient with left sided SAH with loss of consciousness and decreased cognition. Also limited by other orthopedic injuries  -Patient has completed Keppra for seizure prophylaxis.   -PT and OT for mobility and ADLs  -SLP for cognition      Dysphagia - Patient with oropharyngeal dysphagia. Upgraded to Dysphagia 2 with NTL prior to transfer.  SLP for swallow - Advanced diet to regular with thins. Resolved.      C/T Spine fractures - Patient with TP of C7 and T1 fractures in C collar for unknown length of time.    -Continue C collar. Will need follow-up with NSG  -Need to discuss with prosthetics new C collar as patient's poorly  fitting.      Left clavicular fracture - S/p ORIF with Dr. Gauthier on 7/14/19. Also with large left arm laceration  -Continue NWB LUE.  Remove staples on shoulder and forearm  -Wound consult - debridement on 7/24/19. Appreciate recommendations.      HTN - Patient on Benazepril 30 mg and Coreg 6.25 mg BID     Orthostatic hypotension - Severe orthostatics on 7/24/19, continue to monitor. ACEi has been reduced, improved     DM - Patient on Lantus 30 U BID, metformin 1000 mg BID and SSI on transfer. Previously on Trulicity, Metformin  -Will check A1c 7.4. Consult hospitalist, increased Lantus to 32      Hypothyroidism - Patient on 200 mcg on transfer.      Vitamin D deficiency - 12 on admission. Start on 2000 U daily    GI Ppx - Patient on Omeprazole while on dysphagia diet.      DVT Ppx - Patient on Lovenox on transfer.     Total time:  25 minutes.  I spent greater than 50% of the time for patient care, counseling, and coordination on this date, including unit/floor time, and face-to-face time with the patient as per interval events and assessment and plan above. Topics discussed included improved pain control, evaluated wound after debridement, and improved orthostatics after reduction in ACEi.    Vince Matias M.D.

## 2019-07-26 NOTE — PROGRESS NOTES
Received bedside report; assumed pt care. Pt A/O x 4, calm, and stable. Pt medicated for left arm pain. Pt resting comfortably in bed, visitors present, call light within reach when in room, safety precautions in place. Will continue to monitor.

## 2019-07-26 NOTE — DIETARY
NUTRITION SERVICES: BMI - Pt with BMI >40 (=Body mass index is 48.16 kg/m².), morbid obesity. Weight loss counseling not appropriate in acute care setting. RECOMMEND - Referral to outpatient nutrition services for weight management after D/C.

## 2019-07-26 NOTE — PROGRESS NOTES
Hospital Medicine Daily Progress Note      Chief Complaint  HTN, DM    Interval Problem Update  Arm swelling getting better everyday.    Review of Systems  Review of Systems   Constitutional: Negative for chills and fever.   HENT: Negative.    Eyes: Negative.    Respiratory: Negative for cough and shortness of breath.    Cardiovascular: Negative for chest pain and palpitations.   Gastrointestinal: Negative for abdominal pain, nausea and vomiting.   Genitourinary: Negative.    Musculoskeletal:        Wound pain   Skin: Negative for itching and rash.        Physical Exam  Temp:  [36.7 °C (98 °F)-36.7 °C (98.1 °F)] 36.7 °C (98 °F)  Pulse:  [86-91] 86  Resp:  [16-18] 16  BP: (111-141)/(51-70) 113/51  SpO2:  [92 %-95 %] 95 %    Physical Exam   Constitutional: She is oriented to person, place, and time. No distress.   HENT:   Head: Normocephalic and atraumatic.   Right Ear: External ear normal.   Left Ear: External ear normal.   Eyes: Conjunctivae and EOM are normal. Right eye exhibits no discharge. Left eye exhibits no discharge.   Neck: Normal range of motion. Neck supple. No tracheal deviation present.   Cardiovascular: Normal rate and regular rhythm.    Pulmonary/Chest: No stridor. No respiratory distress. She has no wheezes.   Decreased BS   Abdominal: Soft. Bowel sounds are normal. She exhibits no distension. There is no tenderness.   Musculoskeletal: She exhibits edema and tenderness.   LUE 1+ edema w/ ecchymoses   Neurological: She is alert and oriented to person, place, and time.   Skin: Skin is warm and dry. She is not diaphoretic.   Vitals reviewed.      Fluids    Intake/Output Summary (Last 24 hours) at 07/26/19 1621  Last data filed at 07/26/19 0900   Gross per 24 hour   Intake              600 ml   Output                0 ml   Net              600 ml       Laboratory  Recent Labs      07/24/19   0521  07/26/19   0539   WBC  10.8  10.6   RBC  3.41*  3.61*   HEMOGLOBIN  10.5*  11.1*   HEMATOCRIT  34.3*  36.8*    MCV  100.6*  101.9*   MCH  30.8  30.7   MCHC  30.6*  30.2*   RDW  53.5*  55.9*   PLATELETCT  278  285   MPV  10.8  11.0     Recent Labs      07/24/19   0521  07/26/19   0539   SODIUM  137  134*   POTASSIUM  5.0  4.2   CHLORIDE  104  102   CO2  27  24   GLUCOSE  121*  90   BUN  35*  31*   CREATININE  1.14  0.98   CALCIUM  8.9  8.8             Recent Labs      07/24/19   0521   TRIGLYCERIDE  135   HDL  29*   LDL  93           Assessment/Plan  * Subarachnoid hemorrhage following injury, with loss of consciousness (HCC)- (present on admission)   Assessment & Plan    Non-surgical management  Completed Keppra x 7 days for SZ proph     Closed fracture of multiple ribs with flail chest- (present on admission)   Assessment & Plan    S/P Left 3rd-7th rib fractures w/ resolved PTX  Aggressive pulmonary toilet, including IS     UTI (urinary tract infection)- (present on admission)   Assessment & Plan    UCx +Proteus  Completed Bactrim prior to Rehab admission     DM (diabetes mellitus) (HCC)- (present on admission)   Assessment & Plan    HbA1c 7.4  Good glucose control on current regimen  Continue Lantus, SSI, and Metformin     Fracture of left clavicle- (present on admission)   Assessment & Plan    S/P ORIF on 7/14/19 by Dr. Gauthier  NWB     Multiple fractures of cervical spine (HCC)- (present on admission)   Assessment & Plan    Non-surgical management  Cervical collar x 2 wks followed by F/U imaging     Alkaline phosphatase elevation   Assessment & Plan    Suspect 2/2 traumatic fractures  Work up further if worsens or develops biliary symptoms  Follow Alk Phos to resolution     Macrocytic anemia   Assessment & Plan    Check Vit B12 and Folate levels w/ next draw  Follow H/H     Renal insufficiency   Assessment & Plan    Azotemia improving w/ PO fluids  Decreased ACE-I for high trending K+     Vitamin D deficiency   Assessment & Plan    Vit D level 13  Continue supplementation     Laceration of left forearm- (present on  admission)   Assessment & Plan    S/P repair  Wound care     Abnormal CT of the abdomen- (present on admission)   Assessment & Plan    Incidental finding of left kidney lesion on Trauma work-up  Needs F/U imaging     Essential hypertension- (present on admission)   Assessment & Plan    Continue Coreg and Benazepril  Observe blood pressure trends     Hypothyroid- (present on admission)   Assessment & Plan    Euthyroid on Synthroid     Full Code

## 2019-07-26 NOTE — THERAPY
"Physical Therapy   Daily Treatment     Patient Name: Maira Dodd  Age:  73 y.o., Sex:  female  Medical Record #: [id number]  Today's Date: 7/26/2019     Precautions  Precautions: Fall Risk, Non Weight Bearing Left Upper Extremity, Sling Left Upper Extremity, Cervical Collar    Comments: orthostatic hypotension with standing, cervical px, rib fx, LUE wounds, room air trials, sling PRN    Subjective    \"I'm going to walk today!\"     Objective       07/26/19 0931   Precautions   Precautions Fall Risk;Non Weight Bearing Left Upper Extremity;Sling Left Upper Extremity;Cervical Collar     Comments orthostatic hypotension with standing, cervical px, rib fx, LUE wounds, room air trials, sling PRN   Vitals   Patient BP Position Standing 1 minute   Blood Pressure  113/51  (144/65 in supine)   Supine Lower Body Exercise   Bridges 1 set of 15  (pt unable to acheive full hip clearance off bed)   Hip Abduction 2 sets of 15  (2x15 supine, 1x15 hook-lying with ball)   Hip Adduction  2 sets of 15  (2x15 supine, 1x15 hook-lying with pink tband)   Short Arc Quad 1 set of 15   Heel Slide 2 sets of 15   Ankle Pumps 2 sets of 15   Interdisciplinary Plan of Care Collaboration   IDT Collaboration with  Nursing   Patient Position at End of Therapy In Bed;Call Light within Reach;Tray Table within Reach   Collaboration Comments RN made aware, L elbow wound leaking through bandage, assessed wound during PT session    PT Total Time Spent   PT Individual Total Time Spent (Mins) 60   PT Charge Group   PT Therapeutic Exercise 2   PT Therapeutic Activities 2       FIM Bed/Chair/Wheelchair Transfers Score: 4 - Minimal Assistance  Bed/Chair/Wheelchair Transfers Description:  Increased time, Set-up of equipment, Assist with one limb (Bed <> WC, min A supine <> sit, SBA for remainder of SPT without AD)    FIM Toilet Transfer Score:  5 - Standby Prompting/Supervision or Set-up  Toilet Transfer Description:  Grab bar, Increased time, Set-up of " equipment (WC <> toilet, SBA with grab bar)      Assessment    Pt continues to be limited by pain and orthostatic hypotension, very limited tolerance to upright positioning.     Plan    Increase OOB time, progress ambulation as able with orthostatic hypotension, seated and standing tolerance.

## 2019-07-26 NOTE — FLOWSHEET NOTE
07/26/19 0831   Events/Summary/Plan   Events/Summary/Plan O2 spot check, DC oxygen and IS per protocol,   Incentive Spirometry Group   Breathing Exercises Yes   Incentive Spirometer Volume 1750 mL  (Greater than 60% of predicted)   Chest Exam   Respiration 16   Pulse 86   Oximetry   #Pulse Oximetry (Single Determination) Yes   Oxygen   Home O2 Use Prior To Admission? No   Pulse Oximetry 95 %   O2 Daily Delivery Respiratory  Room Air with O2 Available

## 2019-07-27 ENCOUNTER — APPOINTMENT (OUTPATIENT)
Dept: RADIOLOGY | Facility: MEDICAL CENTER | Age: 74
DRG: 949 | End: 2019-07-27
Attending: HOSPITALIST
Payer: COMMERCIAL

## 2019-07-27 ENCOUNTER — APPOINTMENT (OUTPATIENT)
Dept: RADIOLOGY | Facility: REHABILITATION | Age: 74
DRG: 949 | End: 2019-07-27
Attending: HOSPITALIST
Payer: COMMERCIAL

## 2019-07-27 LAB
EKG IMPRESSION: NORMAL
GLUCOSE BLD-MCNC: 105 MG/DL (ref 65–99)
GLUCOSE BLD-MCNC: 123 MG/DL (ref 65–99)
GLUCOSE BLD-MCNC: 131 MG/DL (ref 65–99)
GLUCOSE BLD-MCNC: 151 MG/DL (ref 65–99)
GLUCOSE BLD-MCNC: 151 MG/DL (ref 65–99)
TROPONIN T SERPL-MCNC: 22 NG/L (ref 6–19)

## 2019-07-27 PROCEDURE — G0515 COGNITIVE SKILLS DEVELOPMENT: HCPCS

## 2019-07-27 PROCEDURE — 76705 ECHO EXAM OF ABDOMEN: CPT

## 2019-07-27 PROCEDURE — A9270 NON-COVERED ITEM OR SERVICE: HCPCS | Performed by: HOSPITALIST

## 2019-07-27 PROCEDURE — 93010 ELECTROCARDIOGRAM REPORT: CPT | Performed by: INTERNAL MEDICINE

## 2019-07-27 PROCEDURE — 700102 HCHG RX REV CODE 250 W/ 637 OVERRIDE(OP): Performed by: HOSPITALIST

## 2019-07-27 PROCEDURE — A9270 NON-COVERED ITEM OR SERVICE: HCPCS | Performed by: PHYSICAL MEDICINE & REHABILITATION

## 2019-07-27 PROCEDURE — 84484 ASSAY OF TROPONIN QUANT: CPT

## 2019-07-27 PROCEDURE — 700102 HCHG RX REV CODE 250 W/ 637 OVERRIDE(OP): Performed by: PHYSICAL MEDICINE & REHABILITATION

## 2019-07-27 PROCEDURE — 700111 HCHG RX REV CODE 636 W/ 250 OVERRIDE (IP): Performed by: PHYSICAL MEDICINE & REHABILITATION

## 2019-07-27 PROCEDURE — 82962 GLUCOSE BLOOD TEST: CPT | Mod: 91

## 2019-07-27 PROCEDURE — 99233 SBSQ HOSP IP/OBS HIGH 50: CPT | Performed by: HOSPITALIST

## 2019-07-27 PROCEDURE — 97530 THERAPEUTIC ACTIVITIES: CPT

## 2019-07-27 PROCEDURE — 97535 SELF CARE MNGMENT TRAINING: CPT

## 2019-07-27 PROCEDURE — 93005 ELECTROCARDIOGRAM TRACING: CPT | Performed by: HOSPITALIST

## 2019-07-27 PROCEDURE — 770010 HCHG ROOM/CARE - REHAB SEMI PRIVAT*

## 2019-07-27 RX ADMIN — BACITRACIN ZINC AND POLYMYXIN B SULFATE 1 EACH: at 21:49

## 2019-07-27 RX ADMIN — BACITRACIN ZINC AND POLYMYXIN B SULFATE: at 16:01

## 2019-07-27 RX ADMIN — LEVOTHYROXINE SODIUM 75 MCG: 75 TABLET ORAL at 05:44

## 2019-07-27 RX ADMIN — METFORMIN HYDROCHLORIDE 1000 MG: 500 TABLET, EXTENDED RELEASE ORAL at 21:44

## 2019-07-27 RX ADMIN — ONDANSETRON 4 MG: 4 TABLET, ORALLY DISINTEGRATING ORAL at 09:01

## 2019-07-27 RX ADMIN — OXYCODONE HYDROCHLORIDE 10 MG: 10 TABLET ORAL at 03:35

## 2019-07-27 RX ADMIN — NYSTATIN 1 UNITS: 100000 POWDER TOPICAL at 21:48

## 2019-07-27 RX ADMIN — NYSTATIN: 100000 POWDER TOPICAL at 09:03

## 2019-07-27 RX ADMIN — CARVEDILOL 6.25 MG: 3.12 TABLET, FILM COATED ORAL at 17:41

## 2019-07-27 RX ADMIN — BACITRACIN ZINC AND POLYMYXIN B SULFATE: at 09:03

## 2019-07-27 RX ADMIN — ENOXAPARIN SODIUM 40 MG: 100 INJECTION SUBCUTANEOUS at 21:47

## 2019-07-27 RX ADMIN — INSULIN GLARGINE 32 UNITS: 100 INJECTION, SOLUTION SUBCUTANEOUS at 09:03

## 2019-07-27 RX ADMIN — SENNOSIDES, DOCUSATE SODIUM 2 TABLET: 50; 8.6 TABLET, FILM COATED ORAL at 21:44

## 2019-07-27 RX ADMIN — HYDROMORPHONE HYDROCHLORIDE 4 MG: 2 TABLET ORAL at 08:00

## 2019-07-27 RX ADMIN — LEVOTHYROXINE SODIUM 125 MCG: 125 TABLET ORAL at 05:44

## 2019-07-27 RX ADMIN — INSULIN GLARGINE 32 UNITS: 100 INJECTION, SOLUTION SUBCUTANEOUS at 21:49

## 2019-07-27 ASSESSMENT — ENCOUNTER SYMPTOMS
VOMITING: 0
COUGH: 0
EYES NEGATIVE: 1
CHILLS: 0
SHORTNESS OF BREATH: 0
PALPITATIONS: 0
NAUSEA: 1
ABDOMINAL PAIN: 0
FEVER: 0

## 2019-07-27 NOTE — PROGRESS NOTES
Hospital Medicine Daily Progress Note      Chief Complaint  HTN, DM    Interval Problem Update  Pt c/o nausea and loss of appetite this morning.  No CP, SOB, or palpitations.    Review of Systems  Review of Systems   Constitutional: Negative for chills and fever.   HENT: Negative.    Eyes: Negative.    Respiratory: Negative for cough and shortness of breath.    Cardiovascular: Negative for chest pain and palpitations.   Gastrointestinal: Positive for nausea. Negative for abdominal pain and vomiting.   Genitourinary: Negative.    Musculoskeletal:        Wound pain   Skin: Negative for itching and rash.        Physical Exam  Temp:  [36.4 °C (97.5 °F)-36.7 °C (98 °F)] 36.4 °C (97.6 °F)  Pulse:  [58-88] 58  Resp:  [16-18] 16  BP: (118-140)/(49-68) 140/58  SpO2:  [91 %-98 %] 98 %    Physical Exam   Constitutional: She is oriented to person, place, and time. No distress.   HENT:   Head: Normocephalic and atraumatic.   Right Ear: External ear normal.   Left Ear: External ear normal.   Eyes: Conjunctivae and EOM are normal. Right eye exhibits no discharge. Left eye exhibits no discharge.   Neck: Normal range of motion. Neck supple. No tracheal deviation present.   Cardiovascular: Normal rate and regular rhythm.    Pulmonary/Chest: No stridor. No respiratory distress. She has no wheezes.   Decreased BS   Abdominal: Soft. Bowel sounds are normal. She exhibits no distension. There is no tenderness.   Musculoskeletal: She exhibits edema and tenderness.   LUE 1+ edema w/ ecchymoses   Neurological: She is alert and oriented to person, place, and time.   Skin: Skin is warm and dry. She is not diaphoretic.   Vitals reviewed.      Fluids    Intake/Output Summary (Last 24 hours) at 07/27/19 1505  Last data filed at 07/27/19 0835   Gross per 24 hour   Intake              360 ml   Output                1 ml   Net              359 ml       Laboratory  Recent Labs      07/26/19   0539   WBC  10.6   RBC  3.61*   HEMOGLOBIN  11.1*    HEMATOCRIT  36.8*   MCV  101.9*   MCH  30.7   MCHC  30.2*   RDW  55.9*   PLATELETCT  285   MPV  11.0     Recent Labs      07/26/19   0539   SODIUM  134*   POTASSIUM  4.2   CHLORIDE  102   CO2  24   GLUCOSE  90   BUN  31*   CREATININE  0.98   CALCIUM  8.8                       Assessment/Plan  * Subarachnoid hemorrhage following injury, with loss of consciousness (HCC)- (present on admission)   Assessment & Plan    Non-surgical management  Completed Keppra x 7 days for SZ proph     Closed fracture of multiple ribs with flail chest- (present on admission)   Assessment & Plan    S/P Left 3rd-7th rib fractures w/ resolved PTX  Aggressive pulmonary toilet, including IS     UTI (urinary tract infection)- (present on admission)   Assessment & Plan    UCx +Proteus  Completed Bactrim prior to Rehab admission     DM (diabetes mellitus) ([facility])- (present on admission)   Assessment & Plan    HbA1c 7.4  Good glucose control on current regimen  Continue Lantus, SSI, and Metformin     Fracture of left clavicle- (present on admission)   Assessment & Plan    S/P ORIF on 7/14/19 by Dr. Gauthier  NW     Multiple fractures of cervical spine ([facility])- (present on admission)   Assessment & Plan    Non-surgical management  Cervical collar x 2 wks followed by F/U imaging     Alkaline phosphatase elevation   Assessment & Plan    Suspect 2/2 traumatic fractures  But request U/S RUQ given new symptom of nausea  Will also R/O ACS     Macrocytic anemia   Assessment & Plan    Check Vit B12 and Folate levels w/ next draw  Follow H/H  F/U labs in am     Renal insufficiency   Assessment & Plan    Azotemia improving w/ PO fluids  Decreased ACE-I for high trending K+     Vitamin D deficiency   Assessment & Plan    Vit D level 13  Continue supplementation     Laceration of left forearm- (present on admission)   Assessment & Plan    S/P repair  Wound care     Abnormal CT of the abdomen- (present on admission)   Assessment & Plan    Incidental finding of  left kidney lesion on Trauma work-up  Needs F/U imaging     Essential hypertension- (present on admission)   Assessment & Plan    Continue Coreg and Benazepril  Observe blood pressure trends     Hypothyroid- (present on admission)   Assessment & Plan    Euthyroid on Synthroid     Full Code    Reviewed w/ Staff

## 2019-07-27 NOTE — THERAPY
"Occupational Therapy  Daily Treatment     Patient Name: Maira Dodd  Age:  73 y.o., Sex:  female  Medical Record #: [id number]  Today's Date: 7/27/2019     Precautions  Precautions: Fall Risk, Non Weight Bearing Left Upper Extremity, Cervical Collar  , Other (See Comments) (orthostatic )  Comments: orthostatic hypotension with standing, cervical px, rib fx, LUE wounds, room air trials, sling PRN, PROM and forearm movement of LUE ok    Safety   Comments: education on and practice with use of sock aid and dressing stick to doff and don socks requires set up with sock aid and min A to adjust socks see FIM. Additional education on potential use of reacher to retrieve LB clothing and adjust.    Subjective    Patient found with call light on, reported being dizzy and not well wanted to go to bed.      Objective       07/27/19 0931   Precautions   Precautions Fall Risk;Non Weight Bearing Left Upper Extremity;Cervical Collar  ;Other (See Comments)  (orthostatic )   Interdisciplinary Plan of Care Collaboration   IDT Collaboration with  Nursing   Patient Position at End of Therapy In Bed;Call Light within Reach  (Hand off to nursing)   Collaboration Comments CLOF, patient report nausea and \"not feeling right.\"    Therapy Missed   Missed Therapy (Minutes) 30   Reason For Missed Therapy Medical - Patient on Hold from Therapy   OT Total Time Spent   OT Individual Total Time Spent (Mins) 30   OT Charge Group   OT Self Care / ADL 1   OT Therapy Activity 1       Reviewed use of long handled reacher and sock aid. Adjustment for LUE.    FIM Bed/Chair/Wheelchair Transfers Score: 4 - Minimal Assistance  Bed/Chair/Wheelchair Transfers Description:  Increased time, Supervision for safety, Verbal cueing      Assessment    Patient session shortened due to not feeling well and limited by not feeling well. Patient feels best when supine.     Plan    Cont therapy to increase endurance and general strengthening, ADL re-training, and " functional mobility. LUE PROM and forearm/hand AROM as tolerated. Follow up with photos from spouse for further bathroom DME recommendations. Cont education with AE on LB dressing. Hot packs for pain. Smaller c-collar, recliner in RM to inc sit tolerance and strength.
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
## 2019-07-27 NOTE — THERAPY
Missed Therapy     Patient Name: Maira Dodd  Age:  73 y.o., Sex:  female  Medical Record #: [id number]  Today's Date: 7/27/2019    Discussed missed therapy with Dr. Nayak       07/27/19 1301   Therapy Missed   Missed Therapy (Minutes) 60   Reason For Missed Therapy Medical - Patient on Hold from Therapy

## 2019-07-27 NOTE — CARE PLAN
Problem: Safety  Goal: Will remain free from injury  Encourage to use call light for assist as needed. Verbally confirmed understanding. Bed in low position, call light within reach.    Problem: Pain Management  Goal: Pain level will decrease to patient's comfort goal  No c/o pain, no request for PRN pain med at HS. Resting in bed, eyes closed, resp even, unlabored.

## 2019-07-27 NOTE — PROGRESS NOTES
Pt resting in bed, requested to go back to bed, refused therapy session, had reported feeling nauseated, med given OTD as per order, pt refused other morning meds, reports she would not be able to keep them down. No episodes of emesis at this time. FSBG taken 131, pt unable to stay awake , pt falls asleep while staff is talking with her and following orders for lab draw. Pt easy to rouse. Lab drawn per order, Charge RN and RN in room, MD updated. Will continue to monitor.

## 2019-07-28 PROBLEM — D69.6 THROMBOCYTOPENIA (HCC): Status: ACTIVE | Noted: 2019-07-28

## 2019-07-28 LAB
ALBUMIN SERPL BCP-MCNC: 3 G/DL (ref 3.2–4.9)
ALBUMIN/GLOB SERPL: 1 G/DL
ALP SERPL-CCNC: 134 U/L (ref 30–99)
ALT SERPL-CCNC: 20 U/L (ref 2–50)
ANION GAP SERPL CALC-SCNC: 10 MMOL/L (ref 0–11.9)
AST SERPL-CCNC: 31 U/L (ref 12–45)
BASOPHILS # BLD AUTO: 1.2 % (ref 0–1.8)
BASOPHILS # BLD: 0.08 K/UL (ref 0–0.12)
BILIRUB SERPL-MCNC: 1 MG/DL (ref 0.1–1.5)
BUN SERPL-MCNC: 20 MG/DL (ref 8–22)
CALCIUM SERPL-MCNC: 8.1 MG/DL (ref 8.5–10.5)
CHLORIDE SERPL-SCNC: 107 MMOL/L (ref 96–112)
CO2 SERPL-SCNC: 19 MMOL/L (ref 20–33)
COMMENT 1642: NORMAL
CREAT SERPL-MCNC: 0.74 MG/DL (ref 0.5–1.4)
EOSINOPHIL # BLD AUTO: 0.13 K/UL (ref 0–0.51)
EOSINOPHIL NFR BLD: 1.9 % (ref 0–6.9)
ERYTHROCYTE [DISTWIDTH] IN BLOOD BY AUTOMATED COUNT: 61.1 FL (ref 35.9–50)
FOLATE SERPL-MCNC: 11.6 NG/ML
GLOBULIN SER CALC-MCNC: 2.9 G/DL (ref 1.9–3.5)
GLUCOSE BLD-MCNC: 129 MG/DL (ref 65–99)
GLUCOSE BLD-MCNC: 138 MG/DL (ref 65–99)
GLUCOSE BLD-MCNC: 144 MG/DL (ref 65–99)
GLUCOSE BLD-MCNC: 79 MG/DL (ref 65–99)
GLUCOSE SERPL-MCNC: 79 MG/DL (ref 65–99)
HCT VFR BLD AUTO: 39.6 % (ref 37–47)
HGB BLD-MCNC: 11.3 G/DL (ref 12–16)
IMM GRANULOCYTES # BLD AUTO: 0.03 K/UL (ref 0–0.11)
IMM GRANULOCYTES NFR BLD AUTO: 0.4 % (ref 0–0.9)
LYMPHOCYTES # BLD AUTO: 2.11 K/UL (ref 1–4.8)
LYMPHOCYTES NFR BLD: 30.8 % (ref 22–41)
MCH RBC QN AUTO: 31 PG (ref 27–33)
MCHC RBC AUTO-ENTMCNC: 28.5 G/DL (ref 33.6–35)
MCV RBC AUTO: 108.5 FL (ref 81.4–97.8)
MONOCYTES # BLD AUTO: 0.72 K/UL (ref 0–0.85)
MONOCYTES NFR BLD AUTO: 10.5 % (ref 0–13.4)
MORPHOLOGY BLD-IMP: NORMAL
NEUTROPHILS # BLD AUTO: 3.77 K/UL (ref 2–7.15)
NEUTROPHILS NFR BLD: 55.2 % (ref 44–72)
NRBC # BLD AUTO: 0 K/UL
NRBC BLD-RTO: 0 /100 WBC
PLATELET # BLD AUTO: 153 K/UL (ref 164–446)
PMV BLD AUTO: 11.8 FL (ref 9–12.9)
POTASSIUM SERPL-SCNC: 4.5 MMOL/L (ref 3.6–5.5)
PROT SERPL-MCNC: 5.9 G/DL (ref 6–8.2)
RBC # BLD AUTO: 3.65 M/UL (ref 4.2–5.4)
SODIUM SERPL-SCNC: 136 MMOL/L (ref 135–145)
VIT B12 SERPL-MCNC: 722 PG/ML (ref 211–911)
WBC # BLD AUTO: 6.8 K/UL (ref 4.8–10.8)

## 2019-07-28 PROCEDURE — 700111 HCHG RX REV CODE 636 W/ 250 OVERRIDE (IP): Performed by: PHYSICAL MEDICINE & REHABILITATION

## 2019-07-28 PROCEDURE — 82607 VITAMIN B-12: CPT

## 2019-07-28 PROCEDURE — 82962 GLUCOSE BLOOD TEST: CPT

## 2019-07-28 PROCEDURE — 700102 HCHG RX REV CODE 250 W/ 637 OVERRIDE(OP): Performed by: HOSPITALIST

## 2019-07-28 PROCEDURE — 99232 SBSQ HOSP IP/OBS MODERATE 35: CPT | Performed by: HOSPITALIST

## 2019-07-28 PROCEDURE — 770010 HCHG ROOM/CARE - REHAB SEMI PRIVAT*

## 2019-07-28 PROCEDURE — A9270 NON-COVERED ITEM OR SERVICE: HCPCS | Performed by: HOSPITALIST

## 2019-07-28 PROCEDURE — A9270 NON-COVERED ITEM OR SERVICE: HCPCS | Performed by: PHYSICAL MEDICINE & REHABILITATION

## 2019-07-28 PROCEDURE — 700102 HCHG RX REV CODE 250 W/ 637 OVERRIDE(OP): Performed by: PHYSICAL MEDICINE & REHABILITATION

## 2019-07-28 PROCEDURE — G0515 COGNITIVE SKILLS DEVELOPMENT: HCPCS

## 2019-07-28 PROCEDURE — 36415 COLL VENOUS BLD VENIPUNCTURE: CPT

## 2019-07-28 PROCEDURE — 80053 COMPREHEN METABOLIC PANEL: CPT

## 2019-07-28 PROCEDURE — 85025 COMPLETE CBC W/AUTO DIFF WBC: CPT

## 2019-07-28 PROCEDURE — 82746 ASSAY OF FOLIC ACID SERUM: CPT

## 2019-07-28 RX ADMIN — ENOXAPARIN SODIUM 40 MG: 100 INJECTION SUBCUTANEOUS at 08:49

## 2019-07-28 RX ADMIN — OMEPRAZOLE 20 MG: 20 CAPSULE, DELAYED RELEASE ORAL at 08:49

## 2019-07-28 RX ADMIN — NYSTATIN 1 G: 100000 POWDER TOPICAL at 21:18

## 2019-07-28 RX ADMIN — OXYCODONE HYDROCHLORIDE 5 MG: 5 TABLET ORAL at 17:58

## 2019-07-28 RX ADMIN — LEVOTHYROXINE SODIUM 75 MCG: 75 TABLET ORAL at 05:43

## 2019-07-28 RX ADMIN — BACITRACIN ZINC AND POLYMYXIN B SULFATE 1 EACH: at 21:19

## 2019-07-28 RX ADMIN — METFORMIN HYDROCHLORIDE 1000 MG: 500 TABLET, EXTENDED RELEASE ORAL at 21:17

## 2019-07-28 RX ADMIN — ENOXAPARIN SODIUM 40 MG: 100 INJECTION SUBCUTANEOUS at 21:20

## 2019-07-28 RX ADMIN — INSULIN GLARGINE 32 UNITS: 100 INJECTION, SOLUTION SUBCUTANEOUS at 07:27

## 2019-07-28 RX ADMIN — LEVOTHYROXINE SODIUM 125 MCG: 125 TABLET ORAL at 05:43

## 2019-07-28 RX ADMIN — BACITRACIN ZINC AND POLYMYXIN B SULFATE: at 15:00

## 2019-07-28 RX ADMIN — METFORMIN HYDROCHLORIDE 1000 MG: 500 TABLET, EXTENDED RELEASE ORAL at 08:49

## 2019-07-28 RX ADMIN — INSULIN GLARGINE 32 UNITS: 100 INJECTION, SOLUTION SUBCUTANEOUS at 21:30

## 2019-07-28 RX ADMIN — CARVEDILOL 6.25 MG: 3.12 TABLET, FILM COATED ORAL at 08:50

## 2019-07-28 RX ADMIN — BACITRACIN ZINC AND POLYMYXIN B SULFATE: at 08:48

## 2019-07-28 RX ADMIN — BENAZEPRIL HYDROCHLORIDE 30 MG: 10 TABLET, COATED ORAL at 08:49

## 2019-07-28 RX ADMIN — VITAMIN D, TAB 1000IU (100/BT) 2000 UNITS: 25 TAB at 08:49

## 2019-07-28 RX ADMIN — NYSTATIN: 100000 POWDER TOPICAL at 08:48

## 2019-07-28 ASSESSMENT — ENCOUNTER SYMPTOMS
COUGH: 0
FEVER: 0
CHILLS: 0
ABDOMINAL PAIN: 0
PALPITATIONS: 0
NAUSEA: 0
VOMITING: 0
SHORTNESS OF BREATH: 0
EYES NEGATIVE: 1

## 2019-07-28 NOTE — PROGRESS NOTES
Received patient during shift change, report rec'd from day shift RN. Resting in bed, VS stable on room air. Continent of B&B, contact guard assist for transfers. A&O x 4, able to make needs known. Bed in low position, call light within reach.

## 2019-07-28 NOTE — CARE PLAN
Problem: Safety  Goal: Will remain free from injury  Using call light for assist as needed. Safe transfer noted w/contact guard assist from bed to w/c to restroom. Tolerated well.     Problem: Pain Management  Goal: Pain level will decrease to patient's comfort goal  No request for PRN pain meds at HS. Scheduled meds administered, good effect. Resting in bed, eyes closed, resp even, unlabored.
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
## 2019-07-28 NOTE — CARE PLAN
Problem: Pain Management  Goal: Pain level will decrease to patient's comfort goal    Intervention: Educate and implement non-pharmacologic comfort measures. Examples: relaxation, distration, play therapy, activity therapy, massage, etc.  Patient able to verbalize needs.  Denies pain or discomfort this shift and no s/s same noted.  Will continue to monitor.      Problem: Skin Integrity  Goal: Risk for impaired skin integrity will decrease    Intervention: Assess and monitor skin integrity, appearance and/or temperature  Patient dressing to left elbow saturated this am, changed dressing and took new pictures, cleansed wound. Charge Nurse Paty is aware and came to assess and help redress wound. Will continue to monitor.

## 2019-07-28 NOTE — PROGRESS NOTES
Patient care assumed. Report received from [facility]. Patient is alert and calm, resting in bed. Call light and bedside table within reach. Will continue to monitor.

## 2019-07-28 NOTE — CARE PLAN
Problem: Knowledge Deficit  Goal: Knowledge of disease process/condition, treatment plan, diagnostic tests, and medications will improve  Outcome: PROGRESSING AS EXPECTED  Educated pt on the importance of letting care team know Hx of medication intolerances. Pt reports she does not do well with opioids, reports she gets nauseated and sleepy from them. Md aware.
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
## 2019-07-29 LAB
GLUCOSE BLD-MCNC: 133 MG/DL (ref 65–99)
GLUCOSE BLD-MCNC: 157 MG/DL (ref 65–99)
GLUCOSE BLD-MCNC: 160 MG/DL (ref 65–99)
GLUCOSE BLD-MCNC: 84 MG/DL (ref 65–99)

## 2019-07-29 PROCEDURE — 700111 HCHG RX REV CODE 636 W/ 250 OVERRIDE (IP): Performed by: PHYSICAL MEDICINE & REHABILITATION

## 2019-07-29 PROCEDURE — A9270 NON-COVERED ITEM OR SERVICE: HCPCS | Performed by: PHYSICAL MEDICINE & REHABILITATION

## 2019-07-29 PROCEDURE — 700102 HCHG RX REV CODE 250 W/ 637 OVERRIDE(OP): Performed by: HOSPITALIST

## 2019-07-29 PROCEDURE — 97530 THERAPEUTIC ACTIVITIES: CPT

## 2019-07-29 PROCEDURE — 97535 SELF CARE MNGMENT TRAINING: CPT

## 2019-07-29 PROCEDURE — G0515 COGNITIVE SKILLS DEVELOPMENT: HCPCS

## 2019-07-29 PROCEDURE — 82962 GLUCOSE BLOOD TEST: CPT | Mod: 91

## 2019-07-29 PROCEDURE — 99232 SBSQ HOSP IP/OBS MODERATE 35: CPT | Performed by: PHYSICAL MEDICINE & REHABILITATION

## 2019-07-29 PROCEDURE — 700102 HCHG RX REV CODE 250 W/ 637 OVERRIDE(OP): Performed by: PHYSICAL MEDICINE & REHABILITATION

## 2019-07-29 PROCEDURE — 770010 HCHG ROOM/CARE - REHAB SEMI PRIVAT*

## 2019-07-29 PROCEDURE — 700111 HCHG RX REV CODE 636 W/ 250 OVERRIDE (IP)

## 2019-07-29 PROCEDURE — 99232 SBSQ HOSP IP/OBS MODERATE 35: CPT | Performed by: HOSPITALIST

## 2019-07-29 PROCEDURE — 97110 THERAPEUTIC EXERCISES: CPT

## 2019-07-29 PROCEDURE — A9270 NON-COVERED ITEM OR SERVICE: HCPCS | Performed by: HOSPITALIST

## 2019-07-29 RX ADMIN — INSULIN GLARGINE 32 UNITS: 100 INJECTION, SOLUTION SUBCUTANEOUS at 21:22

## 2019-07-29 RX ADMIN — METFORMIN HYDROCHLORIDE 1000 MG: 500 TABLET, EXTENDED RELEASE ORAL at 21:18

## 2019-07-29 RX ADMIN — ENOXAPARIN SODIUM 40 MG: 100 INJECTION SUBCUTANEOUS at 21:20

## 2019-07-29 RX ADMIN — TRAZODONE HYDROCHLORIDE 50 MG: 50 TABLET ORAL at 01:26

## 2019-07-29 RX ADMIN — NYSTATIN: 100000 POWDER TOPICAL at 08:40

## 2019-07-29 RX ADMIN — VITAMIN D, TAB 1000IU (100/BT) 2000 UNITS: 25 TAB at 08:40

## 2019-07-29 RX ADMIN — CARVEDILOL 6.25 MG: 3.12 TABLET, FILM COATED ORAL at 08:40

## 2019-07-29 RX ADMIN — ENOXAPARIN SODIUM 40 MG: 100 INJECTION SUBCUTANEOUS at 08:40

## 2019-07-29 RX ADMIN — BENAZEPRIL HYDROCHLORIDE 30 MG: 10 TABLET, COATED ORAL at 08:40

## 2019-07-29 RX ADMIN — OXYCODONE HYDROCHLORIDE 5 MG: 5 TABLET ORAL at 21:18

## 2019-07-29 RX ADMIN — LEVOTHYROXINE SODIUM 125 MCG: 125 TABLET ORAL at 05:58

## 2019-07-29 RX ADMIN — OXYCODONE HYDROCHLORIDE 5 MG: 5 TABLET ORAL at 08:50

## 2019-07-29 RX ADMIN — BACITRACIN ZINC AND POLYMYXIN B SULFATE 1 EACH: at 21:36

## 2019-07-29 RX ADMIN — METFORMIN HYDROCHLORIDE 1000 MG: 500 TABLET, EXTENDED RELEASE ORAL at 08:40

## 2019-07-29 RX ADMIN — NYSTATIN 1 G: 100000 POWDER TOPICAL at 21:36

## 2019-07-29 RX ADMIN — SENNOSIDES, DOCUSATE SODIUM 1 TABLET: 50; 8.6 TABLET, FILM COATED ORAL at 08:41

## 2019-07-29 RX ADMIN — TRAZODONE HYDROCHLORIDE 50 MG: 50 TABLET ORAL at 22:35

## 2019-07-29 RX ADMIN — CARVEDILOL 6.25 MG: 3.12 TABLET, FILM COATED ORAL at 17:23

## 2019-07-29 RX ADMIN — ENOXAPARIN SODIUM 40 MG: 100 INJECTION SUBCUTANEOUS at 21:29

## 2019-07-29 RX ADMIN — OXYCODONE HYDROCHLORIDE 5 MG: 5 TABLET ORAL at 00:22

## 2019-07-29 RX ADMIN — BACITRACIN ZINC AND POLYMYXIN B SULFATE: at 08:40

## 2019-07-29 RX ADMIN — OMEPRAZOLE 20 MG: 20 CAPSULE, DELAYED RELEASE ORAL at 08:40

## 2019-07-29 RX ADMIN — LEVOTHYROXINE SODIUM 75 MCG: 75 TABLET ORAL at 05:58

## 2019-07-29 RX ADMIN — INSULIN GLARGINE 32 UNITS: 100 INJECTION, SOLUTION SUBCUTANEOUS at 07:19

## 2019-07-29 ASSESSMENT — ENCOUNTER SYMPTOMS
CHILLS: 0
NAUSEA: 0
ABDOMINAL PAIN: 0
SHORTNESS OF BREATH: 0
VOMITING: 0
COUGH: 0
PALPITATIONS: 0
EYES NEGATIVE: 1
FEVER: 0

## 2019-07-29 NOTE — THERAPY
"Physical Therapy   Daily Treatment     Patient Name: Maira Dodd  Age:  73 y.o., Sex:  female  Medical Record #: [id number]  Today's Date: 7/29/2019     Precautions  Precautions: Fall Risk  Comments: orthostatic hypotension with standing, cervical px, rib fx, LUE wounds, room air trials, sling PRN    Subjective    \"That's an understatement.\" In response to therapist mentioning having heard pt had some nausea over the weekend. Pt in bed, requesting to stay in room during therapy session.     Objective       07/29/19 0831   Precautions   Precautions Cervical Collar  ;Fall Risk   Comments orthostatic hypotension with standing, cervical px, rib fx, LUE wounds, room air trials, sling PRN   Sitting Lower Body Exercises   Ankle Pumps 2 sets of 15   Hip Flexion 2 sets of 15   Hip Abduction 2 sets of 15  (with pink theraband)   Hip Adduction 2 sets of 15   Long Arc Quad 2 sets of 15   Marching 2 sets of 15   Hamstring Curl 1 set of 15  (with pink theraband)   Bed Mobility    Supine to Sit Stand by Assist  (with HOB elevated)   Sit to Supine Stand by Assist  (with HOB elevated)   Sit to Stand Stand by Assist   Interdisciplinary Plan of Care Collaboration   IDT Collaboration with  Nursing;Physician   Patient Position at End of Therapy In Bed;Call Light within Reach;Tray Table within Reach   Collaboration Comments Nursing administering meds, MD re: CLOF, POC   PT Total Time Spent   PT Individual Total Time Spent (Mins) 60   PT Charge Group   PT Therapeutic Exercise 2   PT Therapeutic Activities 2     Bed <> WC: SBA supine <> sit with HOB elevated and use of bed rails, stand step transfer with no AD and SBA.     Sitting tolerance, 8 mins EOB with intermittent unilateral UE support     Standing tolerance- pt with reports of vertigo after 30 secs of standing, needing to sit down.     Pt education regarding orthostatic hypotension, importance of gradually increasing amount of time sitting upright throughout day. Also " discussed orthotist visit later today to fit for new sling.    Assessment    Pt initially motivated to walk but limited by symptoms of orthostatic hypotension in standing. Improved bed mobility and sitting tolerance.    Plan    Increase OOB time, progress ambulation as able with orthostatic hypotension, seated and standing tolerance, provide supine and seated LE exercise handouts.

## 2019-07-29 NOTE — PROGRESS NOTES
Hospital Medicine Daily Progress Note      Chief Complaint  HTN, DM    Interval Problem Update  Pt reports she took Oxycodone for pain today and had no nausea or vomiting.    Review of Systems  Review of Systems   Constitutional: Negative for chills and fever.   HENT: Negative.    Eyes: Negative.    Respiratory: Negative for cough and shortness of breath.    Cardiovascular: Negative for chest pain and palpitations.   Gastrointestinal: Negative for abdominal pain, nausea and vomiting.   Genitourinary: Negative.    Musculoskeletal:        Wound pain   Skin: Negative for itching and rash.        Physical Exam  Temp:  [36.6 °C (97.8 °F)-36.8 °C (98.2 °F)] 36.7 °C (98 °F)  Pulse:  [] 100  Resp:  [18] 18  BP: (110-146)/(62-85) 119/72  SpO2:  [92 %-95 %] 93 %    Physical Exam   Constitutional: She is oriented to person, place, and time. No distress.   HENT:   Head: Normocephalic and atraumatic.   Right Ear: External ear normal.   Left Ear: External ear normal.   Eyes: Conjunctivae and EOM are normal. Right eye exhibits no discharge. Left eye exhibits no discharge.   Neck: Normal range of motion. Neck supple. No tracheal deviation present.   Cardiovascular: Normal rate and regular rhythm.    Pulmonary/Chest: No stridor. No respiratory distress. She has no wheezes.   Decreased BS   Abdominal: Soft. Bowel sounds are normal. She exhibits no distension. There is no tenderness.   Musculoskeletal: She exhibits edema and tenderness.   LUE 1+ edema w/ resolving ecchymoses   Neurological: She is alert and oriented to person, place, and time.   Skin: Skin is warm and dry. She is not diaphoretic.   Vitals reviewed.      Fluids    Intake/Output Summary (Last 24 hours) at 07/29/19 1404  Last data filed at 07/29/19 0900   Gross per 24 hour   Intake              720 ml   Output                0 ml   Net              720 ml       Laboratory  Recent Labs      07/28/19   0612   WBC  6.8   RBC  3.65*   HEMOGLOBIN  11.3*   HEMATOCRIT   39.6   MCV  108.5*   MCH  31.0   MCHC  28.5*   RDW  61.1*   PLATELETCT  153*   MPV  11.8     Recent Labs      07/28/19   0612   SODIUM  136   POTASSIUM  4.5   CHLORIDE  107   CO2  19*   GLUCOSE  79   BUN  20   CREATININE  0.74   CALCIUM  8.1*                       Assessment/Plan  * Subarachnoid hemorrhage following injury, with loss of consciousness (HCC)- (present on admission)   Assessment & Plan    Non-surgical management  Completed Keppra x 7 days for SZ proph     Closed fracture of multiple ribs with flail chest- (present on admission)   Assessment & Plan    S/P Left 3rd-7th rib fractures w/ resolved PTX  Aggressive pulmonary toilet, including IS     UTI (urinary tract infection)- (present on admission)   Assessment & Plan    UCx +Proteus  Completed Bactrim prior to Rehab admission     DM (diabetes mellitus) ([facility])- (present on admission)   Assessment & Plan    HbA1c 7.4  Good glucose control on current regimen  Continue Lantus, SSI, and Metformin     Fracture of left clavicle- (present on admission)   Assessment & Plan    S/P ORIF on 7/14/19 by Dr. Gauthier  NW     Multiple fractures of cervical spine ([facility])- (present on admission)   Assessment & Plan    Non-surgical management  Cervical collar x 2 wks followed by F/U imaging     Thrombocytopenia ([facility])   Assessment & Plan    Following Platelet counts     Alkaline phosphatase elevation   Assessment & Plan    Suspect 2/2 traumatic fractures  U/S RUQ s/p choley w/o biliary dilatation  Follow to resolution     Macrocytic anemia   Assessment & Plan    Vit B12 and Folate levels normal  Following H/H     Renal insufficiency   Assessment & Plan    Renal function improved w/ PO fluids     Vitamin D deficiency   Assessment & Plan    Vit D level 13  Continue supplementation     Laceration of left forearm- (present on admission)   Assessment & Plan    S/P repair  Wound care     Abnormal CT of the abdomen- (present on admission)   Assessment & Plan    Incidental finding of  left kidney lesion on Trauma work-up  Needs F/U imaging     Essential hypertension- (present on admission)   Assessment & Plan    Continue Coreg and Benazepril  Observe blood pressure trends     Hypothyroid- (present on admission)   Assessment & Plan    Euthyroid on Synthroid     Full Code    Patient seen and examined.  Chart reviewed.  Assessment and Plan as above.  No changes today.

## 2019-07-29 NOTE — CARE PLAN
Problem: Safety  Goal: Will remain free from falls    Intervention: Assess risk factors for falls  Pt uses call light consistently and appropriately. Waits for assistance does not attempt self transfer this shift. Able to verbalize needs.      Problem: Skin Integrity  Goal: Risk for impaired skin integrity will decrease    Intervention: Assess and monitor skin integrity, appearance and/or temperature  Changed dressing on patient left elbow, redressed per order, less drainage than yesterday.cleansed with normal saline, will continue to monitor.

## 2019-07-29 NOTE — THERAPY
"Occupational Therapy  Daily Treatment     Patient Name: Maira Dodd  Age:  73 y.o., Sex:  female  Medical Record #: [id number]  Today's Date: 7/29/2019     Precautions  Precautions: (P) Fall Risk  Comments: (P) orthostatic hypotension with standing, cervical px, rib fx, LUE wounds, room air trials, sling PRN    Safety   Comments: education on and practice with use of sock aid and dressing stick to doff and don socks requires set up with sock aid and min A to adjust socks see FIM. Additional education on potential use of reacher to retrieve LB clothing and adjust.    Subjective    \" And now I am dizzy .\" stated upon sitting edge of bed and transfer to w/c    No further complaints through out session      Objective       07/29/19 1031   Precautions   Precautions Fall Risk   Comments orthostatic hypotension with standing, cervical px, rib fx, LUE wounds, room air trials, sling PRN   Interdisciplinary Plan of Care Collaboration   Patient Position at End of Therapy Seated;Call Light within Reach;Tray Table within Reach   OT Total Time Spent   OT Individual Total Time Spent (Mins) 60   OT Charge Group   OT Self Care / ADL 4       FIM Bathing Score:  4 - Minimal Assistance  Bathing Description:       FIM Upper Body Dressing:  3 - Moderate Assistance  Upper Body Dressing Description:   (doff pull over shirt min assist (  right UE, head , left UE) don with mod assist  ( left UE  over head then right UE) )    FIM Lower Body Dressing Score:  3 - Moderate Assistance  Lower Body Dressing Description:   ( doff underwear setup.  don underwear and shorts  incindental assist pulling up left side.   assist to doff. don tread socks )    FIM Toileting:  3 - Moderate Assistance  Toileting Description:   (assist pulling up left side of undewear)    FIM Toilet Transfer Score:  5 - Standby Prompting/Supervision or Set-up  Toilet Transfer Description:  Grab bar    FIM Tub/Shower Transfers Score:  5 - Standby Prompting/Supervision " or Set-up  Tub/Shower Transfers Description:  Grab bar      Assessment     improving independence with  ADL tasks. Improving left UE functional use.     Poor fitting   Aspen collar   Has stout Beth RIVERA  That was also too large according to patient.     Plan     continue  ADL , related moblity   PROM as tolerated  Left UE.   Pursue C spine collar that fits properly.

## 2019-07-29 NOTE — CARE PLAN
Problem: Safety  Goal: Will remain free from injury  Patient uses call light  appropriately this shift.  Waits for assistance when needed and does not attempt self transfer.  Able to verbalize needs.  Will continue to monitor.    Problem: Pain Management  Goal: Pain level will decrease to patient's comfort goal  Roxicodone 5mg given PRN per MAR for c/o left arm pain 4/10 with adequate relief. Pt sleeps good. Will continue to monitor.    Problem: GLYCEMIA IMBALANCE  Intervention: MONITOR BLOOD GLUCOSE LEVELS AS ORDERED  HS [id number]. No regular insulin coverage. Lantus 32 units given as scheduled per MAR. HS snack provided and consumed. Will continue to monitor.

## 2019-07-29 NOTE — PROGRESS NOTES
Patient care assumed. Report received from [facility]. Patient is alert and calm, resting in bed. Call light and bedside table within reach. Will continue to monitor.

## 2019-07-29 NOTE — THERAPY
Speech Language Pathology  Daily Treatment     Patient Name: Maira Dodd  Age:  73 y.o., Sex:  female  Medical Record #: [id number]  Today's Date: 7/29/2019     Subjective    Pt pleasant and cooperative during tx.  No pain.      Objective       07/29/19 1531   Cognition   Executive Functioning / Organization Supervision (5)   Functional Math / Financial Management Within Functional Limits (6-7)   SLP Total Time Spent   SLP Individual Total Time Spent (Mins) 60   Charge Group   SLP Cognitive Skill Development 4           Assessment    Pt completed single step word problems involving time with 100% accuracy independently.  Pt completed 7 step logical scheduling puzzle with 100% accuracy, independently, given additional time.  Pt completed 3x4 deductive reasoning puzzle with 100% accuracy given additional time and min verbal cues to attend to written details.      Plan    Pt stated that she is nearing baseline cognition.  Target higher level attn/reasoning/executive function.  Consider decreasing to 30 minutes of speech therapy per day.

## 2019-07-30 LAB
GLUCOSE BLD-MCNC: 114 MG/DL (ref 65–99)
GLUCOSE BLD-MCNC: 130 MG/DL (ref 65–99)
GLUCOSE BLD-MCNC: 143 MG/DL (ref 65–99)
GLUCOSE BLD-MCNC: 156 MG/DL (ref 65–99)
GLUCOSE BLD-MCNC: 162 MG/DL (ref 65–99)
GLUCOSE BLD-MCNC: 60 MG/DL (ref 65–99)

## 2019-07-30 PROCEDURE — 700102 HCHG RX REV CODE 250 W/ 637 OVERRIDE(OP): Performed by: HOSPITALIST

## 2019-07-30 PROCEDURE — A9270 NON-COVERED ITEM OR SERVICE: HCPCS | Performed by: HOSPITALIST

## 2019-07-30 PROCEDURE — 82962 GLUCOSE BLOOD TEST: CPT | Mod: 91

## 2019-07-30 PROCEDURE — 97110 THERAPEUTIC EXERCISES: CPT

## 2019-07-30 PROCEDURE — 99232 SBSQ HOSP IP/OBS MODERATE 35: CPT | Performed by: PHYSICAL MEDICINE & REHABILITATION

## 2019-07-30 PROCEDURE — G0515 COGNITIVE SKILLS DEVELOPMENT: HCPCS

## 2019-07-30 PROCEDURE — 700111 HCHG RX REV CODE 636 W/ 250 OVERRIDE (IP): Performed by: PHYSICAL MEDICINE & REHABILITATION

## 2019-07-30 PROCEDURE — A9270 NON-COVERED ITEM OR SERVICE: HCPCS | Performed by: PHYSICAL MEDICINE & REHABILITATION

## 2019-07-30 PROCEDURE — 97535 SELF CARE MNGMENT TRAINING: CPT

## 2019-07-30 PROCEDURE — 97530 THERAPEUTIC ACTIVITIES: CPT

## 2019-07-30 PROCEDURE — 99232 SBSQ HOSP IP/OBS MODERATE 35: CPT | Performed by: HOSPITALIST

## 2019-07-30 PROCEDURE — 770010 HCHG ROOM/CARE - REHAB SEMI PRIVAT*

## 2019-07-30 PROCEDURE — 700102 HCHG RX REV CODE 250 W/ 637 OVERRIDE(OP): Performed by: PHYSICAL MEDICINE & REHABILITATION

## 2019-07-30 RX ORDER — INSULIN GLARGINE 100 [IU]/ML
25 INJECTION, SOLUTION SUBCUTANEOUS 2 TIMES DAILY
Status: DISCONTINUED | OUTPATIENT
Start: 2019-07-30 | End: 2019-08-02

## 2019-07-30 RX ADMIN — NYSTATIN: 100000 POWDER TOPICAL at 20:48

## 2019-07-30 RX ADMIN — OXYCODONE HYDROCHLORIDE 5 MG: 5 TABLET ORAL at 08:32

## 2019-07-30 RX ADMIN — ENOXAPARIN SODIUM 40 MG: 100 INJECTION SUBCUTANEOUS at 20:46

## 2019-07-30 RX ADMIN — INSULIN GLARGINE 15 UNITS: 100 INJECTION, SOLUTION SUBCUTANEOUS at 09:53

## 2019-07-30 RX ADMIN — ENOXAPARIN SODIUM 40 MG: 100 INJECTION SUBCUTANEOUS at 08:33

## 2019-07-30 RX ADMIN — OXYCODONE HYDROCHLORIDE 5 MG: 5 TABLET ORAL at 14:14

## 2019-07-30 RX ADMIN — VITAMIN D, TAB 1000IU (100/BT) 2000 UNITS: 25 TAB at 08:32

## 2019-07-30 RX ADMIN — BACITRACIN ZINC AND POLYMYXIN B SULFATE: at 08:32

## 2019-07-30 RX ADMIN — BENAZEPRIL HYDROCHLORIDE 30 MG: 10 TABLET, COATED ORAL at 08:32

## 2019-07-30 RX ADMIN — BACITRACIN ZINC AND POLYMYXIN B SULFATE: at 20:48

## 2019-07-30 RX ADMIN — INSULIN GLARGINE 25 UNITS: 100 INJECTION, SOLUTION SUBCUTANEOUS at 20:48

## 2019-07-30 RX ADMIN — LEVOTHYROXINE SODIUM 125 MCG: 125 TABLET ORAL at 05:48

## 2019-07-30 RX ADMIN — METFORMIN HYDROCHLORIDE 1000 MG: 500 TABLET, EXTENDED RELEASE ORAL at 08:33

## 2019-07-30 RX ADMIN — LEVOTHYROXINE SODIUM 75 MCG: 75 TABLET ORAL at 05:49

## 2019-07-30 RX ADMIN — NYSTATIN: 100000 POWDER TOPICAL at 08:32

## 2019-07-30 ASSESSMENT — ENCOUNTER SYMPTOMS
NERVOUS/ANXIOUS: 0
VOMITING: 0
DIARRHEA: 0
FEVER: 0
CHILLS: 0
ABDOMINAL PAIN: 0
SHORTNESS OF BREATH: 0
NAUSEA: 0

## 2019-07-30 NOTE — WOUND TEAM
"[facility]  Inpatient Services  Wound and Skin Care Progress Note    Admission Date: 7/23/2019     Consult Date:  7/23/19   HPI, PMH, SH: Reviewed    Unit where seen by Wound Team: [id number]     WOUND FOLLOW UP/CONSULT RELATED TO:  Re evaluation of posterior left elbow wound     SUBJECTIVE:  \"I can't hold my arm up\"      Self Report / Pain Level:  Tolerated wound care well       OBJECTIVE:  Obese woman with deep skin folds that are overall intact with current therapy; orders written; hydrocolloid intact left elbow and Dr. Gaston at bedside to observe and then CSWD    WOUND TYPE, LOCATION, CHARACTERISTICS (Pressure Injuries: location, stage, POA or date identified)        Wound 07/23/19 Full Thickness Wound Elbow full thickness wound posterior left elbow (Active)   Wound Image   7/24/2019 12:00 PM   Site Assessment Clean;Drainage;Tan;Yellow;Red 7/30/2019  9:25 AM   Ashely-wound Assessment Clean;Intact 7/30/2019  9:25 AM   Margins Attached edges 7/30/2019  9:25 AM   Wound Length (cm) 10 cm 7/30/2019  9:25 AM   Wound Width (cm) 12 cm 7/30/2019  9:25 AM   Wound Surface Area (cm^2) 120 cm^2 7/30/2019  9:25 AM   Tunneling 0 cm 7/30/2019  9:25 AM   Undermining 0 cm 7/30/2019  9:25 AM   Closure Secondary intention 7/30/2019  9:25 AM   Drainage Amount Moderate 7/30/2019  9:25 AM   Drainage Description Serosanguineous 7/30/2019  9:25 AM   Non-staged Wound Description Full thickness 7/30/2019  9:25 AM   Treatments Cleansed;Site care 7/30/2019  9:25 AM   Cleansing Approved Wound Cleanser 7/30/2019  9:25 AM   Periwound Protectant Not Applicable 7/30/2019  9:25 AM   Dressing Options Absorbent Abdominal Pad;Hydrofera Blue Ready;Roll Gauze 7/30/2019  9:25 AM   Dressing Cleansing/Solutions Not Applicable 7/30/2019  9:25 AM   Dressing Changed New 7/30/2019  9:25 AM   Dressing Status Intact 7/30/2019  9:25 AM   Dressing Change Frequency Daily 7/30/2019  9:25 AM   NEXT Dressing Change  07/31/19 7/30/2019  9:25 AM   NEXT " Weekly Photo (Inpatient Only) 08/03/19 7/30/2019  9:25 AM   WOUND NURSE ONLY - Odor None 7/30/2019  9:25 AM   WOUND NURSE ONLY - Exposed Structures None 7/30/2019  9:25 AM   WOUND NURSE ONLY - Tissue Type and Percentage red/purple/yellow/tan 7/30/2019  9:25 AM   WOUND NURSE ONLY - Time Spent with Patient (mins) 60 7/30/2019  9:25 AM             Lab Values:    WBC:       WBC   Date/Time Value Ref Range Status   07/28/2019 06:12 AM 6.8 4.8 - 10.8 K/uL Final     A1C:      Lab Results   Component Value Date/Time    HBA1C 7.4 (H) 07/24/2019 05:21 AM           Culture:  NA    INTERVENTIONS BY WOUND TEAM:  Unfortunately I am unable to display photo of wound taken 7/28/19 in this note, please see in chart.  Removed old dressing and cleansed wound with wound cleanser.  Measurements taken and using curette tried to debride devitialized tissue but unable due to bleeding and pain.  Center of wound where eschar is softened, appears to be necrotic fat.  Covered with hydrofera blue foam and tacked with tape.  Covered with ABD and secured with roll gauze.  Discussed with staff RN and Dr. Gaston.  He will reassess wound later in the week and with change in treatment and wound to dry out he might be able to CSWD more and decide on possible surgical debridement.  Orders updated.    Dressing selection:  See above         Interdisciplinary consultation: Patient, Bedside RN, Dr. Gaston    EVALUATION: clean appearing wound that needs further debridement; with change in wound care, Dr. Gaston will re assess    Factors affecting wound healing: DM, pressure   Goals: Steady decrease in wound area and depth weekly.    NURSING PLAN OF CARE ORDERS (X):    Dressing changes: See Dressing Care orders: X updated  Skin care: See Skin Care orders:   Rectal tube care: See Rectal Tube Care orders:   Other orders:    WOUND TEAM PLAN OF CARE (X):   NPWT change 3 x week:        Dressing changes by wound team:       Follow up as needed:   X weekly    Other  (explain):     Anticipated discharge plans (X):   SNF:           Home Care:           Outpatient Wound Center:            Self Care:            Other:  TBD with ongoing wound care

## 2019-07-30 NOTE — PROGRESS NOTES
"Rehab Progress Note     Encounter Date: 2019    CC: TBI, left arm wound, and left clavicular fracture    Interval Events (Subjective)  Patient sitting up in bed. She reports she is tired from all of the therapy. She reports she thinks she is doing well. Denies NVD. Denies pain.  Left arm doing well. SBP dropped from 140s to 110s    IDT Team Meeting 2019  DC/Disposition:  19    Objective:  VITAL SIGNS: /70   Pulse 88   Temp 36.7 °C (98 °F) (Oral)   Resp 18   Ht 1.702 m (5' 7\")   Wt (!) 136 kg (299 lb 14.4 oz)   SpO2 93%   BMI 46.97 kg/m²   Gen: NAD  Psych: Mood and affect appropriate  CV: RRR, no edema  Resp: CTAB, no upper airway sounds  Abd: NTND  Neuro: AOx4, following simple commands    Recent Results (from the past 72 hour(s))   ACCU-CHEK GLUCOSE    Collection Time: 19  7:53 AM   Result Value Ref Range    Glucose - Accu-Ck 105 (H) 65 - 99 mg/dL   ACCU-CHEK GLUCOSE    Collection Time: 19 10:09 AM   Result Value Ref Range    Glucose - Accu-Ck 131 (H) 65 - 99 mg/dL   EKG    Collection Time: 19 10:19 AM   Result Value Ref Range    Report       [facility]    Test Date:  2019  Pt Name:    ELIN JOHNSTON            Department: [facility]  MRN:        [id number]                      Room:       [facility]  Gender:     Female                       Technician: WT  :        [id number]                   Requested By:RILEY WEST  Order #:    [id number]                    Reading MD: Virgie Coreas MD    Measurements  Intervals                                Axis  Rate:       79                           P:          55  OH:         172                          QRS:        -14  QRSD:       70                           T:          21  QT:         388  QTc:        445    Interpretive Statements  SINUS RHYTHM  ATRIAL PREMATURE COMPLEX  PROBABLE INFERIOR INFARCT, AGE INDETERMINATE  No previous ECG available for comparison    Electronically Signed On 2019 22:25:11 PDT by Virgie Coreas " MD     ACCU-CHEK GLUCOSE    Collection Time: 07/27/19 11:14 AM   Result Value Ref Range    Glucose - Accu-Ck 123 (H) 65 - 99 mg/dL   TROPONIN    Collection Time: 07/27/19  1:20 PM   Result Value Ref Range    Troponin T 22 (H) 6 - 19 ng/L   ACCU-CHEK GLUCOSE    Collection Time: 07/27/19  5:39 PM   Result Value Ref Range    Glucose - Accu-Ck 151 (H) 65 - 99 mg/dL   ACCU-CHEK GLUCOSE    Collection Time: 07/27/19  8:29 PM   Result Value Ref Range    Glucose - Accu-Ck 151 (H) 65 - 99 mg/dL   CBC WITH DIFFERENTIAL    Collection Time: 07/28/19  6:12 AM   Result Value Ref Range    WBC 6.8 4.8 - 10.8 K/uL    RBC 3.65 (L) 4.20 - 5.40 M/uL    Hemoglobin 11.3 (L) 12.0 - 16.0 g/dL    Hematocrit 39.6 37.0 - 47.0 %    .5 (H) 81.4 - 97.8 fL    MCH 31.0 27.0 - 33.0 pg    MCHC 28.5 (L) 33.6 - 35.0 g/dL    RDW 61.1 (H) 35.9 - 50.0 fL    Platelet Count 153 (L) 164 - 446 K/uL    MPV 11.8 9.0 - 12.9 fL    Neutrophils-Polys 55.20 44.00 - 72.00 %    Lymphocytes 30.80 22.00 - 41.00 %    Monocytes 10.50 0.00 - 13.40 %    Eosinophils 1.90 0.00 - 6.90 %    Basophils 1.20 0.00 - 1.80 %    Immature Granulocytes 0.40 0.00 - 0.90 %    Nucleated RBC 0.00 /100 WBC    Neutrophils (Absolute) 3.77 2.00 - 7.15 K/uL    Lymphs (Absolute) 2.11 1.00 - 4.80 K/uL    Monos (Absolute) 0.72 0.00 - 0.85 K/uL    Eos (Absolute) 0.13 0.00 - 0.51 K/uL    Baso (Absolute) 0.08 0.00 - 0.12 K/uL    Immature Granulocytes (abs) 0.03 0.00 - 0.11 K/uL    NRBC (Absolute) 0.00 K/uL   Comp Metabolic Panel    Collection Time: 07/28/19  6:12 AM   Result Value Ref Range    Sodium 136 135 - 145 mmol/L    Potassium 4.5 3.6 - 5.5 mmol/L    Chloride 107 96 - 112 mmol/L    Co2 19 (L) 20 - 33 mmol/L    Anion Gap 10.0 0.0 - 11.9    Glucose 79 65 - 99 mg/dL    Bun 20 8 - 22 mg/dL    Creatinine 0.74 0.50 - 1.40 mg/dL    Calcium 8.1 (L) 8.5 - 10.5 mg/dL    AST(SGOT) 31 12 - 45 U/L    ALT(SGPT) 20 2 - 50 U/L    Alkaline Phosphatase 134 (H) 30 - 99 U/L    Total Bilirubin 1.0 0.1 - 1.5  mg/dL    Albumin 3.0 (L) 3.2 - 4.9 g/dL    Total Protein 5.9 (L) 6.0 - 8.2 g/dL    Globulin 2.9 1.9 - 3.5 g/dL    A-G Ratio 1.0 g/dL   FOLATE    Collection Time: 07/28/19  6:12 AM   Result Value Ref Range    Folate -Folic Acid 11.6 >4.0 ng/mL   VITAMIN B12    Collection Time: 07/28/19  6:12 AM   Result Value Ref Range    Vitamin B12 -True Cobalamin 722 211 - 911 pg/mL   ESTIMATED GFR    Collection Time: 07/28/19  6:12 AM   Result Value Ref Range    GFR If African American >60 >60 mL/min/1.73 m 2    GFR If Non African American >60 >60 mL/min/1.73 m 2   PERIPHERAL SMEAR REVIEW    Collection Time: 07/28/19  6:12 AM   Result Value Ref Range    Peripheral Smear Review see below    DIFFERENTIAL COMMENT    Collection Time: 07/28/19  6:12 AM   Result Value Ref Range    Comments-Diff see below    ACCU-CHEK GLUCOSE    Collection Time: 07/28/19  7:24 AM   Result Value Ref Range    Glucose - Accu-Ck 79 65 - 99 mg/dL   ACCU-CHEK GLUCOSE    Collection Time: 07/28/19 11:33 AM   Result Value Ref Range    Glucose - Accu-Ck 129 (H) 65 - 99 mg/dL   ACCU-CHEK GLUCOSE    Collection Time: 07/28/19  4:48 PM   Result Value Ref Range    Glucose - Accu-Ck 138 (H) 65 - 99 mg/dL   ACCU-CHEK GLUCOSE    Collection Time: 07/28/19  9:16 PM   Result Value Ref Range    Glucose - Accu-Ck 144 (H) 65 - 99 mg/dL   ACCU-CHEK GLUCOSE    Collection Time: 07/29/19  7:18 AM   Result Value Ref Range    Glucose - Accu-Ck 84 65 - 99 mg/dL   ACCU-CHEK GLUCOSE    Collection Time: 07/29/19 10:56 AM   Result Value Ref Range    Glucose - Accu-Ck 133 (H) 65 - 99 mg/dL   ACCU-CHEK GLUCOSE    Collection Time: 07/29/19  5:03 PM   Result Value Ref Range    Glucose - Accu-Ck 157 (H) 65 - 99 mg/dL   ACCU-CHEK GLUCOSE    Collection Time: 07/29/19  9:17 PM   Result Value Ref Range    Glucose - Accu-Ck 160 (H) 65 - 99 mg/dL       Current Facility-Administered Medications   Medication Frequency   • insulin regular (HUMULIN R) injection 2-12 Units 4X/DAY ACHS    And   • glucose  4 g chewable tablet 16 g Q15 MIN PRN    And   • dextrose 50% (D50W) injection 50 mL Q15 MIN PRN   • insulin glargine (LANTUS) injection 32 Units BID   • benazepril (LOTENSIN) tablet 30 mg DAILY   • vitamin D (cholecalciferol) tablet 2,000 Units DAILY   • Respiratory Care per Protocol Continuous RT   • Pharmacy Consult Request ...Pain Management Review 1 Each PHARMACY TO DOSE   • oxyCODONE immediate-release (ROXICODONE) tablet 5 mg Q3HRS PRN   • oxyCODONE immediate release (ROXICODONE) tablet 10 mg Q3HRS PRN   • tramadol (ULTRAM) 50 MG tablet 50 mg Q4HRS PRN   • hydrALAZINE (APRESOLINE) tablet 25 mg Q8HRS PRN   • acetaminophen (TYLENOL) tablet 650 mg Q4HRS PRN   • senna-docusate (PERICOLACE or SENOKOT S) 8.6-50 MG per tablet 2 Tab BID    And   • polyethylene glycol/lytes (MIRALAX) PACKET 1 Packet QDAY PRN    And   • magnesium hydroxide (MILK OF MAGNESIA) suspension 30 mL QDAY PRN    And   • bisacodyl (DULCOLAX) suppository 10 mg QDAY PRN   • artificial tears 1.4 % ophthalmic solution 1 Drop PRN   • benzocaine-menthol (CEPACOL) lozenge 1 Lozenge Q2HRS PRN   • mag hydrox-al hydrox-simeth (MAALOX PLUS ES or MYLANTA DS) suspension 20 mL Q2HRS PRN   • ondansetron (ZOFRAN ODT) dispertab 4 mg 4X/DAY PRN    Or   • ondansetron (ZOFRAN) syringe/vial injection 4 mg 4X/DAY PRN   • traZODone (DESYREL) tablet 50 mg QHS PRN   • sodium chloride (OCEAN) 0.65 % nasal spray 2 Spray PRN   • bacitracin-polymyxin b (POLYSPORIN) 500-94924 UNIT/GM ointment TID   • carvedilol (COREG) tablet 6.25 mg BID WITH MEALS   • enoxaparin (LOVENOX) inj 40 mg Q12HRS   • HYDROmorphone (DILAUDID) tablet 2-4 mg Q3HRS PRN   • metFORMIN ER (GLUCOPHAGE XR) tablet 1,000 mg BID   • nystatin (MYCOSTATIN) powder BID   • levothyroxine (SYNTHROID) tablet 125 mcg AM ES    And   • levothyroxine (SYNTHROID) tablet 75 mcg AM ES   • omeprazole (PRILOSEC) capsule 20 mg DAILY       Orders Placed This Encounter   Procedures   • Diet Order Diabetic     Standing Status:    Standing     Number of Occurrences:   1     Order Specific Question:   Diet:     Answer:   Diabetic [3]     Order Specific Question:   Consistency/Fluid modifications:     Answer:   Thin Liquids [3]     Comments:   straws ok       Assessment:  Active Hospital Problems    Diagnosis   • *Subarachnoid hemorrhage following injury, with loss of consciousness ([facility])   • Closed fracture of multiple ribs with flail chest   • UTI (urinary tract infection)   • DM (diabetes mellitus) ([facility])   • Fracture of left clavicle   • Morbid obesity with BMI of 45.0-49.9, adult ([facility])   • Multiple fractures of cervical spine ([facility])   • Dysphagia   • Laceration of left forearm   • Essential hypertension   • Hypothyroid   • Trauma   • Abnormal CT of the abdomen   • Vitamin D deficiency   • Renal insufficiency   • Macrocytic anemia   • Alkaline phosphatase elevation       Medical Decision Making and Plan:  TBI - Patient with left sided SAH with loss of consciousness and decreased cognition. Also limited by other orthopedic injuries  -Patient has completed Keppra for seizure prophylaxis.   -PT and OT for mobility and ADLs  -SLP for cognition      Dysphagia - Patient with oropharyngeal dysphagia. Upgraded to Dysphagia 2 with NTL prior to transfer.  SLP for swallow - Advanced diet to regular with thins. Resolved.      C/T Spine fractures - Patient with TP of C7 and T1 fractures in C collar for unknown length of time.    -Continue C collar. Will need follow-up with NSG  -Need to discuss with prosthetics new C collar as patient's poorly fitting.      Left clavicular fracture - S/p ORIF with Dr. Gauthier on 7/14/19. Also with large left armds laceration  -Continue NWB LUE.  Remove staples on shoulder and forearm  -Wound consult - debridement on 7/24/19. Appreciate recommendations.      HTN - Patient on Benazepriil 30 mg and Coreg 6.25 mg BID     Orthostatic hypotension - Severe orthostatics on 7/24/19, continue to monitor. ACEi has been reduced,  improved     Anemia - Stable at 11.3.     DM - Patient on Lantus 30 U BID, metformin 1000 mg BID and SSI on transfer. Previously on Trulicity, Metformin  -Will check A1c 7.4. Consult hospitalist, increased Lantus to 32   -Restarted on Metformin 1000 mg BID     Hypothyroidism - Patient on 200 mcg on transfer.      Vitamin D deficiency - 12 on admission. Start on 2000 U daily    GI Ppx - Patient on Omeprazole while on dysphagia diet. Discontinue Prilosec as off dysphagia diet     DVT Ppx - Patient on Lovenox on transfer.     Total time:  25 minutes.  I spent greater than 50% of the time for patient care, counseling, and coordination on this date, including unit/floor time, and face-to-face time with the patient as per interval events and assessment and plan above. Topics discussed included improved pain control, discontinue PPI and monitor BP control.    Vince Matias M.D.

## 2019-07-30 NOTE — THERAPY
Speech Language Pathology  Daily Treatment     Patient Name: Maira Dodd  Age:  73 y.o., Sex:  female  Medical Record #: [id number]  Today's Date: 7/30/2019     Subjective    Pt pleasant and cooperative, continues to express desire to work with psychologist, this SLP will f/u with physician for referral.      Objective     07/30/19 0934   Cognition   Executive Functioning / Organization Supervision (5)   Interdisciplinary Plan of Care Collaboration   IDT Collaboration with  Certified Nursing Assistant   Patient Position at End of Therapy Seated   Collaboration Comments CNA assisting with toileting prior to session   SLP Total Time Spent   SLP Individual Total Time Spent (Mins) 60   Charge Group   SLP Cognitive Skill Development 4       FIM Eating Score:  5 - Standby Prompting/Supervision or Set-up  Eating Description:       FIM Comprehension Score:  6 - Modified Independent  Comprehension Description:  Glasses    FIM Expression Score:  7 - Independent  Expression Description:       FIM Social Interaction Score:  7 - Independent  Social Interaction Description:       FIM Problem Solving Score:  6 - Modified Independent  Problem Solving Description:  Increased time, Therapy schedule, Verbal cueing    FIM Memory Score:  6 - Modified Independent  Memory Description:  Verbal cueing, Therapy schedule      Assessment    Time mngt tasks - 100% accuracy IND. Complex exec function task - pt able to identify key details, set up task without cueing, required 1 cue to check math and was able to correct on 2nd attempt with no further cueing.     Plan    Decrease to 30 min ST at this time, continue to address IADLs, complex exec function tasks to prepare for d/c.

## 2019-07-30 NOTE — PROGRESS NOTES
Patient fsbs this am 60. Patient states she feels fine, just tired. Given x 120mL of orange juice. Dr Ace aware, will hold lantus until after breakfast and recheck blood sugar.

## 2019-07-30 NOTE — THERAPY
Physical Therapy   Daily Treatment     Patient Name: Maira Dodd  Age:  73 y.o., Sex:  female  Medical Record #: [id number]  Today's Date: 2019     Precautions  Precautions: (P) Non Weight Bearing Left Upper Extremity, Cervical Collar  , Spinal / Back Precautions , Fall Risk, Other (See Comments)  Comments: (P) orthostatic-monitor BP, multiple rib fxs, cervical fx, seizure prec, L UE wounds, L UE sling PRN    Subjective    Pt received in gym in , agreeable to PT session.      Objective       19 1031   Precautions   Precautions Non Weight Bearing Left Upper Extremity;Cervical Collar  ;Spinal / Back Precautions ;Fall Risk;Other (See Comments)   Comments orthostatic-monitor BP, multiple rib fxs, cervical fx, seizure prec, L UE wounds, L UE sling PRN   Vitals   Pulse 95   Blood Pressure  144/55   Cognition    Level of Consciousness Alert   Supine Lower Body Exercise   Other Exercises supine LE HEP provided however inadequate time for pt to perform exercises   Sitting Lower Body Exercises   Ankle Pumps 3 sets of 10;Bilateral   Hip Adduction 3 sets of 10;Bilateral  (isometric ball squeeze)   Long Arc Quad 3 sets of 10;Bilateral   Marching 3 sets of 10   Other Exercises seated LE ther ex HEP provided   Bed Mobility    Sit to Stand Contact Guard Assist  (in // bars and in bathroom with grab bar)   Interdisciplinary Plan of Care Collaboration   Patient Position at End of Therapy Seated;Other (Comments)   Collaboration Comments in dining room for lunch   PT Total Time Spent   PT Individual Total Time Spent (Mins) 60   PT Charge Group   PT Therapeutic Exercise 3   PT Therapeutic Activities 1       FIM Toiletin - Total Assistance  Toileting Description:  Yanet bar, Increased time (total assist for hygiene)    FIM Toilet Transfer Score:  4 - Minimal Assistance  Toilet Transfer Description:   (CGA with grab bar)    FIM Walking Score:  0 - Not tested,medical condition  Walking Description:       Multiple  standing trials in // bars to assess for orthostatic hypotension: BP in sitting 144/55 and 151/52, BP after ~2 min of stance time 132/76. Pt reports dizziness in stance requiring her to need to sit down to wc. Pt stood x3 trials of 1-2 minutes each.     Assessment    Pt limited by reports of dizziness in stance. Pt cooperative with seated therex. Pt with possible self-limiting behaviors.    Plan    Increase OOB time, progress ambulation as able with orthostatic hypotension, seated and standing tolerance. LE strengthening-trial kati Jackson LE propulsion

## 2019-07-30 NOTE — THERAPY
"Occupational Therapy  Daily Treatment     Patient Name: Maira Dodd  Age:  73 y.o., Sex:  female  Medical Record #: [id number]  Today's Date: 7/30/2019     Precautions  Precautions: Non Weight Bearing Left Upper Extremity, Cervical Collar  , Spinal / Back Precautions , Fall Risk, Other (See Comments)  Comments: orthostatic-monitor BP, multiple rib fxs, cervical fx, seizure prec, L UE wounds, L UE sling PRN    Safety   Comments: education on and practice with use of sock aid and dressing stick to doff and don socks requires set up with sock aid and min A to adjust socks see FIM. Additional education on potential use of reacher to retrieve LB clothing and adjust.    Subjective    \" that feels better.\"     Objective       07/30/19 1231   Precautions   Precautions Non Weight Bearing Left Upper Extremity;Cervical Collar  ;Spinal / Back Precautions ;Fall Risk;Other (See Comments)   Comments orthostatic-monitor BP, multiple rib fxs, cervical fx, seizure prec, L UE wounds, L UE sling PRN   Supine Upper Body Exercises   Other Exercise PROM  left shoulder flexion x 10 reps x 2  progressing to  approximately 90 degrees.   shoulder abduction and gentle stretch x 10 reps x 2  progressing to approximately 30 degrees.    Bed Mobility    Supine to Sit Minimal Assist   Sit to Supine Stand by Assist   Interdisciplinary Plan of Care Collaboration   Patient Position at End of Therapy In Bed;Call Light within Reach   OT Total Time Spent   OT Individual Total Time Spent (Mins) 30   OT Charge Group   OT Therapeutic Exercise  2       FIM Lower Body Dressing Score:  4 - Minimal Assistance  Lower Body Dressing Description:  Sock aid, Dressing stick (doff socks supervision with dressing stick   don socks  min assist ( to adjust once donned) using  sock aid )     Therapist adjusted Garrison collar for improved fit .  Removed soiled pads and replaced with clean.  Soiled pads cleaned and left to air dry.      will reassess collar fit for " comfort  tomorrow   Assessment     tolerated PROM with minimal pain complaint     Plan    Continue ADL , related mobility strength/endurance building  Incorporating C spine precautions and NWB left UE

## 2019-07-30 NOTE — PROGRESS NOTES
Hospital Medicine Daily Progress Note      Chief Complaint:  Hypertension  Diabetes    Interval History:  No significant events or changes since last visit    Review of Systems  Review of Systems   Constitutional: Negative for chills and fever.   Respiratory: Negative for shortness of breath.    Cardiovascular: Negative for chest pain.   Gastrointestinal: Negative for abdominal pain, diarrhea, nausea and vomiting.   Psychiatric/Behavioral: The patient is not nervous/anxious.         Physical Exam  Temp:  [36.5 °C (97.7 °F)-36.7 °C (98 °F)] 36.7 °C (98 °F)  Pulse:  [] 88  Resp:  [18-19] 18  BP: (118-128)/(69-73) 118/70  SpO2:  [91 %-95 %] 91 %    Physical Exam   Constitutional: She is oriented to person, place, and time. She appears well-nourished.   HENT:   Head: Atraumatic.   Eyes: Pupils are equal, round, and reactive to light. Conjunctivae are normal.   Neck: Normal range of motion. Neck supple.   Cardiovascular: Normal rate, regular rhythm, S1 normal and S2 normal.    No murmur heard.  Pulmonary/Chest: Effort normal. She has no wheezes. She has no rales.   Abdominal: Soft. She exhibits no distension. There is no tenderness.   Musculoskeletal: She exhibits no edema.   Neurological: She is alert and oriented to person, place, and time. No sensory deficit.   Skin: Skin is warm and dry. No rash noted. No cyanosis.   Psychiatric: She has a normal mood and affect. Her behavior is normal.   Nursing note and vitals reviewed.      Fluids    Intake/Output Summary (Last 24 hours) at 07/30/19 1142  Last data filed at 07/30/19 0957   Gross per 24 hour   Intake              720 ml   Output                0 ml   Net              720 ml       Laboratory  Recent Labs      07/28/19   0612   WBC  6.8   RBC  3.65*   HEMOGLOBIN  11.3*   HEMATOCRIT  39.6   MCV  108.5*   MCH  31.0   MCHC  28.5*   RDW  61.1*   PLATELETCT  153*   MPV  11.8     Recent Labs      07/28/19   0612   SODIUM  136   POTASSIUM  4.5   CHLORIDE  107   CO2   *   GLUCOSE  79   BUN  20   CREATININE  0.74   CALCIUM  8.1*                       Assessment/Plan  * Subarachnoid hemorrhage following injury, with loss of consciousness (HCC)- (present on admission)   Assessment & Plan    Non-surgical management  Completed Keppra x 7 days for SZ proph     Closed fracture of multiple ribs with flail chest- (present on admission)   Assessment & Plan    S/P Left 3rd-7th rib fractures with resolved PTX  Aggressive pulmonary toilet  Encouraging incentive spirometry     DM (diabetes mellitus) ([facility])- (present on admission)   Assessment & Plan    Hba1c: 7.4 ()  BS were   But dropped to 60 this am ()  On Lantus: 32 units bid --> gave 15 units this am () --> will decrease to 25 units bid ()  On Metformin XL: 1000 mg bid (above max dose) --> will decrease to short acting 1000 mg bid (starting )  Cont to monitor     Fracture of left clavicle- (present on admission)   Assessment & Plan    S/P ORIF on 19 by Dr. Gauthier     Multiple fractures of cervical spine (HCC)- (present on admission)   Assessment & Plan    Non-surgical management  Cervical collar x 2 wks followed by F/U imaging     Thrombocytopenia (HCC)   Assessment & Plan    Following Platelet counts     Alkaline phosphatase elevation   Assessment & Plan    Likely 2nd to traumatic fractures  U/S RUQ: s/p choley without biliary dilatation  Monitor for now     Vitamin D deficiency   Assessment & Plan    Vit D: 13  On supplements     Laceration of left forearm- (present on admission)   Assessment & Plan    S/P repair  Wound care     Abnormal CT of the abdomen- (present on admission)   Assessment & Plan    Incidental finding of left kidney lesion on Trauma work-up  Needs F/U imaging     Essential hypertension- (present on admission)   Assessment & Plan    BP ok  On Core.25 mg bid  On Lotensin: 30 mg daily  Monitor     Hypothyroid- (present on admission)   Assessment & Plan    On Synthroid

## 2019-07-30 NOTE — PROGRESS NOTES
Patient care assumed. Report received from [facility] MP Hernandez. Patient is alert and calm, resting in bed. Call light and bedside table within reach. Will continue to monitor.

## 2019-07-30 NOTE — CARE PLAN
Problem: Bowel/Gastric:  Goal: Normal bowel function is maintained or improved  Pt continent of bowel. She refused her scheduled senna tonight. LBM 7/28/19.    Problem: Pain Management  Goal: Pain level will decrease to patient's comfort goal  Roxicodone 5mg given PRN per MAR for c/o left arm pain 4/10 with adequate relief. Pt sleeps good with requested trazodone. Will continue to monitor.    Problem: GLYCEMIA IMBALANCE  Intervention: MONITOR BLOOD GLUCOSE LEVELS AS ORDERED  HS fingerstick=160. Humulin R 2units + lantus 32 units given per MAR. HS snack provided and consumed. Will continue to monitor.

## 2019-07-30 NOTE — CARE PLAN
Problem: Safety  Goal: Will remain free from falls    Intervention: Assess risk factors for falls  Pt uses call light consistently and appropriately. Waits for assistance does not attempt self transfer this shift. Able to verbalize needs.      Problem: Pain Management  Goal: Pain level will decrease to patient's comfort goal    Intervention: Follow pain managment plan developed in collaboration with patient and Interdisciplinary Team  Patient able to verbalize needs.  Denies pain or discomfort this shift and no s/s same noted.  Will continue to monitor.

## 2019-07-31 PROBLEM — R42 DIZZINESS: Status: ACTIVE | Noted: 2019-07-31

## 2019-07-31 LAB
GLUCOSE BLD-MCNC: 119 MG/DL (ref 65–99)
GLUCOSE BLD-MCNC: 144 MG/DL (ref 65–99)
GLUCOSE BLD-MCNC: 178 MG/DL (ref 65–99)
GLUCOSE BLD-MCNC: 189 MG/DL (ref 65–99)

## 2019-07-31 PROCEDURE — 97530 THERAPEUTIC ACTIVITIES: CPT

## 2019-07-31 PROCEDURE — 82962 GLUCOSE BLOOD TEST: CPT | Mod: 91

## 2019-07-31 PROCEDURE — 770010 HCHG ROOM/CARE - REHAB SEMI PRIVAT*

## 2019-07-31 PROCEDURE — A9270 NON-COVERED ITEM OR SERVICE: HCPCS | Performed by: HOSPITALIST

## 2019-07-31 PROCEDURE — 700102 HCHG RX REV CODE 250 W/ 637 OVERRIDE(OP): Performed by: HOSPITALIST

## 2019-07-31 PROCEDURE — G0515 COGNITIVE SKILLS DEVELOPMENT: HCPCS

## 2019-07-31 PROCEDURE — A9270 NON-COVERED ITEM OR SERVICE: HCPCS | Performed by: PHYSICAL MEDICINE & REHABILITATION

## 2019-07-31 PROCEDURE — 97110 THERAPEUTIC EXERCISES: CPT

## 2019-07-31 PROCEDURE — 99232 SBSQ HOSP IP/OBS MODERATE 35: CPT | Performed by: HOSPITALIST

## 2019-07-31 PROCEDURE — 700102 HCHG RX REV CODE 250 W/ 637 OVERRIDE(OP): Performed by: PHYSICAL MEDICINE & REHABILITATION

## 2019-07-31 PROCEDURE — 99232 SBSQ HOSP IP/OBS MODERATE 35: CPT | Performed by: PHYSICAL MEDICINE & REHABILITATION

## 2019-07-31 PROCEDURE — 700111 HCHG RX REV CODE 636 W/ 250 OVERRIDE (IP): Performed by: PHYSICAL MEDICINE & REHABILITATION

## 2019-07-31 RX ADMIN — INSULIN GLARGINE 25 UNITS: 100 INJECTION, SOLUTION SUBCUTANEOUS at 07:50

## 2019-07-31 RX ADMIN — BACITRACIN ZINC AND POLYMYXIN B SULFATE: at 21:38

## 2019-07-31 RX ADMIN — OXYCODONE HYDROCHLORIDE 5 MG: 5 TABLET ORAL at 21:46

## 2019-07-31 RX ADMIN — NYSTATIN: 100000 POWDER TOPICAL at 08:18

## 2019-07-31 RX ADMIN — METFORMIN HYDROCHLORIDE 1000 MG: 500 TABLET, FILM COATED ORAL at 17:54

## 2019-07-31 RX ADMIN — BACITRACIN ZINC AND POLYMYXIN B SULFATE: at 14:21

## 2019-07-31 RX ADMIN — LEVOTHYROXINE SODIUM 125 MCG: 125 TABLET ORAL at 06:30

## 2019-07-31 RX ADMIN — BACITRACIN ZINC AND POLYMYXIN B SULFATE: at 08:18

## 2019-07-31 RX ADMIN — OXYCODONE HYDROCHLORIDE 5 MG: 5 TABLET ORAL at 14:19

## 2019-07-31 RX ADMIN — METFORMIN HYDROCHLORIDE 1000 MG: 500 TABLET, FILM COATED ORAL at 08:18

## 2019-07-31 RX ADMIN — INSULIN GLARGINE 25 UNITS: 100 INJECTION, SOLUTION SUBCUTANEOUS at 21:42

## 2019-07-31 RX ADMIN — LEVOTHYROXINE SODIUM 75 MCG: 75 TABLET ORAL at 06:30

## 2019-07-31 RX ADMIN — OXYCODONE HYDROCHLORIDE 10 MG: 10 TABLET ORAL at 00:01

## 2019-07-31 RX ADMIN — OXYCODONE HYDROCHLORIDE 5 MG: 5 TABLET ORAL at 07:26

## 2019-07-31 RX ADMIN — NYSTATIN: 100000 POWDER TOPICAL at 21:38

## 2019-07-31 RX ADMIN — ENOXAPARIN SODIUM 40 MG: 100 INJECTION SUBCUTANEOUS at 21:37

## 2019-07-31 RX ADMIN — BENAZEPRIL HYDROCHLORIDE 30 MG: 10 TABLET, COATED ORAL at 08:18

## 2019-07-31 RX ADMIN — ENOXAPARIN SODIUM 40 MG: 100 INJECTION SUBCUTANEOUS at 08:20

## 2019-07-31 RX ADMIN — VITAMIN D, TAB 1000IU (100/BT) 2000 UNITS: 25 TAB at 08:18

## 2019-07-31 ASSESSMENT — ENCOUNTER SYMPTOMS
PALPITATIONS: 0
HEADACHES: 0
BLURRED VISION: 0
DIZZINESS: 1
NAUSEA: 0
FEVER: 0
SHORTNESS OF BREATH: 0
VOMITING: 0
HALLUCINATIONS: 0

## 2019-07-31 NOTE — THERAPY
Occupational Therapy  Daily Treatment     Patient Name: Maira Dodd  Age:  73 y.o., Sex:  female  Medical Record #: [id number]  Today's Date: 7/31/2019     Precautions  Precautions: Non Weight Bearing Left Upper Extremity, Cervical Collar  , Spinal / Back Precautions , Fall Risk, Other (See Comments)  Comments: orthostatic-monitor BP, multiple rib fxs, cervical fx, L UE wounds, L UE sling PRN    Safety   Comments: education on and practice with use of sock aid and dressing stick to doff and don socks requires set up with sock aid and min A to adjust socks see FIM. Additional education on potential use of reacher to retrieve LB clothing and adjust.    Subjective    Pt in bed upon arrival, agreeable to OT. Pt reported excitement to complete puzzle activity.      Objective  FIM Bed/Chair/Wheelchair Transfers Score: 4 - Minimal Assistance  Bed/Chair/Wheelchair Transfers Description:  Supervision for safety, Increased time(Pt required Min A to lift for SPT to/from bed to w/c )       07/31/19 1431   Precautions   Precautions Non Weight Bearing Left Upper Extremity;Cervical Collar  ;Spinal / Back Precautions ;Fall Risk;Other (See Comments)   Comments orthostatic-monitor BP, multiple rib fxs, cervical fx, L UE wounds, L UE sling PRN   Vitals   O2 (LPM) 0   O2 Delivery None (Room Air)   Pain   Intervention Declines   Pain 0 - 10 Group   Pain Rating Scale (NPRS) 0   Non Verbal Descriptors   Non Verbal Scale  Calm   Cognition    Level of Consciousness Alert   Balance   Comments Puzzle - Pt standing at therpay mat via CGA, instructed to complete 24 piece puzzle. Pt was able to complete puzzle w/ 3 seated RBs. Pt demonstrated ability to stand for 2 mins, 1.5 mins x3 for a total of 6.5 mins total. No LOB   Bed Mobility    Supine to Sit Minimal Assist   Scooting Stand by Assist   Interdisciplinary Plan of Care Collaboration   Patient Position at End of Therapy In Bed;Call Light within Reach;Tray Table within Reach;Phone  within Reach       Assessment    Pt engaged in standing tolerance activity to assist w/ functional mobility and transfers. Pt completed a puzzle standing via CGA at the therapy mat. Pt was able to stand for a total of 6.5 mins w/ 3 RBs (about 1.5 min increments of standing).     Plan    Cont to address endurance, strength, balance, functional mobility/transfers for ADLs/IADLs

## 2019-07-31 NOTE — ASSESSMENT & PLAN NOTE
Pt gets dizzy on standing up  Dizziness better recently  Orthostatics (8/1): neg  Orthostatics (8/3): pos (sit  --> stand )  On Midodrine 2.5 mg bid (8/4)

## 2019-07-31 NOTE — REHAB-PHARMACY IDT TEAM NOTE
Pharmacy   Pharmacy  Antibiotics/Antifungals/Antivirals:  Medication:      Active Orders (From admission, onward)    None        Route:         n/a  Stop Date:  n/a  Reason:   Antihypertensives/Cardiac:  Medication:    Orders (72h ago, onward)     Start     Ordered    07/25/19 0900  benazepril (LOTENSIN) tablet 30 mg  DAILY     Note to Pharmacy:  Per P&T IV to PO Protocol    07/24/19 1524    07/23/19 1730  carvedilol (COREG) tablet 6.25 mg  2 TIMES DAILY WITH MEALS     Note to Pharmacy:  Per P&T IV to PO Protocol    07/23/19 1038    07/23/19 1039  hydrALAZINE (APRESOLINE) tablet 25 mg  EVERY 8 HOURS PRN      07/23/19 1039              Patient Vitals for the past 24 hrs:   BP Pulse   07/31/19 0818 110/67 87   07/31/19 0700 114/70 99   07/30/19 1900 111/50 100   07/30/19 1400 110/69 92     Anticoagulation:  Medication:  Lovenox  INR:      Other key medications: Lantus, Humulin R, levoyhyroxine, metformin  : A review of the medication list reveals no issues at this time. Patient is currently on antihypertensive(s). Recommend home blood pressure monitoring/recording if antihypertensive(s) regimen(s) continue. Patient is currently on diabetic medication(s) and/or Insulin(s). Recommend home blood glucose monitoring/recording if these regimen(s) continue.  Section completed by:  Pippa Rosado [facility]

## 2019-07-31 NOTE — THERAPY
"Occupational Therapy  Daily Treatment     Patient Name: Maira Dodd  Age:  73 y.o., Sex:  female  Medical Record #: [id number]  Today's Date: 7/31/2019     Precautions  Precautions: (P) Non Weight Bearing Left Upper Extremity, Cervical Collar  , Spinal / Back Precautions , Fall Risk, Other (See Comments)  Comments: (P) orthostatic-monitor BP, multiple rib fxs, cervical fx, L UE wounds, L UE sling PRN    Safety   Comments: education on and practice with use of sock aid and dressing stick to doff and don socks requires set up with sock aid and min A to adjust socks see FIM. Additional education on potential use of reacher to retrieve LB clothing and adjust.    Subjective    \" This is pressing heavy on my shoulder. \" referring to Aspen collar        Objective       07/31/19 1301   Precautions   Precautions Non Weight Bearing Left Upper Extremity;Cervical Collar  ;Spinal / Back Precautions ;Fall Risk;Other (See Comments)   Comments orthostatic-monitor BP, multiple rib fxs, cervical fx, L UE wounds, L UE sling PRN   Sitting Upper Body Exercises   Front Arm Raise 2 sets of 10;Right   (to 90 degrees )   Internal Shoulder Rotation 2 sets of 10;Right    External Shoulder Rotation 2 sets of 10;Right    Bicep Curls 3 sets of 10;Right    Tricep Press 2 sets of 10;Right    Pronation / Supination 2 sets of 10;Right    Comments ther ex performed with 4lb weight    Interdisciplinary Plan of Care Collaboration   Patient Position at End of Therapy Seated  (hand off to ST for tx )   OT Total Time Spent   OT Individual Total Time Spent (Mins) 30   OT Charge Group   OT Therapy Activity 1   OT Therapeutic Exercise  1        Bed to w/c transfer   Min assist supine to sit followed by SBA  Edge of bed to w/c    time spent adjusting  Aspen collar for improved comfort on left shoulder.  Required minimal adjustment for immediate relief   Assessment     completed ther ex no copmlaints       Plan    ADL ,IADL related mobility " ,strength/endurance building incorporating C spine and NWB Left UE precautions

## 2019-07-31 NOTE — PROGRESS NOTES
"Rehab Progress Note     Encounter Date: 7/30/2019    CC: TBI, left arm wound, and left clavicular fracture    Interval Events (Subjective)  Patient sitting up in bed. She reports she was able to raise her arm up today without too much pain. She reports wound team came by and tried to clean the wound but there is some necrotic tissue.  Discussed with Dr. Gaston and ongoing work on wound care.  She otherwise reports therapy is going well. Denies NVD.     IDT Team Meeting 7/25/2019  DC/Disposition:  8/9/19    Objective:  VITAL SIGNS: /69   Pulse 92   Temp 36.6 °C (97.9 °F) (Oral)   Resp 18   Ht 1.702 m (5' 7\")   Wt (!) 136 kg (299 lb 14.4 oz)   SpO2 92%   BMI 46.97 kg/m²   Gen: NAD  Psych: Mood and affect appropriate  CV: RRR, no edema  Resp: CTAB, no upper airway sounds  Abd: NTND  Neuro: AOx4, following simple commands  Unchanged from 7/29/19    Recent Results (from the past 72 hour(s))   ACCU-CHEK GLUCOSE    Collection Time: 07/27/19  8:29 PM   Result Value Ref Range    Glucose - Accu-Ck 151 (H) 65 - 99 mg/dL   CBC WITH DIFFERENTIAL    Collection Time: 07/28/19  6:12 AM   Result Value Ref Range    WBC 6.8 4.8 - 10.8 K/uL    RBC 3.65 (L) 4.20 - 5.40 M/uL    Hemoglobin 11.3 (L) 12.0 - 16.0 g/dL    Hematocrit 39.6 37.0 - 47.0 %    .5 (H) 81.4 - 97.8 fL    MCH 31.0 27.0 - 33.0 pg    MCHC 28.5 (L) 33.6 - 35.0 g/dL    RDW 61.1 (H) 35.9 - 50.0 fL    Platelet Count 153 (L) 164 - 446 K/uL    MPV 11.8 9.0 - 12.9 fL    Neutrophils-Polys 55.20 44.00 - 72.00 %    Lymphocytes 30.80 22.00 - 41.00 %    Monocytes 10.50 0.00 - 13.40 %    Eosinophils 1.90 0.00 - 6.90 %    Basophils 1.20 0.00 - 1.80 %    Immature Granulocytes 0.40 0.00 - 0.90 %    Nucleated RBC 0.00 /100 WBC    Neutrophils (Absolute) 3.77 2.00 - 7.15 K/uL    Lymphs (Absolute) 2.11 1.00 - 4.80 K/uL    Monos (Absolute) 0.72 0.00 - 0.85 K/uL    Eos (Absolute) 0.13 0.00 - 0.51 K/uL    Baso (Absolute) 0.08 0.00 - 0.12 K/uL    Immature Granulocytes (abs) " 0.03 0.00 - 0.11 K/uL    NRBC (Absolute) 0.00 K/uL   Comp Metabolic Panel    Collection Time: 07/28/19  6:12 AM   Result Value Ref Range    Sodium 136 135 - 145 mmol/L    Potassium 4.5 3.6 - 5.5 mmol/L    Chloride 107 96 - 112 mmol/L    Co2 19 (L) 20 - 33 mmol/L    Anion Gap 10.0 0.0 - 11.9    Glucose 79 65 - 99 mg/dL    Bun 20 8 - 22 mg/dL    Creatinine 0.74 0.50 - 1.40 mg/dL    Calcium 8.1 (L) 8.5 - 10.5 mg/dL    AST(SGOT) 31 12 - 45 U/L    ALT(SGPT) 20 2 - 50 U/L    Alkaline Phosphatase 134 (H) 30 - 99 U/L    Total Bilirubin 1.0 0.1 - 1.5 mg/dL    Albumin 3.0 (L) 3.2 - 4.9 g/dL    Total Protein 5.9 (L) 6.0 - 8.2 g/dL    Globulin 2.9 1.9 - 3.5 g/dL    A-G Ratio 1.0 g/dL   FOLATE    Collection Time: 07/28/19  6:12 AM   Result Value Ref Range    Folate -Folic Acid 11.6 >4.0 ng/mL   VITAMIN B12    Collection Time: 07/28/19  6:12 AM   Result Value Ref Range    Vitamin B12 -True Cobalamin 722 211 - 911 pg/mL   ESTIMATED GFR    Collection Time: 07/28/19  6:12 AM   Result Value Ref Range    GFR If African American >60 >60 mL/min/1.73 m 2    GFR If Non African American >60 >60 mL/min/1.73 m 2   PERIPHERAL SMEAR REVIEW    Collection Time: 07/28/19  6:12 AM   Result Value Ref Range    Peripheral Smear Review see below    DIFFERENTIAL COMMENT    Collection Time: 07/28/19  6:12 AM   Result Value Ref Range    Comments-Diff see below    ACCU-CHEK GLUCOSE    Collection Time: 07/28/19  7:24 AM   Result Value Ref Range    Glucose - Accu-Ck 79 65 - 99 mg/dL   ACCU-CHEK GLUCOSE    Collection Time: 07/28/19 11:33 AM   Result Value Ref Range    Glucose - Accu-Ck 129 (H) 65 - 99 mg/dL   ACCU-CHEK GLUCOSE    Collection Time: 07/28/19  4:48 PM   Result Value Ref Range    Glucose - Accu-Ck 138 (H) 65 - 99 mg/dL   ACCU-CHEK GLUCOSE    Collection Time: 07/28/19  9:16 PM   Result Value Ref Range    Glucose - Accu-Ck 144 (H) 65 - 99 mg/dL   ACCU-CHEK GLUCOSE    Collection Time: 07/29/19  7:18 AM   Result Value Ref Range    Glucose - Accu-Ck  84 65 - 99 mg/dL   ACCU-CHEK GLUCOSE    Collection Time: 07/29/19 10:56 AM   Result Value Ref Range    Glucose - Accu-Ck 133 (H) 65 - 99 mg/dL   ACCU-CHEK GLUCOSE    Collection Time: 07/29/19  5:03 PM   Result Value Ref Range    Glucose - Accu-Ck 157 (H) 65 - 99 mg/dL   ACCU-CHEK GLUCOSE    Collection Time: 07/29/19  9:17 PM   Result Value Ref Range    Glucose - Accu-Ck 160 (H) 65 - 99 mg/dL   ACCU-CHEK GLUCOSE    Collection Time: 07/30/19  7:31 AM   Result Value Ref Range    Glucose - Accu-Ck 60 (L) 65 - 99 mg/dL   ACCU-CHEK GLUCOSE    Collection Time: 07/30/19  9:42 AM   Result Value Ref Range    Glucose - Accu-Ck 114 (H) 65 - 99 mg/dL   ACCU-CHEK GLUCOSE    Collection Time: 07/30/19 11:28 AM   Result Value Ref Range    Glucose - Accu-Ck 156 (H) 65 - 99 mg/dL   ACCU-CHEK GLUCOSE    Collection Time: 07/30/19  3:19 PM   Result Value Ref Range    Glucose - Accu-Ck 143 (H) 65 - 99 mg/dL   ACCU-CHEK GLUCOSE    Collection Time: 07/30/19  4:53 PM   Result Value Ref Range    Glucose - Accu-Ck 130 (H) 65 - 99 mg/dL       Current Facility-Administered Medications   Medication Frequency   • [START ON 7/31/2019] metFORMIN (GLUCOPHAGE) tablet 1,000 mg BID WITH MEALS   • insulin glargine (LANTUS) injection 25 Units BID   • insulin regular (HUMULIN R) injection 2-12 Units 4X/DAY ACHS    And   • glucose 4 g chewable tablet 16 g Q15 MIN PRN    And   • dextrose 50% (D50W) injection 50 mL Q15 MIN PRN   • benazepril (LOTENSIN) tablet 30 mg DAILY   • vitamin D (cholecalciferol) tablet 2,000 Units DAILY   • Respiratory Care per Protocol Continuous RT   • Pharmacy Consult Request ...Pain Management Review 1 Each PHARMACY TO DOSE   • oxyCODONE immediate-release (ROXICODONE) tablet 5 mg Q3HRS PRN   • oxyCODONE immediate release (ROXICODONE) tablet 10 mg Q3HRS PRN   • tramadol (ULTRAM) 50 MG tablet 50 mg Q4HRS PRN   • hydrALAZINE (APRESOLINE) tablet 25 mg Q8HRS PRN   • acetaminophen (TYLENOL) tablet 650 mg Q4HRS PRN   • senna-docusate  (PERICOLACE or SENOKOT S) 8.6-50 MG per tablet 2 Tab BID    And   • polyethylene glycol/lytes (MIRALAX) PACKET 1 Packet QDAY PRN    And   • magnesium hydroxide (MILK OF MAGNESIA) suspension 30 mL QDAY PRN    And   • bisacodyl (DULCOLAX) suppository 10 mg QDAY PRN   • artificial tears 1.4 % ophthalmic solution 1 Drop PRN   • benzocaine-menthol (CEPACOL) lozenge 1 Lozenge Q2HRS PRN   • mag hydrox-al hydrox-simeth (MAALOX PLUS ES or MYLANTA DS) suspension 20 mL Q2HRS PRN   • ondansetron (ZOFRAN ODT) dispertab 4 mg 4X/DAY PRN    Or   • ondansetron (ZOFRAN) syringe/vial injection 4 mg 4X/DAY PRN   • traZODone (DESYREL) tablet 50 mg QHS PRN   • sodium chloride (OCEAN) 0.65 % nasal spray 2 Spray PRN   • bacitracin-polymyxin b (POLYSPORIN) 500-12995 UNIT/GM ointment TID   • carvedilol (COREG) tablet 6.25 mg BID WITH MEALS   • enoxaparin (LOVENOX) inj 40 mg Q12HRS   • HYDROmorphone (DILAUDID) tablet 2-4 mg Q3HRS PRN   • nystatin (MYCOSTATIN) powder BID   • levothyroxine (SYNTHROID) tablet 125 mcg AM ES    And   • levothyroxine (SYNTHROID) tablet 75 mcg AM ES       Orders Placed This Encounter   Procedures   • Diet Order Diabetic     Standing Status:   Standing     Number of Occurrences:   1     Order Specific Question:   Diet:     Answer:   Diabetic [3]     Order Specific Question:   Consistency/Fluid modifications:     Answer:   Thin Liquids [3]     Comments:   straws ok       Assessment:  Active Hospital Problems    Diagnosis   • *Subarachnoid hemorrhage following injury, with loss of consciousness ([facility])   • Closed fracture of multiple ribs with flail chest   • UTI (urinary tract infection)   • DM (diabetes mellitus) ([facility])   • Fracture of left clavicle   • Morbid obesity with BMI of 45.0-49.9, adult ([facility])   • Multiple fractures of cervical spine ([facility])   • Dysphagia   • Laceration of left forearm   • Essential hypertension   • Hypothyroid   • Trauma   • Abnormal CT of the abdomen   • Vitamin D deficiency   • Renal  insufficiency   • Macrocytic anemia   • Alkaline phosphatase elevation       Medical Decision Making and Plan:  TBI - Patient with left sided SAH with loss of consciousness and decreased cognition. Also limited by other orthopedic injuries  -Patient has completed Keppra for seizure prophylaxis.   -PT and OT for mobility and ADLs  -SLP for cognition      Dysphagia - Patient with oropharyngeal dysphagia. Upgraded to Dysphagia 2 with NTL prior to transfer.  SLP for swallow - Advanced diet to regular with thins. Resolved.      C/T Spine fractures - Patient with TP of C7 and T1 fractures in C collar for unknown length of time.    -Continue C collar. Will need follow-up with NSG  -Need to discuss with prosthetics new C collar as patient's poorly fitting.  OT found 2 different models they will try later today, may have to be hybrid.     Left clavicular fracture - S/p ORIF with Dr. Gauthier on 7/14/19. Also with large left armds laceration  -Continue NWB LUE.  Remove staples on shoulder and forearm  -Wound consult - debridement on 7/30/19. Appreciate recommendations.      HTN - Patient on Benazepriil 30 mg and Coreg 6.25 mg BID     Orthostatic hypotension - Severe orthostatics on 7/24/19, continue to monitor. ACEi has been reduced, improved     Anemia - Stable at 11.3.     DM - Patient on Lantus 30 U BID, metformin 1000 mg BID and SSI on transfer. Previously on Trulicity, Metformin  -Will check A1c 7.4. Consult hospitalist, decreased to 25 U  -Restarted on Metformin 1000 mg BID     Hypothyroidism - Patient on 200 mcg on transfer.      Vitamin D deficiency - 12 on admission. Start on 2000 U daily    GI Ppx - Patient on Omeprazole while on dysphagia diet. Discontinue Prilosec as off dysphagia diet     DVT Ppx - Patient on Lovenox on transfer.     Total time:  25 minutes.  I spent greater than 50% of the time for patient care, counseling, and coordination on this date, including unit/floor time, and face-to-face time with the  patient as per interval events and assessment and plan above. Topics discussed included wound care, debridement and discussed case with Dr. Gaston.     Vince Matias M.D.

## 2019-07-31 NOTE — CARE PLAN
Problem: Venous Thromboembolism (VTW)/Deep Vein Thrombosis (DVT) Prevention:  Goal: Patient will participate in Venous Thrombosis (VTE)/Deep Vein Thrombosis (DVT)Prevention Measures  Note:   Pt is on scheduled Lovenox injection for DVT prevention. No acute signs and symptoms of DVT noted thus far.      Problem: Pain Management  Goal: Pain level will decrease to patient's comfort goal  Note:   Pt received x 2 doses of PRN oxycodone 5 mg this shift for LUE pain. Pt able to perform activities.

## 2019-07-31 NOTE — THERAPY
"Occupational Therapy  Daily Treatment     Patient Name: Maira Dodd  Age:  73 y.o., Sex:  female  Medical Record #: [id number]  Today's Date: 7/31/2019     Precautions  Precautions: (P) Non Weight Bearing Left Upper Extremity, Cervical Collar  , Spinal / Back Precautions , Fall Risk, Other (See Comments)  Comments: (P) orthostatic-monitor BP, multiple rib fxs, cervical fx, L UE wounds, L UE sling PRN    Safety   Comments: education on and practice with use of sock aid and dressing stick to doff and don socks requires set up with sock aid and min A to adjust socks see FIM. Additional education on potential use of reacher to retrieve LB clothing and adjust.    Subjective    \" Do you think I could have sprained my wrist?\"  Reported wrist pain    Patient also reporting dizziness with standing  That did not limit functional mobility with PT   patient instructed to ask Dr Matias to assess left wrist and relay dizziness to him.        Objective       07/31/19 0931   Precautions   Precautions Non Weight Bearing Left Upper Extremity;Cervical Collar  ;Spinal / Back Precautions ;Fall Risk;Other (See Comments)   Comments orthostatic-monitor BP, multiple rib fxs, cervical fx, L UE wounds, L UE sling PRN   Supine Upper Body Exercises   Other Exercise PROM  left shoulder flexion x 10 reps x 2  progressing to  approximately 90 degrees.   shoulder abduction and gentle stretch x 10 reps x 2  progressing to approximately 30 degrees.    Interdisciplinary Plan of Care Collaboration   IDT Collaboration with  Certified Nursing Assistant   Patient Position at End of Therapy In Bed;Call Light within Reach;Tray Table within Reach   Collaboration Comments current level assist for ADLs and transfer    OT Total Time Spent   OT Individual Total Time Spent (Mins) 30   OT Charge Group   OT Therapy Activity 1   OT Therapeutic Exercise  1       Time spent educating patient regarding pillow use ( under head  In bed)  Patient with 2 " pillows under head and chin slipping below pad in [place] collar.   One pillow removed and [place] collar adjusted for improved fit   patient reported improved comfort with use of 1 pillow and verbalized understanding of use of 1 or no pillow for best positioning.    Left UE position with pillows as to not apply pressure to left elbow.   .  Assessment     continues to tolerate PROM left shoulder  Verbalized concern of left wrist pain  Feels it could be a sprain.  Therapist noted minimal  Edema , slight discomfort at end rage of Active and PROM for wrist extension     Plan    ADL ,IADL related mobility ,strength/endurance building incorporating C spine and NWB Left UE precautions

## 2019-07-31 NOTE — CARE PLAN
Problem: Safety  Goal: Will remain free from injury  Patient uses call light appropriately for assistance as needed/wanted. Bed locked, in lowest position.    Problem: GLYCEMIA IMBALANCE  Intervention: MONITOR BLOOD GLUCOSE LEVELS AS ORDERED  Patient Blood Glucose monitoring is AC&HS with sliding scale coverage as needed.

## 2019-07-31 NOTE — PROGRESS NOTES
Hospital Medicine Daily Progress Note      Chief Complaint:  Hypertension  Diabetes    Interval History:  No significant events or changes since last visit    Review of Systems  Review of Systems   Constitutional: Negative for fever.   Eyes: Negative for blurred vision.   Respiratory: Negative for shortness of breath.    Cardiovascular: Negative for palpitations.   Gastrointestinal: Negative for nausea and vomiting.   Neurological: Positive for dizziness. Negative for headaches.        Gets dizzy on sitting or standing up   Psychiatric/Behavioral: Negative for hallucinations.        Physical Exam  Temp:  [36.6 °C (97.9 °F)-36.9 °C (98.4 °F)] 36.8 °C (98.2 °F)  Pulse:  [] 87  Resp:  [18] 18  BP: (110-144)/(50-70) 110/67  SpO2:  [92 %-94 %] 94 %    Physical Exam   Constitutional: She is oriented to person, place, and time.   HENT:   Mouth/Throat: Oropharynx is clear and moist.   Eyes: No scleral icterus.   Cardiovascular: Normal rate, regular rhythm, S1 normal and S2 normal.   No murmur heard.  Pulmonary/Chest: Effort normal and breath sounds normal. No stridor.   Abdominal: Soft. She exhibits no distension. There is no tenderness.   Musculoskeletal: She exhibits no edema.   Neurological: She is alert and oriented to person, place, and time. No sensory deficit.   Skin: Skin is warm and dry. No rash noted. She is not diaphoretic. No cyanosis.   Psychiatric: She has a normal mood and affect. Her behavior is normal.   Nursing note and vitals reviewed.      Fluids    Intake/Output Summary (Last 24 hours) at 7/31/2019 0935  Last data filed at 7/30/2019 1900  Gross per 24 hour   Intake 960 ml   Output --   Net 960 ml       Laboratory                            Assessment/Plan  * Subarachnoid hemorrhage following injury, with loss of consciousness (HCC)- (present on admission)  Assessment & Plan  Non-surgical management  Completed Keppra x 7 days for SZ proph    Dizziness  Assessment & Plan  Pt gets dizzy on sitting or  standing up  Will check orthostatics ()    Thrombocytopenia (HCC)  Assessment & Plan  Platelets: 285 () --> 153 ()  Will check levels in the am ()  Monitor    Alkaline phosphatase elevation  Assessment & Plan  Likely 2nd to traumatic fractures  U/S RUQ: s/p choley without biliary dilatation  Monitor for now    Vitamin D deficiency  Assessment & Plan  Vit D: 13  On supplements    Laceration of left forearm- (present on admission)  Assessment & Plan  S/P repair  Wound care    Abnormal CT of the abdomen- (present on admission)  Assessment & Plan  Incidental finding of left kidney lesion on Trauma work-up  Needs F/U imaging    Essential hypertension- (present on admission)  Assessment & Plan  BP ok  On Core.25 mg bid  On Lotensin: 30 mg daily  Monitor    Closed fracture of multiple ribs with flail chest- (present on admission)  Assessment & Plan  S/P Left 3rd-7th rib fractures with resolved PTX  Aggressive pulmonary toilet  Encouraging incentive spirometry    Hypothyroid- (present on admission)  Assessment & Plan  On Synthroid    DM (diabetes mellitus) ([facility])- (present on admission)  Assessment & Plan  Hba1c: 7.4 ()  BS were   But dropped to 60 this am ()  On Lantus: 32 units bid --> gave 15 units this am () --> 25 units bid ()  On Metformin: 1000 mg XL bid (above max dose) --> 1000 mg bid ()  Cont to monitor    Fracture of left clavicle- (present on admission)  Assessment & Plan  S/P ORIF on 19 by Dr. Gauthier    Multiple fractures of cervical spine (HCC)- (present on admission)  Assessment & Plan  Non-surgical management  Cervical collar x 2 wks followed by F/U imaging

## 2019-07-31 NOTE — PROGRESS NOTES
Received shift report from night RN and assumed patient care. Patient is awake, alert and oriented. Complains of pain to L arm. C-collar on. Reinforced safety precautions to patient, will continue to monitor.

## 2019-07-31 NOTE — THERAPY
Speech Language Pathology  Daily Treatment     Patient Name: Maira Dodd  Age:  73 y.o., Sex:  female  Medical Record #: [id number]  Today's Date: 7/31/2019     Subjective    Pt arrived with assistance from COSME. Pt pleasant and cooperative.      Objective     07/31/19 1334   Cognition   Executive Functioning / Organization Supervision (5)   Interdisciplinary Plan of Care Collaboration   IDT Collaboration with  Certified O.T. Assistant  (COSME);Physician;Certified Nursing Assistant   Patient Position at End of Therapy Seated   Collaboration Comments assumed care from COSME, transferred care to physician with CNA informed of pt location   SLP Total Time Spent   SLP Individual Total Time Spent (Mins) 30   Charge Group   SLP Cognitive Skill Development 2           Assessment    Pt recalled meds with 100% accuracy, reports she will need assistance opening bottles to sort secondary to LUE, however, spouse can assist with this. Exec function tasks x2 trials: 1st trial: 95%, 2nd trial: 83% IND, MIN cues to increase to 100%.     Plan    fxl med sort, $ mngt task

## 2019-07-31 NOTE — DISCHARGE PLANNING
Recommendation:  Update IPOC to address therapy service delivery:  PT_90/60____ min/day alternating w/ OT, OT __60/90___ min/day alternating w/ PT, ST ___30__ min/day on 5/7 days per week for at least 15 hours per week.

## 2019-07-31 NOTE — THERAPY
Physical Therapy   Daily Treatment     Patient Name: Maira Dodd  Age:  73 y.o., Sex:  female  Medical Record #: [id number]  Today's Date: 7/31/2019     Precautions  Precautions: Non Weight Bearing Left Upper Extremity, Cervical Collar  , Spinal / Back Precautions , Fall Risk, Other (See Comments)  Comments: orthostatic-monitor BP, multiple rib fxs, cervical fx, L UE wounds, L UE sling PRN    Subjective    Pt in bed on arrival, agreeable to PT.     Objective       07/31/19 0831   Precautions   Precautions Non Weight Bearing Left Upper Extremity;Cervical Collar  ;Spinal / Back Precautions ;Fall Risk;Other (See Comments)   Comments orthostatic-monitor BP, multiple rib fxs, cervical fx, L UE wounds, L UE sling PRN   Sitting Lower Body Exercises   Nustep Resistance Level 3  (w/BLE & RUE, decreased res 2 @5min 2/2 fatigue 0.21mi 10min)   Bed Mobility    Supine to Sit Stand by Assist   Sit to Supine Stand by Assist   Sit to Stand Minimal Assist  (HHA 2/2 pt reported dizziness)   Interdisciplinary Plan of Care Collaboration   IDT Collaboration with  Certified O.T. Assistant  (COSME)   Patient Position at End of Therapy In Bed;Call Light within Reach;Tray Table within Reach   Collaboration Comments CLOF, pt position   PT Total Time Spent   PT Individual Total Time Spent (Mins) 60   PT Charge Group   PT Therapeutic Exercise 1   PT Therapeutic Activities 3     Unable to attain BP reading after multiple attempts using vitals machine, appropriately sized cuff not available for manual.     Standing tolerance: CGA, pt tolerated ~1 min static standing     Ambulation with HHA and WC follow, 15' and 20', with 1 minor LOB requiring min A to recover    Bed <> WC, SBA for bed mobility, HHA during SPT, no AD    Discussed possible causes of dizziness- orthostatic hypotension vs. Neck-related vs. Head injury related. Encouraged pt to discuss symptoms with MD.    Assessment    Pt functional mobility  limited by reports of  'spinning' dizziness made worse by movement. Unclear whether due to orthostatic hypotension vs. other cause. Pt with improvements in ambulation distance, reports enjoying NuStep    Plan    Increase OOB time, progress ambulation as able with orthostatic hypotension/sx of 'dizziness', seated and standing tolerance.

## 2019-08-01 LAB
ANION GAP SERPL CALC-SCNC: 8 MMOL/L (ref 0–11.9)
BUN SERPL-MCNC: 22 MG/DL (ref 8–22)
CALCIUM SERPL-MCNC: 8.6 MG/DL (ref 8.5–10.5)
CHLORIDE SERPL-SCNC: 106 MMOL/L (ref 96–112)
CO2 SERPL-SCNC: 22 MMOL/L (ref 20–33)
CREAT SERPL-MCNC: 0.79 MG/DL (ref 0.5–1.4)
ERYTHROCYTE [DISTWIDTH] IN BLOOD BY AUTOMATED COUNT: 55.2 FL (ref 35.9–50)
GLUCOSE BLD-MCNC: 125 MG/DL (ref 65–99)
GLUCOSE BLD-MCNC: 147 MG/DL (ref 65–99)
GLUCOSE BLD-MCNC: 154 MG/DL (ref 65–99)
GLUCOSE BLD-MCNC: 160 MG/DL (ref 65–99)
GLUCOSE SERPL-MCNC: 128 MG/DL (ref 65–99)
HCT VFR BLD AUTO: 32.4 % (ref 37–47)
HGB BLD-MCNC: 10.2 G/DL (ref 12–16)
MAGNESIUM SERPL-MCNC: 1.7 MG/DL (ref 1.5–2.5)
MCH RBC QN AUTO: 30.9 PG (ref 27–33)
MCHC RBC AUTO-ENTMCNC: 31.5 G/DL (ref 33.6–35)
MCV RBC AUTO: 98.2 FL (ref 81.4–97.8)
PHOSPHATE SERPL-MCNC: 2.6 MG/DL (ref 2.5–4.5)
PLATELET # BLD AUTO: 274 K/UL (ref 164–446)
PMV BLD AUTO: 10.7 FL (ref 9–12.9)
POTASSIUM SERPL-SCNC: 4 MMOL/L (ref 3.6–5.5)
RBC # BLD AUTO: 3.3 M/UL (ref 4.2–5.4)
SODIUM SERPL-SCNC: 136 MMOL/L (ref 135–145)
WBC # BLD AUTO: 6.4 K/UL (ref 4.8–10.8)

## 2019-08-01 PROCEDURE — A9270 NON-COVERED ITEM OR SERVICE: HCPCS | Performed by: PHYSICAL MEDICINE & REHABILITATION

## 2019-08-01 PROCEDURE — 700111 HCHG RX REV CODE 636 W/ 250 OVERRIDE (IP): Performed by: PHYSICAL MEDICINE & REHABILITATION

## 2019-08-01 PROCEDURE — 99233 SBSQ HOSP IP/OBS HIGH 50: CPT | Performed by: PHYSICAL MEDICINE & REHABILITATION

## 2019-08-01 PROCEDURE — 36415 COLL VENOUS BLD VENIPUNCTURE: CPT

## 2019-08-01 PROCEDURE — 97535 SELF CARE MNGMENT TRAINING: CPT

## 2019-08-01 PROCEDURE — A9270 NON-COVERED ITEM OR SERVICE: HCPCS | Performed by: HOSPITALIST

## 2019-08-01 PROCEDURE — 700102 HCHG RX REV CODE 250 W/ 637 OVERRIDE(OP): Performed by: PHYSICAL MEDICINE & REHABILITATION

## 2019-08-01 PROCEDURE — 84100 ASSAY OF PHOSPHORUS: CPT

## 2019-08-01 PROCEDURE — 97110 THERAPEUTIC EXERCISES: CPT

## 2019-08-01 PROCEDURE — 82962 GLUCOSE BLOOD TEST: CPT | Mod: 91

## 2019-08-01 PROCEDURE — 770010 HCHG ROOM/CARE - REHAB SEMI PRIVAT*

## 2019-08-01 PROCEDURE — 80048 BASIC METABOLIC PNL TOTAL CA: CPT

## 2019-08-01 PROCEDURE — 700102 HCHG RX REV CODE 250 W/ 637 OVERRIDE(OP): Performed by: HOSPITALIST

## 2019-08-01 PROCEDURE — 85027 COMPLETE CBC AUTOMATED: CPT

## 2019-08-01 PROCEDURE — 97530 THERAPEUTIC ACTIVITIES: CPT

## 2019-08-01 PROCEDURE — 99232 SBSQ HOSP IP/OBS MODERATE 35: CPT | Performed by: HOSPITALIST

## 2019-08-01 PROCEDURE — 83735 ASSAY OF MAGNESIUM: CPT

## 2019-08-01 PROCEDURE — G0515 COGNITIVE SKILLS DEVELOPMENT: HCPCS

## 2019-08-01 RX ADMIN — ENOXAPARIN SODIUM 40 MG: 100 INJECTION SUBCUTANEOUS at 08:32

## 2019-08-01 RX ADMIN — BENAZEPRIL HYDROCHLORIDE 30 MG: 10 TABLET, COATED ORAL at 08:33

## 2019-08-01 RX ADMIN — OXYCODONE HYDROCHLORIDE 5 MG: 5 TABLET ORAL at 11:18

## 2019-08-01 RX ADMIN — BACITRACIN ZINC AND POLYMYXIN B SULFATE: at 20:23

## 2019-08-01 RX ADMIN — NYSTATIN: 100000 POWDER TOPICAL at 08:32

## 2019-08-01 RX ADMIN — NYSTATIN: 100000 POWDER TOPICAL at 20:23

## 2019-08-01 RX ADMIN — CARVEDILOL 6.25 MG: 3.12 TABLET, FILM COATED ORAL at 08:33

## 2019-08-01 RX ADMIN — LEVOTHYROXINE SODIUM 75 MCG: 75 TABLET ORAL at 05:16

## 2019-08-01 RX ADMIN — OXYCODONE HYDROCHLORIDE 5 MG: 5 TABLET ORAL at 05:14

## 2019-08-01 RX ADMIN — OXYCODONE HYDROCHLORIDE 5 MG: 5 TABLET ORAL at 20:18

## 2019-08-01 RX ADMIN — METFORMIN HYDROCHLORIDE 1000 MG: 500 TABLET, FILM COATED ORAL at 08:33

## 2019-08-01 RX ADMIN — METFORMIN HYDROCHLORIDE 1000 MG: 500 TABLET, FILM COATED ORAL at 17:41

## 2019-08-01 RX ADMIN — LEVOTHYROXINE SODIUM 125 MCG: 125 TABLET ORAL at 05:14

## 2019-08-01 RX ADMIN — VITAMIN D, TAB 1000IU (100/BT) 2000 UNITS: 25 TAB at 08:33

## 2019-08-01 RX ADMIN — INSULIN GLARGINE 25 UNITS: 100 INJECTION, SOLUTION SUBCUTANEOUS at 07:59

## 2019-08-01 RX ADMIN — BACITRACIN ZINC AND POLYMYXIN B SULFATE: at 08:32

## 2019-08-01 RX ADMIN — TRAZODONE HYDROCHLORIDE 50 MG: 50 TABLET ORAL at 20:18

## 2019-08-01 RX ADMIN — INSULIN GLARGINE 25 UNITS: 100 INJECTION, SOLUTION SUBCUTANEOUS at 20:28

## 2019-08-01 RX ADMIN — ENOXAPARIN SODIUM 40 MG: 100 INJECTION SUBCUTANEOUS at 20:21

## 2019-08-01 ASSESSMENT — PATIENT HEALTH QUESTIONNAIRE - PHQ9
SUM OF ALL RESPONSES TO PHQ9 QUESTIONS 1 AND 2: 0
1. LITTLE INTEREST OR PLEASURE IN DOING THINGS: NOT AT ALL
2. FEELING DOWN, DEPRESSED, IRRITABLE, OR HOPELESS: NOT AT ALL

## 2019-08-01 ASSESSMENT — ENCOUNTER SYMPTOMS
BLURRED VISION: 0
FEVER: 0
NERVOUS/ANXIOUS: 0
DIARRHEA: 0
COUGH: 0
DIZZINESS: 1
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
## 2019-08-01 NOTE — CARE PLAN
Problem: Safety  Goal: Will remain free from injury  Outcome: PROGRESSING AS EXPECTED  Patient remains free from injury. Use call light for assistance.     Problem: Pain Management  Goal: Pain level will decrease to patient's comfort goal  Outcome: PROGRESSING AS EXPECTED   Patient c/o mild pain 4/10 at left arm. Medicated her with oxycodone 5 mg for pain with positive result. Up and participated in therapies. Tolerated well.

## 2019-08-01 NOTE — REHAB-OT IDT TEAM NOTE
Occupational Therapy   Activities of Daily Living  Eating Initial:  5 - Standby Prompting/Supervision or Set-up  Eating Current:  5 - Standby Prompting/Supervision or Set-up   Eating Description:  Set-up of equipment or meal/tube feeding  Grooming Initial:  5 - Standby Prompting/Supervision or Set-up  Grooming Current:  4 - Minimal Assistance   Grooming Description:  (wash face and oral care supervision   assist with hair combing )  Bathing Initial:  3 - Moderate Assistance  Bathing Current:  4 - Minimal Assistance   Bathing Description:  Grab bar, Tub bench, Hand held shower( assist for quality to wash/dry buttocks and dry   left stomcach fold )  Upper Body Dressing Initial:  2 - Max Assistance  Upper Body Dressing Current:  3 - Moderate Assistance   Upper Body Dressing Description:  (Min assist to doff pull over shirt   min / mod assist to don )  Lower Body Dressing Initial:  1 - Total Assistance  Lower Body Dressing Current:  4 - Minimal Assistance   Lower Body Dressing Description:  4 - Minimal Assistance  Toileting Initial:  4 - Minimal Assistance  Toileting Current:  4 - Minimal Assistance   Toileting Description:  Grab bar, Increased time, Supervision for safety, Verbal cueing  Toilet Transfer Initial:  4 - Minimal Assistance  Toilet Transfer Current:  5 - Standby Prompting/Supervision or Set-up   Toilet Transfer Description:  5 - Standby Prompting/Supervision or Set-up  Tub / Shower Transfer Initial:  4 - Minimal Assistance  Tub / Shower Transfer Current:  5 - Standby Prompting/Supervision or Set-up   Tub / Shower Transfer Description:  Verónicab bar  IADL:  Not yet addressed   Family Training/Education: not yet addressed    DME/DC Recommendations:  Hip Kit, tub bench or shower seat     Strengths:  Able to follow instructions, Effective communication skills, Good carryover of learning, Good insight into deficits/needs, Independent PLOF, Making steady progress towards goals, Motivated for self care and  independence, Pleasant and cooperative, Supportive family and Willingly participates in therapeutic activities  Barriers:  Decreased endurance, Generalized weakness, Orthostatic hypotension, Poor activity tolerance and Other: decreased left UE functional use and pain      # of short term goals set= 5    # of short term goals met= 4   Occupational Therapy Goals     Problem: Dressing     Dates: Start: 07/24/19       Goal: STG-Within one week, patient will dress UB     Dates: Start: 07/24/19       Description: 1) Individualized Goal:  With min A using adaptive strategies as needed.   2) Interventions:  OT Orthotics Training, OT E Stim Attended, OT Self Care/ADL, OT Cognitive Skill Dev, OT Manual Ther Technique, OT Neuro Re-Ed/Balance, OT Therapeutic Activity, OT Evaluation and OT Therapeutic Exercise       Note:     Goal Note filed on 08/01/19 0821 by Julio Nevarez C.O.T.A.    Requires mod to min assist    Limited by left UE pain and decreased AROM                         Problem: OT Long Term Goals     Dates: Start: 07/24/19       Goal: LTG-By discharge, patient will complete basic self care tasks     Dates: Start: 07/24/19       Description: 1) Individualized Goal:  At a supv to mod I level using AE and DME as needed.   2) Interventions:  OT Orthotics Training, OT E Stim Attended, OT Self Care/ADL, OT Cognitive Skill Dev, OT Manual Ther Technique, OT Neuro Re-Ed/Balance, OT Therapeutic Activity, OT Evaluation and OT Therapeutic Exercise             Goal: LTG-By discharge, patient will perform bathroom transfers     Dates: Start: 07/24/19       Description: 1) Individualized Goal:  At a supv to mod I level using AE and DME as needed.   2) Interventions:  OT Orthotics Training, OT E Stim Attended, OT Self Care/ADL, OT Cognitive Skill Dev, OT Manual Ther Technique, OT Neuro Re-Ed/Balance, OT Therapeutic Activity, OT Evaluation and OT Therapeutic Exercise             Goal: LTG-By discharge, patient will complete basic  home management     Dates: Start: 07/24/19       Description: 1) Individualized Goal:  Tasks necessary for DC at a min A to mod I level using AE and DME as needed.   2) Interventions:  OT Orthotics Training, OT E Stim Attended, OT Self Care/ADL, OT Cognitive Skill Dev, OT Manual Ther Technique, OT Neuro Re-Ed/Balance, OT Therapeutic Activity, OT Evaluation and OT Therapeutic Exercise                         Section completed by:  MAYDA Albarran/Bartolome Alexandra M.S., OT/DIVINE
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
## 2019-08-01 NOTE — PROGRESS NOTES
"Rehab Progress Note     Encounter Date: 8/1/2019    CC: TBI, left arm wound, and left clavicular fracture    Interval Events (Subjective)  Patient sitting up in room. She reports she is doing well with increased movement in her arms. She reports she spoke with therapy about car transfers and would like to practice. She would also like to speak with psychology about her anxiety and possibly outpatient referral for counseling. Examined wound with nursing, continues to have large area of eschar. Discussed with patient would have follow-up IDT to discuss discharge planning later today.     IDT Team Meeting 8/1/2019    IVince M.D., was present and led the interdisciplinary team conference on 8/1/2019.  I led the IDT conference and agree with the IDT conference documentation and plan of care as noted below.     RN:  Diet Regular   % Meal     Pain Controlled with Oxycodone   Sleep    Bowel Continent   Bladder Continent   In's & Out's      PT:  Bed Mobility    Transfers [place]   Mobility Tot-A to [place]   Stairs    Limited by orthostatic hypotension  Making slow progress    OT:  Eating    Grooming    Bathing    UB Dressing modA   LB Dressing [place]-SBA   Toileting    Shower & Transfer [place]   Variable assists  Improving ROM    SLP:  Cas-ind for all cognitive  3 of 3 goals  Discharge today    CM:  Continues to work on disposition and DME needs.      DC/Disposition:  8/9/19    Objective:  VITAL SIGNS: /64   Pulse (!) 108   Temp 36.5 °C (97.7 °F) (Temporal)   Resp 18   Ht 1.702 m (5' 7\")   Wt (!) 136 kg (299 lb 14.4 oz)   SpO2 93%   BMI 46.97 kg/m²   Gen: NAD  Psych: Mood and affect appropriate  CV: RRR, no edema  Resp: CTAB, no upper airway sounds  Abd: NTND  Skin: Large eschar on left elbow posteriorly. Pink viable tissue around rim.    Recent Results (from the past 72 hour(s))   ACCU-CHEK GLUCOSE    Collection Time: 07/29/19  5:03 PM   Result Value Ref Range    Glucose - Accu-Ck 157 (H) 65 - 99 " mg/dL   ACCU-CHEK GLUCOSE    Collection Time: 07/29/19  9:17 PM   Result Value Ref Range    Glucose - Accu-Ck 160 (H) 65 - 99 mg/dL   ACCU-CHEK GLUCOSE    Collection Time: 07/30/19  7:31 AM   Result Value Ref Range    Glucose - Accu-Ck 60 (L) 65 - 99 mg/dL   ACCU-CHEK GLUCOSE    Collection Time: 07/30/19  9:42 AM   Result Value Ref Range    Glucose - Accu-Ck 114 (H) 65 - 99 mg/dL   ACCU-CHEK GLUCOSE    Collection Time: 07/30/19 11:28 AM   Result Value Ref Range    Glucose - Accu-Ck 156 (H) 65 - 99 mg/dL   ACCU-CHEK GLUCOSE    Collection Time: 07/30/19  3:19 PM   Result Value Ref Range    Glucose - Accu-Ck 143 (H) 65 - 99 mg/dL   ACCU-CHEK GLUCOSE    Collection Time: 07/30/19  4:53 PM   Result Value Ref Range    Glucose - Accu-Ck 130 (H) 65 - 99 mg/dL   ACCU-CHEK GLUCOSE    Collection Time: 07/30/19  8:45 PM   Result Value Ref Range    Glucose - Accu-Ck 162 (H) 65 - 99 mg/dL   ACCU-CHEK GLUCOSE    Collection Time: 07/31/19  7:29 AM   Result Value Ref Range    Glucose - Accu-Ck 119 (H) 65 - 99 mg/dL   ACCU-CHEK GLUCOSE    Collection Time: 07/31/19 11:12 AM   Result Value Ref Range    Glucose - Accu-Ck 189 (H) 65 - 99 mg/dL   ACCU-CHEK GLUCOSE    Collection Time: 07/31/19  4:55 PM   Result Value Ref Range    Glucose - Accu-Ck 144 (H) 65 - 99 mg/dL   ACCU-CHEK GLUCOSE    Collection Time: 07/31/19  8:35 PM   Result Value Ref Range    Glucose - Accu-Ck 178 (H) 65 - 99 mg/dL   CBC WITHOUT DIFFERENTIAL    Collection Time: 08/01/19  5:27 AM   Result Value Ref Range    WBC 6.4 4.8 - 10.8 K/uL    RBC 3.30 (L) 4.20 - 5.40 M/uL    Hemoglobin 10.2 (L) 12.0 - 16.0 g/dL    Hematocrit 32.4 (L) 37.0 - 47.0 %    MCV 98.2 (H) 81.4 - 97.8 fL    MCH 30.9 27.0 - 33.0 pg    MCHC 31.5 (L) 33.6 - 35.0 g/dL    RDW 55.2 (H) 35.9 - 50.0 fL    Platelet Count 274 164 - 446 K/uL    MPV 10.7 9.0 - 12.9 fL   Basic Metabolic Panel    Collection Time: 08/01/19  5:27 AM   Result Value Ref Range    Sodium 136 135 - 145 mmol/L    Potassium 4.0 3.6 - 5.5  mmol/L    Chloride 106 96 - 112 mmol/L    Co2 22 20 - 33 mmol/L    Glucose 128 (H) 65 - 99 mg/dL    Bun 22 8 - 22 mg/dL    Creatinine 0.79 0.50 - 1.40 mg/dL    Calcium 8.6 8.5 - 10.5 mg/dL    Anion Gap 8.0 0.0 - 11.9   MAGNESIUM    Collection Time: 08/01/19  5:27 AM   Result Value Ref Range    Magnesium 1.7 1.5 - 2.5 mg/dL   PHOSPHORUS    Collection Time: 08/01/19  5:27 AM   Result Value Ref Range    Phosphorus 2.6 2.5 - 4.5 mg/dL   ESTIMATED GFR    Collection Time: 08/01/19  5:27 AM   Result Value Ref Range    GFR If African American >60 >60 mL/min/1.73 m 2    GFR If Non African American >60 >60 mL/min/1.73 m 2   ACCU-CHEK GLUCOSE    Collection Time: 08/01/19  7:58 AM   Result Value Ref Range    Glucose - Accu-Ck 160 (H) 65 - 99 mg/dL       Current Facility-Administered Medications   Medication Frequency   • metFORMIN (GLUCOPHAGE) tablet 1,000 mg BID WITH MEALS   • insulin glargine (LANTUS) injection 25 Units BID   • insulin regular (HUMULIN R) injection 2-12 Units 4X/DAY ACHS    And   • glucose 4 g chewable tablet 16 g Q15 MIN PRN    And   • dextrose 50% (D50W) injection 50 mL Q15 MIN PRN   • benazepril (LOTENSIN) tablet 30 mg DAILY   • vitamin D (cholecalciferol) tablet 2,000 Units DAILY   • Respiratory Care per Protocol Continuous RT   • oxyCODONE immediate-release (ROXICODONE) tablet 5 mg Q3HRS PRN   • oxyCODONE immediate release (ROXICODONE) tablet 10 mg Q3HRS PRN   • tramadol (ULTRAM) 50 MG tablet 50 mg Q4HRS PRN   • hydrALAZINE (APRESOLINE) tablet 25 mg Q8HRS PRN   • acetaminophen (TYLENOL) tablet 650 mg Q4HRS PRN   • senna-docusate (PERICOLACE or SENOKOT S) 8.6-50 MG per tablet 2 Tab BID    And   • polyethylene glycol/lytes (MIRALAX) PACKET 1 Packet QDAY PRN    And   • magnesium hydroxide (MILK OF MAGNESIA) suspension 30 mL QDAY PRN    And   • bisacodyl (DULCOLAX) suppository 10 mg QDAY PRN   • artificial tears 1.4 % ophthalmic solution 1 Drop PRN   • benzocaine-menthol (CEPACOL) lozenge 1 Lozenge Q2HRS PRN    • mag hydrox-al hydrox-simeth (MAALOX PLUS ES or MYLANTA DS) suspension 20 mL Q2HRS PRN   • ondansetron (ZOFRAN ODT) dispertab 4 mg 4X/DAY PRN    Or   • ondansetron (ZOFRAN) syringe/vial injection 4 mg 4X/DAY PRN   • traZODone (DESYREL) tablet 50 mg QHS PRN   • sodium chloride (OCEAN) 0.65 % nasal spray 2 Spray PRN   • bacitracin-polymyxin b (POLYSPORIN) 500-39709 UNIT/GM ointment TID   • carvedilol (COREG) tablet 6.25 mg BID WITH MEALS   • enoxaparin (LOVENOX) inj 40 mg Q12HRS   • HYDROmorphone (DILAUDID) tablet 2-4 mg Q3HRS PRN   • nystatin (MYCOSTATIN) powder BID   • levothyroxine (SYNTHROID) tablet 125 mcg AM ES    And   • levothyroxine (SYNTHROID) tablet 75 mcg AM ES       Orders Placed This Encounter   Procedures   • Diet Order Diabetic     Standing Status:   Standing     Number of Occurrences:   1     Order Specific Question:   Diet:     Answer:   Diabetic [3]     Order Specific Question:   Consistency/Fluid modifications:     Answer:   Thin Liquids [3]     Comments:   straws ok       Assessment:  Active Hospital Problems    Diagnosis   • *Subarachnoid hemorrhage following injury, with loss of consciousness (HCC)   • Closed fracture of multiple ribs with flail chest   • UTI (urinary tract infection)   • DM (diabetes mellitus) ([facility])   • Fracture of left clavicle   • Morbid obesity with BMI of 45.0-49.9, adult ([facility])   • Multiple fractures of cervical spine ([facility])   • Dysphagia   • Laceration of left forearm   • Essential hypertension   • Hypothyroid   • Trauma   • Abnormal CT of the abdomen   • Vitamin D deficiency   • Renal insufficiency   • Macrocytic anemia   • Alkaline phosphatase elevation       Medical Decision Making and Plan:  TBI - Patient with left sided SAH with loss of consciousness and decreased cognition. Also limited by other orthopedic injuries  -Patient has completed Keppra for seizure prophylaxis.   -PT and OT for mobility and ADLs  -SLP for cognition      Dysphagia - Patient with  oropharyngeal dysphagia. Upgraded to Dysphagia 2 with NTL prior to transfer.  SLP for swallow - Advanced diet to regular with thins. Resolved.      C/T Spine fractures - Patient with TP of C7 and T1 fractures in C collar for unknown length of time.    -Continue C collar. Will need follow-up with NSG  -Patient's improved with different collar.      Left clavicular fracture - S/p ORIF with Dr. Gauthier on 7/14/19. Also with large left arms laceration  -Continue NWB LUE.  Remove staples on shoulder and forearm  -Wound consult - debridement on 7/30/19. Appreciate recommendations. Continues to have large eschar, may need referral to general surgery.      HTN - Patient on Benazepriil 30 mg and Coreg 6.25 mg BID     Orthostatic hypotension - Severe orthostatics on 7/24/19, continue to monitor. ACEi has been reduced, improved     Anemia - Stable at 11.3.     DM - Patient on Lantus 30 U BID, metformin 1000 mg BID and SSI on transfer. Previously on Trulicity, Metformin  -Will check A1c 7.4. Consult hospitalist, decreased to 25 U  -Restarted on Metformin 1000 mg BID     Hypothyroidism - Patient on 200 mcg on transfer.      Vitamin D deficiency - 12 on admission. Start on 2000 U daily    Mood - Patient with fear of discharge. Continue to work with patient, psychology consulted. Consider SSRI for anxiety.   -Benefit from outpatient behavioral health referral     GI Ppx - Patient on Omeprazole while on dysphagia diet. Discontinue Prilosec as off dysphagia diet     DVT Ppx - Patient on Lovenox on transfer.     Total time:  35 minutes.  I spent greater than 50% of the time for patient care, counseling, and coordination on this date, including unit/floor time, and face-to-face time with the patient as per interval events and assessment and plan above. Topics discussed included discharge planning, continue dressing changes and referral to outpatient behavioral health. Patient was discussed separately in IDT today; please see details  above.    Vince Matias M.D.

## 2019-08-01 NOTE — REHAB-COLLABORATIVE ONGOING IDT TEAM NOTE
Weekly Interdisciplinary Team Conference Note    Weekly Interdisciplinary Team Conference # 2  Date:  8/1/2019    Clinicians present and reporting at team conference include the following:   MD: HARRY Matias MD    RN:  Bri Rodriguez RN   PT:   Christy Schaffer, PT, DPT  OT:  CARMELINA Paige and Laya Brewster, MS, OTR/L   ST:  Lala Pavon, MS, CCC-SLP  CM:  Annalisa Taveras, RN, CCM  REC:  Not Applicable  RT:  Not Applicable  RPh:  Pippa Rosado, [facility]  All reporting clinicians have a working knowledge of this patient's plan of care.    Targeted DC Date:  8.9.19     Medical    Patient Active Problem List    Diagnosis Date Noted   • Dizziness 07/31/2019   • Thrombocytopenia ([facility]) 07/28/2019   • Vitamin D deficiency 07/24/2019   • Renal insufficiency 07/24/2019   • Macrocytic anemia 07/24/2019   • Alkaline phosphatase elevation 07/24/2019   • UTI (urinary tract infection) 07/19/2019   • Dysphagia 07/14/2019   • Laceration of left forearm 07/13/2019   • Multiple fractures of cervical spine ([facility]) 07/12/2019   • Subarachnoid hemorrhage following injury, with loss of consciousness ([facility]) 07/12/2019   • Fracture of left clavicle 07/12/2019   • Platelet dysfunction due to drugs 07/12/2019   • DM (diabetes mellitus) ([facility]) 07/12/2019   • Hypothyroid 07/12/2019   • Closed fracture of multiple ribs with flail chest 07/12/2019   • Traumatic pneumothorax 07/12/2019   • Essential hypertension 07/12/2019   • Morbid obesity with BMI of 45.0-49.9, adult ([facility]) 07/12/2019   • Abnormal CT of the abdomen 07/12/2019   • Trauma 07/12/2019   • No contraindication to deep vein thrombosis (DVT) prophylaxis 07/12/2019     Results     Procedure Component Value Ref Range Date/Time    ACCU-CHEK GLUCOSE [[id number]]  (Abnormal) Collected:  08/01/19 0758    Order Status:  Completed Lab Status:  Final result Updated:  08/01/19 0815     Glucose - Accu-Ck 160 65 - 99 mg/dL     ESTIMATED GFR [[id number]] Collected:  08/01/19 0527    Order Status:  Completed  Lab Status:  Final result Updated:  08/01/19 0632     GFR If African American >60 >60 mL/min/1.73 m 2      GFR If Non African American >60 >60 mL/min/1.73 m 2     MAGNESIUM [[id number]] Collected:  08/01/19 0527    Order Status:  Completed Lab Status:  Final result Updated:  08/01/19 0632    Specimen:  Blood      Magnesium 1.7 1.5 - 2.5 mg/dL     PHOSPHORUS [[id number]] Collected:  08/01/19 0527    Order Status:  Completed Lab Status:  Final result Updated:  08/01/19 0632    Specimen:  Blood      Phosphorus 2.6 2.5 - 4.5 mg/dL     Basic Metabolic Panel [[id number]]  (Abnormal) Collected:  08/01/19 0527    Order Status:  Completed Lab Status:  Final result Updated:  08/01/19 0632    Specimen:  Blood      Sodium 136 135 - 145 mmol/L      Potassium 4.0 3.6 - 5.5 mmol/L      Chloride 106 96 - 112 mmol/L      Co2 22 20 - 33 mmol/L      Glucose 128 65 - 99 mg/dL      Bun 22 8 - 22 mg/dL      Creatinine 0.79 0.50 - 1.40 mg/dL      Calcium 8.6 8.5 - 10.5 mg/dL      Anion Gap 8.0 0.0 - 11.9     CBC WITHOUT DIFFERENTIAL [[id number]]  (Abnormal) Collected:  08/01/19 0527    Order Status:  Completed Lab Status:  Final result Updated:  08/01/19 0609    Specimen:  Blood      WBC 6.4 4.8 - 10.8 K/uL      RBC 3.30 4.20 - 5.40 M/uL      Hemoglobin 10.2 12.0 - 16.0 g/dL      Hematocrit 32.4 37.0 - 47.0 %      MCV 98.2 81.4 - 97.8 fL      MCH 30.9 27.0 - 33.0 pg      MCHC 31.5 33.6 - 35.0 g/dL      RDW 55.2 35.9 - 50.0 fL      Platelet Count 274 164 - 446 K/uL      MPV 10.7 9.0 - 12.9 fL     ACCU-CHEK GLUCOSE [[id number]]  (Abnormal) Collected:  07/31/19 2035    Order Status:  Completed Lab Status:  Final result Updated:  07/31/19 2301     Glucose - Accu-Ck 178 65 - 99 mg/dL      Comment: Followed orders       ACCU-CHEK GLUCOSE [[id number]]  (Abnormal) Collected:  07/30/19 1519    Order Status:  Completed Lab Status:  Final result Updated:  07/31/19 2242     Glucose - Accu-Ck 143 65 - 99 mg/dL     ACCU-CHEK GLUCOSE [[id number]]  (Abnormal)  Collected:  07/30/19 1653    Order Status:  Completed Lab Status:  Final result Updated:  07/31/19 2242     Glucose - Accu-Ck 130 65 - 99 mg/dL     ACCU-CHEK GLUCOSE [[id number]]  (Abnormal) Collected:  07/31/19 1655    Order Status:  Completed Lab Status:  Final result Updated:  07/31/19 2242     Glucose - Accu-Ck 144 65 - 99 mg/dL     ACCU-CHEK GLUCOSE [[id number]] Collected:  07/29/19 0718    Order Status:  Completed Lab Status:  Final result Updated:  07/31/19 2225     Glucose - Accu-Ck 84 65 - 99 mg/dL     ACCU-CHEK GLUCOSE [[id number]]  (Abnormal) Collected:  07/30/19 0942    Order Status:  Completed Lab Status:  Final result Updated:  07/31/19 2225     Glucose - Accu-Ck 114 65 - 99 mg/dL     ACCU-CHEK GLUCOSE [[id number]]  (Abnormal) Collected:  07/31/19 0729    Order Status:  Completed Lab Status:  Final result Updated:  07/31/19 2225     Glucose - Accu-Ck 119 65 - 99 mg/dL     ACCU-CHEK GLUCOSE [[id number]]  (Abnormal) Collected:  07/29/19 2117    Order Status:  Completed Lab Status:  Final result Updated:  07/31/19 2150     Glucose - Accu-Ck 160 65 - 99 mg/dL     ACCU-CHEK GLUCOSE [[id number]]  (Abnormal) Collected:  07/30/19 2045    Order Status:  Completed Lab Status:  Final result Updated:  07/31/19 2133     Glucose - Accu-Ck 162 65 - 99 mg/dL      Comment: Followed orders       ACCU-CHEK GLUCOSE [[id number]]  (Abnormal) Collected:  07/29/19 1056    Order Status:  Completed Lab Status:  Final result Updated:  07/31/19 2133     Glucose - Accu-Ck 133 65 - 99 mg/dL     ACCU-CHEK GLUCOSE [[id number]]  (Abnormal) Collected:  07/30/19 0731    Order Status:  Completed Lab Status:  Final result Updated:  07/31/19 2115     Glucose - Accu-Ck 60 65 - 99 mg/dL     ACCU-CHEK GLUCOSE [[id number]]  (Abnormal) Collected:  07/29/19 1703    Order Status:  Completed Lab Status:  Final result Updated:  07/31/19 2115     Glucose - Accu-Ck 157 65 - 99 mg/dL     ACCU-CHEK GLUCOSE [[id number]]  (Abnormal) Collected:  07/31/19 1112     Order Status:  Completed Lab Status:  Final result Updated:  07/31/19 2115     Glucose - Accu-Ck 189 65 - 99 mg/dL     ACCU-CHEK GLUCOSE [[id number]]  (Abnormal) Collected:  07/30/19 1128    Order Status:  Completed Lab Status:  Final result Updated:  07/31/19 2059     Glucose - Accu-Ck 156 65 - 99 mg/dL         Nursing  Diet/Nutrition:  Diabetic, Dysphagia III-Mechanical Soft and Thin Liquids  Medication Administration:  Whole with Liquid Wash  % consumed at meals in last 24 hours:  Consumed 25-50% of meals per documentation.  Meal/Snack Consumption for the past 24 hrs:   Oral Nutrition   07/24/19 0900 Breakfast;Between 25-50% Consumed       Snack schedule:  None  Supplement:  Other: N/A  Appetite:  Fair  Fluid Intake/Output in past 24 hours: In: 590 [P.O.:590]  Out: -   Admit Weight:  Weight: (!) 139.5 kg (307 lb 8 oz)  Weight Last 7 Days   [139.5 kg (307 lb 8 oz)] 139.5 kg (307 lb 8 oz) (07/23 1038)    Pain Issues:    Location:  Arm (07/24 0745)  Left (07/24 0745)         Severity:  Moderate   Scheduled pain medications:  None     PRN pain medications used in last 24 hours:  oxycodone immediate release (ROXICODONE)    Non Pharmacologic Interventions:  relaxation, repositioned and rest  Effectiveness of pain management plan:  good=patient states acceptable comfort after interventions    Bowel:    Bowel Assist Initial Score:  4 - Minimal Assistance  Bowel Assist Current Score:  4 - Minimal Assistance  Bowl Accidents in last 7 days:     Last bowel movement: 07/24/19  Stool Description: Formed, Brown     Usual bowel pattern:  daily  Scheduled bowel medications:  senna-docusate (PERICOLACE or SENOKOT S)   PRN bowel medications used in last 24 hours:  None  Nursing Interventions:  Increased time, Scheduled medication, Supervision  Effectiveness of bowel program:   good=regular, predictable, controlled emptying of bowel  Bladder:    Bladder Assist Initial Score:  4 - Minimal Assistance  Bladder Assist Current Score:  4 -  Minimal Assistance  Bladder Accidents in last 7 days:     PVR range for past 24-48 hours:  [28-63]  ()  Intermittent Catheterization:  N/A  Medications affecting bladder:  None    Time void schedule/voiding pattern:  Voiding every 2-4 hours  Interventions:  Increased time, Verbal cueing, Supervision  Effectiveness of bladder training:  Good=regular, predictable, emptying of bladder, patient initiates time voiding      Diabetes:  Blood Sugar Frequency:  Other Q 6 hours    Range of BS for last 48 hours:   Recent Labs      07/23/19   0853  07/23/19   1115  07/23/19   1700  07/24/19   0002  07/24/19   0632  07/24/19   1158  07/24/19   1700  07/24/19   2328   POCGLUCOSE  195*  199*  202*  162*  100*  176*  153*  166*     Scheduled diabetic medications:  metformin (GLUCOPHAGE)  and insulin glargine (LANTUS) injection   Sliding scale usage in past 24 hours:  Yes  Total Short acting insulin in the past 24 hours:  Humulin 10 units  Total Long acting insulin in the past 24 hours:  Lantus 32 units    Wound:         Patient Lines/Drains/Airways Status    Active Current Wounds     Name: Placement date: Placement time: Site: Days:    Wound 07/23/19 Full Thickness Wound Elbow full thickness wound posterior left elbow 07/23/19   1809      1    Incision Left Chest        Incision Left Arm        Wound 07/23/19 Sacrum 07/23/19   1843      1                   Interventions:  Left elbow daily dressing change, debrided by MD on 7/24. Staples removed from incisions, Assess q shift  Effectiveness of intervention:  wound is improving     Sleep/wake cycle:   Average hours slept:  Sleeps 4-6 hours without waking  Sleep medication usage:  None    Patient/Family Training/Education:  Diabetes Management, Diet/Nutrition, General Self Care, Pain Management, Safe Transfers, Safety, Skin Care and Wound Care  Discharge Supply Recommendations:  Glucometer and Strips and Wound Care Supplies  Strengths: Alert and oriented, Willingly participates in  therapeutic activities, Able to follow instructions, Pleasant and cooperative, Effective communication skills and Motivated for self care and independence   Barriers:   Generalized weakness and Limited mobility            Nursing Problems           Problem: Bowel/Gastric:     Goal: Normal bowel function is maintained or improved           Goal: Will not experience complications related to bowel motility             Problem: Communication     Goal: The ability to communicate needs accurately and effectively will improve             Problem: Discharge Barriers/Planning     Goal: Patient's continuum of care needs will be met             Problem: Infection     Goal: Will remain free from infection             Problem: Knowledge Deficit     Goal: Knowledge of disease process/condition, treatment plan, diagnostic tests, and medications will improve           Goal: Knowledge of the prescribed therapeutic regimen will improve             Problem: Pain Management     Goal: Pain level will decrease to patient's comfort goal             Problem: Respiratory:     Goal: Respiratory status will improve             Problem: Safety     Goal: Will remain free from injury           Goal: Will remain free from falls             Problem: Skin Integrity     Goal: Risk for impaired skin integrity will decrease             Problem: Venous Thromboembolism (VTW)/Deep Vein Thrombosis (DVT) Prevention:     Goal: Patient will participate in Venous Thrombosis (VTE)/Deep Vein Thrombosis (DVT)Prevention Measures                  Long Term Goals:   At discharge patient will be able to function safely at home and in the community with support.    Section completed by:  Jasmine Kumar R.N.        Respiratory Therapy   Oxygen has been weaned to 1 LPM.  Anticipate weaning to room air soon.  Section completed by:  Clark Rojas, RRT    Mobility  Bed mobility:   SBA  Bed /Chair/Wheelchair Transfer Initial:  4 - Minimal Assistance  Bed  "/Chair/Wheelchair Transfer Current:  4 - Minimal Assistance   Bed/Chair/Wheelchair Transfer Description:  Supervision for safety, Increased time(Pt required Min A to lift for SPT to/from bed to w/c )  Walk Initial:  0 - Not tested,unsafe activity  Walk Current:  1 - Total Assistance   Walk Description:  Requires wheelchair follow, Extra time, Safety concerns, Requires incidental assist(Ambulation with HHA and WC follow, 15' and 20', with 1 minor LOB requiring min A to recover)  Wheelchair Initial:  1 - Total Assistance  Wheelchair Current:  1 - Total Assistance   Wheelchair Description:  Extra time, Verbal cueing, Supervision for safety(10 ft SBA, using BLEs)  Stairs Initial:  0 - Not tested,unsafe activity  Stairs Current: 0 - Not tested,unsafe activity   Stairs Description:    Patient/Family Training/Education:  TBD   DME/DC Recommendations:  TBD, possible manual WC  Strengths:  Effective communication skills, Independent PLOF, Motivated for self care and independence, Pleasant and cooperative and Willingly participates in therapeutic activities  Barriers:   Generalized weakness, Orthostatic hypotension, Poor activity tolerance and Other: Pain (L UE, ribs)  # of short term goals set= 3  # of short term goals met=1  Physical Therapy Problems     Problem: Mobility     Dates: Start: 07/24/19       Goal: STG-Within one week, patient will ambulate household distance     Dates: Start: 07/24/19       Description: 1) Individualized goal:  50' HHA  2) Interventions:  PT Group Therapy, PT Gait Training, PT Therapeutic Exercises, PT TENS Application, PT Neuro Re-Ed/Balance, PT Therapeutic Activity and PT Manual Therapy       Note:     Goal Note filed on 08/01/19 1138 by Gina Jasmine, Student    20' HHA with WC follow                  Goal: STG-Within one week, patient will ambulate up/down a curb     Dates: Start: 07/24/19       Description: 1) Individualized goal:  4\" curb, HHA  2) Interventions:   PT Group Therapy, PT " "Gait Training, PT Therapeutic Exercises, PT TENS Application, PT Neuro Re-Ed/Balance, PT Therapeutic Activity and PT Manual Therapy        Note:     Goal Note filed on 08/01/19 1138 by Gina Jasmine, Student    D/t orthostatic hypotension/deconditioning                        Problem: PT-Long Term Goals     Dates: Start: 07/24/19       Goal: LTG-By discharge, patient will ambulate     Dates: Start: 07/24/19       Description: 1) Individualized goal:  250' mod I, no AD  2) Interventions:   PT Group Therapy, PT Gait Training, PT Therapeutic Exercises, PT TENS Application, PT Neuro Re-Ed/Balance, PT Therapeutic Activity and PT Manual Therapy              Goal: LTG-By discharge, patient will transfer one surface to another     Dates: Start: 07/24/19       Description: 1) Individualized goal:  Bed <> WC, mod I, no AD  2) Interventions:   PT Group Therapy, PT Gait Training, PT Therapeutic Exercises, PT TENS Application, PT Neuro Re-Ed/Balance, PT Therapeutic Activity and PT Manual Therapy             Goal: LTG-By discharge, patient will ambulate up/down 4-6 stairs     Dates: Start: 07/24/19       Description: 1) Individualized goal:  4 6\" steps, R HR, mod I  2) Interventions:   PT Group Therapy, PT Gait Training, PT Therapeutic Exercises, PT TENS Application, PT Neuro Re-Ed/Balance, PT Therapeutic Activity and PT Manual Therapy               Goal: LTG-By discharge, patient will transfer in/out of a car     Dates: Start: 07/24/19       Description: 1) Individualized goal:  SPV, no AD  2) Interventions:   PT Group Therapy, PT Gait Training, PT Therapeutic Exercises, PT TENS Application, PT Neuro Re-Ed/Balance, PT Therapeutic Activity and PT Manual Therapy                           Section completed by:  Gina Jasmine, Student; Christy Schaffer, PT, DPT    Activities of Daily Living  Eating Initial:  5 - Standby Prompting/Supervision or Set-up  Eating Current:  5 - Standby Prompting/Supervision or Set-up   Eating Description:  " Set-up of equipment or meal/tube feeding  Grooming Initial:  5 - Standby Prompting/Supervision or Set-up  Grooming Current:  4 - Minimal Assistance   Grooming Description:  (wash face and oral care supervision   assist with hair combing )  Bathing Initial:  3 - Moderate Assistance  Bathing Current:  4 - Minimal Assistance   Bathing Description:  Grab bar, Tub bench, Hand held shower( assist for quality to wash/dry buttocks and dry   left stomcach fold )  Upper Body Dressing Initial:  2 - Max Assistance  Upper Body Dressing Current:  3 - Moderate Assistance   Upper Body Dressing Description:  (Min assist to doff pull over shirt   min / mod assist to don )  Lower Body Dressing Initial:  1 - Total Assistance  Lower Body Dressing Current:  4 - Minimal Assistance   Lower Body Dressing Description:  4 - Minimal Assistance  Toileting Initial:  4 - Minimal Assistance  Toileting Current:  4 - Minimal Assistance   Toileting Description:  Grab bar, Increased time, Supervision for safety, Verbal cueing  Toilet Transfer Initial:  4 - Minimal Assistance  Toilet Transfer Current:  5 - Standby Prompting/Supervision or Set-up   Toilet Transfer Description:  5 - Standby Prompting/Supervision or Set-up  Tub / Shower Transfer Initial:  4 - Minimal Assistance  Tub / Shower Transfer Current:  5 - Standby Prompting/Supervision or Set-up   Tub / Shower Transfer Description:  Grab bar  IADL:  Not yet addressed   Family Training/Education: not yet addressed    DME/DC Recommendations:  Hip Kit, tub bench or shower seat     Strengths:  Able to follow instructions, Effective communication skills, Good carryover of learning, Good insight into deficits/needs, Independent PLOF, Making steady progress towards goals, Motivated for self care and independence, Pleasant and cooperative, Supportive family and Willingly participates in therapeutic activities  Barriers:  Decreased endurance, Generalized weakness, Orthostatic hypotension, Poor activity  tolerance and Other: decreased left UE functional use and pain      # of short term goals set= 5    # of short term goals met= 4   Occupational Therapy Goals     Problem: Dressing     Dates: Start: 07/24/19       Goal: STG-Within one week, patient will dress UB     Dates: Start: 07/24/19       Description: 1) Individualized Goal:  With min A using adaptive strategies as needed.   2) Interventions:  OT Orthotics Training, OT E Stim Attended, OT Self Care/ADL, OT Cognitive Skill Dev, OT Manual Ther Technique, OT Neuro Re-Ed/Balance, OT Therapeutic Activity, OT Evaluation and OT Therapeutic Exercise       Note:     Goal Note filed on 08/01/19 0821 by SARKIS Albarran.O.T.A.    Requires mod to min assist    Limited by left UE pain and decreased AROM                         Problem: OT Long Term Goals     Dates: Start: 07/24/19       Goal: LTG-By discharge, patient will complete basic self care tasks     Dates: Start: 07/24/19       Description: 1) Individualized Goal:  At a supv to mod I level using AE and DME as needed.   2) Interventions:  OT Orthotics Training, OT E Stim Attended, OT Self Care/ADL, OT Cognitive Skill Dev, OT Manual Ther Technique, OT Neuro Re-Ed/Balance, OT Therapeutic Activity, OT Evaluation and OT Therapeutic Exercise             Goal: LTG-By discharge, patient will perform bathroom transfers     Dates: Start: 07/24/19       Description: 1) Individualized Goal:  At a supv to mod I level using AE and DME as needed.   2) Interventions:  OT Orthotics Training, OT E Stim Attended, OT Self Care/ADL, OT Cognitive Skill Dev, OT Manual Ther Technique, OT Neuro Re-Ed/Balance, OT Therapeutic Activity, OT Evaluation and OT Therapeutic Exercise             Goal: LTG-By discharge, patient will complete basic home management     Dates: Start: 07/24/19       Description: 1) Individualized Goal:  Tasks necessary for DC at a min A to mod I level using AE and DME as needed.   2) Interventions:  OT Orthotics  Training, OT E Stim Attended, OT Self Care/ADL, OT Cognitive Skill Dev, OT Manual Ther Technique, OT Neuro Re-Ed/Balance, OT Therapeutic Activity, OT Evaluation and OT Therapeutic Exercise                         Section completed by:  MAYDA Albarran    Cognitive Linquistic Functions  Comprehension Initial:  3 - Moderate Assistance  Comprehension Current:  6 - Modified Independent   Comprehension Description:  Glasses  Expression Initial:  5 - Stand-by Prompting/Supervision or Set-up  Expression Current:  7 - Independent   Expression Description:  Verbal cueing  Social Interaction Initial:  3 - Moderate Assistance  Social Interaction Current:  7 - Independent   Social Interaction Description:  Increased time  Problem Solving Initial:  2 - Max Assistance  Problem Solving Current:  6 - Modified Independent   Problem Solving Description:  Increased time, Therapy schedule, Verbal cueing  Memory Initial:  2 - Max Assistance  Memory Current:  6 - Modified Independent   Memory Description:  Verbal cueing, Therapy schedule  Executive Functioning / Organization Initial:  Supervision (5)  Executive Functioning / Organization Current:  6 - Modified Independent   Executive Functioning Desciption: Pt independent prior.   Swallowing  Swallowing Status:  SLP no longer following for dysphagia   Orders Placed This Encounter   Procedures   • Diet Order Diabetic     Standing Status:   Standing     Number of Occurrences:   1     Order Specific Question:   Diet:     Answer:   Diabetic [3]     Order Specific Question:   Consistency/Fluid modifications:     Answer:   Thin Liquids [3]     Comments:   straws ok     Behavior Modification Plan  Give clear feedback, Set clear goals, Decrease the chance of failure by offering activities that are within the patient's abilities and Analyze tasks (break down into smaller steps)  Assistive Technology  Low/Impaired vision equipment and Low tech: Calendar, planner, schedule, alarms/timers,  "pill organizer, post-it notes, lists  Family Training/Education: not needed at this time.   DC Recommendations:  No further SLP services recommended upon d/c from [facility]  Strengths:  Able to follow instructions, Alert and oriented, Effective communication skills, Good carryover of learning, Good insight into deficits/needs, Independent PLOF, Making steady progress towards goals, Motivated for self care and independence, Pleasant and cooperative, Supportive family and Willingly participates in therapeutic activities  Barriers:  Limited mobility and Pain poorly managed  # of short term goals set=3  # of short term goals met=2  Speech Therapy Problems     Problem: Problem Solving STGs     Dates: Start: 07/23/19       Goal: STG-Within one week, patient will     Dates: Start: 07/25/19       Description: 1) Individualized goal:  Complete medication management/pill sorting task using her current medication list and mock \"pills\" to sort with 100% accuracy given set up/SPV   2) Interventions:  SLP Cognitive Skill Development and SLP Group Treatment                   Problem: Speech/Swallowing LTGs     Dates: Start: 07/23/19       Goal: LTG-By discharge, patient will solve complex problem     Dates: Start: 07/23/19       Description: 1) Individualized goal: By utilizing compensatory intervention for memory and problem-solving to allow for safe completion of daily activities with SPV in order to prepare for safe d/c home.   2) Interventions:  SLP Cognitive Skill Development and SLP Group Treatment                          Section completed by:  Lala Pavon MS,[facility]-SLP          REHAB-Pharmacy IDT Team Note by Pippa Rosado RPH at 7/31/2019 11:51 AM  Version 1 of 1    Author:  Pippa Rosado RPH Service:  -- Author Type:  Pharmacist    Filed:  7/31/2019 11:52 AM Date of Service:  7/31/2019 11:51 AM Status:  Signed    :  Pippa Rosado RPH (Pharmacist)         Pharmacy "   Pharmacy  Antibiotics/Antifungals/Antivirals:  Medication:      Active Orders (From admission, onward)    None        Route:         n/a  Stop Date:  n/a  Reason:   Antihypertensives/Cardiac:  Medication:    Orders (72h ago, onward)     Start     Ordered    07/25/19 0900  benazepril (LOTENSIN) tablet 30 mg  DAILY     Note to Pharmacy:  Per P&T IV to PO Protocol    07/24/19 1524    07/23/19 1730  carvedilol (COREG) tablet 6.25 mg  2 TIMES DAILY WITH MEALS     Note to Pharmacy:  Per P&T IV to PO Protocol    07/23/19 1038    07/23/19 1039  hydrALAZINE (APRESOLINE) tablet 25 mg  EVERY 8 HOURS PRN      07/23/19 1039              Patient Vitals for the past 24 hrs:   BP Pulse   07/31/19 0818 110/67 87   07/31/19 0700 114/70 99   07/30/19 1900 111/50 100   07/30/19 1400 110/69 92     Anticoagulation:  Medication:  Lovenox  INR:      Other key medications: Lantus, Humulin R, levoyhyroxine, metformin  : A review of the medication list reveals no issues at this time. Patient is currently on antihypertensive(s). Recommend home blood pressure monitoring/recording if antihypertensive(s) regimen(s) continue. Patient is currently on diabetic medication(s) and/or Insulin(s). Recommend home blood glucose monitoring/recording if these regimen(s) continue.  Section completed by:  Pippa Rosado RPH[[email or web address]]        Attribution Key     TK.1 - [facility] on 7/31/2019 11:51 AM                    DC Planning    DC destination/dispostion:  Home w/ supportive family to [place].    Referrals: TBD    DC Needs: DME for patient safety & mobility. Family training.  referral: RN, PT/OT. MD f/u appts.    Barriers to discharge:  Functional mobility deficits. Morbid obesity.    Strengths: Motivation. Supportive family. PLOF: independent.    Section completed by:  Annalisa Taveras R.N.    Summary: orthostatic BPs, increased ROM & use LUE, wound care.    Recommendation:  Update IPOC to address therapy service delivery:  PT__90___  min/day, OT __90___ min/day, ST __0___ min/day on 5/7 days per week for at least 15 hours per week.    Physician Summary  HARRY Matias MD  participated and led team conference discussion.

## 2019-08-01 NOTE — THERAPY
Speech Language Pathology  Daily Treatment     Patient Name: Maira Dodd  Age:  73 y.o., Sex:  female  Medical Record #: [id number]  Today's Date: 8/1/2019     Subjective    Pt pleasant and cooperative.      Objective       08/01/19 1333   Cognition   Medication Management  Within Functional Limits (6-7)   SLP Total Time Spent   SLP Individual Total Time Spent (Mins) 30   Charge Group   SLP Cognitive Skill Development 2       Assessment    Medication management addressed throughout the session. Pt correctly sorted and stated the purpose of 100% of medications at this time indep.    Plan    D/c ST

## 2019-08-01 NOTE — CARE PLAN
Problem: Safety  Goal: Will remain free from injury  Note:   Patient educated on importance of utilizing call light to minimize the potential of injury from falls. Patient verbalizes understanding.      Problem: Bowel/Gastric:  Goal: Will not experience complications related to bowel motility  Note:   Patient educated on fluid intake, diet, bowel medications and mobilization to maximize the potential for regular bowel movements while taking opiates or opiate derivatives for pain. Patient engages in learning and verbalizes understanding.

## 2019-08-01 NOTE — CARE PLAN
"  Problem: Mobility  Goal: STG-Within one week, patient will ambulate household distance  Description  1) Individualized goal:  50' HHA  2) Interventions:  PT Group Therapy, PT Gait Training, PT Therapeutic Exercises, PT TENS Application, PT Neuro Re-Ed/Balance, PT Therapeutic Activity and PT Manual Therapy     Outcome: NOT MET  Note:   20' HHA with WC follow  Goal: STG-Within one week, patient will ambulate up/down a curb  Description  1) Individualized goal:  4\" curb, [facility]  2) Interventions:   PT Group Therapy, PT Gait Training, PT Therapeutic Exercises, PT TENS Application, PT Neuro Re-Ed/Balance, PT Therapeutic Activity and PT Manual Therapy      Outcome: NOT MET  Note:   D/t orthostatic hypotension/deconditioning     Problem: Mobility Transfers  Goal: STG-Within one week, patient will move supine to sit  Description  1) Individualized goal:  SBA with HOB elevated 30 degrees, supine <> sit   2) Interventions:   PT Group Therapy, PT Gait Training, PT Therapeutic Exercises, PT TENS Application, PT Neuro Re-Ed/Balance, PT Therapeutic Activity and PT Manual Therapy      Outcome: MET     "

## 2019-08-01 NOTE — REHAB-CM IDT TEAM NOTE
Case Management      DC Planning    DC destination/dispostion:  Home w/ supportive family to [place].    Referrals: TBD    DC Needs: DME for patient safety & mobility. Family training. HH referral: RN, PT/OT. MD f/u appts.    Barriers to discharge:  Functional mobility deficits. Morbid obesity.    Strengths: Motivation. Supportive family. PLOF: independent.    Section completed by:  Annalisa Taveras R.N.

## 2019-08-01 NOTE — PROGRESS NOTES
"Rehab Progress Note     Encounter Date: 7/31/2019    CC: TBI, left arm wound, and left clavicular fracture    Interval Events (Subjective)  Patient sitting up in wheelchair. Patient very tearful about going home. She reports that she is very afraid of getting into a car. Discussed that we would work on car transfers and  allow her to feel safe and comfortable.  Discussed we can even have her  bring in their cars to practice.  Otherwise she reports she feels better after the conversation. Denies pain. Denies NVD.      IDT Team Meeting 7/25/2019  DC/Disposition:  8/9/19    Objective:  VITAL SIGNS: /70   Pulse (!) 102   Temp 36.8 °C (98.2 °F) (Oral)   Resp 18   Ht 1.702 m (5' 7\")   Wt (!) 136 kg (299 lb 14.4 oz)   SpO2 94%   BMI 46.97 kg/m²   Gen: NAD  Psych: Mood and affect appropriate  CV: RRR, no edema  Resp: CTAB, no upper airway sounds  Abd: NTND  Neuro: AOx4, moving BUE antigravity    Recent Results (from the past 72 hour(s))   ACCU-CHEK GLUCOSE    Collection Time: 07/28/19  9:16 PM   Result Value Ref Range    Glucose - Accu-Ck 144 (H) 65 - 99 mg/dL   ACCU-CHEK GLUCOSE    Collection Time: 07/29/19  7:18 AM   Result Value Ref Range    Glucose - Accu-Ck 84 65 - 99 mg/dL   ACCU-CHEK GLUCOSE    Collection Time: 07/29/19 10:56 AM   Result Value Ref Range    Glucose - Accu-Ck 133 (H) 65 - 99 mg/dL   ACCU-CHEK GLUCOSE    Collection Time: 07/29/19  5:03 PM   Result Value Ref Range    Glucose - Accu-Ck 157 (H) 65 - 99 mg/dL   ACCU-CHEK GLUCOSE    Collection Time: 07/29/19  9:17 PM   Result Value Ref Range    Glucose - Accu-Ck 160 (H) 65 - 99 mg/dL   ACCU-CHEK GLUCOSE    Collection Time: 07/30/19  7:31 AM   Result Value Ref Range    Glucose - Accu-Ck 60 (L) 65 - 99 mg/dL   ACCU-CHEK GLUCOSE    Collection Time: 07/30/19  9:42 AM   Result Value Ref Range    Glucose - Accu-Ck 114 (H) 65 - 99 mg/dL   ACCU-CHEK GLUCOSE    Collection Time: 07/30/19 11:28 AM   Result Value Ref Range    Glucose - Accu-Ck 156 " (H) 65 - 99 mg/dL   ACCU-CHEK GLUCOSE    Collection Time: 07/30/19  3:19 PM   Result Value Ref Range    Glucose - Accu-Ck 143 (H) 65 - 99 mg/dL   ACCU-CHEK GLUCOSE    Collection Time: 07/30/19  4:53 PM   Result Value Ref Range    Glucose - Accu-Ck 130 (H) 65 - 99 mg/dL   ACCU-CHEK GLUCOSE    Collection Time: 07/30/19  8:45 PM   Result Value Ref Range    Glucose - Accu-Ck 162 (H) 65 - 99 mg/dL   ACCU-CHEK GLUCOSE    Collection Time: 07/31/19  7:29 AM   Result Value Ref Range    Glucose - Accu-Ck 119 (H) 65 - 99 mg/dL   ACCU-CHEK GLUCOSE    Collection Time: 07/31/19 11:12 AM   Result Value Ref Range    Glucose - Accu-Ck 189 (H) 65 - 99 mg/dL   ACCU-CHEK GLUCOSE    Collection Time: 07/31/19  4:55 PM   Result Value Ref Range    Glucose - Accu-Ck 144 (H) 65 - 99 mg/dL       Current Facility-Administered Medications   Medication Frequency   • metFORMIN (GLUCOPHAGE) tablet 1,000 mg BID WITH MEALS   • insulin glargine (LANTUS) injection 25 Units BID   • insulin regular (HUMULIN R) injection 2-12 Units 4X/DAY ACHS    And   • glucose 4 g chewable tablet 16 g Q15 MIN PRN    And   • dextrose 50% (D50W) injection 50 mL Q15 MIN PRN   • benazepril (LOTENSIN) tablet 30 mg DAILY   • vitamin D (cholecalciferol) tablet 2,000 Units DAILY   • Respiratory Care per Protocol Continuous RT   • oxyCODONE immediate-release (ROXICODONE) tablet 5 mg Q3HRS PRN   • oxyCODONE immediate release (ROXICODONE) tablet 10 mg Q3HRS PRN   • tramadol (ULTRAM) 50 MG tablet 50 mg Q4HRS PRN   • hydrALAZINE (APRESOLINE) tablet 25 mg Q8HRS PRN   • acetaminophen (TYLENOL) tablet 650 mg Q4HRS PRN   • senna-docusate (PERICOLACE or SENOKOT S) 8.6-50 MG per tablet 2 Tab BID    And   • polyethylene glycol/lytes (MIRALAX) PACKET 1 Packet QDAY PRN    And   • magnesium hydroxide (MILK OF MAGNESIA) suspension 30 mL QDAY PRN    And   • bisacodyl (DULCOLAX) suppository 10 mg QDAY PRN   • artificial tears 1.4 % ophthalmic solution 1 Drop PRN   • benzocaine-menthol (CEPACOL)  lozenge 1 Lozenge Q2HRS PRN   • mag hydrox-al hydrox-simeth (MAALOX PLUS ES or MYLANTA DS) suspension 20 mL Q2HRS PRN   • ondansetron (ZOFRAN ODT) dispertab 4 mg 4X/DAY PRN    Or   • ondansetron (ZOFRAN) syringe/vial injection 4 mg 4X/DAY PRN   • traZODone (DESYREL) tablet 50 mg QHS PRN   • sodium chloride (OCEAN) 0.65 % nasal spray 2 Spray PRN   • bacitracin-polymyxin b (POLYSPORIN) 500-84619 UNIT/GM ointment TID   • carvedilol (COREG) tablet 6.25 mg BID WITH MEALS   • enoxaparin (LOVENOX) inj 40 mg Q12HRS   • HYDROmorphone (DILAUDID) tablet 2-4 mg Q3HRS PRN   • nystatin (MYCOSTATIN) powder BID   • levothyroxine (SYNTHROID) tablet 125 mcg AM ES    And   • levothyroxine (SYNTHROID) tablet 75 mcg AM ES       Orders Placed This Encounter   Procedures   • Diet Order Diabetic     Standing Status:   Standing     Number of Occurrences:   1     Order Specific Question:   Diet:     Answer:   Diabetic [3]     Order Specific Question:   Consistency/Fluid modifications:     Answer:   Thin Liquids [3]     Comments:   straws ok       Assessment:  Active Hospital Problems    Diagnosis   • *Subarachnoid hemorrhage following injury, with loss of consciousness (HCC)   • Closed fracture of multiple ribs with flail chest   • UTI (urinary tract infection)   • DM (diabetes mellitus) (HCC)   • Fracture of left clavicle   • Morbid obesity with BMI of 45.0-49.9, adult (HCC)   • Multiple fractures of cervical spine (HCC)   • Dysphagia   • Laceration of left forearm   • Essential hypertension   • Hypothyroid   • Trauma   • Abnormal CT of the abdomen   • Vitamin D deficiency   • Renal insufficiency   • Macrocytic anemia   • Alkaline phosphatase elevation       Medical Decision Making and Plan:  TBI - Patient with left sided SAH with loss of consciousness and decreased cognition. Also limited by other orthopedic injuries  -Patient has completed Keppra for seizure prophylaxis.   -PT and OT for mobility and ADLs  -SLP for cognition       Dysphagia - Patient with oropharyngeal dysphagia. Upgraded to Dysphagia 2 with NTL prior to transfer.  SLP for swallow - Advanced diet to regular with thins. Resolved.      C/T Spine fractures - Patient with TP of C7 and T1 fractures in C collar for unknown length of time.    -Continue C collar. Will need follow-up with NSG  -Need to discuss with prosthetics new C collar as patient's improved with different collar.      Left clavicular fracture - S/p ORIF with Dr. Gauthier on 7/14/19. Also with large left armds laceration  -Continue NWB LUE.  Remove staples on shoulder and forearm  -Wound consult - debridement on 7/30/19. Appreciate recommendations.      HTN - Patient on Benazepriil 30 mg and Coreg 6.25 mg BID     Orthostatic hypotension - Severe orthostatics on 7/24/19, continue to monitor. ACEi has been reduced, improved     Anemia - Stable at 11.3.     DM - Patient on Lantus 30 U BID, metformin 1000 mg BID and SSI on transfer. Previously on Trulicity, Metformin  -Will check A1c 7.4. Consult hospitalist, decreased to 25 U  -Restarted on Metformin 1000 mg BID     Hypothyroidism - Patient on 200 mcg on transfer.      Vitamin D deficiency - 12 on admission. Start on 2000 U daily    Mood - Patient with fear of discharge. Continue to work with patient, psychology consulted. Consider SSRI for anxiety.     GI Ppx - Patient on Omeprazole while on dysphagia diet. Discontinue Prilosec as off dysphagia diet     DVT Ppx - Patient on Lovenox on transfer.     Total time:  28 minutes.  I spent greater than 50% of the time for patient care, counseling, and coordination on this date, including unit/floor time, and face-to-face time with the patient as per interval events and assessment and plan above. Topics discussed included increased anxiety, discussed with psychology and consider SSRI.     Vince Matias M.D.

## 2019-08-01 NOTE — THERAPY
"Occupational Therapy  Daily Treatment     Patient Name: Maira Dodd  Age:  73 y.o., Sex:  female  Medical Record #: [id number]  Today's Date: 8/1/2019     Precautions  Precautions: (P) Non Weight Bearing Left Upper Extremity, Cervical Collar  , Spinal / Back Precautions , Fall Risk, Other (See Comments)  Comments: (P) orthostatic-monitor BP, multiple rib fxs, cervical fx, L UE wounds, L UE sling PRN    Safety   ADL Safety : (P) Requires Physical Assist for Safety  Bathroom Safety: (P) Requires Physical Assist for Safety  Comments: education on and practice with use of sock aid and dressing stick to doff and don socks requires set up with sock aid and min A to adjust socks see FIM. Additional education on potential use of reacher to retrieve LB clothing and adjust.    Subjective    \"I think I am doing better.\"     Objective       08/01/19 0701   Precautions   Precautions Non Weight Bearing Left Upper Extremity;Cervical Collar  ;Spinal / Back Precautions ;Fall Risk;Other (See Comments)   Comments orthostatic-monitor BP, multiple rib fxs, cervical fx, L UE wounds, L UE sling PRN   Safety    ADL Safety  Requires Physical Assist for Safety   Bathroom Safety Requires Physical Assist for Safety   Interdisciplinary Plan of Care Collaboration   IDT Collaboration with  Nursing   Patient Position at End of Therapy Seated;Edge of Bed;Call Light within Reach;Tray Table within Reach   Collaboration Comments Mepilex removed from posterior neck  small red spot revealed .  Nursing to assess if continued mepliex is needed    OT Total Time Spent   OT Individual Total Time Spent (Mins) 75   OT Charge Group   OT Self Care / ADL 5       FIM Grooming Score:  4 - Minimal Assistance  Grooming Description:  (wash face and oral care supervision   assist with hair combing )    FIM Bathing Score:  4 - Minimal Assistance  Bathing Description:       FIM Upper Body Dressing:  3 - Moderate Assistance  Upper Body Dressing Description:  " (Min assist to doff pull over shirt   min / mod assist to don )    FIM Lower Body Dressing Score:  4 - Minimal Assistance  Lower Body Dressing Description:  Sock aid, Dressing stick, Reacher(incindental assist to pull up left side of pants.   otherwise setup/ supervision using AE )    FIM Tub/Shower Transfers Score:  5 - Standby Prompting/Supervision or Set-up  Tub/Shower Transfers Description:  Grab bar      Assessment    Improving functional left Use with ADL tasks    Continued dizziness with supine to sit and standing     Plan    Continue ADL , IADL , related mobility , strength/endurance building  Standing tolerance/balance incorporating NWB  Left UE and  C spine precautions

## 2019-08-01 NOTE — PROGRESS NOTES
Hospital Medicine Daily Progress Note      Chief Complaint:  Hypertension  Diabetes    Interval History:  No significant events or changes since last visit    Review of Systems  Review of Systems   Constitutional: Negative for fever.   Eyes: Negative for blurred vision.   Respiratory: Negative for cough.    Cardiovascular: Negative for chest pain.   Gastrointestinal: Negative for diarrhea.   Musculoskeletal: Negative for joint pain.   Neurological: Positive for dizziness.   Psychiatric/Behavioral: The patient is not nervous/anxious.         Physical Exam  Temp:  [36.5 °C (97.7 °F)-36.9 °C (98.4 °F)] 36.5 °C (97.7 °F)  Pulse:  [] 95  Resp:  [18] 18  BP: (115-132)/(49-70) 132/49  SpO2:  [93 %] 93 %    Physical Exam   Constitutional: She is oriented to person, place, and time. No distress.   HENT:   Mouth/Throat: No oropharyngeal exudate.   Eyes: EOM are normal.   Neck: No JVD present. No tracheal deviation present.   Cardiovascular: Normal rate, regular rhythm, S1 normal and S2 normal.   No murmur heard.  Pulmonary/Chest: Effort normal and breath sounds normal.   Abdominal: Soft. Bowel sounds are normal.   Musculoskeletal: She exhibits no edema.   Neurological: She is alert and oriented to person, place, and time. No sensory deficit.   Skin: Skin is warm and dry. No rash noted. No cyanosis.   Psychiatric: She has a normal mood and affect. Her behavior is normal.   Nursing note and vitals reviewed.      Fluids    Intake/Output Summary (Last 24 hours) at 8/1/2019 0934  Last data filed at 7/31/2019 1900  Gross per 24 hour   Intake 240 ml   Output --   Net 240 ml       Laboratory  Recent Labs     08/01/19  0527   WBC 6.4   RBC 3.30*   HEMOGLOBIN 10.2*   HEMATOCRIT 32.4*   MCV 98.2*   MCH 30.9   MCHC 31.5*   RDW 55.2*   PLATELETCT 274   MPV 10.7     Recent Labs     08/01/19 0527   SODIUM 136   POTASSIUM 4.0   CHLORIDE 106   CO2 22   GLUCOSE 128*   BUN 22   CREATININE 0.79   CALCIUM 8.6                        Assessment/Plan  * Subarachnoid hemorrhage following injury, with loss of consciousness (HCC)- (present on admission)  Assessment & Plan  Non-surgical management  Completed Keppra x 7 days for SZ proph    Dizziness  Assessment & Plan  Pt gets dizzy on sitting or standing up  Will check orthostatics ()    Thrombocytopenia (HCC)  Assessment & Plan  Currently resolved  Platelets: 285 () --> 153 () --> 274 ()  ? Lab error    Vitamin D deficiency  Assessment & Plan  Vit D: 13  On supplements    Laceration of left forearm- (present on admission)  Assessment & Plan  S/P repair  Wound care    Abnormal CT of the abdomen- (present on admission)  Assessment & Plan  Incidental finding of left kidney lesion on Trauma work-up  Needs F/U imaging    Essential hypertension- (present on admission)  Assessment & Plan  BP ok  On Core.25 mg bid  On Lotensin: 30 mg daily  Monitor    Closed fracture of multiple ribs with flail chest- (present on admission)  Assessment & Plan  S/P Left 3rd-7th rib fractures with resolved PTX  Aggressive pulmonary toilet  Encouraging incentive spirometry    Hypothyroid- (present on admission)  Assessment & Plan  On Synthroid    DM (diabetes mellitus) (HCC)- (present on admission)  Assessment & Plan  Hba1c: 7.4 ()  BS recently after med change: 119-189  On Lantus: 32 units bid --> gave 15 units this am 2nd to low BS () --> 25 units bid ()  On Metformin: 1000 mg XL bid (above max dose) --> 1000 mg bid ()  Cont to monitor    Fracture of left clavicle- (present on admission)  Assessment & Plan  S/P ORIF on 19 by Dr. Gauthier    Multiple fractures of cervical spine (HCC)- (present on admission)  Assessment & Plan  Non-surgical management  Cervical collar x 2 wks followed by F/U imaging

## 2019-08-01 NOTE — REHAB-PT IDT TEAM NOTE
Physical Therapy   Mobility  Bed mobility:   SBA  Bed /Chair/Wheelchair Transfer Initial:  4 - Minimal Assistance  Bed /Chair/Wheelchair Transfer Current:  4 - Minimal Assistance   Bed/Chair/Wheelchair Transfer Description:  Supervision for safety, Increased time(Pt required Min A to lift for SPT to/from bed to w/c )  Walk Initial:  0 - Not tested,unsafe activity  Walk Current:  1 - Total Assistance   Walk Description:  Requires wheelchair follow, Extra time, Safety concerns, Requires incidental assist (Ambulation with HHA and WC follow, 15' and 20', with 1 minor LOB requiring min A to recover)  Wheelchair Initial:  1 - Total Assistance  Wheelchair Current:  1 - Total Assistance   Wheelchair Description:  Extra time, Verbal cueing, Supervision for safety(10 ft SBA, using BLEs)  Stairs Initial:  0 - Not tested,unsafe activity  Stairs Current: 0 - Not tested,unsafe activity   Stairs Description:    Patient/Family Training/Education:  TBD   DME/DC Recommendations:  TBD, possible manual WC  Strengths:  Effective communication skills, Independent PLOF, Motivated for self care and independence, Pleasant and cooperative and Willingly participates in therapeutic activities  Barriers:   Generalized weakness, Orthostatic hypotension, Poor activity tolerance and Other: Pain (L UE, ribs)  # of short term goals set= 3  # of short term goals met=1  Physical Therapy Problems     Problem: Mobility     Dates: Start: 07/24/19       Goal: STG-Within one week, patient will ambulate household distance     Dates: Start: 07/24/19       Description: 1) Individualized goal:  50' HHA  2) Interventions:  PT Group Therapy, PT Gait Training, PT Therapeutic Exercises, PT TENS Application, PT Neuro Re-Ed/Balance, PT Therapeutic Activity and PT Manual Therapy       Note:     Goal Note filed on 08/01/19 1138 by Gina Jasmine, Student    20' HHA with WC follow                  Goal: STG-Within one week, patient will ambulate up/down a curb      "Dates: Start: 07/24/19       Description: 1) Individualized goal:  4\" curb, HHA  2) Interventions:   PT Group Therapy, PT Gait Training, PT Therapeutic Exercises, PT TENS Application, PT Neuro Re-Ed/Balance, PT Therapeutic Activity and PT Manual Therapy        Note:     Goal Note filed on 08/01/19 1138 by Gina Jasmine, Student    D/t orthostatic hypotension/deconditioning                        Problem: PT-Long Term Goals     Dates: Start: 07/24/19       Goal: LTG-By discharge, patient will ambulate     Dates: Start: 07/24/19       Description: 1) Individualized goal:  250' mod I, no AD  2) Interventions:   PT Group Therapy, PT Gait Training, PT Therapeutic Exercises, PT TENS Application, PT Neuro Re-Ed/Balance, PT Therapeutic Activity and PT Manual Therapy              Goal: LTG-By discharge, patient will transfer one surface to another     Dates: Start: 07/24/19       Description: 1) Individualized goal:  Bed <> WC, mod I, no AD  2) Interventions:   PT Group Therapy, PT Gait Training, PT Therapeutic Exercises, PT TENS Application, PT Neuro Re-Ed/Balance, PT Therapeutic Activity and PT Manual Therapy             Goal: LTG-By discharge, patient will ambulate up/down 4-6 stairs     Dates: Start: 07/24/19       Description: 1) Individualized goal:  4 6\" steps, R HR, mod I  2) Interventions:   PT Group Therapy, PT Gait Training, PT Therapeutic Exercises, PT TENS Application, PT Neuro Re-Ed/Balance, PT Therapeutic Activity and PT Manual Therapy               Goal: LTG-By discharge, patient will transfer in/out of a car     Dates: Start: 07/24/19       Description: 1) Individualized goal:  SPV, no AD  2) Interventions:   PT Group Therapy, PT Gait Training, PT Therapeutic Exercises, PT TENS Application, PT Neuro Re-Ed/Balance, PT Therapeutic Activity and PT Manual Therapy                           Section completed by:  Gina Jasmine, Student; Christy Schaffer, PT, DPT     "

## 2019-08-01 NOTE — REHAB-SLP IDT TEAM NOTE
Speech Therapy   Cognitive Linquistic Functions  Comprehension Initial:  3 - Moderate Assistance  Comprehension Current:  6 - Modified Independent   Comprehension Description:  Glasses  Expression Initial:  5 - Stand-by Prompting/Supervision or Set-up  Expression Current:  7 - Independent   Expression Description:  Verbal cueing  Social Interaction Initial:  3 - Moderate Assistance  Social Interaction Current:  7 - Independent   Social Interaction Description:  Increased time  Problem Solving Initial:  2 - Max Assistance  Problem Solving Current:  6 - Modified Independent   Problem Solving Description:  Increased time, Therapy schedule, Verbal cueing  Memory Initial:  2 - Max Assistance  Memory Current:  6 - Modified Independent   Memory Description:  Verbal cueing, Therapy schedule  Executive Functioning / Organization Initial:  Supervision (5)  Executive Functioning / Organization Current:  6 - Modified Independent   Executive Functioning Desciption: Pt independent prior.   Swallowing  Swallowing Status:  SLP no longer following for dysphagia   Orders Placed This Encounter   Procedures   • Diet Order Diabetic     Standing Status:   Standing     Number of Occurrences:   1     Order Specific Question:   Diet:     Answer:   Diabetic [3]     Order Specific Question:   Consistency/Fluid modifications:     Answer:   Thin Liquids [3]     Comments:   straws ok     Behavior Modification Plan  Give clear feedback, Set clear goals, Decrease the chance of failure by offering activities that are within the patient's abilities and Analyze tasks (break down into smaller steps)  Assistive Technology  Low/Impaired vision equipment and Low tech: Calendar, planner, schedule, alarms/timers, pill organizer, post-it notes, lists  Family Training/Education: not needed at this time.   DC Recommendations:  No further SLP services recommended upon d/c from [facility]  Strengths:  Able to follow instructions, Alert and oriented, Effective  "communication skills, Good carryover of learning, Good insight into deficits/needs, Independent PLOF, Making steady progress towards goals, Motivated for self care and independence, Pleasant and cooperative, Supportive family and Willingly participates in therapeutic activities  Barriers:  Limited mobility and Pain poorly managed  # of short term goals set=3  # of short term goals met=2  Speech Therapy Problems     Problem: Problem Solving STGs     Dates: Start: 07/23/19       Goal: STG-Within one week, patient will     Dates: Start: 07/25/19       Description: 1) Individualized goal:  Complete medication management/pill sorting task using her current medication list and mock \"pills\" to sort with 100% accuracy given set up/SPV   2) Interventions:  SLP Cognitive Skill Development and SLP Group Treatment                   Problem: Speech/Swallowing LTGs     Dates: Start: 07/23/19       Goal: LTG-By discharge, patient will solve complex problem     Dates: Start: 07/23/19       Description: 1) Individualized goal: By utilizing compensatory intervention for memory and problem-solving to allow for safe completion of daily activities with SPV in order to prepare for safe d/c home.   2) Interventions:  SLP Cognitive Skill Development and SLP Group Treatment                          Section completed by:  Lala Pavon MS,CCC-SLP     "

## 2019-08-01 NOTE — PROGRESS NOTES
Received shift report and assumed care of patient.  Patient awake, calm and stable, taking shower with OT at this time.

## 2019-08-01 NOTE — THERAPY
"  Occupational Therapy  Daily Treatment     Patient Name: Maira Dodd  Age:  73 y.o., Sex:  female  Medical Record #: [id number]  Today's Date: 8/1/2019     Precautions  Precautions: (P) Non Weight Bearing Left Upper Extremity, Cervical Collar  , Spinal / Back Precautions , Fall Risk, Other (See Comments)  Comments: (P) orthostatic-monitor BP, multiple rib fxs, cervical fx, L UE wounds, L UE sling PRN    Safety   ADL Safety : Requires Physical Assist for Safety  Bathroom Safety: Requires Physical Assist for Safety  Comments: education on and practice with use of sock aid and dressing stick to doff and don socks requires set up with sock aid and min A to adjust socks see FIM. Additional education on potential use of reacher to retrieve LB clothing and adjust.    Subjective    \" I think It is moving more. \"   \" look I can help .\"  Stated during AAROM      Objective       08/01/19 0845   Precautions   Precautions Non Weight Bearing Left Upper Extremity;Cervical Collar  ;Spinal / Back Precautions ;Fall Risk;Other (See Comments)   Comments orthostatic-monitor BP, multiple rib fxs, cervical fx, L UE wounds, L UE sling PRN   Supine Upper Body Exercises   Other Exercise PROM left shoulder flexion x 5 reps x 2 and AAROM  left shoulder flexion  x 5 reps x 2 .    PROM with gentle stretch  shoulder abdcution to approximately 45 degrees.    Interdisciplinary Plan of Care Collaboration   Patient Position at End of Therapy In Bed;Call Light within Reach;Tray Table within Reach   OT Total Time Spent   OT Individual Total Time Spent (Mins) 15   OT Charge Group   OT Therapeutic Exercise  1         Assessment    Improved PROM  Left Shoulder abduction     Plan    ADL ,IADL related mobility ,strength/endurance building incorporating C spine and NWB Left UE precautions     "

## 2019-08-02 LAB
GLUCOSE BLD-MCNC: 115 MG/DL (ref 65–99)
GLUCOSE BLD-MCNC: 127 MG/DL (ref 65–99)
GLUCOSE BLD-MCNC: 142 MG/DL (ref 65–99)
GLUCOSE BLD-MCNC: 174 MG/DL (ref 65–99)
GLUCOSE BLD-MCNC: 195 MG/DL (ref 65–99)
GLUCOSE BLD-MCNC: 66 MG/DL (ref 65–99)

## 2019-08-02 PROCEDURE — 97535 SELF CARE MNGMENT TRAINING: CPT

## 2019-08-02 PROCEDURE — 97530 THERAPEUTIC ACTIVITIES: CPT

## 2019-08-02 PROCEDURE — A9270 NON-COVERED ITEM OR SERVICE: HCPCS | Performed by: PHYSICAL MEDICINE & REHABILITATION

## 2019-08-02 PROCEDURE — 97110 THERAPEUTIC EXERCISES: CPT

## 2019-08-02 PROCEDURE — 700102 HCHG RX REV CODE 250 W/ 637 OVERRIDE(OP): Performed by: HOSPITALIST

## 2019-08-02 PROCEDURE — A9270 NON-COVERED ITEM OR SERVICE: HCPCS | Performed by: HOSPITALIST

## 2019-08-02 PROCEDURE — 700111 HCHG RX REV CODE 636 W/ 250 OVERRIDE (IP): Performed by: PHYSICAL MEDICINE & REHABILITATION

## 2019-08-02 PROCEDURE — 99232 SBSQ HOSP IP/OBS MODERATE 35: CPT | Performed by: PHYSICAL MEDICINE & REHABILITATION

## 2019-08-02 PROCEDURE — 99232 SBSQ HOSP IP/OBS MODERATE 35: CPT | Performed by: HOSPITALIST

## 2019-08-02 PROCEDURE — 82962 GLUCOSE BLOOD TEST: CPT

## 2019-08-02 PROCEDURE — 770010 HCHG ROOM/CARE - REHAB SEMI PRIVAT*

## 2019-08-02 PROCEDURE — 700102 HCHG RX REV CODE 250 W/ 637 OVERRIDE(OP): Performed by: PHYSICAL MEDICINE & REHABILITATION

## 2019-08-02 RX ORDER — INSULIN GLARGINE 100 [IU]/ML
20 INJECTION, SOLUTION SUBCUTANEOUS 2 TIMES DAILY
Status: DISCONTINUED | OUTPATIENT
Start: 2019-08-02 | End: 2019-08-08 | Stop reason: HOSPADM

## 2019-08-02 RX ORDER — INSULIN GLARGINE 100 [IU]/ML
15 INJECTION, SOLUTION SUBCUTANEOUS ONCE
Status: COMPLETED | OUTPATIENT
Start: 2019-08-02 | End: 2019-08-02

## 2019-08-02 RX ADMIN — CARVEDILOL 6.25 MG: 3.12 TABLET, FILM COATED ORAL at 08:18

## 2019-08-02 RX ADMIN — LEVOTHYROXINE SODIUM 75 MCG: 75 TABLET ORAL at 06:20

## 2019-08-02 RX ADMIN — METFORMIN HYDROCHLORIDE 1000 MG: 500 TABLET, FILM COATED ORAL at 17:40

## 2019-08-02 RX ADMIN — BACITRACIN ZINC AND POLYMYXIN B SULFATE: at 08:22

## 2019-08-02 RX ADMIN — VITAMIN D, TAB 1000IU (100/BT) 2000 UNITS: 25 TAB at 08:19

## 2019-08-02 RX ADMIN — OXYCODONE HYDROCHLORIDE 5 MG: 5 TABLET ORAL at 06:19

## 2019-08-02 RX ADMIN — METFORMIN HYDROCHLORIDE 1000 MG: 500 TABLET, FILM COATED ORAL at 08:18

## 2019-08-02 RX ADMIN — NYSTATIN: 100000 POWDER TOPICAL at 08:22

## 2019-08-02 RX ADMIN — INSULIN GLARGINE 20 UNITS: 100 INJECTION, SOLUTION SUBCUTANEOUS at 21:36

## 2019-08-02 RX ADMIN — OXYCODONE HYDROCHLORIDE 5 MG: 5 TABLET ORAL at 12:16

## 2019-08-02 RX ADMIN — ENOXAPARIN SODIUM 40 MG: 100 INJECTION SUBCUTANEOUS at 08:19

## 2019-08-02 RX ADMIN — INSULIN GLARGINE 15 UNITS: 100 INJECTION, SOLUTION SUBCUTANEOUS at 09:56

## 2019-08-02 RX ADMIN — BACITRACIN ZINC AND POLYMYXIN B SULFATE: at 21:29

## 2019-08-02 RX ADMIN — LEVOTHYROXINE SODIUM 125 MCG: 125 TABLET ORAL at 06:20

## 2019-08-02 RX ADMIN — CARVEDILOL 6.25 MG: 3.12 TABLET, FILM COATED ORAL at 17:40

## 2019-08-02 RX ADMIN — NYSTATIN: 100000 POWDER TOPICAL at 21:29

## 2019-08-02 RX ADMIN — ENOXAPARIN SODIUM 40 MG: 100 INJECTION SUBCUTANEOUS at 21:28

## 2019-08-02 RX ADMIN — BACITRACIN ZINC AND POLYMYXIN B SULFATE: at 16:04

## 2019-08-02 RX ADMIN — TRAZODONE HYDROCHLORIDE 50 MG: 50 TABLET ORAL at 21:28

## 2019-08-02 RX ADMIN — BENAZEPRIL HYDROCHLORIDE 30 MG: 10 TABLET, COATED ORAL at 08:18

## 2019-08-02 RX ADMIN — SENNOSIDES, DOCUSATE SODIUM 2 TABLET: 50; 8.6 TABLET, FILM COATED ORAL at 08:18

## 2019-08-02 RX ADMIN — OXYCODONE HYDROCHLORIDE 5 MG: 5 TABLET ORAL at 16:03

## 2019-08-02 ASSESSMENT — ENCOUNTER SYMPTOMS
NERVOUS/ANXIOUS: 0
ABDOMINAL PAIN: 0
CHILLS: 0
SHORTNESS OF BREATH: 0
FEVER: 0
DIARRHEA: 0
NAUSEA: 0
VOMITING: 0

## 2019-08-02 NOTE — CARE PLAN
Problem: Bowel/Gastric:  Goal: Will not experience complications related to bowel motility  Note:   Patient educated on fluid intake, diet, bowel medications and mobilization to maximize the potential for regular bowel movements while taking opiates or opiate derivatives for pain. Patient engages in learning and verbalizes understanding.       Problem: Skin Integrity  Goal: Risk for impaired skin integrity will decrease  Note:   Patient educated to change positions to minimize the potential of skin break down and possible further complications of open wounds due to pressure, when sitting or laying for periods over half of an hour. Patient engaged in education and verbalizes understanding.

## 2019-08-02 NOTE — PROGRESS NOTES
"Rehab Progress Note     Encounter Date: 8/2/2019    CC: TBI, left arm wound, and left clavicular fracture    Interval Events (Subjective)  Patient sitting up in room. She reports therapy went well today with OT but her bandages have come loose. Evaluated wounds and bandages and still intact. Discussed with nursing and report will replace if no additional wound management later today.  Patient reports she continues to get more ROM but it is slow. Denies SOB.     IDT Team Meeting 8/1/2019  DC/Disposition:  8/9/19    Objective:  VITAL SIGNS: /59   Pulse 88   Temp 36.7 °C (98 °F) (Temporal)   Resp 17   Ht 1.702 m (5' 7\")   Wt (!) 136 kg (299 lb 14.4 oz)   SpO2 91%   BMI 46.97 kg/m²   Gen: NAD  Psych: Mood and affect appropriate  CV: RRR, no edema  Resp: CTAB, no upper airway sounds  Abd: NTND  Skin: Large eschar on left elbow posteriorly. Pink viable tissue around rim.  Unchanged from 8/1/19    Recent Results (from the past 72 hour(s))   ACCU-CHEK GLUCOSE    Collection Time: 07/30/19  3:19 PM   Result Value Ref Range    Glucose - Accu-Ck 143 (H) 65 - 99 mg/dL   ACCU-CHEK GLUCOSE    Collection Time: 07/30/19  4:53 PM   Result Value Ref Range    Glucose - Accu-Ck 130 (H) 65 - 99 mg/dL   ACCU-CHEK GLUCOSE    Collection Time: 07/30/19  8:45 PM   Result Value Ref Range    Glucose - Accu-Ck 162 (H) 65 - 99 mg/dL   ACCU-CHEK GLUCOSE    Collection Time: 07/31/19  7:29 AM   Result Value Ref Range    Glucose - Accu-Ck 119 (H) 65 - 99 mg/dL   ACCU-CHEK GLUCOSE    Collection Time: 07/31/19 11:12 AM   Result Value Ref Range    Glucose - Accu-Ck 189 (H) 65 - 99 mg/dL   ACCU-CHEK GLUCOSE    Collection Time: 07/31/19  4:55 PM   Result Value Ref Range    Glucose - Accu-Ck 144 (H) 65 - 99 mg/dL   ACCU-CHEK GLUCOSE    Collection Time: 07/31/19  8:35 PM   Result Value Ref Range    Glucose - Accu-Ck 178 (H) 65 - 99 mg/dL   CBC WITHOUT DIFFERENTIAL    Collection Time: 08/01/19  5:27 AM   Result Value Ref Range    WBC 6.4 4.8 - " 10.8 K/uL    RBC 3.30 (L) 4.20 - 5.40 M/uL    Hemoglobin 10.2 (L) 12.0 - 16.0 g/dL    Hematocrit 32.4 (L) 37.0 - 47.0 %    MCV 98.2 (H) 81.4 - 97.8 fL    MCH 30.9 27.0 - 33.0 pg    MCHC 31.5 (L) 33.6 - 35.0 g/dL    RDW 55.2 (H) 35.9 - 50.0 fL    Platelet Count 274 164 - 446 K/uL    MPV 10.7 9.0 - 12.9 fL   Basic Metabolic Panel    Collection Time: 08/01/19  5:27 AM   Result Value Ref Range    Sodium 136 135 - 145 mmol/L    Potassium 4.0 3.6 - 5.5 mmol/L    Chloride 106 96 - 112 mmol/L    Co2 22 20 - 33 mmol/L    Glucose 128 (H) 65 - 99 mg/dL    Bun 22 8 - 22 mg/dL    Creatinine 0.79 0.50 - 1.40 mg/dL    Calcium 8.6 8.5 - 10.5 mg/dL    Anion Gap 8.0 0.0 - 11.9   MAGNESIUM    Collection Time: 08/01/19  5:27 AM   Result Value Ref Range    Magnesium 1.7 1.5 - 2.5 mg/dL   PHOSPHORUS    Collection Time: 08/01/19  5:27 AM   Result Value Ref Range    Phosphorus 2.6 2.5 - 4.5 mg/dL   ESTIMATED GFR    Collection Time: 08/01/19  5:27 AM   Result Value Ref Range    GFR If African American >60 >60 mL/min/1.73 m 2    GFR If Non African American >60 >60 mL/min/1.73 m 2   ACCU-CHEK GLUCOSE    Collection Time: 08/01/19  7:58 AM   Result Value Ref Range    Glucose - Accu-Ck 160 (H) 65 - 99 mg/dL   ACCU-CHEK GLUCOSE    Collection Time: 08/01/19 11:19 AM   Result Value Ref Range    Glucose - Accu-Ck 147 (H) 65 - 99 mg/dL   ACCU-CHEK GLUCOSE    Collection Time: 08/01/19  5:14 PM   Result Value Ref Range    Glucose - Accu-Ck 125 (H) 65 - 99 mg/dL   ACCU-CHEK GLUCOSE    Collection Time: 08/01/19  8:20 PM   Result Value Ref Range    Glucose - Accu-Ck 154 (H) 65 - 99 mg/dL   ACCU-CHEK GLUCOSE    Collection Time: 08/02/19  7:20 AM   Result Value Ref Range    Glucose - Accu-Ck 66 65 - 99 mg/dL   ACCU-CHEK GLUCOSE    Collection Time: 08/02/19  7:47 AM   Result Value Ref Range    Glucose - Accu-Ck 115 (H) 65 - 99 mg/dL       Current Facility-Administered Medications   Medication Frequency   • insulin glargine (LANTUS) injection 20 Units BID   •  metFORMIN (GLUCOPHAGE) tablet 1,000 mg BID WITH MEALS   • insulin regular (HUMULIN R) injection 2-12 Units 4X/DAY ACHS    And   • glucose 4 g chewable tablet 16 g Q15 MIN PRN    And   • dextrose 50% (D50W) injection 50 mL Q15 MIN PRN   • benazepril (LOTENSIN) tablet 30 mg DAILY   • vitamin D (cholecalciferol) tablet 2,000 Units DAILY   • Respiratory Care per Protocol Continuous RT   • oxyCODONE immediate-release (ROXICODONE) tablet 5 mg Q3HRS PRN   • oxyCODONE immediate release (ROXICODONE) tablet 10 mg Q3HRS PRN   • tramadol (ULTRAM) 50 MG tablet 50 mg Q4HRS PRN   • hydrALAZINE (APRESOLINE) tablet 25 mg Q8HRS PRN   • acetaminophen (TYLENOL) tablet 650 mg Q4HRS PRN   • senna-docusate (PERICOLACE or SENOKOT S) 8.6-50 MG per tablet 2 Tab BID    And   • polyethylene glycol/lytes (MIRALAX) PACKET 1 Packet QDAY PRN    And   • magnesium hydroxide (MILK OF MAGNESIA) suspension 30 mL QDAY PRN    And   • bisacodyl (DULCOLAX) suppository 10 mg QDAY PRN   • artificial tears 1.4 % ophthalmic solution 1 Drop PRN   • benzocaine-menthol (CEPACOL) lozenge 1 Lozenge Q2HRS PRN   • mag hydrox-al hydrox-simeth (MAALOX PLUS ES or MYLANTA DS) suspension 20 mL Q2HRS PRN   • ondansetron (ZOFRAN ODT) dispertab 4 mg 4X/DAY PRN    Or   • ondansetron (ZOFRAN) syringe/vial injection 4 mg 4X/DAY PRN   • traZODone (DESYREL) tablet 50 mg QHS PRN   • sodium chloride (OCEAN) 0.65 % nasal spray 2 Spray PRN   • bacitracin-polymyxin b (POLYSPORIN) 500-74477 UNIT/GM ointment TID   • carvedilol (COREG) tablet 6.25 mg BID WITH MEALS   • enoxaparin (LOVENOX) inj 40 mg Q12HRS   • HYDROmorphone (DILAUDID) tablet 2-4 mg Q3HRS PRN   • nystatin (MYCOSTATIN) powder BID   • levothyroxine (SYNTHROID) tablet 125 mcg AM ES    And   • levothyroxine (SYNTHROID) tablet 75 mcg AM ES       Orders Placed This Encounter   Procedures   • Diet Order Diabetic     Standing Status:   Standing     Number of Occurrences:   1     Order Specific Question:   Diet:     Answer:    Diabetic [3]     Order Specific Question:   Consistency/Fluid modifications:     Answer:   Thin Liquids [3]     Comments:   straws ok       Assessment:  Active Hospital Problems    Diagnosis   • *Subarachnoid hemorrhage following injury, with loss of consciousness ([facility])   • Closed fracture of multiple ribs with flail chest   • UTI (urinary tract infection)   • DM (diabetes mellitus) ([facility])   • Fracture of left clavicle   • Morbid obesity with BMI of 45.0-49.9, adult ([facility])   • Multiple fractures of cervical spine ([facility])   • Dysphagia   • Laceration of left forearm   • Essential hypertension   • Hypothyroid   • Trauma   • Abnormal CT of the abdomen   • Vitamin D deficiency   • Renal insufficiency   • Macrocytic anemia   • Alkaline phosphatase elevation       Medical Decision Making and Plan:  TBI - Patient with left sided SAH with loss of consciousness and decreased cognition. Also limited by other orthopedic injuries  -Patient has completed Keppra for seizure prophylaxis.   -PT and OT for mobility and ADLs  -SLP for cognition      Dysphagia - Patient with oropharyngeal dysphagia. Upgraded to Dysphagia 2 with NTL prior to transfer.  SLP for swallow - Advanced diet to regular with thins. Resolved.      C/T Spine fractures - Patient with TP of C7 and T1 fractures in C collar for unknown length of time.    -Continue C collar. Will need follow-up with NSG  -Patient's improved with different collar.      Left clavicular fracture - S/p ORIF with Dr. Gauthier on 7/14/19. Also with large left arms laceration  -Continue NWB LUE.  Remove staples on shoulder and forearm  -Wound consult - examined wound on 8/2/19, no changes in plan. RN to neena.       HTN - Patient on Benazepriil 30 mg and Coreg 6.25 mg BID  -SBP slowly increasing, per hospitalist continue to monitor as recently had orthostatic hypotension.     Orthostatic hypotension - Severe orthostatics on 7/24/19, continue to monitor. ACEi has been reduced, improved      Anemia - Stable at 11.3.     DM - Patient on Lantus 30 U BID, metformin 1000 mg BID and SSI on transfer. Previously on Trulicity, Metformin  -Will check A1c 7.4. Consult hospitalist, decreased to 25 U  -Restarted on Metformin 1000 mg BID     Hypothyroidism - Patient on 200 mcg on transfer.      Vitamin D deficiency - 12 on admission. Start on 2000 U daily    Mood - Patient with fear of discharge. Continue to work with patient, psychology consulted. Consider SSRI for anxiety.   -Benefit from outpatient behavioral health referral     GI Ppx - Patient on Omeprazole while on dysphagia diet. Discontinue Prilosec as off dysphagia diet     DVT Ppx - Patient on Lovenox on transfer.     Total time:  25 minutes.  I spent greater than 50% of the time for patient care, counseling, and coordination on this date, including unit/floor time, and face-to-face time with the patient as per interval events and assessment and plan above. Topics discussed included reviewed wound and no changes in plan and continue to monitor SBP as elevated but history of orthostatic hypotension.     Vince Matias M.D.

## 2019-08-02 NOTE — PROGRESS NOTES
Hospital Medicine Daily Progress Note      Chief Complaint:  Hypertension  Diabetes    Interval History:  No significant events or changes since last visit    Review of Systems  Review of Systems   Constitutional: Negative for chills and fever.   Respiratory: Negative for shortness of breath.    Cardiovascular: Negative for chest pain.   Gastrointestinal: Negative for abdominal pain, diarrhea, nausea and vomiting.   Psychiatric/Behavioral: The patient is not nervous/anxious.         Physical Exam  Temp:  [36.4 °C (97.6 °F)-36.7 °C (98 °F)] 36.7 °C (98 °F)  Pulse:  [] 88  Resp:  [17-18] 17  BP: (118-147)/(49-64) 132/59  SpO2:  [91 %-93 %] 91 %    Physical Exam   Constitutional: She is oriented to person, place, and time. She appears well-nourished.   HENT:   Head: Atraumatic.   Eyes: Pupils are equal, round, and reactive to light. Conjunctivae are normal.   Neck: Normal range of motion. Neck supple. No tracheal deviation present.   Cardiovascular: Normal rate, regular rhythm, S1 normal and S2 normal.   Pulmonary/Chest: Effort normal and breath sounds normal.   Abdominal: Soft. Bowel sounds are normal.   Musculoskeletal: She exhibits no edema.   Neurological: She is alert and oriented to person, place, and time. No sensory deficit.   Skin: Skin is warm and dry. No cyanosis.   Psychiatric: She has a normal mood and affect. Her behavior is normal.   Nursing note and vitals reviewed.      Fluids    Intake/Output Summary (Last 24 hours) at 8/2/2019 1012  Last data filed at 8/1/2019 1751  Gross per 24 hour   Intake 360 ml   Output --   Net 360 ml       Laboratory  Recent Labs     08/01/19  0527   WBC 6.4   RBC 3.30*   HEMOGLOBIN 10.2*   HEMATOCRIT 32.4*   MCV 98.2*   MCH 30.9   MCHC 31.5*   RDW 55.2*   PLATELETCT 274   MPV 10.7     Recent Labs     08/01/19 0527   SODIUM 136   POTASSIUM 4.0   CHLORIDE 106   CO2 22   GLUCOSE 128*   BUN 22   CREATININE 0.79   CALCIUM 8.6                       Assessment/Plan  *  Subarachnoid hemorrhage following injury, with loss of consciousness (HCC)- (present on admission)  Assessment & Plan  Non-surgical management  Completed Keppra x 7 days for SZ proph    Dizziness  Assessment & Plan  Pt gets dizzy on sitting or standing up  Orthostatics (): neg  Will repeat orthostatics    Thrombocytopenia (HCC)  Assessment & Plan  Currently resolved  Platelets: 285 () --> 153 () --> 274 ()  ? Lab error    Vitamin D deficiency  Assessment & Plan  Vit D: 13  On supplements    Laceration of left forearm- (present on admission)  Assessment & Plan  S/P repair  Wound care    Abnormal CT of the abdomen- (present on admission)  Assessment & Plan  Incidental finding of left kidney lesion on Trauma work-up  Needs F/U imaging    Essential hypertension- (present on admission)  Assessment & Plan  BP ok  On Core.25 mg bid  On Lotensin: 30 mg daily  Monitor    Closed fracture of multiple ribs with flail chest- (present on admission)  Assessment & Plan  S/P Left 3rd-7th rib fractures with resolved PTX  Aggressive pulmonary toilet  Encouraging incentive spirometry    Hypothyroid- (present on admission)  Assessment & Plan  On Synthroid    DM (diabetes mellitus) (HCC)- (present on admission)  Assessment & Plan  Hba1c: 7.4 ()  BS dipped lower this am to 66 -- 2nd to < 25% of dinner last night  On Lantus: 32 units bid --> 25 units bid () --> will decrease to 20 units bid (starting  evening)                     note: gave Lantus 15 units x 1 this am ()  On Metformin: 1000 mg XL bid (above max dose) --> 1000 mg bid ()  Cont to monitor    Fracture of left clavicle- (present on admission)  Assessment & Plan  S/P ORIF on 19 by Dr. Gauthier    Multiple fractures of cervical spine ([facility])- (present on admission)  Assessment & Plan  Non-surgical management  Cervical collar x 2 wks followed by F/U imaging

## 2019-08-02 NOTE — CARE PLAN
Problem: Bowel/Gastric:  Goal: Will not experience complications related to bowel motility  Outcome: PROGRESSING SLOWER THAN EXPECTED  Note:   Patient's Blood sugar this am before meal was 66 and was asymptomatic. OJ given and recheck . Dr Moody and Charge nurse notified. Patient ate 95 % breakfast. Lantus 25 unit was held. BS after meal was 142. Lantus 15 units given per order. Will continue to monitor.       Problem: Safety  Goal: Will remain free from injury  Outcome: PROGRESSING AS EXPECTED  Note:   Patient use call light for assistance. Remains free from injury.

## 2019-08-02 NOTE — THERAPY
Physical Therapy   Daily Treatment     Patient Name: Maira Dodd  Age:  73 y.o., Sex:  female  Medical Record #: [id number]  Today's Date: 8/2/2019     Precautions  Precautions: Non Weight Bearing Left Upper Extremity, Cervical Collar  , Spinal / Back Precautions , Fall Risk, Other (See Comments)  Comments: orthostatic-monitor BP, multiple rib fxs, cervical fx, L UE wounds    Subjective    Pt seated in room. Reporting pain at collarbone due to c-collar rubbing, requesting to do bed exercises.     Objective       08/02/19 0901   Precautions   Precautions Non Weight Bearing Left Upper Extremity;Cervical Collar  ;Spinal / Back Precautions ;Fall Risk;Other (See Comments)   Comments orthostatic-monitor BP, multiple rib fxs, cervical fx, L UE wounds   Supine Lower Body Exercise   Bridges 1 set of 15   Hip Abduction 1 set of 15   Short Arc Quad 1 set of 15   Heel Slide 1 set of 15   Ankle Pumps 1 set of 15   Other Exercises Marching 1x15 B   Bed Mobility    Sit to Supine Stand by Assist   Sit to Stand Stand by Assist   Scooting Stand by Assist   Interdisciplinary Plan of Care Collaboration   IDT Collaboration with  Nursing   Patient Position at End of Therapy In Bed;Call Light within Reach;Tray Table within Reach   Collaboration Comments Nursing administering meds   PT Total Time Spent   PT Individual Total Time Spent (Mins) 30   PT Charge Group   PT Therapeutic Exercise 1   PT Therapeutic Activities 1     Wc > bed transfer, stand step and sit >supine, SBA    Discussed use of supine exercises for use in room and as HEP.    Assessment    Pt limited this session by reports of pain at collarbone, continues with LE weakness and deconditioning, L>R, but with improved hip clearance during bridging.    Plan    Progress ambulation as tolerated, LE strength and endurance, standing balance and tolerance, encourage increasing time OOB, car transfer into spouse's vehicle

## 2019-08-02 NOTE — THERAPY
"Occupational Therapy  Daily Treatment     Patient Name: Maira Dodd  Age:  73 y.o., Sex:  female  Medical Record #: [id number]  Today's Date: 2019     Precautions  Precautions: Non Weight Bearing Left Upper Extremity, Cervical Collar  , Spinal / Back Precautions , Fall Risk, Other (See Comments)  Comments: orthostatic-monitor BP, multiple rib fxs, cervical fx, L UE wounds    Safety   ADL Safety : Requires Physical Assist for Safety  Bathroom Safety: Requires Physical Assist for Safety  Comments: education on and practice with use of sock aid and dressing stick to doff and don socks requires set up with sock aid and min A to adjust socks see FIM. Additional education on potential use of reacher to retrieve LB clothing and adjust.    Subjective    Patient stated \"This collar is digging into my collar bone\" at start of session. Mepilex applied with good response.      Objective       19 0831   Precautions   Precautions Non Weight Bearing Left Upper Extremity;Cervical Collar  ;Spinal / Back Precautions ;Fall Risk;Other (See Comments)   Comments orthostatic-monitor BP, multiple rib fxs, cervical fx, L UE wounds   Cognition    Level of Consciousness Alert   Interdisciplinary Plan of Care Collaboration   Patient Position at End of Therapy Seated;Call Light within Reach;Tray Table within Reach   OT Total Time Spent   OT Individual Total Time Spent (Mins) 30   OT Charge Group   OT Self Care / ADL 2       FIM Eating Score:  5 - Standby Prompting/Supervision or Set-up  Eating Description:  Increased time(Patient completed self-feeding task with set-up assistance (while eating [place] toast))    FIM Toiletin - Max Assistance  Toileting Description:  Grab bar, Increased time, Supervision for safety    FIM Toilet Transfer Score:  5 - Standby Prompting/Supervision or Set-up  Toilet Transfer Description:  Grab bar, Increased time, Supervision for safety(Patient transferred w/c <> toilet with supervision for " safety and standing balance)      Assessment    Patient tolerated OT session well. Primary barriers to toileting function and indepedence are decreased LUE function 2/2 W/B precautions and decreased strength.     Plan    Continue ADL training, IADL related mobility ,strength/endurance building incorporating C spine and NWB Left UE precautions

## 2019-08-02 NOTE — THERAPY
Physical Therapy   Daily Treatment     Patient Name: Maira Dodd  Age:  73 y.o., Sex:  female  Medical Record #: [id number]  Today's Date: 8/2/2019     Precautions  Precautions: Spinal / Back Precautions , Fall Risk, Other (See Comments)  Comments: orthostatic-monitor BP, multiple rib fxs, cervical fx, L UE wounds    Subjective    Pt in bed on arrival, agreeable to PT.     Objective       08/02/19 1431   Precautions   Precautions Spinal / Back Precautions ;Fall Risk;Other (See Comments)   Comments orthostatic-monitor BP, multiple rib fxs, cervical fx, L UE wounds   Bed Mobility    Supine to Sit Stand by Assist   Sit to Supine Stand by Assist   Sit to Stand Stand by Assist   Scooting Stand by Assist   Interdisciplinary Plan of Care Collaboration   Patient Position at End of Therapy In Bed;Call Light within Reach;Tray Table within Reach   PT Total Time Spent   PT Individual Total Time Spent (Mins) 60   PT Charge Group   PT Therapeutic Activities 4     Bed <> WC, SBA with HOB elevated, stand step transfer without AD    Ambulation with HHA for pt comfort, 3x125', 110' with seated rest breaks.    Standing tolerance while playing ladderball, SBA-CGA without UE support, 3x ~1min with seated rest breaks. Pt reporting dizziness after second rep which resolved upon sitting.      Assessment    Pt with improvements in ambulation endurance and increased standing tolerance but continues to be limited by sxs of dizziness with standing and generalized deconditioning.    Plan    Progress ambulation tolerance, LE strength and endurance, standing balance and tolerance, encourage time OOB, car transfer into spouse's vehicle, assess curb/stairs

## 2019-08-02 NOTE — THERAPY
"Occupational Therapy  Daily Treatment     Patient Name: Maira Dodd  Age:  73 y.o., Sex:  female  Medical Record #: [id number]  Today's Date: 2019     Precautions  Precautions: Spinal / Back Precautions , Fall Risk, Other (See Comments)  Comments: orthostatic-monitor BP, multiple rib fxs, cervical fx, L UE wounds    Safety   ADL Safety : Requires Physical Assist for Safety  Bathroom Safety: Requires Physical Assist for Safety  Comments: education on and practice with use of sock aid and dressing stick to doff and don socks requires set up with sock aid and min A to adjust socks see FIM. Additional education on potential use of reacher to retrieve LB clothing and adjust.    Subjective    \"The main thing I want is to get my arm moving\"     Objective       19 1001   Precautions   Precautions Spinal / Back Precautions ;Fall Risk;Other (See Comments)   Comments orthostatic-monitor BP, multiple rib fxs, cervical fx, L UE wounds   Cognition    Level of Consciousness Alert   Balance   Sitting Balance (Static) Good   Sitting Balance (Dynamic) Fair +   Bed Mobility    Supine to Sit Stand by Assist   Sit to Supine Stand by Assist   Scooting Stand by Assist   Interdisciplinary Plan of Care Collaboration   Patient Position at End of Therapy In Bed;Call Light within Reach;Tray Table within Reach   OT Total Time Spent   OT Individual Total Time Spent (Mins) 60   OT Charge Group   OT Self Care / ADL 2   OT Therapy Activity 2       FIM Toiletin - Total Assistance  Toileting Description:  Grab bar, Increased time, Supervision for safety(Patient required total assistance to perform toileting tasks 2/2 assistance needed for donning/doffing left side of pants and hygiene following BM)  *Patient reports she previously completed hygiene following BM by stabilizing self with LUE and reaching posteriorly with RUE.     FIM Toilet Transfer Score:  5 - Standby Prompting/Supervision or Set-up  Toilet Transfer " Description:  Increased time, Grab bar, Supervision for safety(Patient transferred w/c <> toilet with supervision)    Completed AAROM exercises for LUE in supine (shoulder flexion/extension - 2 sets of 10, shoulder abd/adduction 2 sets of 10, horizontal abd/adducation - 1 set of 10, and internal/external rotation - 1 set of 10).     Assessment    Patient actively participated in OT session and improved tolerance noted with 2nd set of AAROM exercises. Primary barriers to functional performance and independence with ADL tasks include WB precautions on LUE and pain.     Plan    Continue ADL training, IADL related mobility ,strength/endurance building incorporating C spine and NWB Left UE precautions

## 2019-08-03 PROBLEM — I95.1 ORTHOSTATIC HYPOTENSION: Status: ACTIVE | Noted: 2019-07-31

## 2019-08-03 LAB
GLUCOSE BLD-MCNC: 115 MG/DL (ref 65–99)
GLUCOSE BLD-MCNC: 132 MG/DL (ref 65–99)
GLUCOSE BLD-MCNC: 134 MG/DL (ref 65–99)
GLUCOSE BLD-MCNC: 142 MG/DL (ref 65–99)
GLUCOSE BLD-MCNC: 161 MG/DL (ref 65–99)

## 2019-08-03 PROCEDURE — 700111 HCHG RX REV CODE 636 W/ 250 OVERRIDE (IP): Performed by: PHYSICAL MEDICINE & REHABILITATION

## 2019-08-03 PROCEDURE — 700102 HCHG RX REV CODE 250 W/ 637 OVERRIDE(OP): Performed by: HOSPITALIST

## 2019-08-03 PROCEDURE — A9270 NON-COVERED ITEM OR SERVICE: HCPCS | Performed by: PHYSICAL MEDICINE & REHABILITATION

## 2019-08-03 PROCEDURE — A9270 NON-COVERED ITEM OR SERVICE: HCPCS | Performed by: HOSPITALIST

## 2019-08-03 PROCEDURE — 770010 HCHG ROOM/CARE - REHAB SEMI PRIVAT*

## 2019-08-03 PROCEDURE — 99232 SBSQ HOSP IP/OBS MODERATE 35: CPT | Performed by: HOSPITALIST

## 2019-08-03 PROCEDURE — 82962 GLUCOSE BLOOD TEST: CPT

## 2019-08-03 PROCEDURE — 700102 HCHG RX REV CODE 250 W/ 637 OVERRIDE(OP): Performed by: PHYSICAL MEDICINE & REHABILITATION

## 2019-08-03 RX ORDER — MIDODRINE HYDROCHLORIDE 2.5 MG/1
2.5 TABLET ORAL 2 TIMES DAILY
Status: DISCONTINUED | OUTPATIENT
Start: 2019-08-04 | End: 2019-08-06

## 2019-08-03 RX ADMIN — CARVEDILOL 6.25 MG: 3.12 TABLET, FILM COATED ORAL at 08:37

## 2019-08-03 RX ADMIN — OXYCODONE HYDROCHLORIDE 5 MG: 5 TABLET ORAL at 02:27

## 2019-08-03 RX ADMIN — BACITRACIN ZINC AND POLYMYXIN B SULFATE: at 08:41

## 2019-08-03 RX ADMIN — BACITRACIN ZINC AND POLYMYXIN B SULFATE: at 21:54

## 2019-08-03 RX ADMIN — INSULIN GLARGINE 20 UNITS: 100 INJECTION, SOLUTION SUBCUTANEOUS at 21:55

## 2019-08-03 RX ADMIN — NYSTATIN: 100000 POWDER TOPICAL at 21:54

## 2019-08-03 RX ADMIN — BENAZEPRIL HYDROCHLORIDE 30 MG: 10 TABLET, COATED ORAL at 08:37

## 2019-08-03 RX ADMIN — OXYCODONE HYDROCHLORIDE 5 MG: 5 TABLET ORAL at 21:59

## 2019-08-03 RX ADMIN — TRAZODONE HYDROCHLORIDE 50 MG: 50 TABLET ORAL at 21:59

## 2019-08-03 RX ADMIN — LEVOTHYROXINE SODIUM 125 MCG: 125 TABLET ORAL at 05:55

## 2019-08-03 RX ADMIN — ENOXAPARIN SODIUM 40 MG: 100 INJECTION SUBCUTANEOUS at 08:37

## 2019-08-03 RX ADMIN — ENOXAPARIN SODIUM 40 MG: 100 INJECTION SUBCUTANEOUS at 21:54

## 2019-08-03 RX ADMIN — VITAMIN D, TAB 1000IU (100/BT) 2000 UNITS: 25 TAB at 08:37

## 2019-08-03 RX ADMIN — OXYCODONE HYDROCHLORIDE 5 MG: 5 TABLET ORAL at 14:02

## 2019-08-03 RX ADMIN — NYSTATIN: 100000 POWDER TOPICAL at 08:41

## 2019-08-03 RX ADMIN — LEVOTHYROXINE SODIUM 75 MCG: 75 TABLET ORAL at 05:55

## 2019-08-03 RX ADMIN — OXYCODONE HYDROCHLORIDE 5 MG: 5 TABLET ORAL at 07:14

## 2019-08-03 RX ADMIN — OXYCODONE HYDROCHLORIDE 5 MG: 5 TABLET ORAL at 17:26

## 2019-08-03 RX ADMIN — BACITRACIN ZINC AND POLYMYXIN B SULFATE: at 14:05

## 2019-08-03 RX ADMIN — METFORMIN HYDROCHLORIDE 1000 MG: 500 TABLET, FILM COATED ORAL at 08:37

## 2019-08-03 RX ADMIN — METFORMIN HYDROCHLORIDE 1000 MG: 500 TABLET, FILM COATED ORAL at 17:26

## 2019-08-03 RX ADMIN — CARVEDILOL 6.25 MG: 3.12 TABLET, FILM COATED ORAL at 17:26

## 2019-08-03 RX ADMIN — INSULIN GLARGINE 20 UNITS: 100 INJECTION, SOLUTION SUBCUTANEOUS at 07:15

## 2019-08-03 ASSESSMENT — ENCOUNTER SYMPTOMS
NAUSEA: 0
SHORTNESS OF BREATH: 0
HEADACHES: 0
DIZZINESS: 0
FEVER: 0
VOMITING: 0
BLURRED VISION: 0
HALLUCINATIONS: 0
PALPITATIONS: 0

## 2019-08-03 NOTE — PROGRESS NOTES
Hospital Medicine Daily Progress Note      Chief Complaint:  Hypertension  Diabetes    Interval History:  No significant events or changes since last visit    Review of Systems  Review of Systems   Constitutional: Negative for fever.   Eyes: Negative for blurred vision.   Respiratory: Negative for shortness of breath.    Cardiovascular: Negative for palpitations.   Gastrointestinal: Negative for nausea and vomiting.   Neurological: Negative for dizziness and headaches.   Psychiatric/Behavioral: Negative for hallucinations.        Physical Exam  Temp:  [36.4 °C (97.5 °F)-36.8 °C (98.3 °F)] 36.4 °C (97.5 °F)  Pulse:  [] 89  Resp:  [18-20] 19  BP: (130-132)/(52-59) 130/59  SpO2:  [90 %-95 %] 95 %    Physical Exam   Constitutional: She is oriented to person, place, and time.   HENT:   Mouth/Throat: Oropharynx is clear and moist.   Eyes: No scleral icterus.   Cardiovascular: Normal rate, regular rhythm, S1 normal and S2 normal.   Pulmonary/Chest: Effort normal. No stridor. She has no wheezes. She has no rales.   Abdominal: Soft. Bowel sounds are normal.   Neurological: She is alert and oriented to person, place, and time. No sensory deficit.   Skin: Skin is warm and dry. She is not diaphoretic. No cyanosis.   Psychiatric: She has a normal mood and affect. Her behavior is normal.   Nursing note and vitals reviewed.      Fluids    Intake/Output Summary (Last 24 hours) at 8/3/2019 0950  Last data filed at 8/3/2019 0911  Gross per 24 hour   Intake 240 ml   Output --   Net 240 ml       Laboratory  Recent Labs     08/01/19  0527   WBC 6.4   RBC 3.30*   HEMOGLOBIN 10.2*   HEMATOCRIT 32.4*   MCV 98.2*   MCH 30.9   MCHC 31.5*   RDW 55.2*   PLATELETCT 274   MPV 10.7     Recent Labs     08/01/19  0527   SODIUM 136   POTASSIUM 4.0   CHLORIDE 106   CO2 22   GLUCOSE 128*   BUN 22   CREATININE 0.79   CALCIUM 8.6                       Assessment/Plan  * Subarachnoid hemorrhage following injury, with loss of consciousness (HCC)-  (present on admission)  Assessment & Plan  Non-surgical management  Completed Keppra x 7 days for SZ proph    Orthostatic hypotension  Assessment & Plan  Pt gets dizzy on standing up  Dizziness better recently (8/3)  Orthostatics (): neg  Orthostatics (8/3): pos (sit --> stand was  --> 115)  Will start Midodrine 2.5 mg bid ()    Thrombocytopenia (HCC)  Assessment & Plan  Currently resolved  Platelets: 285 () --> 153 () --> 274 ()  ? Lab error    Vitamin D deficiency  Assessment & Plan  Vit D: 13  On supplements    Laceration of left forearm- (present on admission)  Assessment & Plan  S/P repair  Wound care    Abnormal CT of the abdomen- (present on admission)  Assessment & Plan  Incidental finding of left kidney lesion on Trauma work-up  Needs F/U imaging    Essential hypertension- (present on admission)  Assessment & Plan  BP ok  On Core.25 mg bid  On Lotensin: 30 mg daily  Monitor    Closed fracture of multiple ribs with flail chest- (present on admission)  Assessment & Plan  S/P Left 3rd-7th rib fractures with resolved PTX  Aggressive pulmonary toilet  Encouraging incentive spirometry    Hypothyroid- (present on admission)  Assessment & Plan  On Synthroid    DM (diabetes mellitus) (HCC)- (present on admission)  Assessment & Plan  Hba1c: 7.4 ()  BS better recently: 115-132  On Lantus: 32 units bid --> 25 units bid () --> 20 units bid ( evening)  On Metformin: 1000 mg XL bid (above max dose) --> 1000 mg bid ()  Cont to monitor    Fracture of left clavicle- (present on admission)  Assessment & Plan  S/P ORIF on 19 by Dr. Gauthier    Multiple fractures of cervical spine (HCC)- (present on admission)  Assessment & Plan  Non-surgical management  Cervical collar x 2 wks followed by F/U imaging

## 2019-08-03 NOTE — CARE PLAN
Problem: Safety  Goal: Will remain free from injury  Outcome: PROGRESSING AS EXPECTED  Note:   Patient use call light consistently for assistance. Remains free from injury.      Problem: Bowel/Gastric:  Goal: Normal bowel function is maintained or improved  Outcome: PROGRESSING AS EXPECTED  Note:   Patient refused bowel med this am. Denied s/s of constipation. Last BM was on 8/2. Will continue to monitor.

## 2019-08-03 NOTE — CARE PLAN
Problem: Safety  Goal: Will remain free from injury  Outcome: PROGRESSING AS EXPECTED  Note:   Call light with in reach. Redirection to use call light for assistance. Non skid socks. Upper siderails up x2 while in bed.      Problem: GLYCEMIA IMBALANCE  Intervention: MONITOR BLOOD GLUCOSE LEVELS AS ORDERED  Note:   HS , insulin given as ordered. HS snacks given. No complains of pain at this time. Complains of unable to sleep. As needed medication given with good effect. Able to make needs known. Assisted as needed. Refused left arm sling on, redirection with no good effect. No respiratory distress. Will continue to monitor.

## 2019-08-03 NOTE — PROGRESS NOTES
Received shift report and assumed care of patient.  Patient awake, calm and stable, currently positioned in bed for comfort and safety; call light within reach.  C/o left arm soreness. Medicated her with oxycodone for pain.  Will continue to monitor.

## 2019-08-03 NOTE — PROGRESS NOTES
Patient complains of not feeling good at this time. Feeling warm. No N/V. V/S with in normal limits. Cooler on, removed 1 blanket and cold wash cloth given to FH as per patient's request. Re-check FS as per patient's request with 132 results. Patient verbalizes some relief. Will monitor.

## 2019-08-04 LAB
GLUCOSE BLD-MCNC: 111 MG/DL (ref 65–99)
GLUCOSE BLD-MCNC: 114 MG/DL (ref 65–99)
GLUCOSE BLD-MCNC: 155 MG/DL (ref 65–99)
GLUCOSE BLD-MCNC: 155 MG/DL (ref 65–99)

## 2019-08-04 PROCEDURE — A9270 NON-COVERED ITEM OR SERVICE: HCPCS | Performed by: HOSPITALIST

## 2019-08-04 PROCEDURE — A9270 NON-COVERED ITEM OR SERVICE: HCPCS | Performed by: PHYSICAL MEDICINE & REHABILITATION

## 2019-08-04 PROCEDURE — 700111 HCHG RX REV CODE 636 W/ 250 OVERRIDE (IP): Performed by: PHYSICAL MEDICINE & REHABILITATION

## 2019-08-04 PROCEDURE — 700102 HCHG RX REV CODE 250 W/ 637 OVERRIDE(OP): Performed by: HOSPITALIST

## 2019-08-04 PROCEDURE — 770010 HCHG ROOM/CARE - REHAB SEMI PRIVAT*

## 2019-08-04 PROCEDURE — 99232 SBSQ HOSP IP/OBS MODERATE 35: CPT | Performed by: HOSPITALIST

## 2019-08-04 PROCEDURE — 700102 HCHG RX REV CODE 250 W/ 637 OVERRIDE(OP): Performed by: PHYSICAL MEDICINE & REHABILITATION

## 2019-08-04 PROCEDURE — 97110 THERAPEUTIC EXERCISES: CPT

## 2019-08-04 PROCEDURE — 97530 THERAPEUTIC ACTIVITIES: CPT

## 2019-08-04 PROCEDURE — 82962 GLUCOSE BLOOD TEST: CPT | Mod: 91

## 2019-08-04 PROCEDURE — 97535 SELF CARE MNGMENT TRAINING: CPT

## 2019-08-04 RX ORDER — BENAZEPRIL HYDROCHLORIDE 10 MG/1
10 TABLET ORAL
Status: DISCONTINUED | OUTPATIENT
Start: 2019-08-05 | End: 2019-08-06

## 2019-08-04 RX ORDER — CARVEDILOL 12.5 MG/1
12.5 TABLET ORAL 2 TIMES DAILY WITH MEALS
Status: DISCONTINUED | OUTPATIENT
Start: 2019-08-05 | End: 2019-08-08 | Stop reason: HOSPADM

## 2019-08-04 RX ADMIN — VITAMIN D, TAB 1000IU (100/BT) 2000 UNITS: 25 TAB at 08:24

## 2019-08-04 RX ADMIN — METFORMIN HYDROCHLORIDE 1000 MG: 500 TABLET, FILM COATED ORAL at 08:24

## 2019-08-04 RX ADMIN — INSULIN GLARGINE 20 UNITS: 100 INJECTION, SOLUTION SUBCUTANEOUS at 08:28

## 2019-08-04 RX ADMIN — LEVOTHYROXINE SODIUM 125 MCG: 125 TABLET ORAL at 06:03

## 2019-08-04 RX ADMIN — LEVOTHYROXINE SODIUM 75 MCG: 75 TABLET ORAL at 06:03

## 2019-08-04 RX ADMIN — CARVEDILOL 6.25 MG: 3.12 TABLET, FILM COATED ORAL at 08:25

## 2019-08-04 RX ADMIN — METFORMIN HYDROCHLORIDE 1000 MG: 500 TABLET, FILM COATED ORAL at 17:27

## 2019-08-04 RX ADMIN — NYSTATIN: 100000 POWDER TOPICAL at 11:32

## 2019-08-04 RX ADMIN — OXYCODONE HYDROCHLORIDE 5 MG: 5 TABLET ORAL at 13:34

## 2019-08-04 RX ADMIN — ENOXAPARIN SODIUM 40 MG: 100 INJECTION SUBCUTANEOUS at 20:19

## 2019-08-04 RX ADMIN — SENNOSIDES, DOCUSATE SODIUM 1 TABLET: 50; 8.6 TABLET, FILM COATED ORAL at 08:25

## 2019-08-04 RX ADMIN — MIDODRINE HYDROCHLORIDE 2.5 MG: 2.5 TABLET ORAL at 06:02

## 2019-08-04 RX ADMIN — BACITRACIN ZINC AND POLYMYXIN B SULFATE: at 10:00

## 2019-08-04 RX ADMIN — TRAZODONE HYDROCHLORIDE 50 MG: 50 TABLET ORAL at 20:19

## 2019-08-04 RX ADMIN — NYSTATIN: 100000 POWDER TOPICAL at 20:19

## 2019-08-04 RX ADMIN — BENAZEPRIL HYDROCHLORIDE 30 MG: 10 TABLET, COATED ORAL at 08:24

## 2019-08-04 RX ADMIN — ENOXAPARIN SODIUM 40 MG: 100 INJECTION SUBCUTANEOUS at 08:24

## 2019-08-04 RX ADMIN — INSULIN GLARGINE 20 UNITS: 100 INJECTION, SOLUTION SUBCUTANEOUS at 20:20

## 2019-08-04 RX ADMIN — MIDODRINE HYDROCHLORIDE 2.5 MG: 2.5 TABLET ORAL at 13:13

## 2019-08-04 RX ADMIN — OXYCODONE HYDROCHLORIDE 5 MG: 5 TABLET ORAL at 05:03

## 2019-08-04 ASSESSMENT — ENCOUNTER SYMPTOMS
NERVOUS/ANXIOUS: 0
FEVER: 0
DIARRHEA: 0
BLURRED VISION: 0
COUGH: 0
DIZZINESS: 0

## 2019-08-04 NOTE — THERAPY
Physical Therapy   Daily Treatment     Patient Name: Maira Dodd  Age:  73 y.o., Sex:  female  Medical Record #: [id number]  Today's Date: 8/4/2019     Precautions  Precautions: Spinal / Back Precautions , Cervical Collar  , Fall Risk, Other (See Comments)  Comments: orthostatic-monitor BP, multiple rib fxs, cervical fx, L UE wounds    Subjective    Pt in room eating breakfast upon arrival. Reporting fear and becoming tearful regarding need to get into vehicle to be transported home.      Objective       08/04/19 0831   Precautions   Precautions Spinal / Back Precautions ;Cervical Collar  ;Fall Risk;Other (See Comments)   Comments orthostatic-monitor BP, multiple rib fxs, cervical fx, L UE wounds   Sitting Lower Body Exercises   Nustep Resistance Level 2  (10 min w BLEs and RUE, 0.15 mi)   Bed Mobility    Supine to Sit Stand by Assist   Sit to Supine Stand by Assist   Sit to Stand Stand by Assist   Scooting Stand by Assist   Interdisciplinary Plan of Care Collaboration   IDT Collaboration with  Nursing;Occupational Therapist   Collaboration Comments Nursing finishing administering meds on arrival, OT re: pt request for shower, c-collar irritation on collarbone   PT Total Time Spent   PT Individual Total Time Spent (Mins) 60   PT Charge Group   PT Therapeutic Exercise 1   PT Therapeutic Activities 3       Bed <> WC, SBA with use of bedrail for supine <> sit, no AD with stand step transfer.    Ambulation 3x125', 1x120', HHA with seated rest break.     Discussed POC, plan for graded exposure to vehicle over course of week, provided emotional support and validated feelings re: vehicle transport.      Assessment    Pt with significant fear and anxiety regarding car transfer and need for vehicle transport home at discharge. Continues to be limited by symptoms of dizziness with standing and deconditioning.    Plan    Graded exposure to vehicles to prepare for transport at discharge, progress ambulation tolerance,  assess stairs/curb, standing tolerance/balance, LE strength and endurance, standardized balance testing

## 2019-08-04 NOTE — THERAPY
"Occupational Therapy  Daily Treatment     Patient Name: Miara Dodd  Age:  73 y.o., Sex:  female  Medical Record #: [id number]  Today's Date: 8/4/2019     Precautions  Precautions: (P) Spinal / Back Precautions , Cervical Collar  , Fall Risk, Other (See Comments)  Comments: (P) orthostatic-monitor BP, multiple rib fxs, cervical fx, L UE wounds    Safety   ADL Safety : (P) Requires Supervision for Safety, Requires Physical Assist for Safety  Bathroom Safety: (P) Requires Supervision for Safety, Requires Physical Assist for Safety  Comments: (P) See FIMs     Subjective    \"I could really use a shower.\"      Objective       08/04/19 1031   Precautions   Precautions Spinal / Back Precautions ;Cervical Collar  ;Fall Risk;Other (See Comments)   Comments orthostatic-monitor BP, multiple rib fxs, cervical fx, L UE wounds   Safety    ADL Safety  Requires Supervision for Safety;Requires Physical Assist for Safety   Bathroom Safety Requires Supervision for Safety;Requires Physical Assist for Safety   Comments See FIMs    Pain 0 - 10 Group   Pain Rating Scale (NPRS) 4   Therapist Pain Assessment Post Activity Pain Same as Prior to Activity;3  (L Elbow)   Non Verbal Descriptors   Non Verbal Scale  Calm   Cognition    Level of Consciousness Alert   Bed Mobility    Supine to Sit Stand by Assist   Skilled Intervention Postural Facilitation;Tactile Cuing;Verbal Cuing   Comments SBA secondary to familiarity wih pt.    Interdisciplinary Plan of Care Collaboration   IDT Collaboration with  Nursing;Physical Therapist   Patient Position at End of Therapy Seated   Collaboration Comments Transfer of care to nursing; shower requested   OT Total Time Spent   OT Individual Total Time Spent (Mins) 60   OT Charge Group   Charges Yes   OT Self Care / ADL 4       FIM Eating Score:     Eating Description:       FIM Grooming Score:  4 - Minimal Assistance  Grooming Description:  (Min A with posterior hair care to attempt to detangle " matted area of hair)    FIM Bathing Score:  4 - Minimal Assistance  Bathing Description:       FIM Upper Body Dressing:  3 - Moderate Assistance  Upper Body Dressing Description:  Supervision for safety, Verbal cueing, Increased time(Assist with LUE )    FIM Lower Body Dressing Score:  4 - Minimal Assistance  Lower Body Dressing Description:  Sock aid, Increased time, Supervision for safety, Initial preparation for task(Assist donning pants/underwear over left hip)    FIM Toileting Body Dressin - Standby Prompting/Supervision or Set-up  Toileting Description:  Grab bar, Increased time, Supervision for safety(Pt doffed pants/brief from toilet in prep for shower)    FIM Bed/Chair/Wheelchair Transfers Score: 5 - Standby Prompting/Supervision or Set-up  Bed/Chair/Wheelchair Transfers Description:  Increased time, Supervision for safety, Set-up of equipment(Close SBA )    FIM Toilet Transfer Score:  5 - Standby Prompting/Supervision or Set-up  Toilet Transfer Description:  Supervision for safety, Grab bar, Increased time(SBA secondary to pt/therapist first time working together. Good safety/balance demo throughout)    FIM Tub/Shower Transfers Score:  5 - Standby Prompting/Supervision or Set-up  Tub/Shower Transfers Description:  Increased time, Grab bar, Supervision for safety(SBA secondary to shower environment)      Assessment    Pt pleasant and cooperative with self-care activities with good safety awareness and precaution adherence modeled throughout tx.  Pt required intermittent physical assist for tasks typically requiring bimanual assist including hygiene to harder to reach areas and LUE management during UB dressing.     Plan    Continue ADL training, IADL related mobility ,strength/endurance building incorporating C spine and NWB Left UE precautions

## 2019-08-04 NOTE — PROGRESS NOTES
Hospital Medicine Daily Progress Note      Chief Complaint:  Hypertension  Diabetes    Interval History:  No significant events or changes since last visit    Review of Systems  Review of Systems   Constitutional: Negative for fever.   Eyes: Negative for blurred vision.   Respiratory: Negative for cough.    Cardiovascular: Negative for chest pain.   Gastrointestinal: Negative for diarrhea.   Musculoskeletal: Negative for joint pain.   Neurological: Negative for dizziness.   Psychiatric/Behavioral: The patient is not nervous/anxious.         Physical Exam  Temp:  [36.9 °C (98.4 °F)] 36.9 °C (98.4 °F)  Pulse:  [] 92  Resp:  [17-18] 18  BP: (112-144)/(53-68) 112/54  SpO2:  [93 %-94 %] 94 %    Physical Exam   Constitutional: She is oriented to person, place, and time. No distress.   HENT:   Mouth/Throat: No oropharyngeal exudate.   Eyes: EOM are normal.   Neck: No JVD present. No tracheal deviation present.   Cardiovascular: Normal rate, regular rhythm, S1 normal and S2 normal.   Pulmonary/Chest: Effort normal. She has no wheezes. She has no rales.   Abdominal: Soft. She exhibits no distension. There is no tenderness.   Neurological: She is alert and oriented to person, place, and time. No sensory deficit.   Skin: Skin is warm and dry. No cyanosis.   Psychiatric: She has a normal mood and affect. Her behavior is normal.   Nursing note and vitals reviewed.      Fluids    Intake/Output Summary (Last 24 hours) at 8/4/2019 0927  Last data filed at 8/3/2019 2320  Gross per 24 hour   Intake 120 ml   Output 650 ml   Net -530 ml       Laboratory                            Assessment/Plan  * Subarachnoid hemorrhage following injury, with loss of consciousness (HCC)- (present on admission)  Assessment & Plan  Non-surgical management  Completed Keppra x 7 days for SZ proph    Orthostatic hypotension  Assessment & Plan  Pt gets dizzy on standing up  Dizziness better recently (8/3)  Orthostatics (8/1): neg  Orthostatics (8/3):  pos (sit  --> stand )  On Midodrine 2.5 mg bid ()    Thrombocytopenia (HCC)  Assessment & Plan  Currently resolved  Platelets: 285 () --> 153 () --> 274 ()  ? Lab error    Vitamin D deficiency  Assessment & Plan  Vit D: 13  On supplements    Laceration of left forearm- (present on admission)  Assessment & Plan  S/P repair  Wound care    Abnormal CT of the abdomen- (present on admission)  Assessment & Plan  Incidental finding of left kidney lesion on Trauma work-up  Needs F/U imaging    Essential hypertension- (present on admission)  Assessment & Plan  BP ok  HR   On Core.25 mg bid --> will increase to 12.5 mg bid ()  On Lotensin: 30 mg daily --> will decrease to 10 mg daily ()  Monitor    Closed fracture of multiple ribs with flail chest- (present on admission)  Assessment & Plan  S/P Left 3rd-7th rib fractures with resolved PTX  Aggressive pulmonary toilet  Encouraging incentive spirometry    Hypothyroid- (present on admission)  Assessment & Plan  On Synthroid    DM (diabetes mellitus) ([facility])- (present on admission)  Assessment & Plan  Hba1c: 7.4 ()  BS better recently: 111-161  On Lantus: 32 units bid --> 25 units bid () --> 20 units bid ( evening)  On Metformin: 1000 mg XL bid (above max dose) --> 1000 mg bid ()  Cont to monitor    Fracture of left clavicle- (present on admission)  Assessment & Plan  S/P ORIF on 19 by Dr. Gauthier    Multiple fractures of cervical spine ([facility])- (present on admission)  Assessment & Plan  Non-surgical management  Cervical collar x 2 wks followed by F/U imaging

## 2019-08-04 NOTE — CARE PLAN
Problem: Safety  Goal: Will remain free from injury  8/4/2019 0739 by Kendal Oliveira R.N.  Outcome: PROGRESSING AS EXPECTED  Note:   Call light with in reach. Redirection to use call light for assistance. Non skid socks. Upper siderails up x2 while in bed. Educate patient of non-pharmacological comfort measures: repositioning, relaxation/breathing technique, cold compress and activities. Panus wound care given, CN notified, for wound consult per protocol order. HS snacks given. Will continue to monitor.  8/4/2019 0732 by Kendal Oliveira R.N.  Outcome: PROGRESSING AS EXPECTED  Note:   Call light with in reach. Redirection to use call light for assistance. Non skid socks. Upper siderails up x2 while in bed.

## 2019-08-05 LAB
GLUCOSE BLD-MCNC: 124 MG/DL (ref 65–99)
GLUCOSE BLD-MCNC: 126 MG/DL (ref 65–99)
GLUCOSE BLD-MCNC: 148 MG/DL (ref 65–99)
GLUCOSE BLD-MCNC: 158 MG/DL (ref 65–99)

## 2019-08-05 PROCEDURE — 770010 HCHG ROOM/CARE - REHAB SEMI PRIVAT*

## 2019-08-05 PROCEDURE — 99232 SBSQ HOSP IP/OBS MODERATE 35: CPT | Performed by: HOSPITALIST

## 2019-08-05 PROCEDURE — 99233 SBSQ HOSP IP/OBS HIGH 50: CPT | Performed by: PHYSICAL MEDICINE & REHABILITATION

## 2019-08-05 PROCEDURE — A9270 NON-COVERED ITEM OR SERVICE: HCPCS | Performed by: HOSPITALIST

## 2019-08-05 PROCEDURE — 97110 THERAPEUTIC EXERCISES: CPT

## 2019-08-05 PROCEDURE — 700102 HCHG RX REV CODE 250 W/ 637 OVERRIDE(OP): Performed by: HOSPITALIST

## 2019-08-05 PROCEDURE — 700102 HCHG RX REV CODE 250 W/ 637 OVERRIDE(OP): Performed by: PHYSICAL MEDICINE & REHABILITATION

## 2019-08-05 PROCEDURE — 82962 GLUCOSE BLOOD TEST: CPT | Mod: 91

## 2019-08-05 PROCEDURE — 700111 HCHG RX REV CODE 636 W/ 250 OVERRIDE (IP): Performed by: PHYSICAL MEDICINE & REHABILITATION

## 2019-08-05 PROCEDURE — A9270 NON-COVERED ITEM OR SERVICE: HCPCS | Performed by: PHYSICAL MEDICINE & REHABILITATION

## 2019-08-05 PROCEDURE — 97535 SELF CARE MNGMENT TRAINING: CPT

## 2019-08-05 PROCEDURE — 97530 THERAPEUTIC ACTIVITIES: CPT

## 2019-08-05 RX ADMIN — INSULIN GLARGINE 20 UNITS: 100 INJECTION, SOLUTION SUBCUTANEOUS at 20:14

## 2019-08-05 RX ADMIN — LEVOTHYROXINE SODIUM 75 MCG: 75 TABLET ORAL at 05:07

## 2019-08-05 RX ADMIN — NYSTATIN: 100000 POWDER TOPICAL at 20:13

## 2019-08-05 RX ADMIN — OXYCODONE HYDROCHLORIDE 5 MG: 5 TABLET ORAL at 13:53

## 2019-08-05 RX ADMIN — VITAMIN D, TAB 1000IU (100/BT) 2000 UNITS: 25 TAB at 09:21

## 2019-08-05 RX ADMIN — CARVEDILOL 12.5 MG: 12.5 TABLET, FILM COATED ORAL at 17:20

## 2019-08-05 RX ADMIN — LEVOTHYROXINE SODIUM 125 MCG: 125 TABLET ORAL at 05:06

## 2019-08-05 RX ADMIN — BENAZEPRIL HYDROCHLORIDE 10 MG: 10 TABLET, COATED ORAL at 05:06

## 2019-08-05 RX ADMIN — MIDODRINE HYDROCHLORIDE 2.5 MG: 2.5 TABLET ORAL at 06:04

## 2019-08-05 RX ADMIN — INSULIN GLARGINE 20 UNITS: 100 INJECTION, SOLUTION SUBCUTANEOUS at 07:33

## 2019-08-05 RX ADMIN — METFORMIN HYDROCHLORIDE 1000 MG: 500 TABLET, FILM COATED ORAL at 17:20

## 2019-08-05 RX ADMIN — OXYCODONE HYDROCHLORIDE 5 MG: 5 TABLET ORAL at 02:28

## 2019-08-05 RX ADMIN — SENNOSIDES, DOCUSATE SODIUM 2 TABLET: 50; 8.6 TABLET, FILM COATED ORAL at 20:11

## 2019-08-05 RX ADMIN — METFORMIN HYDROCHLORIDE 1000 MG: 500 TABLET, FILM COATED ORAL at 09:21

## 2019-08-05 RX ADMIN — NYSTATIN: 100000 POWDER TOPICAL at 09:22

## 2019-08-05 RX ADMIN — ENOXAPARIN SODIUM 40 MG: 100 INJECTION SUBCUTANEOUS at 09:08

## 2019-08-05 RX ADMIN — CARVEDILOL 12.5 MG: 12.5 TABLET, FILM COATED ORAL at 09:21

## 2019-08-05 RX ADMIN — MIDODRINE HYDROCHLORIDE 2.5 MG: 2.5 TABLET ORAL at 14:44

## 2019-08-05 ASSESSMENT — ENCOUNTER SYMPTOMS
VOMITING: 0
DIARRHEA: 0
ABDOMINAL PAIN: 0
SHORTNESS OF BREATH: 0
NERVOUS/ANXIOUS: 0
CHILLS: 0
FEVER: 0
NAUSEA: 0

## 2019-08-05 NOTE — THERAPY
Physical Therapy   Daily Treatment     Patient Name: Maria Dodd  Age:  73 y.o., Sex:  female  Medical Record #: [id number]  Today's Date: 8/5/2019     Precautions  Precautions: (P) Cervical Collar  , Spinal / Back Precautions , Fall Risk  Comments: orthostatic-monitor BP, multiple rib fxs, cervical fx, L UE wounds    Subjective    Pt resting in bed, done with breakfast and willing to participate     Objective       08/05/19 1031   Precautions   Precautions Cervical Collar  ;Spinal / Back Precautions ;Fall Risk   Sitting Lower Body Exercises   Nustep Resistance Level 2  (10 minutes for RUE / BLE endurance to cmoplete .19mi)   Bed Mobility    Supine to Sit Supervised   Sit to Stand Supervised   PT Total Time Spent   PT Individual Total Time Spent (Mins) 30   PT Charge Group   PT Therapeutic Exercise 1   PT Therapeutic Activities 1     Pt ambulates from bed<>bathroom with SBA/ HHA for confidence. Pt able to maintain standing for hand hygeine/ grooming after toileting task.      Assessment    Pt tolerance basic mobility and endurance training as noted, no c/o dizziness throughout upright activity    Plan    Graded exposure to vehicles to prepare for transport at discharge, progress ambulation tolerance, assess stairs/curb, standing tolerance/balance, LE strength and endurance, standardized balance testing

## 2019-08-05 NOTE — THERAPY
"Occupational Therapy  Daily Treatment     Patient Name: Maira Dodd  Age:  73 y.o., Sex:  female  Medical Record #: [id number]  Today's Date: 8/5/2019     Precautions  Precautions: Cervical Collar  , Spinal / Back Precautions , Fall Risk  Comments: orthostatic-monitor BP, multiple rib fxs, cervical fx, L UE wounds    Safety   ADL Safety : Requires Supervision for Safety, Requires Physical Assist for Safety  Bathroom Safety: Requires Supervision for Safety, Requires Physical Assist for Safety  Comments: See FIMs     Subjective    \" She said she know someone who will  Get my bathroom ready for me. Putting in the grab bars and everything.\" referring to CM      Objective       08/05/19 1231   Precautions   Precautions Cervical Collar  ;Spinal / Back Precautions ;Fall Risk   Supine Upper Body Exercises   Other Exercise PROM shoulder abdcution to approximately  60 degrees . note that due to body habitus  when supine and arm at side it rests at apposimately 25 to 30 degrees of abduction.   PROM and active assited ROM  shoulder flexion x 5 reps x 2    Sitting Upper Body Exercises   Upper Extremity Bike Level 1 Resistance  (x 2.5 minutes )   Interdisciplinary Plan of Care Collaboration   Patient Position at End of Therapy In Bed;Call Light within Reach;Tray Table within Reach   OT Total Time Spent   OT Individual Total Time Spent (Mins) 60   OT Charge Group   OT Self Care / ADL 2   OT Therapy Activity 1   OT Therapeutic Exercise  1       FIM Toileting:  3 - Moderate Assistance  Toileting Description:  (requires assist with wiping due to non weight bearing left UE (  patient previously stabilzied with left UE while wiping with the right )    FIM Bed/Chair/Wheelchair Transfers Score: 5 - Standby Prompting/Supervision or Set-up  Bed/Chair/Wheelchair Transfers Description:  (w/c <->  bed and   w/c <-> supine on mat )    FIM Toilet Transfer Score:  5 - Standby Prompting/Supervision or Set-up  Toilet Transfer " Description:        Dry transfer  In /out of tub shower combo to and from tub bench  Supervision     Assessment     Reported increased left UE pain when performing UE bike this session     Plan    ADL  IADL , related mobility  UE ther ex and activity   PROM  AAROM left UE to improve functional use   Practice transfer in / out of tub shower  Using grab bar and shower seat. This is patients current set up at home

## 2019-08-05 NOTE — PROGRESS NOTES
"Rehab Progress Note     Encounter Date: 8/5/2019    CC: TBI, left arm wound, and left clavicular fracture    Interval Events (Subjective)  Patient sitting up in room. She reports she is doing well, she reports she did not get much therapy this weekend but that was fine. She reports she was able to walk a long distance today.  Patient with orthostatic hypotension so was started on Midodrine by hospitalist. Also having hypertension when in bed.     IDT Team Meeting 8/1/2019  DC/Disposition:  8/9/19    Objective:  VITAL SIGNS: /57   Pulse 95   Temp 36.7 °C (98.1 °F) (Temporal)   Resp 18   Ht 1.702 m (5' 7\")   Wt (!) 136 kg (299 lb 14.4 oz)   SpO2 92%   BMI 46.97 kg/m²   Gen: NAD  Psych: Mood and affect appropriate  CV: RRR, no edema  Resp: CTAB, no upper airway sounds  Abd: NTND  Neuro: AOx4, 5/5 BUE    Recent Results (from the past 72 hour(s))   ACCU-CHEK GLUCOSE    Collection Time: 08/02/19  5:11 PM   Result Value Ref Range    Glucose - Accu-Ck 195 (H) 65 - 99 mg/dL   ACCU-CHEK GLUCOSE    Collection Time: 08/02/19  8:26 PM   Result Value Ref Range    Glucose - Accu-Ck 174 (H) 65 - 99 mg/dL   ACCU-CHEK GLUCOSE    Collection Time: 08/02/19 11:54 PM   Result Value Ref Range    Glucose - Accu-Ck 132 (H) 65 - 99 mg/dL   ACCU-CHEK GLUCOSE    Collection Time: 08/03/19  7:11 AM   Result Value Ref Range    Glucose - Accu-Ck 115 (H) 65 - 99 mg/dL   ACCU-CHEK GLUCOSE    Collection Time: 08/03/19 11:12 AM   Result Value Ref Range    Glucose - Accu-Ck 142 (H) 65 - 99 mg/dL   ACCU-CHEK GLUCOSE    Collection Time: 08/03/19  5:25 PM   Result Value Ref Range    Glucose - Accu-Ck 134 (H) 65 - 99 mg/dL   ACCU-CHEK GLUCOSE    Collection Time: 08/03/19  9:51 PM   Result Value Ref Range    Glucose - Accu-Ck 161 (H) 65 - 99 mg/dL   ACCU-CHEK GLUCOSE    Collection Time: 08/04/19  7:25 AM   Result Value Ref Range    Glucose - Accu-Ck 111 (H) 65 - 99 mg/dL   ACCU-CHEK GLUCOSE    Collection Time: 08/04/19 11:38 AM   Result Value " Ref Range    Glucose - Accu-Ck 155 (H) 65 - 99 mg/dL   ACCU-CHEK GLUCOSE    Collection Time: 08/04/19  5:26 PM   Result Value Ref Range    Glucose - Accu-Ck 114 (H) 65 - 99 mg/dL   ACCU-CHEK GLUCOSE    Collection Time: 08/04/19  8:04 PM   Result Value Ref Range    Glucose - Accu-Ck 155 (H) 65 - 99 mg/dL   ACCU-CHEK GLUCOSE    Collection Time: 08/05/19  7:32 AM   Result Value Ref Range    Glucose - Accu-Ck 124 (H) 65 - 99 mg/dL   ACCU-CHEK GLUCOSE    Collection Time: 08/05/19 10:52 AM   Result Value Ref Range    Glucose - Accu-Ck 158 (H) 65 - 99 mg/dL       Current Facility-Administered Medications   Medication Frequency   • carvedilol (COREG) tablet 12.5 mg BID WITH MEALS   • benazepril (LOTENSIN) tablet 10 mg Q DAY   • midodrine (PROAMATINE) tablet 2.5 mg BID   • insulin glargine (LANTUS) injection 20 Units BID   • metFORMIN (GLUCOPHAGE) tablet 1,000 mg BID WITH MEALS   • insulin regular (HUMULIN R) injection 2-12 Units 4X/DAY ACHS    And   • glucose 4 g chewable tablet 16 g Q15 MIN PRN    And   • dextrose 50% (D50W) injection 50 mL Q15 MIN PRN   • vitamin D (cholecalciferol) tablet 2,000 Units DAILY   • Respiratory Care per Protocol Continuous RT   • oxyCODONE immediate-release (ROXICODONE) tablet 5 mg Q3HRS PRN   • oxyCODONE immediate release (ROXICODONE) tablet 10 mg Q3HRS PRN   • tramadol (ULTRAM) 50 MG tablet 50 mg Q4HRS PRN   • hydrALAZINE (APRESOLINE) tablet 25 mg Q8HRS PRN   • acetaminophen (TYLENOL) tablet 650 mg Q4HRS PRN   • senna-docusate (PERICOLACE or SENOKOT S) 8.6-50 MG per tablet 2 Tab BID    And   • polyethylene glycol/lytes (MIRALAX) PACKET 1 Packet QDAY PRN    And   • magnesium hydroxide (MILK OF MAGNESIA) suspension 30 mL QDAY PRN    And   • bisacodyl (DULCOLAX) suppository 10 mg QDAY PRN   • artificial tears 1.4 % ophthalmic solution 1 Drop PRN   • benzocaine-menthol (CEPACOL) lozenge 1 Lozenge Q2HRS PRN   • mag hydrox-al hydrox-simeth (MAALOX PLUS ES or MYLANTA DS) suspension 20 mL Q2HRS PRN    • ondansetron (ZOFRAN ODT) dispertab 4 mg 4X/DAY PRN    Or   • ondansetron (ZOFRAN) syringe/vial injection 4 mg 4X/DAY PRN   • traZODone (DESYREL) tablet 50 mg QHS PRN   • sodium chloride (OCEAN) 0.65 % nasal spray 2 Spray PRN   • enoxaparin (LOVENOX) inj 40 mg Q12HRS   • HYDROmorphone (DILAUDID) tablet 2-4 mg Q3HRS PRN   • nystatin (MYCOSTATIN) powder BID   • levothyroxine (SYNTHROID) tablet 125 mcg AM ES    And   • levothyroxine (SYNTHROID) tablet 75 mcg AM ES       Orders Placed This Encounter   Procedures   • Diet Order Diabetic     Standing Status:   Standing     Number of Occurrences:   1     Order Specific Question:   Diet:     Answer:   Diabetic [3]     Order Specific Question:   Consistency/Fluid modifications:     Answer:   Thin Liquids [3]     Comments:   straws ok       Assessment:  Active Hospital Problems    Diagnosis   • *Subarachnoid hemorrhage following injury, with loss of consciousness (HCC)   • Closed fracture of multiple ribs with flail chest   • UTI (urinary tract infection)   • DM (diabetes mellitus) (HCC)   • Fracture of left clavicle   • Morbid obesity with BMI of 45.0-49.9, adult (HCC)   • Multiple fractures of cervical spine (HCC)   • Dysphagia   • Laceration of left forearm   • Essential hypertension   • Hypothyroid   • Trauma   • Abnormal CT of the abdomen   • Vitamin D deficiency   • Renal insufficiency   • Macrocytic anemia   • Alkaline phosphatase elevation       Medical Decision Making and Plan:  TBI - Patient with left sided SAH with loss of consciousness and decreased cognition. Also limited by other orthopedic injuries  -Patient has completed Keppra for seizure prophylaxis.   -PT and OT for mobility and ADLs  -SLP for cognition      Dysphagia - Patient with oropharyngeal dysphagia. Upgraded to Dysphagia 2 with NTL prior to transfer.  SLP for swallow - Advanced diet to regular with thins. Resolved.      C/T Spine fractures - Patient with TP of C7 and T1 fractures in C collar for  unknown length of time.    -Continue C collar. Will need follow-up with NSG  -Patient's improved with different collar.      Left clavicular fracture/left arm wound - S/p ORIF with Dr. Gauthier on 7/14/19. Also with large left arms laceration  -Continue NWB LUE.  Remove staples on shoulder and forearm  -Wound consult - examined wound on 8/5/19. Discussed with Dr. Gaston who will debride tomorrow.       HTN - Patient on Benazepriil 10 mg and Coreg 6.25 mg BID  -SBP slowly increasing, per hospitalist continue to monitor as recently had orthostatic hypotension.     Orthostatic hypotension - Severe orthostatics on 7/24/19, continue to monitor. ACEi has been reduced.   -Started on Midodrine 2.5 mg BID. Difficult to control due to orthostatic hypotension and supine hypertension.    Anemia - Stable at 11.3.     DM - Patient on Lantus 30 U BID, metformin 1000 mg BID and SSI on transfer. Previously on Trulicity, Metformin  -Will check A1c 7.4. Consult hospitalist, decreased to 20 U  -Restarted on Metformin 1000 mg BID     Hypothyroidism - Patient on 200 mcg on transfer.      Vitamin D deficiency - 12 on admission. Start on 2000 U daily    Mood - Patient with fear of discharge. Continue to work with patient, psychology consulted. Consider SSRI for anxiety.   -Benefit from outpatient behavioral health referral     GI Ppx - Patient on Omeprazole while on dysphagia diet. Discontinue Prilosec as off dysphagia diet     DVT Ppx - Patient on Lovenox on transfer. Ambulating > 100 feet, discontinue Lovenox     Total time:  35 minutes.  I spent greater than 50% of the time for patient care, counseling, and coordination on this date, including unit/floor time, and face-to-face time with the patient as per interval events and assessment and plan above. Topics discussed included wound evaluation, orthostatics and discussed plan with hospitalist and discontinue Lovenox as ambulating.   Vince Matias M.D.

## 2019-08-05 NOTE — CARE PLAN
Problem: Safety  Goal: Will remain free from injury  Outcome: PROGRESSING AS EXPECTED  Note:   Call light with in reach. Redirection to use call light for assistance. Non skid socks. Upper siderails up x2 while in bed.      Problem: GLYCEMIA IMBALANCE  Intervention: MONITOR BLOOD GLUCOSE LEVELS AS ORDERED  Note:   HS snacks given. No complains of pain at this time. Complains of unable to sleep, as needed medication given with good effect. No respiratory distress. Able to make needs known. Assisted as needed. Will continue to monitor.

## 2019-08-05 NOTE — THERAPY
"Occupational Therapy  Daily Treatment     Patient Name: Maira Dodd  Age:  73 y.o., Sex:  female  Medical Record #: [id number]  Today's Date: 8/5/2019     Precautions  Precautions: Cervical Collar  , Spinal / Back Precautions , Fall Risk  Comments: orthostatic-monitor BP, multiple rib fxs, cervical fx, L UE wounds    Safety   ADL Safety : Requires Supervision for Safety, Requires Physical Assist for Safety  Bathroom Safety: Requires Supervision for Safety, Requires Physical Assist for Safety  Comments: See FIMs     Subjective    \" I will have to check with him tonight.\"     when educated regarding family verbalizing they would provide bathroom modifications           Objective       08/05/19 1401   Precautions   Precautions Cervical Collar  ;Spinal / Back Precautions ;Fall Risk   Sitting Upper Body Exercises   Chest Press 1 set of 10;Right    Front Arm Raise 1 set of 10;Right    Shoulder Press 1 set of 10;Right    Internal Shoulder Rotation 1 set of 10;Right    External Shoulder Rotation 1 set of 10;Right    Bicep Curls 1 set of 10;Right    Pronation / Supination 1 set of 10;Right    Comments using 4lb weight    Interdisciplinary Plan of Care Collaboration   IDT Collaboration with  ;Other (See Comments)   Patient Position at End of Therapy In Bed;Call Light within Reach;Tray Table within Reach   Collaboration Comments CM clarified that  patients spouse and son would be providing any needed bathroom modifications in prep for d/c  home   OT Total Time Spent   OT Individual Total Time Spent (Mins) 30   OT Charge Group   OT Therapy Activity 1   OT Therapeutic Exercise  1       FIM Walking Score:  5 - Standby Prompting/Supervision or Set-up  Walking Description:  (functional moblity no device  176 feet  room to gym )     w/c to bed transfer   Supervision   Assessment    Completed tx no complaint   Plan    ADL  IADL , related mobility  UE ther ex and activity   PROM  AAROM left UE to improve " functional use   Practice transfer in / out of tub shower  Using grab bar and shower seat. This is patients current set up at home

## 2019-08-05 NOTE — PROGRESS NOTES
Hospital Medicine Daily Progress Note      Chief Complaint:  Hypertension  Diabetes    Interval History:  No significant events or changes since last visit    Review of Systems  Review of Systems   Constitutional: Negative for chills and fever.   Respiratory: Negative for shortness of breath.    Cardiovascular: Negative for chest pain.   Gastrointestinal: Negative for abdominal pain, diarrhea, nausea and vomiting.   Psychiatric/Behavioral: The patient is not nervous/anxious.         Physical Exam  Temp:  [36.6 °C (97.8 °F)-36.7 °C (98.1 °F)] 36.6 °C (97.8 °F)  Pulse:  [86-96] 89  Resp:  [18-19] 18  BP: (105-146)/(45-74) 114/45  SpO2:  [94 %] 94 %    Physical Exam   Constitutional: She is oriented to person, place, and time. She appears well-nourished.   HENT:   Head: Atraumatic.   Eyes: Pupils are equal, round, and reactive to light. Conjunctivae are normal.   Neck: Normal range of motion. Neck supple.   Cardiovascular: Normal rate, regular rhythm, S1 normal and S2 normal.   No murmur heard.  Pulmonary/Chest: Effort normal. She has no wheezes. She has no rales.   Abdominal: Soft. She exhibits no distension. There is no tenderness.   Musculoskeletal: She exhibits no edema.   Neurological: She is alert and oriented to person, place, and time. No sensory deficit.   Skin: Skin is warm and dry. No rash noted. No cyanosis.   Psychiatric: She has a normal mood and affect. Her behavior is normal.   Nursing note and vitals reviewed.      Fluids    Intake/Output Summary (Last 24 hours) at 8/5/2019 0854  Last data filed at 8/5/2019 0841  Gross per 24 hour   Intake 780 ml   Output --   Net 780 ml       Laboratory                            Assessment/Plan  * Subarachnoid hemorrhage following injury, with loss of consciousness (HCC)- (present on admission)  Assessment & Plan  Non-surgical management  Completed Keppra x 7 days for SZ proph    Orthostatic hypotension  Assessment & Plan  Pt gets dizzy on standing up  Dizziness  better recently  Orthostatics (): neg  Orthostatics (8/3): pos (sit  --> stand )  On Midodrine 2.5 mg bid ()    Thrombocytopenia (HCC)  Assessment & Plan  Currently resolved  Platelets: 285 () --> 153 () --> 274 ()  ? Lab error    Vitamin D deficiency  Assessment & Plan  Vit D: 13  On supplements    Laceration of left forearm- (present on admission)  Assessment & Plan  S/P repair  Wound care    Abnormal CT of the abdomen- (present on admission)  Assessment & Plan  Incidental finding of left kidney lesion on Trauma work-up  Needs F/U imaging    Essential hypertension- (present on admission)  Assessment & Plan  On Core.25 mg bid --> 12.5 mg bid ()  On Lotensin: 30 mg daily --> 10 mg daily ()  Note: increased Coreg for mildly elevated HR  Monitor    Closed fracture of multiple ribs with flail chest- (present on admission)  Assessment & Plan  S/P Left 3rd-7th rib fractures with resolved PTX  Aggressive pulmonary toilet  Encouraging incentive spirometry    Hypothyroid- (present on admission)  Assessment & Plan  On Synthroid    DM (diabetes mellitus) (HCC)- (present on admission)  Assessment & Plan  Hba1c: 7.4 ()  BS better recently: 111-161  On Lantus: 32 units bid --> 25 units bid () --> 20 units bid ( evening)  On Metformin: 1000 mg XL bid (above max dose) --> 1000 mg bid ()  Cont to monitor    Fracture of left clavicle- (present on admission)  Assessment & Plan  S/P ORIF on 19 by Dr. Gauthier    Multiple fractures of cervical spine (HCC)- (present on admission)  Assessment & Plan  Non-surgical management  Cervical collar x 2 wks followed by F/U imaging

## 2019-08-05 NOTE — PROGRESS NOTES
Patient care assumed. Report received from [facility] MP Edmonds. Patient is alert and calm, resting in bed. Call light and bedside table within reach. Will continue to monitor.

## 2019-08-05 NOTE — THERAPY
"Physical Therapy   Daily Treatment     Patient Name: Maira Dodd  Age:  73 y.o., Sex:  female  Medical Record #: [id number]  Today's Date: 8/5/2019     Precautions  Precautions: Spinal / Back Precautions , Cervical Collar  , Fall Risk, Other (See Comments)  Comments: orthostatic-monitor BP, multiple rib fxs, cervical fx, L UE wounds    Subjective    Pt in bed upon arrival, agreeable to PT.     Objective       08/05/19 0931   Precautions   Precautions Spinal / Back Precautions ;Cervical Collar  ;Fall Risk;Other (See Comments)   Comments orthostatic-monitor BP, multiple rib fxs, cervical fx, L UE wounds   Vitals   Pulse (!) 115   Pulse Oximetry 95 %   Vitals Comments Taken seated following ambulation   Supine Lower Body Exercise   Bridges 1 set of 15   Heel Slide 1 set of 15   Bed Mobility    Supine to Sit Supervised   Sit to Supine Stand by Assist   Sit to Stand Stand by Assist   Interdisciplinary Plan of Care Collaboration   IDT Collaboration with  Physician   Patient Position at End of Therapy In Bed;Phone within Reach;Tray Table within Reach;Call Light within Reach   Collaboration Comments LUE wound care   PT Total Time Spent   PT Individual Total Time Spent (Mins) 60   PT Charge Group   PT Therapeutic Exercise 1   PT Therapeutic Activities 3     Bed <> WC, SBA with use of bedrail for supine <> sit, no AD with stand step transfer    Ambulation, SBA no AD except for one instance of HHA due to pt fatigue, 135', 2x125', 100', 70' with seated rest breaks.    Curb training, HHA, ascending and descending 1x4\" curb and 1x6\" curb with seated rest break.    Assessment    Pt with improvements in activity tolerance despite reporting increased fatigue this session, continues to be limited by deconditioning. Reports ability to have initial 24/7 assist of family members at d/c.      Plan    Graded exposure to vehicles to prepare for transport at discharge, progress ambulation tolerance, assess stairs, standing " tolerance/balance, LE strength and endurance, standardized balance testing

## 2019-08-06 ENCOUNTER — APPOINTMENT (OUTPATIENT)
Dept: RADIOLOGY | Facility: REHABILITATION | Age: 74
DRG: 949 | End: 2019-08-06
Attending: PHYSICAL MEDICINE & REHABILITATION
Payer: COMMERCIAL

## 2019-08-06 LAB
GLUCOSE BLD-MCNC: 123 MG/DL (ref 65–99)
GLUCOSE BLD-MCNC: 132 MG/DL (ref 65–99)
GLUCOSE BLD-MCNC: 150 MG/DL (ref 65–99)
GLUCOSE BLD-MCNC: 162 MG/DL (ref 65–99)

## 2019-08-06 PROCEDURE — 700102 HCHG RX REV CODE 250 W/ 637 OVERRIDE(OP): Performed by: HOSPITALIST

## 2019-08-06 PROCEDURE — 99232 SBSQ HOSP IP/OBS MODERATE 35: CPT | Performed by: HOSPITALIST

## 2019-08-06 PROCEDURE — 97535 SELF CARE MNGMENT TRAINING: CPT

## 2019-08-06 PROCEDURE — 97110 THERAPEUTIC EXERCISES: CPT

## 2019-08-06 PROCEDURE — 97530 THERAPEUTIC ACTIVITIES: CPT

## 2019-08-06 PROCEDURE — 72040 X-RAY EXAM NECK SPINE 2-3 VW: CPT

## 2019-08-06 PROCEDURE — 99232 SBSQ HOSP IP/OBS MODERATE 35: CPT | Performed by: PHYSICAL MEDICINE & REHABILITATION

## 2019-08-06 PROCEDURE — 700102 HCHG RX REV CODE 250 W/ 637 OVERRIDE(OP): Performed by: PHYSICAL MEDICINE & REHABILITATION

## 2019-08-06 PROCEDURE — A9270 NON-COVERED ITEM OR SERVICE: HCPCS | Performed by: HOSPITALIST

## 2019-08-06 PROCEDURE — 97112 NEUROMUSCULAR REEDUCATION: CPT

## 2019-08-06 PROCEDURE — 770010 HCHG ROOM/CARE - REHAB SEMI PRIVAT*

## 2019-08-06 PROCEDURE — 82962 GLUCOSE BLOOD TEST: CPT

## 2019-08-06 PROCEDURE — 700111 HCHG RX REV CODE 636 W/ 250 OVERRIDE (IP): Performed by: PHYSICAL MEDICINE & REHABILITATION

## 2019-08-06 PROCEDURE — A9270 NON-COVERED ITEM OR SERVICE: HCPCS | Performed by: PHYSICAL MEDICINE & REHABILITATION

## 2019-08-06 RX ORDER — BENAZEPRIL HYDROCHLORIDE 10 MG/1
5 TABLET ORAL
Status: DISCONTINUED | OUTPATIENT
Start: 2019-08-07 | End: 2019-08-08 | Stop reason: HOSPADM

## 2019-08-06 RX ADMIN — CARVEDILOL 12.5 MG: 12.5 TABLET, FILM COATED ORAL at 17:44

## 2019-08-06 RX ADMIN — METFORMIN HYDROCHLORIDE 1000 MG: 500 TABLET, FILM COATED ORAL at 17:44

## 2019-08-06 RX ADMIN — INSULIN GLARGINE 20 UNITS: 100 INJECTION, SOLUTION SUBCUTANEOUS at 20:22

## 2019-08-06 RX ADMIN — METFORMIN HYDROCHLORIDE 1000 MG: 500 TABLET, FILM COATED ORAL at 08:37

## 2019-08-06 RX ADMIN — MIDODRINE HYDROCHLORIDE 2.5 MG: 2.5 TABLET ORAL at 08:37

## 2019-08-06 RX ADMIN — ONDANSETRON 4 MG: 4 TABLET, ORALLY DISINTEGRATING ORAL at 10:07

## 2019-08-06 RX ADMIN — OXYCODONE HYDROCHLORIDE 5 MG: 5 TABLET ORAL at 15:43

## 2019-08-06 RX ADMIN — INSULIN GLARGINE 20 UNITS: 100 INJECTION, SOLUTION SUBCUTANEOUS at 07:12

## 2019-08-06 RX ADMIN — LEVOTHYROXINE SODIUM 75 MCG: 75 TABLET ORAL at 05:48

## 2019-08-06 RX ADMIN — NYSTATIN: 100000 POWDER TOPICAL at 08:37

## 2019-08-06 RX ADMIN — BENAZEPRIL HYDROCHLORIDE 10 MG: 10 TABLET, COATED ORAL at 05:48

## 2019-08-06 RX ADMIN — CARVEDILOL 12.5 MG: 12.5 TABLET, FILM COATED ORAL at 08:37

## 2019-08-06 RX ADMIN — MIDODRINE HYDROCHLORIDE 2.5 MG: 2.5 TABLET ORAL at 13:34

## 2019-08-06 RX ADMIN — LEVOTHYROXINE SODIUM 125 MCG: 125 TABLET ORAL at 05:48

## 2019-08-06 RX ADMIN — OXYCODONE HYDROCHLORIDE 5 MG: 5 TABLET ORAL at 11:27

## 2019-08-06 RX ADMIN — NYSTATIN: 100000 POWDER TOPICAL at 20:21

## 2019-08-06 RX ADMIN — VITAMIN D, TAB 1000IU (100/BT) 2000 UNITS: 25 TAB at 08:37

## 2019-08-06 RX ADMIN — OXYCODONE HYDROCHLORIDE 5 MG: 5 TABLET ORAL at 03:15

## 2019-08-06 ASSESSMENT — ENCOUNTER SYMPTOMS
EYES NEGATIVE: 1
SHORTNESS OF BREATH: 0
NAUSEA: 0
CHILLS: 0
FEVER: 0
ABDOMINAL PAIN: 0
COUGH: 0
VOMITING: 0
PALPITATIONS: 0

## 2019-08-06 NOTE — CARE PLAN
Problem: Safety  Goal: Will remain free from injury  8/6/2019 1622 by Adele Gonzalez R.N.  Note:   Pt uses call light consistently and appropriately. Waits for assistance does not attempt self transfer this shift. Able to verbalize needs.   8/6/2019 1033 by Adele Gonzalez R.N.  Note:      Problem: Pain Management  Goal: Pain level will decrease to patient's comfort goal  Intervention: Follow pain managment plan developed in collaboration with patient and Interdisciplinary Team  Note:   Patient able to verbalize needs.  Denies pain or discomfort this shift and no s/s same noted.  Will continue to monitor.

## 2019-08-06 NOTE — THERAPY
Physical Therapy   Daily Treatment     Patient Name: Maira Dodd  Age:  74 y.o., Sex:  female  Medical Record #: [id number]  Today's Date: 8/6/2019     Precautions  Precautions: Cervical Collar  , Spinal / Back Precautions , Fall Risk, Other (See Comments), Non Weight Bearing Left Upper Extremity  Comments: L UE wounds, orthostatic    Subjective    Pt in bed, reports feeling better this afternoon with decreased nausea.    Objective       08/06/19 1331   Precautions   Precautions Cervical Collar  ;Spinal / Back Precautions ;Fall Risk;Other (See Comments);Non Weight Bearing Left Upper Extremity   Comments L UE wounds, orthostatic   Sitting Lower Body Exercises   Nustep Resistance Level 2  (5 min, 0.09 miles with BLE and RUE)   Interdisciplinary Plan of Care Collaboration   IDT Collaboration with  Nursing;Other (See Comments)   Patient Position at End of Therapy Seated  (care passed to x ray tech)   Collaboration Comments Nursing administering meds   PT Total Time Spent   PT Individual Total Time Spent (Mins) 30   PT Charge Group   PT Therapeutic Exercise 1   PT Therapeutic Activities 1     Attempted initial bout of ambulation from EOB, pt requesting to sit after ~5' due to sx of dizziness.    Ambulation 125' to pt fatigue, HHA-SBA.    Pt declined dynamic gait activity (sidestepping) due to reported increase in pain in L collar bone area  with sidestepping.     Assessment    Pt limited this session by L UE pain, sx of dizziness and low activity tolerance.    Plan    Schedule car transfer with family 8/8. Ambulation tolerance, assess stairs, standing tolerance/balance, LE strength and endurance, discharge 8/9
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
## 2019-08-06 NOTE — THERAPY
"Physical Therapy   Daily Treatment     Patient Name: Maira Dodd  Age:  74 y.o., Sex:  female  Medical Record #: [id number]  Today's Date: 8/6/2019     Precautions  Precautions: Cervical Collar  , Spinal / Back Precautions , Fall Risk, Other (See Comments)  Comments: L UE wounds, orthostatic     Subjective    Pt sleeping in bed upon arrival. Reports increased LUE pain following wound debridement, unable to eat breakfast due to significant nausea this morning. \"I'm not feeling great.\"     Objective       08/06/19 0931   Precautions   Precautions Cervical Collar  ;Spinal / Back Precautions ;Fall Risk;Other (See Comments)   Comments L UE wounds, orthostatic    Supine Lower Body Exercise   Bridges 2 sets of 15   Hip Abduction 2 sets of 15   Hip Adduction  2 sets of 15   Straight Leg Raises 2 sets of 15   Short Arc Quad 2 sets of 15   Heel Slide 2 sets of 15   Ankle Pumps 2 sets of 15   Bed Mobility    Supine to Sit Minimal Assist   Sit to Supine Supervised   Scooting Stand by Assist   Interdisciplinary Plan of Care Collaboration   IDT Collaboration with  Nursing   Patient Position at End of Therapy In Bed;Call Light within Reach;Tray Table within Reach;Phone within Reach   Collaboration Comments re: nausea, dizziness, nursing providing nausea medication   PT Total Time Spent   PT Individual Total Time Spent (Mins) 60   PT Charge Group   PT Therapeutic Exercise 3   PT Therapeutic Activities 1     Supine <> sit, min A supine> sit due to pt fatigue, SBA sit > supine.     Unable to complete bed > wc transfer with pt reporting increased symptoms of dizziness upon coming to sitting. Pt able to tolerate ~5 mins seated EOB with intermittent RUE support before requesting to lay down in bed. Reported resolution of dizziness after ~1 min sitting.     Discussed POC and car transfer.    Assessment    Pt limited this session by sx of dizziness, nausea and LUE pain.    Plan    Schedule car transfer with family 8/8. " Ambulation tolerance, assess stairs, standing tolerance/balance, LE strength and endurance, standardized balance testing, discharge 8/9
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
## 2019-08-06 NOTE — THERAPY
"Occupational Therapy  Daily Treatment     Patient Name: Maira Dodd  Age:  74 y.o., Sex:  female  Medical Record #: [id number]  Today's Date: 8/6/2019     Precautions  Precautions: Cervical Collar  , Spinal / Back Precautions , Fall Risk, Other (See Comments), Non Weight Bearing Left Upper Extremity  Comments: L UE wounds, orthostatic    Safety   ADL Safety : Requires Supervision for Safety, Requires Physical Assist for Safety  Bathroom Safety: Requires Supervision for Safety, Requires Physical Assist for Safety  Comments: See FIMs     Subjective    \"Yes!\" Patient stated after locking w/c breaks for the first time with LUE.      Objective        08/06/19 1431   Precautions   Precautions Cervical Collar  ;Spinal / Back Precautions ;Fall Risk;Other (See Comments);Non Weight Bearing Left Upper Extremity   Comments L UE wounds, orthostatic   Cognition    Level of Consciousness Alert   Hand Strengthening   Hand Strengthening Left ;Left Pinch;Left Finger Flexion;Left Finger Extension;Left Thumb Opposition   Comment Reviewed theraputty exercises (for LUE)    Interdisciplinary Plan of Care Collaboration   Patient Position at End of Therapy In Bed;Call Light within Reach;Phone within Reach;Tray Table within Reach   OT Total Time Spent   OT Individual Total Time Spent (Mins) 30   OT Charge Group   OT Neuromuscular Re-education / Balance 1   OT Therapeutic Exercise  1       FIM Bed/Chair/Wheelchair Transfers Score: 5 - Standby Prompting/Supervision or Set-up  Bed/Chair/Wheelchair Transfers Description:  Increased time, Supervision for safety, Set-up of equipment(Patient transferred bed <> w/c with SBA)    Patient c/o 6/10 collar bone pain while performing theraputty activity in standing. Reported pain subsided once she sat down. Benefits from pillow for positioning at this time.       Assessment    Patient required moderate encouragement to participate in OT session. Reviewed PROM home exercises to facilitate LUE " ROM with successful return demo. Handout provided. Primary limiting factors to functional performance and independence at this time are pain and spinal precautions.     Plan    Continue functional transfers/mobility training, ADLs, IADLs, standing balance, LUE strength/coordination, Patient to d/c home 8/9

## 2019-08-06 NOTE — CARE PLAN
Problem: Communication  Goal: The ability to communicate needs accurately and effectively will improve  Note:   Patient alert and oriented x 4 with clear speech and ability to convey needs/wants to care team.      Problem: Safety  Goal: Will remain free from injury  Note:   Patient uses call light appropriately for assistance as needed/wanted. Bed locked, in lowest position.

## 2019-08-06 NOTE — WOUND TEAM
"[facility]  Inpatient Services  Wound and Skin Care Progress Note    Admission Date: 7/23/2019     Consult Date:  7/23/19   HPI, PMH, SH: Reviewed    Unit where seen by Wound Team: [id number]     WOUND FOLLOW UP/CONSULT RELATED TO:  Re evaluation of posterior left elbow wound and abdominal pannus wound    SUBJECTIVE:  \"I'm going home Friday\"      Self Report / Pain Level:  Tolerated wound care well       OBJECTIVE:  Full thickness wound left elbow that continues to evolve with CSWD and wound care; new wound mid lower abdominal pannus; Dr. Gaston at bedside for continued CSWD    WOUND TYPE, LOCATION, CHARACTERISTICS (Pressure Injuries: location, stage, POA or date identified)       Wound 07/23/19 Full Thickness Wound Elbow full thickness wound posterior left elbow (Active)   Wound Image   8/6/2019  9:00 AM   Site Assessment Clean;Drainage;Red;Yellow 8/6/2019  9:00 AM   Ashely-wound Assessment Clean;Intact 8/6/2019  9:00 AM   Margins Attached edges 8/6/2019  9:00 AM   Wound Length (cm) 9 cm 8/6/2019  9:00 AM   Wound Width (cm) 12.5 cm 8/6/2019  9:00 AM   Wound Depth (cm) 1 cm 8/6/2019  9:00 AM   Wound Surface Area (cm^2) 112.5 cm^2 8/6/2019  9:00 AM   Tunneling 0 cm 8/6/2019  9:00 AM   Undermining 0 cm 8/6/2019  9:00 AM   Closure Secondary intention 8/6/2019  9:00 AM   Drainage Amount Moderate 8/6/2019  9:00 AM   Drainage Description Cohn;Serous 8/6/2019  9:00 AM   Non-staged Wound Description Full thickness 8/6/2019  9:00 AM   Treatments Cleansed;Site care 8/6/2019  9:00 AM   Cleansing Normal Saline Irrigation 8/6/2019  9:00 AM   Periwound Protectant Not Applicable 8/6/2019  9:00 AM   Dressing Options Absorbent Abdominal Pad;Hydrofera Blue Classic;Roll Gauze 8/6/2019  9:00 AM   Dressing Cleansing/Solutions Not Applicable 8/6/2019  9:00 AM   Dressing Changed Changed 8/6/2019  9:00 AM   Dressing Status Intact 8/6/2019  9:00 AM   Dressing Change Frequency Daily 8/6/2019  9:00 AM   NEXT Dressing Change  08/07/19 " Pt states he saw Dr Dayana Valdovinos about a mo ago and she ordered lab work   Pt asks for  [phone] to review results of tests done at [address] [place] [address]  8/6/2019  9:00 AM   NEXT Weekly Photo (Inpatient Only) 08/13/19 8/6/2019  9:00 AM   WOUND NURSE ONLY - Odor None 8/6/2019  9:00 AM   WOUND NURSE ONLY - Exposed Structures None 8/6/2019  9:00 AM   WOUND NURSE ONLY - Tissue Type and Percentage red/adipose 8/6/2019  9:00 AM   WOUND NURSE ONLY - Time Spent with Patient (mins)         Wound 08/03/19 Partial Thickness Wound Abdomen partial thickness wound mid lower pannus fold (Active)   Site Assessment Red 8/6/2019  9:00 AM   Ashely-wound Assessment Clean;Intact 8/6/2019  9:00 AM   Margins Attached edges 8/6/2019  9:00 AM   Wound Length (cm) 0.5 cm 8/6/2019  9:00 AM   Wound Width (cm) 2 cm 8/6/2019  9:00 AM   Wound Depth (cm) 0.1 cm 8/6/2019  9:00 AM   Wound Surface Area (cm^2) 1 cm^2 8/6/2019  9:00 AM   Tunneling 0 cm 8/6/2019  9:00 AM   Undermining 0 cm 8/6/2019  9:00 AM   Closure Secondary intention 8/6/2019  9:00 AM   Drainage Amount Small 8/6/2019  9:00 AM   Drainage Description Sanguineous 8/6/2019  9:00 AM   Non-staged Wound Description Partial thickness 8/6/2019  9:00 AM   Treatments Cleansed;Site care 8/6/2019  9:00 AM   Cleansing Normal Saline Irrigation 8/6/2019  9:00 AM   Periwound Protectant Not Applicable 8/6/2019  9:00 AM   Dressing Options Hydrofera Blue Classic 8/6/2019  9:00 AM   Dressing Cleansing/Solutions Not Applicable 8/6/2019  9:00 AM   Dressing Changed New 8/6/2019  9:00 AM   Dressing Status Intact 8/6/2019  9:00 AM   Dressing Change Frequency Daily 8/6/2019  9:00 AM   NEXT Dressing Change  08/07/19 8/6/2019  9:00 AM   NEXT Weekly Photo (Inpatient Only) 08/10/19 8/6/2019  9:00 AM   WOUND NURSE ONLY - Odor None 8/6/2019  9:00 AM   WOUND NURSE ONLY - Exposed Structures None 8/6/2019  9:00 AM   WOUND NURSE ONLY - Tissue Type and Percentage red 100% 8/6/2019  9:00 AM   WOUND NURSE ONLY - Time Spent with Patient (mins) 60 8/6/2019  9:00 AM            Lab Values:    WBC:       WBC   Date/Time Value Ref Range Status   07/28/2019 06:12 AM 6.8 4.8 - 10.8  K/uL Final     A1C:      Lab Results   Component Value Date/Time    HBA1C 7.4 (H) 07/24/2019 05:21 AM           Culture:  NA    INTERVENTIONS BY WOUND TEAM:  Met with Dr. Gaston and staff RN, removed old dressing and Dr. Gaston CSWD black eschar/adipose tissue revealing more adipose and red tissue.  Unable to debride distal wound wound bed due to pain.  Cleansed wound with NS and measurements and photo taken.  Covered wound with hydrofera blue foam, ABD and roll gauze.  Removed interdry wicking cloth from abdominal pannus and cleansed partial thickness wound.  Measurements taken and covered with hydrofera blue foam securing with hypafix tape.  Discussed with staff RN and orders updated.  Dr. Gaston to discuss with Dr. Matias.  I discussed with case management with discharge this Friday and wound evolving, will not change wound care.  Home health can evaluate wound and possible consider a NPWT dressing and possible coordination with outpatient wound clinic in [place].    Dressing selection:  See above         Interdisciplinary consultation: Patient, Bedside RN, Dr. Gaston, case management    EVALUATION: clean appearing wound that is still evolving; would consider NPWT if wound remains clean    Factors affecting wound healing: DM, pressure   Goals: Steady decrease in wound area and depth weekly.    NURSING PLAN OF CARE ORDERS (X):    Dressing changes: See Dressing Care orders: X updated  Skin care: See Skin Care orders:   Rectal tube care: See Rectal Tube Care orders:   Other orders:    WOUND TEAM PLAN OF CARE (X):   NPWT change 3 x week:        Dressing changes by wound team:       Follow up as needed:   X weekly    Other (explain):     Anticipated discharge plans (X):   SNF:           Home Care: X for ongoing wound care          Outpatient Wound Center:            Self Care:            Other:

## 2019-08-06 NOTE — CARE PLAN
Problem: Skin breakdown  Goal: Patient to consume greater than or equal to 50% of meals  Outcome: MET  Goal: Optimize vitamin/mineral intake  Outcome: NOT MET

## 2019-08-06 NOTE — THERAPY
"Occupational Therapy  Daily Treatment     Patient Name: Maira Dodd  Age:  74 y.o., Sex:  female  Medical Record #: [id number]  Today's Date: 8/6/2019     Precautions  Precautions: Cervical Collar  , Spinal / Back Precautions , Fall Risk, Other (See Comments), Non Weight Bearing Left Upper Extremity  Comments: L UE wounds, orthostatic    Safety   ADL Safety : Requires Supervision for Safety, Requires Physical Assist for Safety  Bathroom Safety: Requires Supervision for Safety, Requires Physical Assist for Safety  Comments: See FIMs     Subjective    \"My (left) arm is sore\"      Objective       08/06/19 1031   Precautions   Precautions Cervical Collar  ;Spinal / Back Precautions ;Fall Risk;Other (See Comments);Non Weight Bearing Left Upper Extremity   Comments L UE wounds, orthostatic   Pain 0 - 10 Group   Location Arm;Abdomen   Location Orientation Left;Upper   Pain Rating Scale (NPRS) 2  (Patient reported \"2.5/10\" pain in LUE and queasy stomach )   Cognition    Level of Consciousness Alert   Balance   Sitting Balance (Static) Good   Sitting Balance (Dynamic) Fair +   Standing Balance (Static) Fair   Bed Mobility    Supine to Sit Minimal Assist   Sit to Supine Contact Guard Assist   OT Total Time Spent   OT Individual Total Time Spent (Mins) 60   OT Charge Group   OT Self Care / ADL 2   OT Therapy Activity 2       FIM Grooming Score:  4 - Minimal Assistance  Grooming Description:  Increased time, Supervision for safety, Set-up of equipment(Patient requires minimal assistance with posterior hair hair (brushing, detangling) )    Assessment    Patient with fair tolerance of therapy session today secondary to c/o pain and discomfort (due to LUE pain and queasy stomach). Patient tolerated shoulder, elbow, forearm, wrist and finger PROM/AAROM exercises in bed; shoulder flexion PROM limited to approximately 100 degrees. Educated patient on importance of daily ROM exercises and functional participation using " Valentine.  Plan    Continue functional transfers/mobility training, ADLs, IADLs, standing balance, LUE strength/coordination, Patient to d/c home 8/9

## 2019-08-06 NOTE — PROGRESS NOTES
Patient care assumed. Report received from [facility] MP Monae. Patient is alert and calm, resting in bed. Call light and bedside table within reach. Will continue to monitor.

## 2019-08-07 ENCOUNTER — HOME HEALTH ADMISSION (OUTPATIENT)
Dept: HOME HEALTH SERVICES | Facility: HOME HEALTHCARE | Age: 74
End: 2019-08-07
Payer: COMMERCIAL

## 2019-08-07 LAB
ANION GAP SERPL CALC-SCNC: 9 MMOL/L (ref 0–11.9)
BUN SERPL-MCNC: 17 MG/DL (ref 8–22)
CALCIUM SERPL-MCNC: 8.5 MG/DL (ref 8.5–10.5)
CHLORIDE SERPL-SCNC: 104 MMOL/L (ref 96–112)
CO2 SERPL-SCNC: 24 MMOL/L (ref 20–33)
CREAT SERPL-MCNC: 0.75 MG/DL (ref 0.5–1.4)
ERYTHROCYTE [DISTWIDTH] IN BLOOD BY AUTOMATED COUNT: 52.6 FL (ref 35.9–50)
GLUCOSE BLD-MCNC: 123 MG/DL (ref 65–99)
GLUCOSE BLD-MCNC: 131 MG/DL (ref 65–99)
GLUCOSE BLD-MCNC: 134 MG/DL (ref 65–99)
GLUCOSE BLD-MCNC: 145 MG/DL (ref 65–99)
GLUCOSE SERPL-MCNC: 161 MG/DL (ref 65–99)
HCT VFR BLD AUTO: 33.5 % (ref 37–47)
HGB BLD-MCNC: 10.4 G/DL (ref 12–16)
MCH RBC QN AUTO: 31.1 PG (ref 27–33)
MCHC RBC AUTO-ENTMCNC: 31 G/DL (ref 33.6–35)
MCV RBC AUTO: 100.3 FL (ref 81.4–97.8)
PLATELET # BLD AUTO: 281 K/UL (ref 164–446)
PMV BLD AUTO: 10.3 FL (ref 9–12.9)
POTASSIUM SERPL-SCNC: 4.5 MMOL/L (ref 3.6–5.5)
RBC # BLD AUTO: 3.34 M/UL (ref 4.2–5.4)
SODIUM SERPL-SCNC: 137 MMOL/L (ref 135–145)
WBC # BLD AUTO: 6.9 K/UL (ref 4.8–10.8)

## 2019-08-07 PROCEDURE — A9270 NON-COVERED ITEM OR SERVICE: HCPCS | Performed by: PHYSICAL MEDICINE & REHABILITATION

## 2019-08-07 PROCEDURE — 85027 COMPLETE CBC AUTOMATED: CPT

## 2019-08-07 PROCEDURE — A9270 NON-COVERED ITEM OR SERVICE: HCPCS | Performed by: HOSPITALIST

## 2019-08-07 PROCEDURE — 36415 COLL VENOUS BLD VENIPUNCTURE: CPT

## 2019-08-07 PROCEDURE — 99233 SBSQ HOSP IP/OBS HIGH 50: CPT | Performed by: PHYSICAL MEDICINE & REHABILITATION

## 2019-08-07 PROCEDURE — 82962 GLUCOSE BLOOD TEST: CPT | Mod: 91

## 2019-08-07 PROCEDURE — 80048 BASIC METABOLIC PNL TOTAL CA: CPT

## 2019-08-07 PROCEDURE — 97110 THERAPEUTIC EXERCISES: CPT

## 2019-08-07 PROCEDURE — 97530 THERAPEUTIC ACTIVITIES: CPT

## 2019-08-07 PROCEDURE — 97535 SELF CARE MNGMENT TRAINING: CPT

## 2019-08-07 PROCEDURE — 97112 NEUROMUSCULAR REEDUCATION: CPT

## 2019-08-07 PROCEDURE — 770010 HCHG ROOM/CARE - REHAB SEMI PRIVAT*

## 2019-08-07 PROCEDURE — 700102 HCHG RX REV CODE 250 W/ 637 OVERRIDE(OP): Performed by: PHYSICAL MEDICINE & REHABILITATION

## 2019-08-07 PROCEDURE — 700102 HCHG RX REV CODE 250 W/ 637 OVERRIDE(OP): Performed by: HOSPITALIST

## 2019-08-07 PROCEDURE — 700102 HCHG RX REV CODE 250 W/ 637 OVERRIDE(OP)

## 2019-08-07 PROCEDURE — A9270 NON-COVERED ITEM OR SERVICE: HCPCS

## 2019-08-07 PROCEDURE — 99232 SBSQ HOSP IP/OBS MODERATE 35: CPT | Performed by: HOSPITALIST

## 2019-08-07 RX ORDER — BENAZEPRIL HYDROCHLORIDE 10 MG/1
TABLET ORAL
Status: COMPLETED
Start: 2019-08-07 | End: 2019-08-07

## 2019-08-07 RX ADMIN — METFORMIN HYDROCHLORIDE 1000 MG: 500 TABLET, FILM COATED ORAL at 08:22

## 2019-08-07 RX ADMIN — OXYCODONE HYDROCHLORIDE 5 MG: 5 TABLET ORAL at 20:31

## 2019-08-07 RX ADMIN — CARVEDILOL 12.5 MG: 12.5 TABLET, FILM COATED ORAL at 17:38

## 2019-08-07 RX ADMIN — NYSTATIN: 100000 POWDER TOPICAL at 08:22

## 2019-08-07 RX ADMIN — VITAMIN D, TAB 1000IU (100/BT) 2000 UNITS: 25 TAB at 08:22

## 2019-08-07 RX ADMIN — LEVOTHYROXINE SODIUM 75 MCG: 75 TABLET ORAL at 05:00

## 2019-08-07 RX ADMIN — SENNOSIDES, DOCUSATE SODIUM 1 TABLET: 50; 8.6 TABLET, FILM COATED ORAL at 08:22

## 2019-08-07 RX ADMIN — BENAZEPRIL HYDROCHLORIDE 5 MG: 10 TABLET, COATED ORAL at 05:01

## 2019-08-07 RX ADMIN — CARVEDILOL 12.5 MG: 12.5 TABLET, FILM COATED ORAL at 08:22

## 2019-08-07 RX ADMIN — NYSTATIN: 100000 POWDER TOPICAL at 20:42

## 2019-08-07 RX ADMIN — METFORMIN HYDROCHLORIDE 1000 MG: 500 TABLET, FILM COATED ORAL at 17:38

## 2019-08-07 RX ADMIN — OXYCODONE HYDROCHLORIDE 5 MG: 5 TABLET ORAL at 10:33

## 2019-08-07 RX ADMIN — LEVOTHYROXINE SODIUM 125 MCG: 125 TABLET ORAL at 04:59

## 2019-08-07 RX ADMIN — INSULIN GLARGINE 20 UNITS: 100 INJECTION, SOLUTION SUBCUTANEOUS at 07:08

## 2019-08-07 RX ADMIN — INSULIN GLARGINE 20 UNITS: 100 INJECTION, SOLUTION SUBCUTANEOUS at 20:32

## 2019-08-07 ASSESSMENT — ENCOUNTER SYMPTOMS
NAUSEA: 0
EYES NEGATIVE: 1
VOMITING: 0
CHILLS: 0
PALPITATIONS: 0
SHORTNESS OF BREATH: 0
ABDOMINAL PAIN: 0
COUGH: 0
FEVER: 0

## 2019-08-07 NOTE — PROGRESS NOTES
"Hospital Medicine Daily Progress Note      Chief Complaint  HTN, DM    Interval Problem Update  Feeling better today, no more \"queasiness.\"    Review of Systems  Review of Systems   Constitutional: Negative for chills and fever.   HENT: Negative.    Eyes: Negative.    Respiratory: Negative for cough and shortness of breath.    Cardiovascular: Negative for chest pain and palpitations.   Gastrointestinal: Negative for abdominal pain, nausea and vomiting.   Genitourinary: Negative.    Musculoskeletal:        Wound pain   Skin: Negative for itching and rash.        Physical Exam  Temp:  [36.5 °C (97.7 °F)-36.6 °C (97.8 °F)] 36.5 °C (97.7 °F)  Pulse:  [80-89] 89  Resp:  [18] 18  BP: (102-136)/(44-52) 112/52  SpO2:  [96 %] 96 %    Physical Exam   Constitutional: She is oriented to person, place, and time. No distress.   HENT:   Head: Normocephalic and atraumatic.   Right Ear: External ear normal.   Left Ear: External ear normal.   Eyes: Conjunctivae and EOM are normal. Right eye exhibits no discharge. Left eye exhibits no discharge.   Neck: Normal range of motion. Neck supple. No tracheal deviation present.   Cardiovascular: Normal rate and regular rhythm.   Pulmonary/Chest: No stridor. No respiratory distress. She has no wheezes.   Decreased BS   Abdominal: Soft. Bowel sounds are normal. She exhibits no distension. There is no tenderness.   Musculoskeletal: She exhibits edema and tenderness.   LUE 1+ edema w/ resolving ecchymoses   Neurological: She is alert and oriented to person, place, and time.   Skin: Skin is warm and dry. She is not diaphoretic.   Vitals reviewed.      Fluids  No intake or output data in the 24 hours ending 08/07/19 1224    Laboratory  Recent Labs     08/07/19  0536   WBC 6.9   RBC 3.34*   HEMOGLOBIN 10.4*   HEMATOCRIT 33.5*   .3*   MCH 31.1   MCHC 31.0*   RDW 52.6*   PLATELETCT 281   MPV 10.3     Recent Labs     08/07/19  0536   SODIUM 137   POTASSIUM 4.5   CHLORIDE 104   CO2 24   GLUCOSE " "161*   BUN 17   CREATININE 0.75   CALCIUM 8.5                   Assessment/Plan  * Subarachnoid hemorrhage following injury, with loss of consciousness (HCC)- (present on admission)  Assessment & Plan  Non-surgical management  Completed Keppra x 7 days for SZ proph    Alkaline phosphatase elevation  Assessment & Plan  Suspect 2/2 traumatic fractures  U/S RUQ s/p choley w/o biliary dilatation  Follow to resolution    Macrocytic anemia  Assessment & Plan  Vit B12 and Folate levels normal  Following H/H    Vitamin D deficiency  Assessment & Plan  Vit D level 13  Continue supplementation    Laceration of left forearm- (present on admission)  Assessment & Plan  S/P repair  Wound care    Abnormal CT of the abdomen- (present on admission)  Assessment & Plan  Incidental finding of left kidney lesion on Trauma work-up  Needs F/U imaging    Essential hypertension- (present on admission)  Assessment & Plan  Continue Coreg and Benazepril  Now off Midodrine    Closed fracture of multiple ribs with flail chest- (present on admission)  Assessment & Plan  S/P Left 3rd-7th rib fractures with resolved PTX  Aggressive pulmonary toilet, including incentive spirometry    Hypothyroid- (present on admission)  Assessment & Plan  Euthyroid on Synthroid    DM (diabetes mellitus) (HCC)- (present on admission)  Assessment & Plan  HbA1c 7.4  Good serum glucose control on current regimen  Continue Lantus and Metformin  Will not need SSI on discharge    Fracture of left clavicle- (present on admission)  Assessment & Plan  S/P ORIF on 7/14/19 by Dr. Gauthier    Multiple fractures of cervical spine (HCC)- (present on admission)  Assessment & Plan  Non-surgical management  Cervical collar x 2 wks  F/U X-ray \"no dynamic instability\"    Full Code    "

## 2019-08-07 NOTE — THERAPY
Physical Therapy   Daily Treatment     Patient Name: Maira Dodd  Age:  74 y.o., Sex:  female  Medical Record #: [id number]  Today's Date: 8/7/2019     Precautions  Precautions: Cervical Collar  , Spinal / Back Precautions , Non Weight Bearing Left Upper Extremity, Fall Risk, Other (See Comments)  Comments: L UE wounds, orthostatic    Subjective    Pt in bed on arrival, agreeable to PT.     Objective       08/07/19 1401   Precautions   Precautions Cervical Collar  ;Spinal / Back Precautions ;Non Weight Bearing Left Upper Extremity;Fall Risk;Other (See Comments)   Comments L UE wounds, orthostatic   Sitting Lower Body Exercises   Ankle Pumps 1 set of 15  (2lb weight)   Hip Abduction 1 set of 15  (with pink theraband)   Hip Adduction 1 set of 15   Long Arc Quad 1 set of 15  (with 2lb weight)   Marching 1 set of 15   Hamstring Curl 1 set of 15  (with pink theraband)   Interdisciplinary Plan of Care Collaboration   IDT Collaboration with  Physician   Patient Position at End of Therapy Seated;Call Light within Reach;Tray Table within Reach;Phone within Reach   Collaboration Comments Re: d/c tomorrow following therapy   PT Total Time Spent   PT Individual Total Time Spent (Mins) 30   PT Charge Group   PT Therapeutic Exercise 1   PT Neuromuscular Re-Education / Balance 1     Bed > WC SBA: use of bed rail for supine > sit, no AD for stand step transfer    Standing tolerance/balance activity: tossing rings, 2x10 with seated rest break, SBA, no UE support.    Discussed POC, possible early d/c tomorrow 8/8.    Assessment    Pt with improved standing tolerance this session, no report of dizziness with transfer OOB or with standing activity.    Plan    Complete DC IRF MARYLOU and FIMs, car transfer to family vehicle, fall recovery and falls information handout, possible DC on 8/8.

## 2019-08-07 NOTE — THERAPY
Physical Therapy   Daily Treatment     Patient Name: Maira Dodd  Age:  74 y.o., Sex:  female  Medical Record #: [id number]  Today's Date: 8/7/2019     Precautions  Precautions: Cervical Collar  , Spinal / Back Precautions , Fall Risk, Non Weight Bearing Left Upper Extremity, Other (See Comments)  Comments: L UE wounds, orthostatic    Subjective    Pt asleep in room upon arrival, agreeable to PT.     Objective       08/07/19 0931   Precautions   Precautions Cervical Collar  ;Spinal / Back Precautions ;Fall Risk;Non Weight Bearing Left Upper Extremity;Other (See Comments)   Comments L UE wounds, orthostatic   Sitting Lower Body Exercises   Nustep Resistance Level 2  (10 min, 0.19 miles with BLE and RUE)   Bed Mobility    Supine to Sit Stand by Assist   Sit to Supine Stand by Assist   Sit to Stand Supervised   Scooting Minimal Assist   Interdisciplinary Plan of Care Collaboration   Patient Position at End of Therapy In Bed;Call Light within Reach;Tray Table within Reach;Phone within Reach   PT Total Time Spent   PT Individual Total Time Spent (Mins) 60   PT Charge Group   PT Therapeutic Exercise 1   PT Therapeutic Activities 3     Ambulation, SBA, no AD with seated rest breaks, 60', 80', 115', 135', 1 self-corrected LOB during longest bout. Discussed importance of self-pacing and rest breaks for safety during ambulation.    Dynamic gait activity, SBA, retrieving 10 cones from varying locations and heights around gym    Bed <> WC transfer, SBA with use of bed rail for bed mobility and no AD for stand step transfer.    Assessment    Pt motivated to ambulate this session with improvement in activity tolerance. Continues to be limited by deconditioning with SOB noted following ambulation.     Plan    Complete DC IRF MARYLOU and FIMs, car transfer to family vehicle, fall recovery and falls information handout, possible DC on 8/8.

## 2019-08-07 NOTE — DISCHARGE PLANNING
"Met w/ patient to discuss DCP in process. No DME recommended at this time. Discussed HH referral for RN, wound care, PT/OT. Patient verbalizes agreement & understanding of HH referral. Reviewed choice form. Requests HH referral to [facility]. Discussed home modifications in progress. States \"my , Hector, has been lost at home w/o me. Nothing has been done. He is in limbo.\" Asked about other family to assist w/ grab bar installation & removing glass shower doors (patient request). Patient will discuss w/ her FENG Tiara tompkins. States \"she is a . She will get it done.\"  will provided transportation home. Patient excited about upcoming discharge home. Questions answered. Emotional support provided.  "

## 2019-08-07 NOTE — THERAPY
"Occupational Therapy  Daily Treatment     Patient Name: Maira Dodd  Age:  74 y.o., Sex:  female  Medical Record #: [id number]  Today's Date: 2019     Precautions  Precautions: Cervical Collar  , Spinal / Back Precautions , Fall Risk, Non Weight Bearing Left Upper Extremity, Other (See Comments)  Comments: L UE wounds, orthostatic    Safety   ADL Safety : Requires Supervision for Safety, Requires Physical Assist for Safety  Bathroom Safety: Requires Supervision for Safety  Comments: See FIMs     Subjective    \" Thank you .\" stated at end of session      Objective       19 1231   Precautions   Precautions Cervical Collar  ;Spinal / Back Precautions ;Fall Risk;Non Weight Bearing Left Upper Extremity;Other (See Comments)   Comments L UE wounds, orthostatic   Safety    ADL Safety  Requires Supervision for Safety;Requires Physical Assist for Safety   Bathroom Safety Requires Supervision for Safety   Interdisciplinary Plan of Care Collaboration   Patient Position at End of Therapy In Bed;Call Light within Reach;Tray Table within Reach   OT Total Time Spent   OT Individual Total Time Spent (Mins) 60   OT Charge Group   OT Self Care / ADL 4       FIM Bathing Score:  4 - Minimal Assistance  Bathing Description:       FIM Upper Body Dressin - Minimal Assistance  Upper Body Dressing Description:  (assisti with getting sleev over left UE/ bandage . )    FIM Lower Body Dressing Score:  3 - Moderate Assistance  Lower Body Dressing Description:  Dressing stick, Sock aid(doff /don LB dressing using AE as needed  with  setup/ supervision )    FIM Bed/Chair/Wheelchair Transfers Score: 6 - Modified Independent  Bed/Chair/Wheelchair Transfers Description:       FIM Tub/Shower Transfers Score:  6 - Modified Independent  Tub/Shower Transfers Description:  Grab bar      Assessment     continues with improving independence with ADL and related mobility     Plan    IADL , related mobility ,  Educate regarding  " Management of  [place] collar and pad ,  Cleaning / care,  Tift J collar pad management  Use for shower. Provide info regarding   Purchase of a hip kit on [employer] patient reports son can purchase one.

## 2019-08-07 NOTE — REHAB-PHARMACY IDT TEAM NOTE
Pharmacy   Pharmacy  Antibiotics/Antifungals/Antivirals:  Medication:      Active Orders (From admission, onward)    None        Route:         None  Stop Date:  N/A  Reason:   Antihypertensives/Cardiac:  Medication:    Orders (72h ago, onward)     Start     Ordered    08/07/19 0600  benazepril (LOTENSIN) tablet 5 mg  Q DAY      08/06/19 2346    08/05/19 0800  carvedilol (COREG) tablet 12.5 mg  2 TIMES DAILY WITH MEALS      08/04/19 0931    08/05/19 0600  benazepril (LOTENSIN) tablet 10 mg  Q DAY,   Status:  Discontinued      08/04/19 0931    08/04/19 0700  midodrine (PROAMATINE) tablet 2.5 mg  2 TIMES DAILY,   Status:  Discontinued      08/03/19 1520    07/23/19 1730  carvedilol (COREG) tablet 6.25 mg  2 TIMES DAILY WITH MEALS     Note to Pharmacy:  Per P&T IV to PO Protocol    07/23/19 1038    07/23/19 1039  hydrALAZINE (APRESOLINE) tablet 25 mg  EVERY 8 HOURS PRN      07/23/19 1039              Patient Vitals for the past 24 hrs:   BP Pulse   08/07/19 1600 128/78 81   08/07/19 0700 112/52 89   08/06/19 1912 102/44 80     Anticoagulation:  Medication:  Not applicable  INR:      Other key medications:    A review of the medication list reveals no issues at this time. Patient is currently on antihypertensive(s). Recommend home blood pressure monitoring/recording if antihypertensive(s) regimen(s) continue. Patient is currently on diabetic medication(s) and/or Insulin(s). Recommend home blood glucose monitoring/recording if these regimen(s) continue.    Section completed by:  Johny Nicholas [facility]

## 2019-08-07 NOTE — PROGRESS NOTES
Patient care assumed. Report received from [facility] MP Monae. Patient is alert and calm, resting in bed. Call light and bedside table within reach. Will continue to monitor.

## 2019-08-07 NOTE — DISCHARGE PLANNING
ATTN: Case Management  RE: Referral for Home Health    As of 8/7/2019, we have accepted the Home Health referral for the patient listed above.    A [facility] clinician will be out to see the patient within 48 hours. If you have any questions or concerns regarding the patient’s transition to Home Health, please do not hesitate to contact us at [phone].      We look forward to collaborating with you,  [facility] Team

## 2019-08-07 NOTE — PROGRESS NOTES
Hospital Medicine Daily Progress Note      Chief Complaint  HTN, DM    Interval Problem Update  Pt refused morning dose of Lantus due to decreased appetite.    Review of Systems  Review of Systems   Constitutional: Negative for chills and fever.   HENT: Negative.    Eyes: Negative.    Respiratory: Negative for cough and shortness of breath.    Cardiovascular: Negative for chest pain and palpitations.   Gastrointestinal: Negative for abdominal pain, nausea and vomiting.   Genitourinary: Negative.    Musculoskeletal:        Wound pain   Skin: Negative for itching and rash.        Physical Exam  Temp:  [36.5 °C (97.7 °F)-36.6 °C (97.8 °F)] 36.5 °C (97.7 °F)  Pulse:  [80-89] 89  Resp:  [18] 18  BP: (102-136)/(44-52) 112/52  SpO2:  [96 %] 96 %    Physical Exam   Constitutional: She is oriented to person, place, and time. No distress.   HENT:   Head: Normocephalic and atraumatic.   Right Ear: External ear normal.   Left Ear: External ear normal.   Eyes: Conjunctivae and EOM are normal. Right eye exhibits no discharge. Left eye exhibits no discharge.   Neck: Normal range of motion. Neck supple. No tracheal deviation present.   Cardiovascular: Normal rate and regular rhythm.   Pulmonary/Chest: No stridor. No respiratory distress. She has no wheezes.   Decreased BS   Abdominal: Soft. Bowel sounds are normal. She exhibits no distension. There is no tenderness.   Musculoskeletal: She exhibits edema and tenderness.   LUE 1+ edema w/ resolving ecchymoses   Neurological: She is alert and oriented to person, place, and time.   Skin: Skin is warm and dry. She is not diaphoretic.   Vitals reviewed.      Fluids  No intake or output data in the 24 hours ending 08/07/19 1216    Laboratory  Recent Labs     08/07/19  0536   WBC 6.9   RBC 3.34*   HEMOGLOBIN 10.4*   HEMATOCRIT 33.5*   .3*   MCH 31.1   MCHC 31.0*   RDW 52.6*   PLATELETCT 281   MPV 10.3     Recent Labs     08/07/19  0536   SODIUM 137   POTASSIUM 4.5   CHLORIDE 104    CO2 24   GLUCOSE 161*   BUN 17   CREATININE 0.75   CALCIUM 8.5                   Assessment/Plan  * Subarachnoid hemorrhage following injury, with loss of consciousness (HCC)- (present on admission)  Assessment & Plan  Non-surgical management  Completed Keppra x 7 days for SZ proph    Alkaline phosphatase elevation  Assessment & Plan  Suspect 2/2 traumatic fractures  U/S RUQ s/p choley w/o biliary dilatation  Follow to resolution    Macrocytic anemia  Assessment & Plan  Vit B12 and Folate levels normal  Following H/H    Vitamin D deficiency  Assessment & Plan  Vit D level 13  Continue supplementation    Laceration of left forearm- (present on admission)  Assessment & Plan  S/P repair  Wound care    Abnormal CT of the abdomen- (present on admission)  Assessment & Plan  Incidental finding of left kidney lesion on Trauma work-up  Needs F/U imaging    Essential hypertension- (present on admission)  Assessment & Plan  On Coreg, Benazepril, and Midodrine?  Will taper off Midodrine and decrease Benazepril    Closed fracture of multiple ribs with flail chest- (present on admission)  Assessment & Plan  S/P Left 3rd-7th rib fractures with resolved PTX  Aggressive pulmonary toilet, including incentive spirometry    Hypothyroid- (present on admission)  Assessment & Plan  Euthyroid on Synthroid    DM (diabetes mellitus) (HCC)- (present on admission)  Assessment & Plan  HbA1c 7.4  Good serum glucose control on current regimen  Continue Lantus, SSI, and Metformin    Fracture of left clavicle- (present on admission)  Assessment & Plan  S/P ORIF on 7/14/19 by Dr. Gauthier    Multiple fractures of cervical spine (HCC)- (present on admission)  Assessment & Plan  Non-surgical management  Cervical collar x 2 wks followed by F/U imaging  F/U X-ray pending    Full Code
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
## 2019-08-08 VITALS
HEIGHT: 67 IN | BODY MASS INDEX: 45.99 KG/M2 | RESPIRATION RATE: 18 BRPM | WEIGHT: 293 LBS | SYSTOLIC BLOOD PRESSURE: 124 MMHG | DIASTOLIC BLOOD PRESSURE: 72 MMHG | OXYGEN SATURATION: 92 % | TEMPERATURE: 98.2 F | HEART RATE: 96 BPM

## 2019-08-08 LAB
GLUCOSE BLD-MCNC: 124 MG/DL (ref 65–99)
GLUCOSE BLD-MCNC: 167 MG/DL (ref 65–99)

## 2019-08-08 PROCEDURE — A9270 NON-COVERED ITEM OR SERVICE: HCPCS | Performed by: HOSPITALIST

## 2019-08-08 PROCEDURE — 82962 GLUCOSE BLOOD TEST: CPT

## 2019-08-08 PROCEDURE — 99232 SBSQ HOSP IP/OBS MODERATE 35: CPT | Performed by: HOSPITALIST

## 2019-08-08 PROCEDURE — 99239 HOSP IP/OBS DSCHRG MGMT >30: CPT | Performed by: PHYSICAL MEDICINE & REHABILITATION

## 2019-08-08 PROCEDURE — 700102 HCHG RX REV CODE 250 W/ 637 OVERRIDE(OP): Performed by: PHYSICAL MEDICINE & REHABILITATION

## 2019-08-08 PROCEDURE — 700102 HCHG RX REV CODE 250 W/ 637 OVERRIDE(OP): Performed by: HOSPITALIST

## 2019-08-08 PROCEDURE — 97535 SELF CARE MNGMENT TRAINING: CPT

## 2019-08-08 PROCEDURE — A9270 NON-COVERED ITEM OR SERVICE: HCPCS | Performed by: PHYSICAL MEDICINE & REHABILITATION

## 2019-08-08 PROCEDURE — 97530 THERAPEUTIC ACTIVITIES: CPT

## 2019-08-08 RX ORDER — BENAZEPRIL HYDROCHLORIDE 5 MG/1
5 TABLET ORAL DAILY
Qty: 30 TAB | Refills: 2 | Status: ON HOLD | OUTPATIENT
Start: 2019-08-09 | End: 2019-08-29

## 2019-08-08 RX ORDER — OXYCODONE HYDROCHLORIDE 10 MG/1
10 TABLET ORAL EVERY 6 HOURS PRN
Qty: 42 TAB | Refills: 0 | Status: ON HOLD | OUTPATIENT
Start: 2019-08-08 | End: 2019-08-29

## 2019-08-08 RX ORDER — CARVEDILOL 12.5 MG/1
12.5 TABLET ORAL 2 TIMES DAILY WITH MEALS
Qty: 60 TAB | Refills: 2 | Status: ON HOLD | OUTPATIENT
Start: 2019-08-08 | End: 2019-08-29

## 2019-08-08 RX ADMIN — CARVEDILOL 12.5 MG: 12.5 TABLET, FILM COATED ORAL at 07:59

## 2019-08-08 RX ADMIN — OXYCODONE HYDROCHLORIDE 5 MG: 5 TABLET ORAL at 04:09

## 2019-08-08 RX ADMIN — LEVOTHYROXINE SODIUM 75 MCG: 75 TABLET ORAL at 05:49

## 2019-08-08 RX ADMIN — VITAMIN D, TAB 1000IU (100/BT) 2000 UNITS: 25 TAB at 07:59

## 2019-08-08 RX ADMIN — NYSTATIN: 100000 POWDER TOPICAL at 08:04

## 2019-08-08 RX ADMIN — INSULIN GLARGINE 20 UNITS: 100 INJECTION, SOLUTION SUBCUTANEOUS at 08:04

## 2019-08-08 RX ADMIN — BENAZEPRIL HYDROCHLORIDE 5 MG: 10 TABLET, COATED ORAL at 05:47

## 2019-08-08 RX ADMIN — LEVOTHYROXINE SODIUM 125 MCG: 125 TABLET ORAL at 05:47

## 2019-08-08 RX ADMIN — METFORMIN HYDROCHLORIDE 1000 MG: 500 TABLET, FILM COATED ORAL at 08:00

## 2019-08-08 ASSESSMENT — ENCOUNTER SYMPTOMS
PALPITATIONS: 0
EYES NEGATIVE: 1
CHILLS: 0
NAUSEA: 0
ABDOMINAL PAIN: 0
SHORTNESS OF BREATH: 0
FEVER: 0
COUGH: 0
VOMITING: 0

## 2019-08-08 ASSESSMENT — ACTIVITIES OF DAILY LIVING (ADL)
SHOWER_TRANSFER_LEVEL_OF_ASSIST: REQUIRES SUPERVISION WITH SHOWER TRANSFER
TOILETING_LEVEL_OF_ASSIST: REQUIRES SUPERVISION WITH TOILETING
TOILET_TRANSFER_LEVEL_OF_ASSIST: REQUIRES SUPERVISION WITH TOILET TRANSFER

## 2019-08-08 NOTE — REHAB-OT IDT TEAM NOTE
Occupational Therapy   Activities of Daily Living  Eating Initial:  5 - Standby Prompting/Supervision or Set-up  Eating Current:  7 - Independent   Eating Description:  Increased time(Patient completed self-feeding task with set-up assistance (while eating [place] toast))  Grooming Initial:  5 - Standby Prompting/Supervision or Set-up  Grooming Current:  7 - Independent   Grooming Description:  ( seated and standing at sink)  Bathing Initial:  3 - Moderate Assistance  Bathing Current:  4 - Minimal Assistance   Bathing Description:  Grab bar, Tub bench, Hand held shower(incindental assist to dry left stomach .therapist waterproofed left of elbow.  all other  bathing  setup/ supervision )  Upper Body Dressing Initial:  2 - Max Assistance  Upper Body Dressing Current:  4 - Minimal Assistance   Upper Body Dressing Description:  (assisti with getting sleev over left UE/ bandage . )  Lower Body Dressing Initial:  1 - Total Assistance  Lower Body Dressing Current:  5 - Standby Prompting/Supervsion or Set-up   Lower Body Dressing Description:  5 - Standby Prompting/Supervsion or Set-up  Toileting Initial:  4 - Minimal Assistance  Toileting Current:  5 - Standby Prompting/Supervision or Set-up   Toileting Description:  Grab bar, Increased time, Supervision for safety  Toilet Transfer Initial:  4 - Minimal Assistance  Toilet Transfer Current:  5 - Standby Prompting/Supervision or Set-up   Toilet Transfer Description:  5 - Standby Prompting/Supervision or Set-up  Tub / Shower Transfer Initial:  4 - Minimal Assistance  Tub / Shower Transfer Current:  6 - Modified Independent   Tub / Shower Transfer Description:  Grab bar  IADL:  Not addressed.   Family Training/Education:  Son (Jesus)present today; participated in family training focused on cervical collar (ie how to properly don/doff and apply parts, wash/dry, wearing schedule)  DME/DC Recommendations:  Shower bench, grab bars and hand held shower head recommended to facilitate  independence and safety with bathing tasks at home. Per patient and son, grab bars and hand held shower head already installed and shower bench ordered.     Strengths:  Able to follow instructions, Alert and oriented, Effective communication skills, Good carryover of learning, Good insight into deficits/needs, Independent PLOF, Making steady progress towards goals, Manages pain appropriately, Motivated for self care and independence, Pleasant and cooperative, Supportive family and Willingly participates in therapeutic activities  Barriers:  Decreased endurance and Other: Pain      # of short term goals set= 1  # of short term goals met= 1       Occupational Therapy Goals     Problem: OT Long Term Goals     Dates: Start: 07/24/19       Goal: LTG-By discharge, patient will complete basic home management     Dates: Start: 07/24/19       Description: 1) Individualized Goal:  Tasks necessary for DC at a min A to mod I level using AE and DME as needed.   2) Interventions:  OT Orthotics Training, OT E Stim Attended, OT Self Care/ADL, OT Cognitive Skill Dev, OT Manual Ther Technique, OT Neuro Re-Ed/Balance, OT Therapeutic Activity, OT Evaluation and OT Therapeutic Exercise                         Section completed by:  Tanesha Freedman MS,OTR/L

## 2019-08-08 NOTE — DISCHARGE INSTRUCTIONS
Occupational Therapy Discharge Instructions for Maira Dodd    8/8/2019    Level of Assist Required for Eating: Able to Complete Eating without Assist  Level of Assist Required for Grooming: Able to Complete Grooming without Assist  Level of Assist Required for Dressing: Requires Supervision with Dressing(Patient requires set-up assistance with UB dressing and SBA for LB dressing while incoporating sitting and standing and use of AE)  Equipment for Dressing: Sock Aid, Reacher, Long Handled Shoe Horn  Level of Assist Required for Toileting: Requires Supervision with Toileting  Level of Assist Required for Toilet Transfer: Requires Supervision with Toilet Transfer  Equipment for Toilet Transfer: Grab Bars by Toilet  Level of Assist Required for Bathing: Requires Supervision with Bathing  Equipment for Bathing: Grab Bars in Tub / Shower, Tub Transfer Bench, Hand Held Shower Head, Long Handled Sponge  Level of Assist Required for Shower Transfer: Requires Supervision with Shower Transfer  Equipment for Shower Transfer: Tub Transfer Bench, Grab Bars in Tub / Shower  Level of Assist Required for Home Mgmt: (Anticipate patient can perform at supervision level; not addressed during rehab stay)  Level of Assist Required for Meal Prep: (Anticipate patient can perform at supervision level; not addressed during rehab stay)  Driving: Please Contact Physician Prior to Driving  Home Exercise Program: Refer to Home Exercise Program Handout for Details      Physical Therapy Discharge Instructions for Maira Dodd    8/8/2019    Level of Assist Required for Ambulation: Hand Held Assist on Curbs, Supervision on Flat Surfaces  Device Recommended for Ambulation: None  Level of Assist Required for Transfers: Supervision  Device Recommended for Transfers: None  Home Exercise Program: Refer to Home Exercise Program Handout for Details    Thanks for all of your hard work, Coreen! Best of luck with your continued  recovery.   -Christy Schaffer, PT, DPT and Gina Jasmine, SPT      Diabetes and Small Vessel Disease  Small vessel disease (microvascular disease) includes nephropathy, retinopathy, and neuropathy. People with diabetes are at risk for these problems, but keeping blood glucose (sugar) controlled is helpful in preventing problems.  DIABETIC KIDNEY PROBLEMS (DIABETIC NEPHROPATHY)  · Diabetic nephropathy occurs in many patients with diabetes.   · Damage to the small vessels in the kidneys is the leading cause of end-stage renal disease (ESRD).   · Protein in the urine (albuminuria) in the range of 30 to 300 mg/24 h (microalbuminuria) is a sign of the earliest stage of diabetic nephropathy.   · Good blood glucose (sugar) and blood pressure control significantly reduce the progression of nephropathy.   DIABETIC EYE PROBLEMS (DIABETIC RETINOPATHY)  · Diabetic retinopathy is the most common cause of new cases of blindness in adults. It is related to the number of years you have had diabetes.   · Common risk factors include high blood sugar (hyperglycemia), high blood pressure (hypertension), and poorly controlled blood lipids such as high blood cholesterol (hypercholesterolemia).   DIABETIC NERVE PROBLEMS (DIABETIC NEUROPATHY)  Diabetic neuropathy is the most common, long-term complication of diabetes. It is responsible for more than half of leg amputations not due to accidents. The main risk for developing diabetic neuropathy seems to be uncontrolled blood sugars. Hyperglycemia damages the nerve fibers causing sensation (feeling) problems.  The closer you can keep the following guidelines, the better chance you will have avoiding problems from small vessel disease.  · Working toward near normal blood glucose or as normal as possible. You will need to keep your blood glucose and A1c at the target range prescribed by your caregiver.   · Keep your blood pressure less than 130/80.   · Keep your low-density lipoprotein (LDL)  cholesterol (one of the fats in your blood) at less than 100 mg/dL. An LDL less than 70 mg/dL may be recommended for high risk patients.   You cannot change your family history, but it is important to change the risk factors that you can. Risk factors you can control include:  · Controlling high blood pressure.   · Stopping smoking.   · Using alcohol only in moderation. Generally, this means about one drink per day for women and two drinks per day for men.   · Controlling your blood lipids (cholesterol and triglycerides).   · Treating heart problems, if these are contributing to risk.   SEEK MEDICAL CARE IF:   · You are having problems keeping your blood glucose in goal range.   · You notice a change in your vision or new problems with your vision.   · You have wound or sore that does not heal.   · Your blood pressure is above the target range.   Document Released: 12/20/2004 Document Revised: 03/11/2013 Document Reviewed: 05/28/2010  [employer]® Patient Information ©2013 [employer].  Diabetes Mellitus and Food  It is important for you to manage your blood sugar (glucose) level. Your blood glucose level can be greatly affected by what you eat. Eating healthier foods in the appropriate amounts throughout the day at about the same time each day will help you control your blood glucose level. It can also help slow or prevent worsening of your diabetes mellitus. Healthy eating may even help you improve the level of your blood pressure and reach or maintain a healthy weight.  General recommendations for healthful eating and cooking habits include:  · Eating meals and snacks regularly. Avoid going long periods of time without eating to lose weight.  · Eating a diet that consists mainly of plant-based foods, such as fruits, vegetables, nuts, legumes, and whole grains.  · Using low-heat cooking methods, such as baking, instead of high-heat cooking methods, such as deep frying.  Work with your dietitian to make sure you  understand how to use the Nutrition Facts information on food labels.  How can food affect me?  Carbohydrates   Carbohydrates affect your blood glucose level more than any other type of food. Your dietitian will help you determine how many carbohydrates to eat at each meal and teach you how to count carbohydrates. Counting carbohydrates is important to keep your blood glucose at a healthy level, especially if you are using insulin or taking certain medicines for diabetes mellitus.  Alcohol   Alcohol can cause sudden decreases in blood glucose (hypoglycemia), especially if you use insulin or take certain medicines for diabetes mellitus. Hypoglycemia can be a life-threatening condition. Symptoms of hypoglycemia (sleepiness, dizziness, and disorientation) are similar to symptoms of having too much alcohol.  If your health care provider has given you approval to drink alcohol, do so in moderation and use the following guidelines:  · Women should not have more than one drink per day, and men should not have more than two drinks per day. One drink is equal to:  ¨ 12 oz of beer.  ¨ 5 oz of wine.  ¨ 1½ oz of hard liquor.  · Do not drink on an empty stomach.  · Keep yourself hydrated. Have water, diet soda, or unsweetened iced tea.  · Regular soda, juice, and other mixers might contain a lot of carbohydrates and should be counted.  What foods are not recommended?  As you make food choices, it is important to remember that all foods are not the same. Some foods have fewer nutrients per serving than other foods, even though they might have the same number of calories or carbohydrates. It is difficult to get your body what it needs when you eat foods with fewer nutrients. Examples of foods that you should avoid that are high in calories and carbohydrates but low in nutrients include:  · Trans fats (most processed foods list trans fats on the Nutrition Facts label).  · Regular soda.  · Juice.  · Candy.  · Sweets, such as cake,  pie, doughnuts, and cookies.  · Fried foods.  What foods can I eat?  Eat nutrient-rich foods, which will nourish your body and keep you healthy. The food you should eat also will depend on several factors, including:  · The calories you need.  · The medicines you take.  · Your weight.  · Your blood glucose level.  · Your blood pressure level.  · Your cholesterol level.  You should eat a variety of foods, including:  · Protein.  ¨ Lean cuts of meat.  ¨ Proteins low in saturated fats, such as fish, egg whites, and beans. Avoid processed meats.  · Fruits and vegetables.  ¨ Fruits and vegetables that may help control blood glucose levels, such as apples, mangoes, and yams.  · Dairy products.  ¨ Choose fat-free or low-fat dairy products, such as milk, yogurt, and cheese.  · Grains, bread, pasta, and rice.  ¨ Choose whole grain products, such as multigrain bread, whole oats, and brown rice. These foods may help control blood pressure.  · Fats.  ¨ Foods containing healthful fats, such as nuts, avocado, olive oil, canola oil, and fish.  Does everyone with diabetes mellitus have the same meal plan?  Because every person with diabetes mellitus is different, there is not one meal plan that works for everyone. It is very important that you meet with a dietitian who will help you create a meal plan that is just right for you.  This information is not intended to replace advice given to you by your health care provider. Make sure you discuss any questions you have with your health care provider.  Document Released: 09/14/2006 Document Revised: 05/25/2017 Document Reviewed: 11/14/2014  [employer] Interactive Patient Education © 2017 [employer]  Suicidal Feelings: How to Help Yourself  Suicide is the taking of one's own life. If you feel as though life is getting too tough to handle and are thinking about suicide, get help right away. To get help:  · Call your local emergency services (911 in the [place]  · Call a suicide hotline  to speak with a trained counselor who understands how you are feeling. The following is a list of suicide hotlines in the [place]. For a list of hotlines in [place], visit www.suicide.org/hotlines/international/usmcon-vhpuscc-qcdhwzje.html.  ¨ 9-929-225-TALK (1-939.715.1441).  ¨ 0-287-NUNWOTQ (1-143.270.5556).  ¨ 1-644.723.8384. This is a hotline for Cymro speakers.  ¨ 8-136-806-4TTY (1-229.371.1314). This is a hotline for TTY users.  ¨ 0-693-6-U-JOSE MANUEL (1-815.626.8312). This is a hotline for lesbian, downey, bisexual, transgender, or questioning youth.  · Contact a crisis center or a local suicide prevention center. To find a crisis center or suicide prevention center:  ¨ Call your local hospital, clinic, community service organization, mental health center, social service provider, or health department. Ask for assistance in connecting to a crisis center.  ¨ Visit www.suicidepreventionlifeline.org/getinvolved/ for a list of crisis centers in the [place], or visit www.suicideprevention.ca/jdeajric-drksy-mupvmhk/find-a-crisis-centre for a list of centers in [place].  · Visit the following websites:  ¨ National Suicide Prevention Lifeline: www.suicidepreventionlifeline.org  ¨ Hopeline: www.hopeline.com  ¨ American Foundation for Suicide Prevention: www.afsp.org  ¨ The Jose Manuel Project (for lesbian, downey, bisexual, transgender, or questioning youth): www.thetrevorproject.org  How can I help myself feel better?  · Promise yourself that you will not do anything drastic when you have suicidal feelings. Remember, there is hope. Many people have gotten through suicidal thoughts and feelings, and you will, too. You may have gotten through them before, and this proves that you can get through them again.  · Let family, friends, teachers, or counselors know how you are feeling. Try not to isolate yourself from those who care about you. Remember, they will want to help you. Talk with someone every day, even if you  do not feel sociable. Face-to-face conversation is best.  · Call a mental health professional and see one regularly.  · Visit your primary health care provider every year.  · Eat a well-balanced diet, and space your meals so you eat regularly.  · Get plenty of rest.  · Avoid alcohol and drugs, and remove them from your home. They will only make you feel worse.  · If you are thinking of taking a lot of medicine, give your medicine to someone who can give it to you one day at a time. If you are on antidepressants and are concerned you will overdose, let your health care provider know so he or she can give you safer medicines. Ask your mental health professional about the possible side effects of any medicines you are taking.  · Remove weapons, poisons, knives, and anything else that could harm you from your home.  · Try to stick to routines. Follow a schedule every day. Put self-care on your schedule.  · Make a list of realistic goals, and cross them off when you achieve them. Accomplishments give a sense of worth.  · Wait until you are feeling better before doing the things you find difficult or unpleasant.  · Exercise if you are able. You will feel better if you exercise for even a half hour each day.  · Go out in the sun or into nature. This will help you recover from depression faster. If you have a favorite place to walk, go there.  · Do the things that have always given you pleasure. Play your favorite music, read a good book, paint a picture, play your favorite instrument, or do anything else that takes your mind off your depression if it is safe to do.  · Keep your living space well lit.  · When you are feeling well, write yourself a letter about tips and support that you can read when you are not feeling well.  · Remember that life’s difficulties can be sorted out with help. Conditions can be treated. You can work on thoughts and strategies that serve you well.  This information is not intended to replace  "advice given to you by your health care provider. Make sure you discuss any questions you have with your health care provider.  Document Released: 06/23/2004 Document Revised: 08/16/2017 Document Reviewed: 04/14/2015  [employer] Interactive Patient Education © 2017 [employer]  Prevent Falls in Your Home    \"Falling once doubles your chance of falling again\"        -Center for Disease Control and Prevention    Falls in the home can lead to serious injury (fractures, brain injuries), hospitalizations, increased medical costs, and could even be fatal.  The good news is, there are many precautions you can take to avoid falls in your home and help keep you safe:     · If prescribed an assistive device (walker, crutches), use as instructed by the healthcare provider\"   · Remove any tripping hazards from your home, including loose cords, throw rugs and clutter  · Keep a nightlight on in dark (hallways, bathrooms, etc)   · Get up slowly, to make sure you feel okay before getting up  · Be aware of any side effects of your medications: some medications may make you dizzy  · Place a non-skid rubber mat in your shower or tub-consider a shower bench or chair if unsteady on your feet  · Wear supportive shoes or non-skid socks when moving around  · Start an exercise program once approved by your provider.  If you are feeling weak following a hospital stay, talk to your doctor about home health or outpatient therapy programs designed to help rebuild your strength and endurance      [facility] NURSING DISCHARGE INSTRUCTIONS    Blood Pressure : 117/49(RN notified)  Weight: (!) 136 kg (299 lb 14.4 oz)  Nursing recommendations for Maira Dodd at time of discharge are as follows:  Client verbalized understanding of all discharge instructions and prescriptions.     Review all your home medications and newly ordered medications with your doctor and/or pharmacist. Follow medication instructions as directed " by your doctor and/or pharmacist.    Pain Management:   Discharge Pain Medication Instructions:  Comfort Goal: Comfort with Movement, Comfort at Rest, Sleep Comfortably  Notify your primary care provider if pain is unrelieved with these measures, if the pain is new, or increased in intensity.    Discharge Skin Characteristics: Warm, Dry  Discharge Skin Exam:    Wound 07/23/19 Full Thickness Wound Elbow full thickness wound posterior left elbow (Active)   Wound Image   8/6/2019  9:00 AM   Site Assessment Red;Yellow;Drainage 8/7/2019  7:35 AM   Ashely-wound Assessment Painful;Pink 8/7/2019  7:35 AM   Margins Attached edges 8/6/2019  9:00 AM   Wound Length (cm) 9 cm 8/6/2019  9:00 AM   Wound Width (cm) 12.5 cm 8/6/2019  9:00 AM   Wound Depth (cm) 1 cm 8/6/2019  9:00 AM   Wound Surface Area (cm^2) 112.5 cm^2 8/6/2019  9:00 AM   Tunneling 0 cm 8/6/2019  9:00 AM   Undermining 0 cm 8/6/2019  9:00 AM   Closure Secondary intention 8/6/2019  9:00 AM   Drainage Amount Moderate 8/7/2019  7:35 AM   Drainage Description Yellow;Serosanguineous 8/7/2019  7:35 AM   Non-staged Wound Description Full thickness 8/7/2019  7:35 AM   Treatments Cleansed;Site care 8/7/2019  7:35 AM   Cleansing Approved Wound Cleanser 8/7/2019  7:35 AM   Periwound Protectant Not Applicable 8/7/2019  7:35 AM   Dressing Options Absorbent Abdominal Pad;Hydrofera Blue Ready;Roll Gauze 8/7/2019  8:55 PM   Dressing Cleansing/Solutions Not Applicable 8/6/2019  9:00 AM   Dressing Changed Changed 8/7/2019  7:35 AM   Dressing Status Clean;Dry;Intact 8/7/2019  8:55 PM   Dressing Change Frequency Daily 8/7/2019  8:55 PM   NEXT Dressing Change  08/08/19 8/7/2019  7:35 AM   NEXT Weekly Photo (Inpatient Only) 08/13/19 8/6/2019  9:00 AM   WOUND NURSE ONLY - Odor None 8/6/2019  9:00 AM   WOUND NURSE ONLY - Exposed Structures None 8/6/2019  9:00 AM   WOUND NURSE ONLY - Tissue Type and Percentage red/adipose 8/6/2019  9:00 AM   WOUND NURSE ONLY - Time Spent with Patient (mins)  60 7/30/2019  9:25 AM       Wound 07/23/19 Sacrum (Active)   Site Assessment Red;Pink 8/7/2019  8:55 PM   Ashely-wound Assessment Pink 8/7/2019  8:55 PM   Dressing Options Open to Air 8/7/2019  8:55 PM       Wound 08/03/19 Partial Thickness Wound Abdomen partial thickness wound mid lower pannus fold (Active)   Site Assessment Yellow;Red 8/7/2019  7:35 AM   Ashely-wound Assessment Clean;Intact 8/7/2019  7:35 AM   Margins Attached edges 8/6/2019  9:00 AM   Wound Length (cm) 0.5 cm 8/6/2019  9:00 AM   Wound Width (cm) 2 cm 8/6/2019  9:00 AM   Wound Depth (cm) 0.1 cm 8/6/2019  9:00 AM   Wound Surface Area (cm^2) 1 cm^2 8/6/2019  9:00 AM   Tunneling 0 cm 8/6/2019  9:00 AM   Undermining 0 cm 8/6/2019  9:00 AM   Closure Secondary intention 8/7/2019  7:35 AM   Drainage Amount Small 8/7/2019  7:35 AM   Drainage Description Sanguineous 8/7/2019  7:35 AM   Non-staged Wound Description Partial thickness 8/7/2019  7:35 AM   Treatments Cleansed;Site care 8/7/2019  7:35 AM   Cleansing Approved Wound Cleanser 8/7/2019  7:35 AM   Periwound Protectant Not Applicable 8/6/2019  9:00 AM   Dressing Options Hydrofera Blue Classic 8/7/2019  8:55 PM   Dressing Cleansing/Solutions Not Applicable 8/6/2019  9:00 AM   Dressing Changed Changed 8/7/2019  7:35 AM   Dressing Status Clean;Dry;Intact 8/7/2019  8:55 PM   Dressing Change Frequency Daily 8/7/2019  8:55 PM   NEXT Dressing Change  08/08/19 8/7/2019  7:35 AM   NEXT Weekly Photo (Inpatient Only) 08/10/19 8/6/2019  9:00 AM   WOUND NURSE ONLY - Odor None 8/6/2019  9:00 AM   WOUND NURSE ONLY - Exposed Structures None 8/6/2019  9:00 AM   WOUND NURSE ONLY - Tissue Type and Percentage red 100% 8/6/2019  9:00 AM   WOUND NURSE ONLY - Time Spent with Patient (mins) 60 8/6/2019  9:00 AM     Skin / Wound Care Instructions: Please contact your primary care physician for any change in skin integrity.     If You Have Surgical Incisions / Wounds:  Monitor surgical site(s) for signs of increased swelling,  redness or symptoms of drainage from the site or fever as this could indicate signs and symptoms of infection. If these symptoms are noted, notifiy your primary care provider.      Discharge Safety Instructions:       Discharge Safety Concerns:    The interdisciplinary team has made recommendation that you should not be left alone  in the house due to demonstration of safety with ADL's and IADLS and problem solving skills  Anti-embolic stockings are not required to increase circulation to the lower extremities.    Discharge Diet: diabetic diet - regular consistency      Discharge Liquids: thin  Discharge Bowel Function: Continent  Please contact your primary care physician for any changes in bowel habits.  Discharge Bowel Program:    Discharge Bladder Function: Continent  Discharge Urinary Devices: None      Nursing Discharge Plan:   Influenza Vaccine Indication: Patient Refuses    Case Management Discharge Instructions:   Discharge Location: Home with Home Health  Agency Name/Address/Phone: [facility] [address] [address] [phone]  [facility]: Registered Nurse, Home Health Aide, Occupational Therapist, Physical Therapist  Outpatient Services:    DME Provider/Phone:    Medical Equipment Ordered:    Prescription Faxed to:        Discharge Medication Instructions:  Below are the medications your physician expects you to take upon discharge:

## 2019-08-08 NOTE — THERAPY
Physical Therapy   Daily Treatment     Patient Name: Maira Dodd  Age:  74 y.o., Sex:  female  Medical Record #: [id number]  Today's Date: 8/8/2019     Precautions  Precautions: Spinal / Back Precautions , Fall Risk, Non Weight Bearing Left Upper Extremity  Comments: L UE wounds, orthostatic    Subjective    Pt in room with spouse and son, eager for discharge.  present with pt at beginning of session.     Objective       08/08/19 1501   Precautions   Precautions Spinal / Back Precautions ;Fall Risk;Non Weight Bearing Left Upper Extremity   Comments L UE wounds, orthostatic   Bed Mobility    Supine to Sit Stand by Assist   Interdisciplinary Plan of Care Collaboration   IDT Collaboration with  Family / Caregiver;   Patient Position at End of Therapy Seated  (at nurse's station with family)   Collaboration Comments Spouse and son present for session,  with pt on arrival   PT Total Time Spent   PT Individual Total Time Spent (Mins) 30   PT Charge Group   PT Therapeutic Activities 2     WC <> truck transfer:  wc > vehicle with min A and second person behind pt for pt comfort, vehicle > wc CGA    Ascending and descending standard curb x1 with HHA from spouse    Ambulation over uneven surfaces, ~ 25' CGA-SBA.  Pt able to  object from ground with SBA for safety.    Pt issued fall information packet.      Assessment    Pt eager to d/c today, requesting to do minimal required for d/c. Pt able to complete functional mobility tasks including ambulation, transfers and curbs at a SBA level for safety and balance, with minimal assistance needed for curbs and transfer into spouse's truck. Patient is ready to d/c home.    Plan    D/c to home today with support of family.

## 2019-08-08 NOTE — CARE PLAN
"  Problem: Mobility  Goal: STG-Within one week, patient will ambulate household distance  Description  1) Individualized goal:  50' HHA  2) Interventions:  PT Group Therapy, PT Gait Training, PT Therapeutic Exercises, PT TENS Application, PT Neuro Re-Ed/Balance, PT Therapeutic Activity and PT Manual Therapy     Outcome: MET  Goal: STG-Within one week, patient will ambulate up/down a curb  Description  1) Individualized goal:  4\" curb, HHA  2) Interventions:   PT Group Therapy, PT Gait Training, PT Therapeutic Exercises, PT TENS Application, PT Neuro Re-Ed/Balance, PT Therapeutic Activity and PT Manual Therapy      Outcome: MET     Problem: PT-Long Term Goals  Goal: LTG-By discharge, patient will ambulate  Description  1) Individualized goal:  250' mod I, no AD  2) Interventions:   PT Group Therapy, PT Gait Training, PT Therapeutic Exercises, PT TENS Application, PT Neuro Re-Ed/Balance, PT Therapeutic Activity and PT Manual Therapy      Outcome: DISCHARGED-GOAL NOT MET  Goal: LTG-By discharge, patient will transfer one surface to another  Description  1) Individualized goal:  Bed <> WC, mod I, no AD  2) Interventions:   PT Group Therapy, PT Gait Training, PT Therapeutic Exercises, PT TENS Application, PT Neuro Re-Ed/Balance, PT Therapeutic Activity and PT Manual Therapy     Outcome: DISCHARGED-GOAL NOT MET  Goal: LTG-By discharge, patient will ambulate up/down 4-6 stairs  Description  1) Individualized goal:  4 6\" steps, R HR, mod I  2) Interventions:   PT Group Therapy, PT Gait Training, PT Therapeutic Exercises, PT TENS Application, PT Neuro Re-Ed/Balance, PT Therapeutic Activity and PT Manual Therapy       Outcome: DISCHARGED-GOAL NOT MET  Goal: LTG-By discharge, patient will transfer in/out of a car  Description  1) Individualized goal:  SPV, no AD  2) Interventions:   PT Group Therapy, PT Gait Training, PT Therapeutic Exercises, PT TENS Application, PT Neuro Re-Ed/Balance, PT Therapeutic Activity and PT Manual " Therapy     Outcome: DISCHARGED-GOAL NOT MET

## 2019-08-08 NOTE — DISCHARGE PLANNING
Case Management Discharge Instructions        Discharge Location: Home with Home Health   Agency Name / Address / Phone: [facility]         [address] [place] [address]         [phone]   Home Health: Registered Nurse, Home Health Aide, Occupational Therapist, Physical Therapist     Follow-up Information:    Manuel Gauthier M.D. Orthopaedics    [address]    [place] [address]    [phone]    Monday 8.12.2019, check in at 2:15pm for 2:45pm appointment      Manan Ortiz M.D. Primary Care Provider    [facility] [facility] [address]    [facility] [address]    [phone]    Wednesday 8.14.2019, appointment at 1:30pm with Jazmine Avila P.A.-C. Endocrinology    [address]    [facility] [address]    [phone]    Tuesday 9.10.2019, appointment at 4:45pm

## 2019-08-08 NOTE — THERAPY
"Occupational Therapy  Daily Treatment     Patient Name: Maira Dodd  Age:  74 y.o., Sex:  female  Medical Record #: [id number]  Today's Date: 8/8/2019     Precautions  Precautions: Cervical Collar  , Spinal / Back Precautions , Fall Risk, Non Weight Bearing Left Upper Extremity  Comments: L UE wounds, orthostatic    Safety   ADL Safety : Requires Supervision for Safety  Bathroom Safety: Requires Supervision for Safety  Comments: See FIMs     Subjective    \"This was so helpful!\" patient stated after education re: cervical collar.      Objective       08/08/19 1001   Precautions   Precautions Cervical Collar  ;Spinal / Back Precautions ;Fall Risk;Non Weight Bearing Left Upper Extremity   Safety    ADL Safety  Requires Supervision for Safety   Bathroom Safety Requires Supervision for Safety   Cognition    Cognition / Consciousness WDL   Level of Consciousness Alert   Interdisciplinary Plan of Care Collaboration   IDT Collaboration with  Family / Caregiver  (SonJesus attended part OT of session)   Patient Position at End of Therapy In Bed;Call Light within Reach;Tray Table within Reach;Family / Friend in Room   Collaboration Comments Patient's son, Jesus actively participated in OT session. Educated on cervical collar wearing schedule (currently to be on at all times), reviewed how to properly don/doff collar and it's foam parts, proper washing/drying. Jesus verbalized understanding and demonstrated successful return demo.     OT Total Time Spent   OT Individual Total Time Spent (Mins) 60   OT Charge Group   OT Self Care / ADL 4       FIM Lower Body Dressing Score:  5 - Standby Prompting/Supervsion or Set-up  Lower Body Dressing Description:  Assist device/equipment, Sock aid, Increased time, Supervision for safety, Set-up of equipment(Patient donned and doffed elastic waist shorts and non-skid socks with SBA using adaptive equipment (sock aid) while incorporating sitting EOB and standing. )    FIM " "Toiletin - Standby Prompting/Supervision or Set-up  Toileting Description:  Grab bar, Increased time, Supervision for safety    FIM Bed/Chair/Wheelchair Transfers Score: 5 - Standby Prompting/Supervision or Set-up  Bed/Chair/Wheelchair Transfers Description:  (Patient transferred bed <> w/c with SBA assistance for safety )    FIM Toilet Transfer Score:  5 - Standby Prompting/Supervision or Set-up  Toilet Transfer Description:  Grab bar, Increased time(Patient transferred wc<>toilet with SBA for safety)    Reviewed AE to facilitate safety and independence with LB dressing and patient demonstrated ability to use AE with SBA while seated edge of bed. Undersigned therapist assisted patient with locating \"hip kit\" on [employer] and reinforced benefits of AE when son present (as patient patient she is not \"phone and computer savvy\")     Discussed bathing equipment recommendations and confirmed family already ordered bath bench.  Hand-held shower head and grab bars already installed.     Assessment    Patient tolerated OT session well this AM. Patient has made excellent functional progress during rehab stay and can now perform self-feeding and grooming tasks independently and all other ADL tasks with SBA while incorporating sitting and standing. Continues to require SBA assistance for safety and standing balance. Patient is ready to d/c home.     Plan    Patient to d/c home today with spouse.     "

## 2019-08-08 NOTE — PROGRESS NOTES
"Rehab Progress Note     Encounter Date: 8/7/2019    CC: TBI, left arm wound, and left clavicular fracture    Interval Events (Subjective)  Patient sitting up in room. She reports therapy is going well and her  is coming in tomorrow. She inquires if she can leave after training tomorrow. Discussed would have to speak with therapy team and CM to see if it can be arranged. Discussed with PT and CM and they are in agreement that after family training if it goes well she can leave at the end of the day.  Discussed with patient.  Discussed discharge medications including metformin and Lantus.  Discussed will also speak with Hospitalist for final recommendations.     IDT Team Meeting 8/1/2019  DC/Disposition:  8/9/19    Objective:  VITAL SIGNS: /49 Comment: RN notified  Pulse 87   Temp 36.7 °C (98.1 °F) (Oral)   Resp 18   Ht 1.702 m (5' 7\")   Wt (!) 136 kg (299 lb 14.4 oz)   SpO2 92%   BMI 46.97 kg/m²   Gen: NAD  Psych: Mood and affect appropriate  CV: RRR, no edema  Resp: CTAB, no upper airway sounds  Abd: NTND  Neuro: AOx4, walking with forward stooped gait    Recent Results (from the past 72 hour(s))   ACCU-CHEK GLUCOSE    Collection Time: 08/05/19  7:32 AM   Result Value Ref Range    Glucose - Accu-Ck 124 (H) 65 - 99 mg/dL   ACCU-CHEK GLUCOSE    Collection Time: 08/05/19 10:52 AM   Result Value Ref Range    Glucose - Accu-Ck 158 (H) 65 - 99 mg/dL   ACCU-CHEK GLUCOSE    Collection Time: 08/05/19  4:55 PM   Result Value Ref Range    Glucose - Accu-Ck 126 (H) 65 - 99 mg/dL   ACCU-CHEK GLUCOSE    Collection Time: 08/05/19  8:10 PM   Result Value Ref Range    Glucose - Accu-Ck 148 (H) 65 - 99 mg/dL   ACCU-CHEK GLUCOSE    Collection Time: 08/06/19  7:11 AM   Result Value Ref Range    Glucose - Accu-Ck 123 (H) 65 - 99 mg/dL   ACCU-CHEK GLUCOSE    Collection Time: 08/06/19 11:19 AM   Result Value Ref Range    Glucose - Accu-Ck 132 (H) 65 - 99 mg/dL   ACCU-CHEK GLUCOSE    Collection Time: 08/06/19  5:32 PM "   Result Value Ref Range    Glucose - Accu-Ck 162 (H) 65 - 99 mg/dL   ACCU-CHEK GLUCOSE    Collection Time: 08/06/19  8:20 PM   Result Value Ref Range    Glucose - Accu-Ck 150 (H) 65 - 99 mg/dL   CBC WITHOUT DIFFERENTIAL    Collection Time: 08/07/19  5:36 AM   Result Value Ref Range    WBC 6.9 4.8 - 10.8 K/uL    RBC 3.34 (L) 4.20 - 5.40 M/uL    Hemoglobin 10.4 (L) 12.0 - 16.0 g/dL    Hematocrit 33.5 (L) 37.0 - 47.0 %    .3 (H) 81.4 - 97.8 fL    MCH 31.1 27.0 - 33.0 pg    MCHC 31.0 (L) 33.6 - 35.0 g/dL    RDW 52.6 (H) 35.9 - 50.0 fL    Platelet Count 281 164 - 446 K/uL    MPV 10.3 9.0 - 12.9 fL   Basic Metabolic Panel    Collection Time: 08/07/19  5:36 AM   Result Value Ref Range    Sodium 137 135 - 145 mmol/L    Potassium 4.5 3.6 - 5.5 mmol/L    Chloride 104 96 - 112 mmol/L    Co2 24 20 - 33 mmol/L    Glucose 161 (H) 65 - 99 mg/dL    Bun 17 8 - 22 mg/dL    Creatinine 0.75 0.50 - 1.40 mg/dL    Calcium 8.5 8.5 - 10.5 mg/dL    Anion Gap 9.0 0.0 - 11.9   ESTIMATED GFR    Collection Time: 08/07/19  5:36 AM   Result Value Ref Range    GFR If African American >60 >60 mL/min/1.73 m 2    GFR If Non African American >60 >60 mL/min/1.73 m 2   ACCU-CHEK GLUCOSE    Collection Time: 08/07/19  7:07 AM   Result Value Ref Range    Glucose - Accu-Ck 134 (H) 65 - 99 mg/dL   ACCU-CHEK GLUCOSE    Collection Time: 08/07/19 11:10 AM   Result Value Ref Range    Glucose - Accu-Ck 145 (H) 65 - 99 mg/dL   ACCU-CHEK GLUCOSE    Collection Time: 08/07/19  5:24 PM   Result Value Ref Range    Glucose - Accu-Ck 123 (H) 65 - 99 mg/dL   ACCU-CHEK GLUCOSE    Collection Time: 08/07/19  8:08 PM   Result Value Ref Range    Glucose - Accu-Ck 131 (H) 65 - 99 mg/dL       Current Facility-Administered Medications   Medication Frequency   • benazepril (LOTENSIN) tablet 5 mg Q DAY   • carvedilol (COREG) tablet 12.5 mg BID WITH MEALS   • insulin glargine (LANTUS) injection 20 Units BID   • metFORMIN (GLUCOPHAGE) tablet 1,000 mg BID WITH MEALS   • insulin  regular (HUMULIN R) injection 2-12 Units 4X/DAY ACHS    And   • glucose 4 g chewable tablet 16 g Q15 MIN PRN    And   • dextrose 50% (D50W) injection 50 mL Q15 MIN PRN   • vitamin D (cholecalciferol) tablet 2,000 Units DAILY   • Respiratory Care per Protocol Continuous RT   • oxyCODONE immediate-release (ROXICODONE) tablet 5 mg Q3HRS PRN   • oxyCODONE immediate release (ROXICODONE) tablet 10 mg Q3HRS PRN   • tramadol (ULTRAM) 50 MG tablet 50 mg Q4HRS PRN   • hydrALAZINE (APRESOLINE) tablet 25 mg Q8HRS PRN   • acetaminophen (TYLENOL) tablet 650 mg Q4HRS PRN   • senna-docusate (PERICOLACE or SENOKOT S) 8.6-50 MG per tablet 2 Tab BID    And   • polyethylene glycol/lytes (MIRALAX) PACKET 1 Packet QDAY PRN    And   • magnesium hydroxide (MILK OF MAGNESIA) suspension 30 mL QDAY PRN    And   • bisacodyl (DULCOLAX) suppository 10 mg QDAY PRN   • artificial tears 1.4 % ophthalmic solution 1 Drop PRN   • benzocaine-menthol (CEPACOL) lozenge 1 Lozenge Q2HRS PRN   • mag hydrox-al hydrox-simeth (MAALOX PLUS ES or MYLANTA DS) suspension 20 mL Q2HRS PRN   • ondansetron (ZOFRAN ODT) dispertab 4 mg 4X/DAY PRN    Or   • ondansetron (ZOFRAN) syringe/vial injection 4 mg 4X/DAY PRN   • traZODone (DESYREL) tablet 50 mg QHS PRN   • sodium chloride (OCEAN) 0.65 % nasal spray 2 Spray PRN   • HYDROmorphone (DILAUDID) tablet 2-4 mg Q3HRS PRN   • nystatin (MYCOSTATIN) powder BID   • levothyroxine (SYNTHROID) tablet 125 mcg AM ES    And   • levothyroxine (SYNTHROID) tablet 75 mcg AM ES       Orders Placed This Encounter   Procedures   • Diet Order Diabetic     Standing Status:   Standing     Number of Occurrences:   1     Order Specific Question:   Diet:     Answer:   Diabetic [3]     Order Specific Question:   Consistency/Fluid modifications:     Answer:   Thin Liquids [3]     Comments:   straws ok       Assessment:  Active Hospital Problems    Diagnosis   • *Subarachnoid hemorrhage following injury, with loss of consciousness (HCC)   • Closed  fracture of multiple ribs with flail chest   • UTI (urinary tract infection)   • DM (diabetes mellitus) ([facility])   • Fracture of left clavicle   • Morbid obesity with BMI of 45.0-49.9, adult ([facility])   • Multiple fractures of cervical spine ([facility])   • Dysphagia   • Laceration of left forearm   • Essential hypertension   • Hypothyroid   • Trauma   • Abnormal CT of the abdomen   • Vitamin D deficiency   • Renal insufficiency   • Macrocytic anemia   • Alkaline phosphatase elevation       Medical Decision Making and Plan:  TBI - Patient with left sided SAH with loss of consciousness and decreased cognition. Also limited by other orthopedic injuries  -Patient has completed Keppra for seizure prophylaxis.   -PT and OT for mobility and ADLs  -SLP for cognition      Dysphagia - Patient with oropharyngeal dysphagia. Upgraded to Dysphagia 2 with NTL prior to transfer.  SLP for swallow - Advanced diet to regular with thins. Resolved.      C/T Spine fractures - Patient with TP of C7 and T1 fractures in C collar for unknown length of time.    -Continue C collar. Will need follow-up with NSG  -Patient's improved with different collar.      Left clavicular fracture/left arm wound - S/p ORIF with Dr. Gauthier on 7/14/19. Also with large left arms laceration  -Continue NWB LUE.  Remove staples on shoulder and forearm  -Wound consult - examined after debridement, well healing. Will need home health wound care.       HTN - Patient on Benazepriil 10 mg and Coreg 6.25 mg BID  -SBP slowly increasing, per hospitalist continue to monitor as recently had orthostatic hypotension.     Orthostatic hypotension - Severe orthostatics on 7/24/19, continue to monitor. ACEi has been reduced.   -Started on Midodrine 2.5 mg BID. Difficult to control due to orthostatic hypotension and supine hypertension.    Anemia - Stable at 11.3.     DM - Patient on Lantus 30 U BID, metformin 1000 mg BID and SSI on transfer. Previously on Trulicity, Metformin  -Will check A1c  7.4. Consult hospitalist, decreased to 20 U  -Restarted on Metformin 1000 mg BID. Will discuss final recommendations with Hospitalist     Hypothyroidism - Patient on 200 mcg on transfer.      Vitamin D deficiency - 12 on admission. Start on 2000 U daily    Mood - Patient with fear of discharge. Continue to work with patient, psychology consulted. Consider SSRI for anxiety.   -Benefit from outpatient behavioral health referral     GI Ppx - Patient on Omeprazole while on dysphagia diet. Discontinue Prilosec as off dysphagia diet     DVT Ppx - Patient on Lovenox on transfer. Ambulating > 100 feet, discontinue Lovenox    Dispo - Patient would like to go home tomorrow. Reviewed medications and with team for her to have family training then discharge.      Total time:  35 minutes.  I spent greater than 50% of the time for patient care, counseling, and coordination on this date, including unit/floor time, and face-to-face time with the patient as per interval events and assessment and plan above. Topics discussed included discharge planning, discharge medications and ongoing discussion about diabetic medications.     Vince Matias M.D.

## 2019-08-08 NOTE — PROGRESS NOTES
Hospital Medicine Daily Progress Note      Chief Complaint  HTN, DM    Interval Problem Update  No new complaints, looking forward to going home today.    Review of Systems  Review of Systems   Constitutional: Negative for chills and fever.   HENT: Negative.    Eyes: Negative.    Respiratory: Negative for cough and shortness of breath.    Cardiovascular: Negative for chest pain and palpitations.   Gastrointestinal: Negative for abdominal pain, nausea and vomiting.   Genitourinary: Negative.    Musculoskeletal:        Wound pain   Skin: Negative for itching and rash.        Physical Exam  Temp:  [36.7 °C (98.1 °F)-36.8 °C (98.2 °F)] 36.8 °C (98.2 °F)  Pulse:  [81-96] 96  Resp:  [18] 18  BP: (117-128)/(49-78) 124/72  SpO2:  [92 %-94 %] 92 %    Physical Exam   Constitutional: She is oriented to person, place, and time. No distress.   HENT:   Head: Normocephalic and atraumatic.   Right Ear: External ear normal.   Left Ear: External ear normal.   Eyes: Conjunctivae and EOM are normal. Right eye exhibits no discharge. Left eye exhibits no discharge.   Neck: Normal range of motion. Neck supple. No tracheal deviation present.   Cardiovascular: Normal rate and regular rhythm.   Pulmonary/Chest: No stridor. No respiratory distress. She has no wheezes.   Decreased BS   Abdominal: Soft. Bowel sounds are normal. She exhibits no distension. There is no tenderness.   Musculoskeletal: She exhibits edema and tenderness.   LUE trace edema w/ resolving ecchymoses   Neurological: She is alert and oriented to person, place, and time.   Skin: Skin is warm and dry. She is not diaphoretic.   Vitals reviewed.      Fluids    Intake/Output Summary (Last 24 hours) at 8/8/2019 0984  Last data filed at 8/7/2019 1900  Gross per 24 hour   Intake 240 ml   Output --   Net 240 ml       Laboratory  Recent Labs     08/07/19  0536   WBC 6.9   RBC 3.34*   HEMOGLOBIN 10.4*   HEMATOCRIT 33.5*   .3*   MCH 31.1   MCHC 31.0*   RDW 52.6*   PLATELETCT  "281   MPV 10.3     Recent Labs     08/07/19  0536   SODIUM 137   POTASSIUM 4.5   CHLORIDE 104   CO2 24   GLUCOSE 161*   BUN 17   CREATININE 0.75   CALCIUM 8.5                   Assessment/Plan  * Subarachnoid hemorrhage following injury, with loss of consciousness (HCC)- (present on admission)  Assessment & Plan  Non-surgical management  Completed Keppra x 7 days for SZ proph    Alkaline phosphatase elevation  Assessment & Plan  Suspect 2/2 traumatic fractures  U/S RUQ s/p choley w/o biliary dilatation  Follow to resolution    Macrocytic anemia  Assessment & Plan  Vit B12 and Folate levels normal  Following H/H    Vitamin D deficiency  Assessment & Plan  Vit D level 13  Continue supplementation    Laceration of left forearm- (present on admission)  Assessment & Plan  S/P repair  Wound care    Abnormal CT of the abdomen- (present on admission)  Assessment & Plan  Incidental finding of left kidney lesion on Trauma work-up  Needs F/U imaging    Essential hypertension- (present on admission)  Assessment & Plan  Continue Coreg and Benazepril  Off Midodrine    Closed fracture of multiple ribs with flail chest- (present on admission)  Assessment & Plan  S/P Left 3rd-7th rib fractures with resolved PTX  Aggressive pulmonary toilet, including incentive spirometry    Hypothyroid- (present on admission)  Assessment & Plan  Euthyroid on Synthroid    DM (diabetes mellitus) (HCC)- (present on admission)  Assessment & Plan  HbA1c 7.4  Good serum glucose control on current regimen  Continue Lantus and Metformin  Will not need SSI on discharge    Fracture of left clavicle- (present on admission)  Assessment & Plan  S/P ORIF on 7/14/19 by Dr. Gauthier    Multiple fractures of cervical spine (HCC)- (present on admission)  Assessment & Plan  Non-surgical management  Cervical collar x 2 wks  F/U X-ray \"no dynamic instability\"    Full Code    Patient seen and examined.  Chart reviewed.  Assessment and Plan as above.  No changes today.      "
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
## 2019-08-09 NOTE — DISCHARGE PLANNING
Case management Summary:   Met with patient prior to discharge.   Reviewed all follow up appointments: PCP, Ortho & endocrinology.   Referral made to [facility] and they are have accepted referral and are ready to follow.        During hospitalization, I have provided support and education and have been available for questions and information during hours of operation, communicated with therapy team and MD along with providing links/resources  to outside services.    Patient verbalizes agreement with all plans and has an understanding of the next steps within the post acute services.     Individualized Goals:   1. Pain mgmt  2. To be able to get OOB & move  3. To get a comfortable collar to wear    Outcome:   1. Goal met & completed: adequate pain mgmt.  2. Goal met & completed: patient has made improvement since rehab admit, no DME needed for discharge. It is anticipated she will continue to improve w/ HH mgmt.  3. Goal met & completed: collar discontinued prior to discharge home.

## 2019-08-09 NOTE — CARE PLAN
"  Problem: Pain Management  Goal: Pain level will decrease to patient's comfort goal  Outcome: PROGRESSING AS EXPECTED  Note:   Pt reported no pain today. Pt able to participate in therapies and activities this shift.      Problem: Skin Integrity  Goal: Risk for impaired skin integrity will decrease  Outcome: PROGRESSING AS EXPECTED  Note:   Pt refused dressing changes today. Pt stated \"they were changed early this morning.\" Per report, NOC shift RN did change dressings early this morning.      "

## 2019-08-09 NOTE — PROGRESS NOTES
Patient discharged to home per order.  Discharge instructions reviewed with patient and ; they verbalize understanding and signed copies placed in chart.  Patient has all belongings; signed copy of form in chart.  Patient left facility at 1600 via WC accompanied by rehab staff and .  Have enjoyed working with this pleasant patient.

## 2019-08-10 ENCOUNTER — HOME CARE VISIT (OUTPATIENT)
Dept: HOME HEALTH SERVICES | Facility: HOME HEALTHCARE | Age: 74
End: 2019-08-10
Payer: COMMERCIAL

## 2019-08-10 VITALS
BODY MASS INDEX: 44.41 KG/M2 | RESPIRATION RATE: 18 BRPM | SYSTOLIC BLOOD PRESSURE: 100 MMHG | HEIGHT: 68 IN | DIASTOLIC BLOOD PRESSURE: 78 MMHG | WEIGHT: 293 LBS | OXYGEN SATURATION: 97 % | TEMPERATURE: 98.7 F | HEART RATE: 82 BPM

## 2019-08-10 PROCEDURE — A4216 STERILE WATER/SALINE, 10 ML: HCPCS

## 2019-08-10 PROCEDURE — 665001 SOC-HOME HEALTH

## 2019-08-10 PROCEDURE — 665998 HH PPS REVENUE CREDIT

## 2019-08-10 PROCEDURE — 665999 HH PPS REVENUE DEBIT

## 2019-08-10 PROCEDURE — A6210 FOAM DRG >16<=48 SQ IN W/O B: HCPCS

## 2019-08-10 PROCEDURE — A6403 STERILE GAUZE>16 <= 48 SQ IN: HCPCS

## 2019-08-10 PROCEDURE — A4450 NON-WATERPROOF TAPE: HCPCS

## 2019-08-10 PROCEDURE — A6253 ABSORPT DRG > 48 SQ IN W/O B: HCPCS

## 2019-08-10 PROCEDURE — A4452 WATERPROOF TAPE: HCPCS

## 2019-08-10 PROCEDURE — G0493 RN CARE EA 15 MIN HH/HOSPICE: HCPCS

## 2019-08-10 ASSESSMENT — ENCOUNTER SYMPTOMS: DEBILITATING PAIN: 1

## 2019-08-11 PROCEDURE — 665999 HH PPS REVENUE DEBIT

## 2019-08-11 PROCEDURE — 665998 HH PPS REVENUE CREDIT

## 2019-08-11 ASSESSMENT — ENCOUNTER SYMPTOMS
SHORTNESS OF BREATH: T
NAUSEA: DENIES
VOMITING: DENIES

## 2019-08-11 ASSESSMENT — PATIENT HEALTH QUESTIONNAIRE - PHQ9
1. LITTLE INTEREST OR PLEASURE IN DOING THINGS: 00
2. FEELING DOWN, DEPRESSED, IRRITABLE, OR HOPELESS: 00
CLINICAL INTERPRETATION OF PHQ2 SCORE: 0

## 2019-08-11 ASSESSMENT — ACTIVITIES OF DAILY LIVING (ADL): OASIS_M1830: 03

## 2019-08-11 NOTE — PROGRESS NOTES
DATE OF SERVICE:  08/07/2019    Patient will be discharged tomorrow.  She has done very well.  This session   continued to talk about how she can address her fears of driving.  Some of the   recommendations that have been made to her over the past 2 sessions were   talked about at length.  Possible ____ to implementing some of the suggestions   were discussed.  She was encouraged to contact me should she need any   feedback as she anticipates returning home and eventually driving.       ____________________________________     GERARDO ALONSO, PHD    FRANCES / MAURY    DD:  08/11/2019 09:52:08  DT:  08/11/2019 11:58:42    D#:  [id number]  Job#:  [id number]
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
## 2019-08-12 ENCOUNTER — DOCUMENTATION (OUTPATIENT)
Dept: VASCULAR LAB | Facility: MEDICAL CENTER | Age: 74
End: 2019-08-12

## 2019-08-12 ENCOUNTER — HOME CARE VISIT (OUTPATIENT)
Dept: HOME HEALTH SERVICES | Facility: HOME HEALTHCARE | Age: 74
End: 2019-08-12
Payer: COMMERCIAL

## 2019-08-12 VITALS
HEART RATE: 88 BPM | SYSTOLIC BLOOD PRESSURE: 106 MMHG | OXYGEN SATURATION: 97 % | RESPIRATION RATE: 18 BRPM | DIASTOLIC BLOOD PRESSURE: 60 MMHG | TEMPERATURE: 97.9 F

## 2019-08-12 VITALS
HEART RATE: 87 BPM | OXYGEN SATURATION: 98 % | RESPIRATION RATE: 18 BRPM | TEMPERATURE: 98 F | SYSTOLIC BLOOD PRESSURE: 106 MMHG | DIASTOLIC BLOOD PRESSURE: 60 MMHG

## 2019-08-12 PROCEDURE — G0299 HHS/HOSPICE OF RN EA 15 MIN: HCPCS

## 2019-08-12 PROCEDURE — 665999 HH PPS REVENUE DEBIT

## 2019-08-12 PROCEDURE — 665998 HH PPS REVENUE CREDIT

## 2019-08-12 PROCEDURE — G0151 HHCP-SERV OF PT,EA 15 MIN: HCPCS

## 2019-08-12 ASSESSMENT — ACTIVITIES OF DAILY LIVING (ADL)
ADLS_COMMENTS: <!--EPICS-->SEE OT EVAL<!--EPICE-->
IADLS_COMMENTS: <!--EPICS-->SEE OT EVAL<!--EPICE-->

## 2019-08-12 ASSESSMENT — ENCOUNTER SYMPTOMS
NAUSEA: DENIES
VOMITING: DENIES

## 2019-08-13 ENCOUNTER — HOME CARE VISIT (OUTPATIENT)
Dept: HOME HEALTH SERVICES | Facility: HOME HEALTHCARE | Age: 74
End: 2019-08-13
Payer: COMMERCIAL

## 2019-08-13 PROCEDURE — 665998 HH PPS REVENUE CREDIT

## 2019-08-13 PROCEDURE — 665999 HH PPS REVENUE DEBIT

## 2019-08-13 PROCEDURE — G0156 HHCP-SVS OF AIDE,EA 15 MIN: HCPCS

## 2019-08-14 ENCOUNTER — HOME CARE VISIT (OUTPATIENT)
Dept: HOME HEALTH SERVICES | Facility: HOME HEALTHCARE | Age: 74
End: 2019-08-14
Payer: COMMERCIAL

## 2019-08-14 VITALS
TEMPERATURE: 98.4 F | HEART RATE: 89 BPM | RESPIRATION RATE: 19 BRPM | OXYGEN SATURATION: 98 % | SYSTOLIC BLOOD PRESSURE: 108 MMHG | DIASTOLIC BLOOD PRESSURE: 70 MMHG

## 2019-08-14 VITALS
RESPIRATION RATE: 18 BRPM | DIASTOLIC BLOOD PRESSURE: 60 MMHG | HEART RATE: 78 BPM | OXYGEN SATURATION: 95 % | SYSTOLIC BLOOD PRESSURE: 110 MMHG | TEMPERATURE: 98.2 F

## 2019-08-14 PROCEDURE — 665999 HH PPS REVENUE DEBIT

## 2019-08-14 PROCEDURE — 665998 HH PPS REVENUE CREDIT

## 2019-08-14 PROCEDURE — G0299 HHS/HOSPICE OF RN EA 15 MIN: HCPCS

## 2019-08-14 ASSESSMENT — ENCOUNTER SYMPTOMS
VOMITING: DENIES
NAUSEA: DENIES

## 2019-08-15 ENCOUNTER — HOME CARE VISIT (OUTPATIENT)
Dept: HOME HEALTH SERVICES | Facility: HOME HEALTHCARE | Age: 74
End: 2019-08-15
Payer: COMMERCIAL

## 2019-08-15 VITALS
TEMPERATURE: 97.5 F | RESPIRATION RATE: 18 BRPM | OXYGEN SATURATION: 97 % | DIASTOLIC BLOOD PRESSURE: 60 MMHG | SYSTOLIC BLOOD PRESSURE: 120 MMHG | HEART RATE: 95 BPM

## 2019-08-15 VITALS
OXYGEN SATURATION: 97 % | TEMPERATURE: 97.5 F | DIASTOLIC BLOOD PRESSURE: 60 MMHG | HEART RATE: 95 BPM | SYSTOLIC BLOOD PRESSURE: 120 MMHG | RESPIRATION RATE: 18 BRPM

## 2019-08-15 PROCEDURE — G0152 HHCP-SERV OF OT,EA 15 MIN: HCPCS

## 2019-08-15 PROCEDURE — G0151 HHCP-SERV OF PT,EA 15 MIN: HCPCS

## 2019-08-15 PROCEDURE — 665999 HH PPS REVENUE DEBIT

## 2019-08-15 PROCEDURE — 665998 HH PPS REVENUE CREDIT

## 2019-08-15 ASSESSMENT — ACTIVITIES OF DAILY LIVING (ADL)
TRANSPORTATION_ASSISTANCE: 6
TOILETING_ASSISTANCE: 0
HOUSEKEEPING_ASSISTANCE: 6
DRESSING_UB_ASSISTANCE: 6
LAUNDRY_ASSISTANCE: 6
DRESSING_LB_ASSISTANCE: 5
GROOMING_ASSISTANCE: 4
TELEPHONE_ASSISTANCE: 0
BATHING_ASSISTANCE: 6
ORAL_CARE_ASSISTANCE: 0
EATING_ASSISTANCE: 1
MEAL_PREP_ASSISTANCE: 6
SHOPPING_ASSISTANCE: 6

## 2019-08-15 ASSESSMENT — ENCOUNTER SYMPTOMS: MENTAL STATUS CHANGE: 0

## 2019-08-16 ENCOUNTER — HOME CARE VISIT (OUTPATIENT)
Dept: HOME HEALTH SERVICES | Facility: HOME HEALTHCARE | Age: 74
End: 2019-08-16
Payer: COMMERCIAL

## 2019-08-16 PROCEDURE — G0299 HHS/HOSPICE OF RN EA 15 MIN: HCPCS

## 2019-08-16 PROCEDURE — 665999 HH PPS REVENUE DEBIT

## 2019-08-16 PROCEDURE — 665998 HH PPS REVENUE CREDIT

## 2019-08-16 PROCEDURE — G0152 HHCP-SERV OF OT,EA 15 MIN: HCPCS

## 2019-08-17 PROCEDURE — 665999 HH PPS REVENUE DEBIT

## 2019-08-17 PROCEDURE — 665998 HH PPS REVENUE CREDIT

## 2019-08-18 VITALS
DIASTOLIC BLOOD PRESSURE: 60 MMHG | TEMPERATURE: 97.8 F | RESPIRATION RATE: 18 BRPM | HEART RATE: 89 BPM | SYSTOLIC BLOOD PRESSURE: 116 MMHG | OXYGEN SATURATION: 97 %

## 2019-08-18 VITALS
HEART RATE: 89 BPM | SYSTOLIC BLOOD PRESSURE: 116 MMHG | OXYGEN SATURATION: 97 % | TEMPERATURE: 97.8 F | DIASTOLIC BLOOD PRESSURE: 60 MMHG | RESPIRATION RATE: 18 BRPM

## 2019-08-18 PROCEDURE — 665998 HH PPS REVENUE CREDIT

## 2019-08-18 PROCEDURE — 665999 HH PPS REVENUE DEBIT

## 2019-08-18 ASSESSMENT — ENCOUNTER SYMPTOMS
VOMITING: DENIES
NAUSEA: DENIES

## 2019-08-19 ENCOUNTER — HOME CARE VISIT (OUTPATIENT)
Dept: HOME HEALTH SERVICES | Facility: HOME HEALTHCARE | Age: 74
End: 2019-08-19
Payer: COMMERCIAL

## 2019-08-19 VITALS
OXYGEN SATURATION: 97 % | SYSTOLIC BLOOD PRESSURE: 101 MMHG | DIASTOLIC BLOOD PRESSURE: 59 MMHG | RESPIRATION RATE: 18 BRPM | TEMPERATURE: 97.5 F | HEART RATE: 89 BPM

## 2019-08-19 PROCEDURE — G0156 HHCP-SVS OF AIDE,EA 15 MIN: HCPCS

## 2019-08-19 PROCEDURE — 665999 HH PPS REVENUE DEBIT

## 2019-08-19 PROCEDURE — G0151 HHCP-SERV OF PT,EA 15 MIN: HCPCS

## 2019-08-19 PROCEDURE — 665998 HH PPS REVENUE CREDIT

## 2019-08-19 ASSESSMENT — ACTIVITIES OF DAILY LIVING (ADL): TRANSPORTATION COMMENTS: REQUIRES ASSIST OUT OF HOME ON UNEVEN SURFACES

## 2019-08-20 ENCOUNTER — HOME CARE VISIT (OUTPATIENT)
Dept: HOME HEALTH SERVICES | Facility: HOME HEALTHCARE | Age: 74
End: 2019-08-20
Payer: COMMERCIAL

## 2019-08-20 VITALS
HEART RATE: 89 BPM | DIASTOLIC BLOOD PRESSURE: 60 MMHG | SYSTOLIC BLOOD PRESSURE: 118 MMHG | RESPIRATION RATE: 18 BRPM | OXYGEN SATURATION: 97 % | TEMPERATURE: 97.5 F

## 2019-08-20 VITALS
RESPIRATION RATE: 18 BRPM | OXYGEN SATURATION: 97 % | TEMPERATURE: 98.1 F | HEART RATE: 105 BPM | DIASTOLIC BLOOD PRESSURE: 70 MMHG | SYSTOLIC BLOOD PRESSURE: 130 MMHG

## 2019-08-20 PROCEDURE — G0152 HHCP-SERV OF OT,EA 15 MIN: HCPCS

## 2019-08-20 PROCEDURE — 665998 HH PPS REVENUE CREDIT

## 2019-08-20 PROCEDURE — 665999 HH PPS REVENUE DEBIT

## 2019-08-21 ENCOUNTER — HOSPITAL ENCOUNTER (INPATIENT)
Facility: MEDICAL CENTER | Age: 74
LOS: 8 days | DRG: 501 | End: 2019-08-29
Attending: INTERNAL MEDICINE | Admitting: INTERNAL MEDICINE
Payer: COMMERCIAL

## 2019-08-21 ENCOUNTER — HOME CARE VISIT (OUTPATIENT)
Dept: HOME HEALTH SERVICES | Facility: HOME HEALTHCARE | Age: 74
End: 2019-08-21
Payer: COMMERCIAL

## 2019-08-21 ENCOUNTER — HOSPITAL ENCOUNTER (EMERGENCY)
Facility: MEDICAL CENTER | Age: 74
End: 2019-09-03
Payer: MEDICARE

## 2019-08-21 ENCOUNTER — HOSPITAL ENCOUNTER (OUTPATIENT)
Facility: MEDICAL CENTER | Age: 74
DRG: 501 | End: 2019-08-21
Payer: COMMERCIAL

## 2019-08-21 DIAGNOSIS — S42.022A CLOSED DISPLACED FRACTURE OF SHAFT OF LEFT CLAVICLE, INITIAL ENCOUNTER: ICD-10-CM

## 2019-08-21 DIAGNOSIS — L02.414 ABSCESS OF ARM, LEFT: ICD-10-CM

## 2019-08-21 DIAGNOSIS — S12.9XXA CLOSED FRACTURE OF MULTIPLE CERVICAL VERTEBRAE, INITIAL ENCOUNTER (HCC): ICD-10-CM

## 2019-08-21 PROBLEM — S51.002S ELBOW WOUND, LEFT, SEQUELA: Status: ACTIVE | Noted: 2019-08-21

## 2019-08-21 LAB
GRAM STN SPEC: NORMAL
SIGNIFICANT IND 70042: NORMAL
SITE SITE: NORMAL
SOURCE SOURCE: NORMAL

## 2019-08-21 PROCEDURE — 99223 1ST HOSP IP/OBS HIGH 75: CPT | Performed by: INTERNAL MEDICINE

## 2019-08-21 PROCEDURE — 87077 CULTURE AEROBIC IDENTIFY: CPT | Mod: 91

## 2019-08-21 PROCEDURE — 665999 HH PPS REVENUE DEBIT

## 2019-08-21 PROCEDURE — 770001 HCHG ROOM/CARE - MED/SURG/GYN PRIV*

## 2019-08-21 PROCEDURE — 87186 SC STD MICRODIL/AGAR DIL: CPT | Mod: 91

## 2019-08-21 PROCEDURE — 700102 HCHG RX REV CODE 250 W/ 637 OVERRIDE(OP): Performed by: INTERNAL MEDICINE

## 2019-08-21 PROCEDURE — 665998 HH PPS REVENUE CREDIT

## 2019-08-21 PROCEDURE — 87205 SMEAR GRAM STAIN: CPT

## 2019-08-21 PROCEDURE — 700111 HCHG RX REV CODE 636 W/ 250 OVERRIDE (IP): Performed by: INTERNAL MEDICINE

## 2019-08-21 PROCEDURE — 700105 HCHG RX REV CODE 258: Performed by: INTERNAL MEDICINE

## 2019-08-21 PROCEDURE — A9270 NON-COVERED ITEM OR SERVICE: HCPCS | Performed by: INTERNAL MEDICINE

## 2019-08-21 PROCEDURE — 87070 CULTURE OTHR SPECIMN AEROBIC: CPT

## 2019-08-21 RX ORDER — POLYETHYLENE GLYCOL 3350 17 G/17G
1 POWDER, FOR SOLUTION ORAL
Status: DISCONTINUED | OUTPATIENT
Start: 2019-08-21 | End: 2019-08-23

## 2019-08-21 RX ORDER — POLYETHYLENE GLYCOL 3350 17 G/17G
1 POWDER, FOR SOLUTION ORAL
Status: DISCONTINUED | OUTPATIENT
Start: 2019-08-21 | End: 2019-08-29 | Stop reason: HOSPADM

## 2019-08-21 RX ORDER — SODIUM CHLORIDE 9 MG/ML
INJECTION, SOLUTION INTRAVENOUS CONTINUOUS
Status: DISCONTINUED | OUTPATIENT
Start: 2019-08-21 | End: 2019-08-29 | Stop reason: HOSPADM

## 2019-08-21 RX ORDER — HEPARIN SODIUM 5000 [USP'U]/ML
5000 INJECTION, SOLUTION INTRAVENOUS; SUBCUTANEOUS EVERY 8 HOURS
Status: DISCONTINUED | OUTPATIENT
Start: 2019-08-21 | End: 2019-08-29 | Stop reason: HOSPADM

## 2019-08-21 RX ORDER — OXYCODONE HYDROCHLORIDE 5 MG/1
5 TABLET ORAL EVERY 6 HOURS PRN
Status: DISCONTINUED | OUTPATIENT
Start: 2019-08-21 | End: 2019-08-23

## 2019-08-21 RX ORDER — ONDANSETRON 4 MG/1
4 TABLET, ORALLY DISINTEGRATING ORAL EVERY 4 HOURS PRN
Status: DISCONTINUED | OUTPATIENT
Start: 2019-08-21 | End: 2019-08-29 | Stop reason: HOSPADM

## 2019-08-21 RX ORDER — BENAZEPRIL HYDROCHLORIDE 10 MG/1
40 TABLET ORAL
Status: DISCONTINUED | OUTPATIENT
Start: 2019-08-21 | End: 2019-08-23

## 2019-08-21 RX ORDER — ACETAMINOPHEN 325 MG/1
650 TABLET ORAL EVERY 6 HOURS PRN
Status: DISCONTINUED | OUTPATIENT
Start: 2019-08-21 | End: 2019-08-23

## 2019-08-21 RX ORDER — AMOXICILLIN 250 MG
2 CAPSULE ORAL 2 TIMES DAILY
Status: DISCONTINUED | OUTPATIENT
Start: 2019-08-21 | End: 2019-08-29 | Stop reason: HOSPADM

## 2019-08-21 RX ORDER — BISACODYL 10 MG
10 SUPPOSITORY, RECTAL RECTAL
Status: DISCONTINUED | OUTPATIENT
Start: 2019-08-21 | End: 2019-08-23

## 2019-08-21 RX ORDER — AMOXICILLIN 250 MG
2 CAPSULE ORAL 2 TIMES DAILY
Status: DISCONTINUED | OUTPATIENT
Start: 2019-08-21 | End: 2019-08-23

## 2019-08-21 RX ORDER — BISACODYL 10 MG
10 SUPPOSITORY, RECTAL RECTAL
Status: DISCONTINUED | OUTPATIENT
Start: 2019-08-21 | End: 2019-08-29 | Stop reason: HOSPADM

## 2019-08-21 RX ORDER — ONDANSETRON 2 MG/ML
4 INJECTION INTRAMUSCULAR; INTRAVENOUS EVERY 4 HOURS PRN
Status: DISCONTINUED | OUTPATIENT
Start: 2019-08-21 | End: 2019-08-29 | Stop reason: HOSPADM

## 2019-08-21 RX ORDER — CARVEDILOL 6.25 MG/1
6.25 TABLET ORAL 2 TIMES DAILY
Status: DISCONTINUED | OUTPATIENT
Start: 2019-08-21 | End: 2019-08-29 | Stop reason: HOSPADM

## 2019-08-21 RX ADMIN — SODIUM CHLORIDE: 9 INJECTION, SOLUTION INTRAVENOUS at 22:13

## 2019-08-21 RX ADMIN — VANCOMYCIN HYDROCHLORIDE 3 G: 500 INJECTION, POWDER, LYOPHILIZED, FOR SOLUTION INTRAVENOUS at 22:13

## 2019-08-21 RX ADMIN — BENAZEPRIL HYDROCHLORIDE 40 MG: 10 TABLET ORAL at 18:39

## 2019-08-21 RX ADMIN — CARVEDILOL 6.25 MG: 6.25 TABLET, FILM COATED ORAL at 18:38

## 2019-08-21 RX ADMIN — PIPERACILLIN AND TAZOBACTAM 3.38 G: 3; .375 INJECTION, POWDER, LYOPHILIZED, FOR SOLUTION INTRAVENOUS; PARENTERAL at 23:19

## 2019-08-21 ASSESSMENT — PATIENT HEALTH QUESTIONNAIRE - PHQ9
SUM OF ALL RESPONSES TO PHQ9 QUESTIONS 1 AND 2: 0
2. FEELING DOWN, DEPRESSED, IRRITABLE, OR HOPELESS: NOT AT ALL
1. LITTLE INTEREST OR PLEASURE IN DOING THINGS: NOT AT ALL

## 2019-08-21 ASSESSMENT — COPD QUESTIONNAIRES
COPD SCREENING SCORE: 2
DURING THE PAST 4 WEEKS HOW MUCH DID YOU FEEL SHORT OF BREATH: NONE/LITTLE OF THE TIME
HAVE YOU SMOKED AT LEAST 100 CIGARETTES IN YOUR ENTIRE LIFE: NO/DON'T KNOW
DO YOU EVER COUGH UP ANY MUCUS OR PHLEGM?: NO/ONLY WITH OCCASIONAL COLDS OR INFECTIONS
IN THE PAST 12 MONTHS DO YOU DO LESS THAN YOU USED TO BECAUSE OF YOUR BREATHING PROBLEMS: DISAGREE/UNSURE

## 2019-08-21 ASSESSMENT — ENCOUNTER SYMPTOMS
DIZZINESS: 0
WEIGHT LOSS: 0
DIARRHEA: 0
EYE PAIN: 0
PALPITATIONS: 0
ORTHOPNEA: 0
ABDOMINAL PAIN: 0
FEVER: 0
DEPRESSION: 0
BACK PAIN: 0
SPUTUM PRODUCTION: 0
STRIDOR: 0
HEADACHES: 0
NERVOUS/ANXIOUS: 0
SEIZURES: 0
FOCAL WEAKNESS: 0
INSOMNIA: 0
VOMITING: 0
CHILLS: 0
NECK PAIN: 0
SHORTNESS OF BREATH: 0
MYALGIAS: 0
COUGH: 0
EYE REDNESS: 0
HEARTBURN: 0
BLURRED VISION: 0
EYE DISCHARGE: 0
NAUSEA: 0

## 2019-08-21 ASSESSMENT — LIFESTYLE VARIABLES
HOW MANY TIMES IN THE PAST YEAR HAVE YOU HAD 5 OR MORE DRINKS IN A DAY: 0
EVER_SMOKED: NEVER
CONSUMPTION TOTAL: NEGATIVE
TOTAL SCORE: 0
HAVE YOU EVER FELT YOU SHOULD CUT DOWN ON YOUR DRINKING: NO
AVERAGE NUMBER OF DAYS PER WEEK YOU HAVE A DRINK CONTAINING ALCOHOL: 0
HAVE PEOPLE ANNOYED YOU BY CRITICIZING YOUR DRINKING: NO
EVER HAD A DRINK FIRST THING IN THE MORNING TO STEADY YOUR NERVES TO GET RID OF A HANGOVER: NO
TOTAL SCORE: 0
TOTAL SCORE: 0
ON A TYPICAL DAY WHEN YOU DRINK ALCOHOL HOW MANY DRINKS DO YOU HAVE: 0
ALCOHOL_USE: NO
EVER FELT BAD OR GUILTY ABOUT YOUR DRINKING: NO

## 2019-08-21 ASSESSMENT — COGNITIVE AND FUNCTIONAL STATUS - GENERAL
TURNING FROM BACK TO SIDE WHILE IN FLAT BAD: A LITTLE
MOVING FROM LYING ON BACK TO SITTING ON SIDE OF FLAT BED: A LITTLE
MOVING TO AND FROM BED TO CHAIR: A LITTLE
DRESSING REGULAR LOWER BODY CLOTHING: A LITTLE
SUGGESTED CMS G CODE MODIFIER MOBILITY: CJ
TOILETING: A LITTLE
SUGGESTED CMS G CODE MODIFIER DAILY ACTIVITY: CJ
STANDING UP FROM CHAIR USING ARMS: A LITTLE
DRESSING REGULAR UPPER BODY CLOTHING: A LITTLE
DAILY ACTIVITIY SCORE: 21
MOBILITY SCORE: 20

## 2019-08-21 NOTE — PROGRESS NOTES
Patient is a direct admit from MD office.   Dr Burleson is accepting the patient.   Dr Gauthier is consulting for ortho.

## 2019-08-21 NOTE — PROGRESS NOTES
Direct admit from Dr. Gauthier office for non healing left elbow wound.  To OR for I&D tomorrow.  Need IV abx

## 2019-08-22 ENCOUNTER — HOME CARE VISIT (OUTPATIENT)
Dept: HOME HEALTH SERVICES | Facility: HOME HEALTHCARE | Age: 74
End: 2019-08-22
Payer: COMMERCIAL

## 2019-08-22 ENCOUNTER — ANESTHESIA (OUTPATIENT)
Dept: SURGERY | Facility: MEDICAL CENTER | Age: 74
DRG: 501 | End: 2019-08-22
Payer: COMMERCIAL

## 2019-08-22 ENCOUNTER — ANESTHESIA EVENT (OUTPATIENT)
Dept: SURGERY | Facility: MEDICAL CENTER | Age: 74
DRG: 501 | End: 2019-08-22
Payer: COMMERCIAL

## 2019-08-22 LAB
ALBUMIN SERPL BCP-MCNC: 3.1 G/DL (ref 3.2–4.9)
ALBUMIN/GLOB SERPL: 1 G/DL
ALP SERPL-CCNC: 108 U/L (ref 30–99)
ALT SERPL-CCNC: 13 U/L (ref 2–50)
ANION GAP SERPL CALC-SCNC: 9 MMOL/L (ref 0–11.9)
AST SERPL-CCNC: 18 U/L (ref 12–45)
BILIRUB SERPL-MCNC: 0.8 MG/DL (ref 0.1–1.5)
BUN SERPL-MCNC: 13 MG/DL (ref 8–22)
CALCIUM SERPL-MCNC: 8.9 MG/DL (ref 8.5–10.5)
CHLORIDE SERPL-SCNC: 108 MMOL/L (ref 96–112)
CO2 SERPL-SCNC: 22 MMOL/L (ref 20–33)
CREAT SERPL-MCNC: 0.85 MG/DL (ref 0.5–1.4)
ERYTHROCYTE [DISTWIDTH] IN BLOOD BY AUTOMATED COUNT: 50.2 FL (ref 35.9–50)
GLOBULIN SER CALC-MCNC: 3 G/DL (ref 1.9–3.5)
GLUCOSE BLD-MCNC: 112 MG/DL (ref 65–99)
GLUCOSE BLD-MCNC: 130 MG/DL (ref 65–99)
GLUCOSE BLD-MCNC: 131 MG/DL (ref 65–99)
GLUCOSE SERPL-MCNC: 171 MG/DL (ref 65–99)
GRAM STN SPEC: NORMAL
HCT VFR BLD AUTO: 37.2 % (ref 37–47)
HGB BLD-MCNC: 11.7 G/DL (ref 12–16)
MCH RBC QN AUTO: 30.3 PG (ref 27–33)
MCHC RBC AUTO-ENTMCNC: 31.5 G/DL (ref 33.6–35)
MCV RBC AUTO: 96.4 FL (ref 81.4–97.8)
PLATELET # BLD AUTO: 296 K/UL (ref 164–446)
PMV BLD AUTO: 10.2 FL (ref 9–12.9)
POTASSIUM SERPL-SCNC: 4.1 MMOL/L (ref 3.6–5.5)
PROT SERPL-MCNC: 6.1 G/DL (ref 6–8.2)
RBC # BLD AUTO: 3.86 M/UL (ref 4.2–5.4)
SIGNIFICANT IND 70042: NORMAL
SITE SITE: NORMAL
SODIUM SERPL-SCNC: 139 MMOL/L (ref 135–145)
SOURCE SOURCE: NORMAL
VANCOMYCIN SERPL-MCNC: 24.9 UG/ML
WBC # BLD AUTO: 8.2 K/UL (ref 4.8–10.8)

## 2019-08-22 PROCEDURE — 160027 HCHG SURGERY MINUTES - 1ST 30 MINS LEVEL 2: Performed by: ORTHOPAEDIC SURGERY

## 2019-08-22 PROCEDURE — 700111 HCHG RX REV CODE 636 W/ 250 OVERRIDE (IP): Performed by: ANESTHESIOLOGY

## 2019-08-22 PROCEDURE — 36415 COLL VENOUS BLD VENIPUNCTURE: CPT

## 2019-08-22 PROCEDURE — 87205 SMEAR GRAM STAIN: CPT

## 2019-08-22 PROCEDURE — 160036 HCHG PACU - EA ADDL 30 MINS PHASE I: Performed by: ORTHOPAEDIC SURGERY

## 2019-08-22 PROCEDURE — 160002 HCHG RECOVERY MINUTES (STAT): Performed by: ORTHOPAEDIC SURGERY

## 2019-08-22 PROCEDURE — 99233 SBSQ HOSP IP/OBS HIGH 50: CPT | Performed by: HOSPITALIST

## 2019-08-22 PROCEDURE — A9270 NON-COVERED ITEM OR SERVICE: HCPCS | Performed by: INTERNAL MEDICINE

## 2019-08-22 PROCEDURE — 160035 HCHG PACU - 1ST 60 MINS PHASE I: Performed by: ORTHOPAEDIC SURGERY

## 2019-08-22 PROCEDURE — 80053 COMPREHEN METABOLIC PANEL: CPT

## 2019-08-22 PROCEDURE — 82962 GLUCOSE BLOOD TEST: CPT

## 2019-08-22 PROCEDURE — 87070 CULTURE OTHR SPECIMN AEROBIC: CPT

## 2019-08-22 PROCEDURE — 700111 HCHG RX REV CODE 636 W/ 250 OVERRIDE (IP): Performed by: HOSPITALIST

## 2019-08-22 PROCEDURE — 700102 HCHG RX REV CODE 250 W/ 637 OVERRIDE(OP): Performed by: INTERNAL MEDICINE

## 2019-08-22 PROCEDURE — 700111 HCHG RX REV CODE 636 W/ 250 OVERRIDE (IP): Performed by: INTERNAL MEDICINE

## 2019-08-22 PROCEDURE — 501330 HCHG SET, CYSTO IRRIG TUBING: Performed by: ORTHOPAEDIC SURGERY

## 2019-08-22 PROCEDURE — 501445 HCHG STAPLER, SKIN DISP: Performed by: ORTHOPAEDIC SURGERY

## 2019-08-22 PROCEDURE — 160038 HCHG SURGERY MINUTES - EA ADDL 1 MIN LEVEL 2: Performed by: ORTHOPAEDIC SURGERY

## 2019-08-22 PROCEDURE — 160048 HCHG OR STATISTICAL LEVEL 1-5: Performed by: ORTHOPAEDIC SURGERY

## 2019-08-22 PROCEDURE — 700105 HCHG RX REV CODE 258: Performed by: HOSPITALIST

## 2019-08-22 PROCEDURE — 85027 COMPLETE CBC AUTOMATED: CPT

## 2019-08-22 PROCEDURE — A6454 SELF-ADHER BAND W>=3" <5"/YD: HCPCS | Performed by: ORTHOPAEDIC SURGERY

## 2019-08-22 PROCEDURE — 501838 HCHG SUTURE GENERAL: Performed by: ORTHOPAEDIC SURGERY

## 2019-08-22 PROCEDURE — 80202 ASSAY OF VANCOMYCIN: CPT

## 2019-08-22 PROCEDURE — 500452 HCHG DRESSING, WOUND VAC MED.: Performed by: ORTHOPAEDIC SURGERY

## 2019-08-22 PROCEDURE — 770001 HCHG ROOM/CARE - MED/SURG/GYN PRIV*

## 2019-08-22 PROCEDURE — 87077 CULTURE AEROBIC IDENTIFY: CPT

## 2019-08-22 PROCEDURE — A9270 NON-COVERED ITEM OR SERVICE: HCPCS | Performed by: ANESTHESIOLOGY

## 2019-08-22 PROCEDURE — 665998 HH PPS REVENUE CREDIT

## 2019-08-22 PROCEDURE — 700105 HCHG RX REV CODE 258: Performed by: INTERNAL MEDICINE

## 2019-08-22 PROCEDURE — 500881 HCHG PACK, EXTREMITY: Performed by: ORTHOPAEDIC SURGERY

## 2019-08-22 PROCEDURE — 665999 HH PPS REVENUE DEBIT

## 2019-08-22 PROCEDURE — 0KB80ZZ EXCISION OF LEFT UPPER ARM MUSCLE, OPEN APPROACH: ICD-10-PCS | Performed by: ORTHOPAEDIC SURGERY

## 2019-08-22 PROCEDURE — 700102 HCHG RX REV CODE 250 W/ 637 OVERRIDE(OP): Performed by: ANESTHESIOLOGY

## 2019-08-22 PROCEDURE — 87075 CULTR BACTERIA EXCEPT BLOOD: CPT

## 2019-08-22 PROCEDURE — 160009 HCHG ANES TIME/MIN: Performed by: ORTHOPAEDIC SURGERY

## 2019-08-22 RX ORDER — METOCLOPRAMIDE HYDROCHLORIDE 5 MG/ML
INJECTION INTRAMUSCULAR; INTRAVENOUS PRN
Status: DISCONTINUED | OUTPATIENT
Start: 2019-08-22 | End: 2019-08-22 | Stop reason: SURG

## 2019-08-22 RX ORDER — LABETALOL HYDROCHLORIDE 5 MG/ML
5 INJECTION, SOLUTION INTRAVENOUS
Status: DISCONTINUED | OUTPATIENT
Start: 2019-08-22 | End: 2019-08-22 | Stop reason: HOSPADM

## 2019-08-22 RX ORDER — OXYCODONE HCL 5 MG/5 ML
10 SOLUTION, ORAL ORAL
Status: COMPLETED | OUTPATIENT
Start: 2019-08-22 | End: 2019-08-22

## 2019-08-22 RX ORDER — HYDROMORPHONE HYDROCHLORIDE 1 MG/ML
0.1 INJECTION, SOLUTION INTRAMUSCULAR; INTRAVENOUS; SUBCUTANEOUS
Status: DISCONTINUED | OUTPATIENT
Start: 2019-08-22 | End: 2019-08-22 | Stop reason: HOSPADM

## 2019-08-22 RX ORDER — SODIUM CHLORIDE, SODIUM LACTATE, POTASSIUM CHLORIDE, CALCIUM CHLORIDE 600; 310; 30; 20 MG/100ML; MG/100ML; MG/100ML; MG/100ML
INJECTION, SOLUTION INTRAVENOUS CONTINUOUS
Status: DISCONTINUED | OUTPATIENT
Start: 2019-08-22 | End: 2019-08-22 | Stop reason: HOSPADM

## 2019-08-22 RX ORDER — MEPERIDINE HYDROCHLORIDE 25 MG/ML
12.5 INJECTION INTRAMUSCULAR; INTRAVENOUS; SUBCUTANEOUS
Status: DISCONTINUED | OUTPATIENT
Start: 2019-08-22 | End: 2019-08-22 | Stop reason: HOSPADM

## 2019-08-22 RX ORDER — HALOPERIDOL 5 MG/ML
1 INJECTION INTRAMUSCULAR
Status: DISCONTINUED | OUTPATIENT
Start: 2019-08-22 | End: 2019-08-22 | Stop reason: HOSPADM

## 2019-08-22 RX ORDER — HYDROMORPHONE HYDROCHLORIDE 1 MG/ML
0.2 INJECTION, SOLUTION INTRAMUSCULAR; INTRAVENOUS; SUBCUTANEOUS
Status: DISCONTINUED | OUTPATIENT
Start: 2019-08-22 | End: 2019-08-22 | Stop reason: HOSPADM

## 2019-08-22 RX ORDER — ONDANSETRON 2 MG/ML
INJECTION INTRAMUSCULAR; INTRAVENOUS PRN
Status: DISCONTINUED | OUTPATIENT
Start: 2019-08-22 | End: 2019-08-22 | Stop reason: SURG

## 2019-08-22 RX ORDER — SODIUM CHLORIDE, SODIUM LACTATE, POTASSIUM CHLORIDE, CALCIUM CHLORIDE 600; 310; 30; 20 MG/100ML; MG/100ML; MG/100ML; MG/100ML
INJECTION, SOLUTION INTRAVENOUS
Status: DISCONTINUED | OUTPATIENT
Start: 2019-08-22 | End: 2019-08-22

## 2019-08-22 RX ORDER — HYDRALAZINE HYDROCHLORIDE 20 MG/ML
5 INJECTION INTRAMUSCULAR; INTRAVENOUS
Status: DISCONTINUED | OUTPATIENT
Start: 2019-08-22 | End: 2019-08-22 | Stop reason: HOSPADM

## 2019-08-22 RX ORDER — OXYCODONE HCL 5 MG/5 ML
5 SOLUTION, ORAL ORAL
Status: COMPLETED | OUTPATIENT
Start: 2019-08-22 | End: 2019-08-22

## 2019-08-22 RX ORDER — ONDANSETRON 2 MG/ML
4 INJECTION INTRAMUSCULAR; INTRAVENOUS
Status: DISCONTINUED | OUTPATIENT
Start: 2019-08-22 | End: 2019-08-22 | Stop reason: HOSPADM

## 2019-08-22 RX ORDER — HYDROMORPHONE HYDROCHLORIDE 1 MG/ML
0.4 INJECTION, SOLUTION INTRAMUSCULAR; INTRAVENOUS; SUBCUTANEOUS
Status: DISCONTINUED | OUTPATIENT
Start: 2019-08-22 | End: 2019-08-22 | Stop reason: HOSPADM

## 2019-08-22 RX ORDER — METOPROLOL TARTRATE 1 MG/ML
1 INJECTION, SOLUTION INTRAVENOUS
Status: DISCONTINUED | OUTPATIENT
Start: 2019-08-22 | End: 2019-08-22 | Stop reason: HOSPADM

## 2019-08-22 RX ADMIN — PIPERACILLIN AND TAZOBACTAM 3.38 G: 3; .375 INJECTION, POWDER, LYOPHILIZED, FOR SOLUTION INTRAVENOUS; PARENTERAL at 13:52

## 2019-08-22 RX ADMIN — FENTANYL CITRATE 50 MCG: 50 INJECTION, SOLUTION INTRAMUSCULAR; INTRAVENOUS at 18:23

## 2019-08-22 RX ADMIN — PIPERACILLIN AND TAZOBACTAM 3.38 G: 3; .375 INJECTION, POWDER, LYOPHILIZED, FOR SOLUTION INTRAVENOUS; PARENTERAL at 08:43

## 2019-08-22 RX ADMIN — OXYCODONE HYDROCHLORIDE 5 MG: 5 SOLUTION ORAL at 18:21

## 2019-08-22 RX ADMIN — LIDOCAINE HYDROCHLORIDE 40 MG: 20 INJECTION, SOLUTION INTRAVENOUS at 17:25

## 2019-08-22 RX ADMIN — METOCLOPRAMIDE 10 MG: 5 INJECTION, SOLUTION INTRAMUSCULAR; INTRAVENOUS at 17:50

## 2019-08-22 RX ADMIN — OXYCODONE HYDROCHLORIDE 5 MG: 5 TABLET ORAL at 22:05

## 2019-08-22 RX ADMIN — ONDANSETRON 4 MG: 2 INJECTION INTRAMUSCULAR; INTRAVENOUS at 17:50

## 2019-08-22 RX ADMIN — FENTANYL CITRATE 100 MCG: 50 INJECTION, SOLUTION INTRAMUSCULAR; INTRAVENOUS at 17:52

## 2019-08-22 RX ADMIN — FENTANYL CITRATE 100 MCG: 50 INJECTION, SOLUTION INTRAMUSCULAR; INTRAVENOUS at 17:25

## 2019-08-22 RX ADMIN — VANCOMYCIN HYDROCHLORIDE 2000 MG: 500 INJECTION, POWDER, LYOPHILIZED, FOR SOLUTION INTRAVENOUS at 22:05

## 2019-08-22 RX ADMIN — HEPARIN SODIUM 5000 UNITS: 5000 INJECTION, SOLUTION INTRAVENOUS; SUBCUTANEOUS at 22:20

## 2019-08-22 RX ADMIN — PIPERACILLIN AND TAZOBACTAM 3.38 G: 3; .375 INJECTION, POWDER, LYOPHILIZED, FOR SOLUTION INTRAVENOUS; PARENTERAL at 22:04

## 2019-08-22 RX ADMIN — PROPOFOL 200 MG: 10 INJECTION, EMULSION INTRAVENOUS at 17:25

## 2019-08-22 ASSESSMENT — ENCOUNTER SYMPTOMS
LOSS OF CONSCIOUSNESS: 0
DIZZINESS: 0
FEVER: 0
SORE THROAT: 0
CONSTIPATION: 0
BACK PAIN: 0
SENSORY CHANGE: 0
SPEECH CHANGE: 0
WHEEZING: 0
SHORTNESS OF BREATH: 0
CHILLS: 0
DIAPHORESIS: 0
EYE DISCHARGE: 0
VOMITING: 0
EYE PAIN: 0
NAUSEA: 0
CLAUDICATION: 0
SPUTUM PRODUCTION: 0
WEAKNESS: 0
FOCAL WEAKNESS: 0
DEPRESSION: 0
COUGH: 0
MYALGIAS: 1
ABDOMINAL PAIN: 0
NECK PAIN: 0
BRUISES/BLEEDS EASILY: 0
HEADACHES: 0
DIARRHEA: 0
HEMOPTYSIS: 0
PALPITATIONS: 0

## 2019-08-22 ASSESSMENT — LIFESTYLE VARIABLES: SUBSTANCE_ABUSE: 0

## 2019-08-22 NOTE — ASSESSMENT & PLAN NOTE
- Body mass index is 46.2 kg/m²..  - Counseled on weight loss.  - outpatient referral for outpatient weight management.
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
## 2019-08-22 NOTE — ASSESSMENT & PLAN NOTE
-HbA1c 6.2.  -I would resume her long-acting insulin, but start at a lower dose at 20 units of Lantus twice daily.  Slowly adjust back to basal insulin dose (36 units) depending on blood glucose control.  Continue sliding cell insulin coverage while in-house.  Holding off on OHA for now. Accu-Cheks before meals and at bedtime. Goal to keep BG between 140-180 per 2019 ADA guidelines.

## 2019-08-22 NOTE — CARE PLAN
Problem: Nutritional:  Goal: Achieve adequate nutritional intake  Description  Patient will start diet and consume >50% of meals   Outcome: NOT MET

## 2019-08-22 NOTE — ASSESSMENT & PLAN NOTE
- s/p I&D. Wound vac in place.  OR cultures growing polymicrobial organisms with pseudomonas, staph, strep agalactia and citrobacter  -Continue IV zosyn.  ID recommending 2 weeks of antibiotics. Midline in place.   -Continue wound VAC management.  -SNF vs LTACH placement for continued IV antibiotic, wound VAC management, skilled therapies.

## 2019-08-22 NOTE — CARE PLAN
Problem: Communication  Goal: The ability to communicate needs accurately and effectively will improve  Outcome: MET  Intervention: Educate patient and significant other/support system about the plan of care, procedures, treatments, medications and allow for questions  Note:   Pt communicates her needs effectively, POC discussed.     Problem: Safety  Goal: Will remain free from falls  Outcome: PROGRESSING AS EXPECTED  Intervention: Implement fall precautions  Flowsheets (Taken 8/22/2019 0102)  Environmental Precautions: Treaded Slipper Socks on Patient; Personal Belongings, Wastebasket, Call Bell etc. in Easy Reach; Transferred to Stronger Side; Report Given to Other Health Care Providers Regarding Fall Risk; Bed in Low Position; Communication Sign for Patients & Families; Mobility Assessed & Appropriate Sign Placed
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
## 2019-08-22 NOTE — ASSESSMENT & PLAN NOTE
-Continue to hold benazepril due to ANTHONY.  Continue home dose Coreg.  Monitor blood pressure trend closely.

## 2019-08-22 NOTE — PROGRESS NOTES
Pt arrived to unit via wc.  2 RN skin assessed with James JOHNSON  Wound noted to LUE.  Open area to abdominal fold.  Excoriation noted to abdominal folds and underneath breasts    No other skin issues noted  Photos of wounds uploaded.

## 2019-08-22 NOTE — PROGRESS NOTES
"Pharmacy Kinetics 74 y.o. female on vancomycin day # 2  8/22/2019    Currently Dose: Vancomycin pulse dosing  Received Load Dose: Yes    Indication for Treatment: SSTI  ID Service Following: No (orthopedics consulted)    Pertinent history per medical record: Admitted on 8/21/2019 for concerning left elbow wound. Admit H&P notes foul discharge from wound site with plans for OR intervention 8/22/19. Noted history of DM.     Other antibiotics: piperacillin/tazobactam 3.375 gm iv q8h    Allergies: Statins [hmg-coa-r inhibitors]; Betadine [povidone iodine]; Betadine [povidone iodine]; Iodine; Iodine; Neomycin; Statins [hmg-coa-r inhibitors]; and Tape     List concerns for accumulation of vancomycin: age 74, BMI ~45, abnormal LFT's, low albumin/malnutrition    Pertinent cultures to date:   Results     Procedure Component Value Units Date/Time    GRAM STAIN [[id number]] Collected:  08/21/19 1839    Order Status:  Completed Specimen:  Wound Updated:  08/21/19 2237     Significant Indicator .     Source WND     Site LEFT ARM     Gram Stain Result Many WBCs.  Many Gram positive cocci.  Rare Gram negative rods.      Narrative:       Collected By:[id number] BRIJESH FREEMAN  Collected By:[id number] BRIJESH FREEMAN    CULTURE WOUND W/ GRAM STAIN [[id number]] Collected:  08/21/19 1839    Order Status:  Completed Specimen:  Wound from Left Arm Updated:  08/21/19 1921    Narrative:       Collected By:[id number] BRIJESH FREEMAN        MRSA nares swab if pneumonia is a concern (ordered/positive/negative/n-a): n/a    Recent Labs     08/22/19  1014   WBC 8.2     Recent Labs     08/22/19  1014   BUN 13   CREATININE 0.85   ALBUMIN 3.1*     Recent Labs     08/22/19  1014   VANCORANDOM 24.9     Intake/Output Summary (Last 24 hours) at 8/22/2019 1127  Last data filed at 8/22/2019 0300  Gross per 24 hour   Intake --   Output 2 ml   Net -2 ml      /51   Pulse 95   Temp 36.3 °C (97.3 °F) (Temporal)   Resp 18   Ht 1.715 m (5' 7.5\")   Wt (!) 133 " kg (293 lb 3.4 oz)   SpO2 96%  Temp (24hrs), Av.5 °C (97.7 °F), Min:36.3 °C (97.3 °F), Max:37.2 °C (98.9 °F)    Estimated Creatinine Clearance: 85.9 mL/min (by C-G formula based on SCr of 0.85 mg/dL).    A/P   1. Vancomycin dose change: 2000 mg iv once (~15 mg/kg/dose)   2. Next vancomycin level: 19 @1115 (~12 hours post-dose)  3. Goal trough: 12-16 mcg/mL  4. Comments: VS sable. Afebrile. WBC stable. CrCl ~86 mL/min and likely inaccurate due to body habitus. Microbiology pending. Concerns for accumulation of vancomycin noted. Most recent level noted and above goal ~12 hours after loading dose. Will pulse dose with ~15 mg/kg once and repeat random level 12 hours post-dose to assess vancomycin clearance. Chart review notes likely I&D today. BMP with AM labs per provider. Pharmacy will continue to follow.    Kilo Dove, PharmD
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
## 2019-08-23 ENCOUNTER — HOME CARE VISIT (OUTPATIENT)
Dept: HOME HEALTH SERVICES | Facility: HOME HEALTHCARE | Age: 74
End: 2019-08-23
Payer: COMMERCIAL

## 2019-08-23 PROBLEM — N17.9 AKI (ACUTE KIDNEY INJURY) (HCC): Status: ACTIVE | Noted: 2019-08-23

## 2019-08-23 PROBLEM — L02.414 ABSCESS OF ARM, LEFT: Status: ACTIVE | Noted: 2019-08-21

## 2019-08-23 LAB
ANION GAP SERPL CALC-SCNC: 9 MMOL/L (ref 0–11.9)
BASOPHILS # BLD AUTO: 0.6 % (ref 0–1.8)
BASOPHILS # BLD: 0.04 K/UL (ref 0–0.12)
BUN SERPL-MCNC: 13 MG/DL (ref 8–22)
CALCIUM SERPL-MCNC: 8.3 MG/DL (ref 8.5–10.5)
CHLORIDE SERPL-SCNC: 107 MMOL/L (ref 96–112)
CO2 SERPL-SCNC: 23 MMOL/L (ref 20–33)
CREAT SERPL-MCNC: 1.28 MG/DL (ref 0.5–1.4)
EOSINOPHIL # BLD AUTO: 0.11 K/UL (ref 0–0.51)
EOSINOPHIL NFR BLD: 1.6 % (ref 0–6.9)
ERYTHROCYTE [DISTWIDTH] IN BLOOD BY AUTOMATED COUNT: 52.4 FL (ref 35.9–50)
EST. AVERAGE GLUCOSE BLD GHB EST-MCNC: 131 MG/DL
GLUCOSE BLD-MCNC: 144 MG/DL (ref 65–99)
GLUCOSE BLD-MCNC: 155 MG/DL (ref 65–99)
GLUCOSE SERPL-MCNC: 143 MG/DL (ref 65–99)
HBA1C MFR BLD: 6.2 % (ref 0–5.6)
HCT VFR BLD AUTO: 36.7 % (ref 37–47)
HGB BLD-MCNC: 11.4 G/DL (ref 12–16)
IMM GRANULOCYTES # BLD AUTO: 0.02 K/UL (ref 0–0.11)
IMM GRANULOCYTES NFR BLD AUTO: 0.3 % (ref 0–0.9)
LYMPHOCYTES # BLD AUTO: 1.74 K/UL (ref 1–4.8)
LYMPHOCYTES NFR BLD: 25.7 % (ref 22–41)
MCH RBC QN AUTO: 30.9 PG (ref 27–33)
MCHC RBC AUTO-ENTMCNC: 31.1 G/DL (ref 33.6–35)
MCV RBC AUTO: 99.5 FL (ref 81.4–97.8)
MONOCYTES # BLD AUTO: 0.62 K/UL (ref 0–0.85)
MONOCYTES NFR BLD AUTO: 9.1 % (ref 0–13.4)
NEUTROPHILS # BLD AUTO: 4.25 K/UL (ref 2–7.15)
NEUTROPHILS NFR BLD: 62.7 % (ref 44–72)
NRBC # BLD AUTO: 0 K/UL
NRBC BLD-RTO: 0 /100 WBC
PLATELET # BLD AUTO: 252 K/UL (ref 164–446)
PMV BLD AUTO: 10.2 FL (ref 9–12.9)
POTASSIUM SERPL-SCNC: 3.7 MMOL/L (ref 3.6–5.5)
RBC # BLD AUTO: 3.69 M/UL (ref 4.2–5.4)
SODIUM SERPL-SCNC: 139 MMOL/L (ref 135–145)
VANCOMYCIN SERPL-MCNC: 30.2 UG/ML
WBC # BLD AUTO: 6.8 K/UL (ref 4.8–10.8)

## 2019-08-23 PROCEDURE — 87040 BLOOD CULTURE FOR BACTERIA: CPT | Mod: 91

## 2019-08-23 PROCEDURE — A9270 NON-COVERED ITEM OR SERVICE: HCPCS | Performed by: INTERNAL MEDICINE

## 2019-08-23 PROCEDURE — 85025 COMPLETE CBC W/AUTO DIFF WBC: CPT

## 2019-08-23 PROCEDURE — 83036 HEMOGLOBIN GLYCOSYLATED A1C: CPT

## 2019-08-23 PROCEDURE — 97161 PT EVAL LOW COMPLEX 20 MIN: CPT

## 2019-08-23 PROCEDURE — 99255 IP/OBS CONSLTJ NEW/EST HI 80: CPT | Performed by: INTERNAL MEDICINE

## 2019-08-23 PROCEDURE — 770001 HCHG ROOM/CARE - MED/SURG/GYN PRIV*

## 2019-08-23 PROCEDURE — 97165 OT EVAL LOW COMPLEX 30 MIN: CPT

## 2019-08-23 PROCEDURE — 665998 HH PPS REVENUE CREDIT

## 2019-08-23 PROCEDURE — 302135 SEQUENTIAL COMPRESSION MACHINE: Performed by: HOSPITALIST

## 2019-08-23 PROCEDURE — A9270 NON-COVERED ITEM OR SERVICE: HCPCS | Performed by: HOSPITALIST

## 2019-08-23 PROCEDURE — 36415 COLL VENOUS BLD VENIPUNCTURE: CPT

## 2019-08-23 PROCEDURE — 700111 HCHG RX REV CODE 636 W/ 250 OVERRIDE (IP): Performed by: INTERNAL MEDICINE

## 2019-08-23 PROCEDURE — 82962 GLUCOSE BLOOD TEST: CPT | Mod: 91

## 2019-08-23 PROCEDURE — 665999 HH PPS REVENUE DEBIT

## 2019-08-23 PROCEDURE — 99233 SBSQ HOSP IP/OBS HIGH 50: CPT | Performed by: HOSPITALIST

## 2019-08-23 PROCEDURE — 700105 HCHG RX REV CODE 258: Performed by: HOSPITALIST

## 2019-08-23 PROCEDURE — 700105 HCHG RX REV CODE 258: Performed by: INTERNAL MEDICINE

## 2019-08-23 PROCEDURE — 80202 ASSAY OF VANCOMYCIN: CPT

## 2019-08-23 PROCEDURE — 700102 HCHG RX REV CODE 250 W/ 637 OVERRIDE(OP): Performed by: HOSPITALIST

## 2019-08-23 PROCEDURE — 80048 BASIC METABOLIC PNL TOTAL CA: CPT

## 2019-08-23 PROCEDURE — 700102 HCHG RX REV CODE 250 W/ 637 OVERRIDE(OP): Performed by: INTERNAL MEDICINE

## 2019-08-23 RX ORDER — OXYCODONE HYDROCHLORIDE 5 MG/1
5 TABLET ORAL EVERY 6 HOURS PRN
Status: DISCONTINUED | OUTPATIENT
Start: 2019-08-23 | End: 2019-08-29 | Stop reason: HOSPADM

## 2019-08-23 RX ORDER — TRAMADOL HYDROCHLORIDE 50 MG/1
50 TABLET ORAL EVERY 4 HOURS PRN
Status: DISCONTINUED | OUTPATIENT
Start: 2019-08-23 | End: 2019-08-23

## 2019-08-23 RX ORDER — IBUPROFEN 400 MG/1
400 TABLET ORAL EVERY 6 HOURS PRN
Status: DISCONTINUED | OUTPATIENT
Start: 2019-08-23 | End: 2019-08-23

## 2019-08-23 RX ORDER — TRAMADOL HYDROCHLORIDE 50 MG/1
50 TABLET ORAL EVERY 4 HOURS PRN
Status: DISCONTINUED | OUTPATIENT
Start: 2019-08-23 | End: 2019-08-29 | Stop reason: HOSPADM

## 2019-08-23 RX ORDER — LINEZOLID 600 MG/1
600 TABLET, FILM COATED ORAL EVERY 12 HOURS
Status: DISCONTINUED | OUTPATIENT
Start: 2019-08-23 | End: 2019-08-24

## 2019-08-23 RX ADMIN — HEPARIN SODIUM 5000 UNITS: 5000 INJECTION, SOLUTION INTRAVENOUS; SUBCUTANEOUS at 13:31

## 2019-08-23 RX ADMIN — SODIUM CHLORIDE: 9 INJECTION, SOLUTION INTRAVENOUS at 22:18

## 2019-08-23 RX ADMIN — CARVEDILOL 6.25 MG: 6.25 TABLET, FILM COATED ORAL at 04:43

## 2019-08-23 RX ADMIN — IBUPROFEN 400 MG: 400 TABLET ORAL at 11:47

## 2019-08-23 RX ADMIN — LEVOTHYROXINE SODIUM 200 MCG: 25 TABLET ORAL at 05:24

## 2019-08-23 RX ADMIN — HEPARIN SODIUM 5000 UNITS: 5000 INJECTION, SOLUTION INTRAVENOUS; SUBCUTANEOUS at 22:17

## 2019-08-23 RX ADMIN — HEPARIN SODIUM 5000 UNITS: 5000 INJECTION, SOLUTION INTRAVENOUS; SUBCUTANEOUS at 04:45

## 2019-08-23 RX ADMIN — VITAMIN D, TAB 1000IU (100/BT) 2000 UNITS: 25 TAB at 18:09

## 2019-08-23 RX ADMIN — PIPERACILLIN AND TAZOBACTAM 3.38 G: 3; .375 INJECTION, POWDER, LYOPHILIZED, FOR SOLUTION INTRAVENOUS; PARENTERAL at 22:17

## 2019-08-23 RX ADMIN — SODIUM CHLORIDE: 9 INJECTION, SOLUTION INTRAVENOUS at 08:52

## 2019-08-23 RX ADMIN — PIPERACILLIN AND TAZOBACTAM 3.38 G: 3; .375 INJECTION, POWDER, LYOPHILIZED, FOR SOLUTION INTRAVENOUS; PARENTERAL at 04:44

## 2019-08-23 RX ADMIN — OXYCODONE HYDROCHLORIDE 5 MG: 5 TABLET ORAL at 04:44

## 2019-08-23 RX ADMIN — TRAMADOL HYDROCHLORIDE 50 MG: 50 TABLET, FILM COATED ORAL at 18:13

## 2019-08-23 RX ADMIN — LINEZOLID 600 MG: 600 TABLET, FILM COATED ORAL at 18:09

## 2019-08-23 RX ADMIN — PIPERACILLIN AND TAZOBACTAM 3.38 G: 3; .375 INJECTION, POWDER, LYOPHILIZED, FOR SOLUTION INTRAVENOUS; PARENTERAL at 13:32

## 2019-08-23 RX ADMIN — CARVEDILOL 6.25 MG: 6.25 TABLET, FILM COATED ORAL at 18:10

## 2019-08-23 RX ADMIN — OXYCODONE HYDROCHLORIDE 5 MG: 5 TABLET ORAL at 22:18

## 2019-08-23 ASSESSMENT — COGNITIVE AND FUNCTIONAL STATUS - GENERAL
EATING MEALS: A LITTLE
HELP NEEDED FOR BATHING: A LITTLE
MOVING TO AND FROM BED TO CHAIR: A LITTLE
MOBILITY SCORE: 21
SUGGESTED CMS G CODE MODIFIER MOBILITY: CJ
DAILY ACTIVITIY SCORE: 20
TURNING FROM BACK TO SIDE WHILE IN FLAT BAD: A LITTLE
SUGGESTED CMS G CODE MODIFIER DAILY ACTIVITY: CJ
CLIMB 3 TO 5 STEPS WITH RAILING: A LITTLE
DRESSING REGULAR LOWER BODY CLOTHING: A LITTLE
DRESSING REGULAR UPPER BODY CLOTHING: A LITTLE

## 2019-08-23 ASSESSMENT — ENCOUNTER SYMPTOMS
DIZZINESS: 0
SPUTUM PRODUCTION: 0
SHORTNESS OF BREATH: 0
SPEECH CHANGE: 0
EYE PAIN: 0
DIAPHORESIS: 0
PALPITATIONS: 0
DEPRESSION: 0
EYE DISCHARGE: 0
SENSORY CHANGE: 0
HEADACHES: 0
FEVER: 0
WEAKNESS: 0
SORE THROAT: 0
WHEEZING: 0
HEMOPTYSIS: 0
VOMITING: 0
FOCAL WEAKNESS: 0
NAUSEA: 0
BACK PAIN: 0
CONSTIPATION: 0
MYALGIAS: 1
CLAUDICATION: 0
ABDOMINAL PAIN: 0
LOSS OF CONSCIOUSNESS: 0
COUGH: 0
CHILLS: 0
NECK PAIN: 0
BRUISES/BLEEDS EASILY: 0
DIARRHEA: 0

## 2019-08-23 ASSESSMENT — GAIT ASSESSMENTS
DEVIATION: DECREASED HEEL STRIKE;DECREASED TOE OFF
DISTANCE (FEET): 50
GAIT LEVEL OF ASSIST: SUPERVISED

## 2019-08-23 ASSESSMENT — ACTIVITIES OF DAILY LIVING (ADL): TOILETING: INDEPENDENT

## 2019-08-23 ASSESSMENT — LIFESTYLE VARIABLES: SUBSTANCE_ABUSE: 0

## 2019-08-23 NOTE — OR NURSING
Patient awake and alert and resting comfortably in bed. Pt has been updated on plan of care and all questions have been answered. Safety measures in place, bed in lowest position, pt has been educated on all safety precautions in PACU. Vital signs stable.  Dressings is CDI. Wound vac in place. Pt family has been updated on pt status. Will continue to monitor.     1836: pt IV infiltrated on right arm and causing pain. Removed IV and placed Heat packs to pt arm.     1900: pt right arm feeling better and pt pain is decreasing in left arm with pain medication give prior to IV infiltrating.     1942: New IV started in left hand. Pt tolerated well.     Pt resting comfortably in bed and has no questions at this time. Will continue to monitor.
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
## 2019-08-23 NOTE — ANESTHESIA TIME REPORT
Anesthesia Start and Stop Event Times     Date Time Event    8/22/2019 1634 Ready for Procedure     1716 Anesthesia Start     1806 Anesthesia Stop        Responsible Staff  08/22/19    Name Role Begin End    Mohsen Shankar M.D. Anesth 1716 1806        Preop Diagnosis (Free Text):  Pre-op Diagnosis     left elbow wound        Preop Diagnosis (Codes):    Post op Diagnosis  Arm wound, left, initial encounter      Premium Reason  A. 3PM - 7AM    Comments:
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
## 2019-08-23 NOTE — ANESTHESIA POSTPROCEDURE EVALUATION
Patient: Maira Dodd    Procedure Summary     Date:  08/22/19 Room / Location:  [facility] OR 06 / SURGERY [facility]    Anesthesia Start:  1716 Anesthesia Stop:  1806    Procedure:  IRRIGATION AND DEBRIDEMENT, WOUND- ARM WITH WOUND VAC APPLICATION (Left Arm Upper) Diagnosis:  (left elbow wound)    Surgeon:  Javad Richmond M.D. Responsible Provider:  Mohsen Shankar M.D.    Anesthesia Type:  general ASA Status:  3          Final Anesthesia Type: general  Last vitals  BP   Blood Pressure : 156/64, NIBP: 160/72    Temp   36.9 °C (98.5 °F)    Pulse   Pulse: 90   Resp   16    SpO2   97 %      Anesthesia Post Evaluation    Patient location during evaluation: PACU  Patient participation: complete - patient participated  Level of consciousness: awake and alert    Airway patency: patent  Anesthetic complications: no  Cardiovascular status: hemodynamically stable  Respiratory status: acceptable  Hydration status: euvolemic    PONV: none           Nurse Pain Score: 3 (NPRS)
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
## 2019-08-23 NOTE — OR SURGEON
Immediate Post OP Note    PreOp Diagnosis: Left posterior arm infected open wound    PostOp Diagnosis: same    Procedure(s):  EXCISIONAL DEBRIDEMENT, WOUND- ARM WITH WOUND VAC APPLICATION - Wound Class: Dirty or Infected    Surgeon(s):  Javad Richmond M.D.    Anesthesiologist/Type of Anesthesia:  Anesthesiologist: Mohsen Shankar M.D./General    Surgical Staff:  Circulator: Otf Stanton R.N.  Scrub Person: Elean Machado    Specimens removed if any:  ID Type Source Tests Collected by Time Destination   1 : left arm wound Other Other AEROBIC/ANAEROBIC CULTURE (SURGERY) Javad Richmond M.D. 8/22/2019  5:55 PM        Estimated Blood Loss: 50cc    Findings: see dictation    Complications: none known    PLAN:  --readmit medicine postop  --wound vac change per protocol with wound team starting in 2 days  --continue abx, fu intraop cultures        8/22/2019 5:57 PM Javad Richmond M.D.
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
## 2019-08-23 NOTE — PROGRESS NOTES
Pharmacy Kinetics 74 y.o. female on vancomycin day # 3  8/23/2019    Currently Dose: Vancomycin pulse dosing  08/21/19 3000 mg  08/22/19 VR 24.9 mcg/mL (~12 hours post-dose)  08/22/19 2000 mg  08/22/19 VR 30.2 mcg/mL (~12 hours post-dose)     Indication for Treatment: SSTI  ID Service Following: No (orthopedics consulted)     Pertinent history per medical record: Admitted on 8/21/2019 for concerning left elbow wound. Admit H&P notes foul discharge from wound site with plans for OR intervention 8/22/19. Noted history of DM.      Other antibiotics: piperacillin/tazobactam 3.375 gm iv q8h     Allergies: Statins [hmg-coa-r inhibitors]; Betadine [povidone iodine]; Betadine [povidone iodine]; Iodine; Iodine; Neomycin; Statins [hmg-coa-r inhibitors]; and Tape      List concerns for accumulation of vancomycin: age 74, BMI ~45, abnormal LFT's, low albumin/malnutrition     Pertinent cultures to date:   Results     Procedure Component Value Units Date/Time    Anaerobic Culture [[id number]] Collected:  08/22/19 1755    Order Status:  Completed Specimen:  Wound Updated:  08/23/19 1034     Significant Indicator NEG     Source WND     Site Left Arm     Culture Result Culture in progress.    CULTURE WOUND W/ GRAM STAIN [[id number]]  (Abnormal)  (Susceptibility) Collected:  08/21/19 1839    Order Status:  Completed Specimen:  Wound from Left Arm Updated:  08/23/19 0840     Significant Indicator POS     Source WND     Site LEFT ARM     Culture Result -     Gram Stain Result Many WBCs.  Many Gram positive cocci.  Rare Gram negative rods.       Culture Result Citrobacter freundii  Moderate growth        Pseudomonas aeruginosa  Light growth  P.aeruginosa may develop resistance during prolonged therapy  with all antibiotics. Isolates that are initially susceptible  may become resistant within three to four days after  initiation of therapy. Testing of repeat isolates may be  warranted.  No Proteus spp. isolated        Streptococcus  agalactiae (Group B)  Heavy growth        Staphylococcus aureus  Moderate growth      Narrative:       Collected By:[id number] BRIJESH FREEMAN  Collected By:[id number] BRIJESH FREEMAN    Susceptibility     Citrobacter freundii (1)     Antibiotic Interpretation Microscan Method Status    Ceftriaxone Sensitive <=8 mcg/mL СЕРГЕЙ Preliminary    Ceftazidime Sensitive <=1 mcg/mL СЕРГЕЙ Preliminary    Cefotaxime Sensitive <=2 mcg/mL СЕРГЕЙ Preliminary    Ciprofloxacin Sensitive <=1 mcg/mL СЕРГЕЙ Preliminary    Cefepime Sensitive <=8 mcg/mL СЕРГЕЙ Preliminary    Cefuroxime Sensitive <=4 mcg/mL СЕРГЕЙ Preliminary    Cefotetan Sensitive <=16 mcg/mL СЕРГЕЙ Preliminary    Ampicillin Resistant >16 mcg/mL СЕРГЕЙ Preliminary    Ertapenem Sensitive <=1 mcg/mL СЕРГЕЙ Preliminary    Gentamicin Resistant >8 mcg/mL СЕРГЕЙ Preliminary    Pip/Tazobactam Sensitive <=16 mcg/mL СЕРГЕЙ Preliminary    Trimeth/Sulfa Resistant >2/38 mcg/mL СЕРГЕЙ Preliminary    Tigecycline Sensitive <=2 mcg/mL СЕРГЕЙ Preliminary    Tobramycin Intermediate 8 mcg/mL СЕРГЕЙ Preliminary          Pseudomonas aeruginosa (2)     Antibiotic Interpretation Microscan Method Status    Ceftazidime Sensitive 4 mcg/mL СЕРГЕЙ Preliminary    Ciprofloxacin Sensitive <=1 mcg/mL СЕРГЕЙ Preliminary    Cefepime Sensitive <=8 mcg/mL СЕРГЕЙ Preliminary    Meropenem Sensitive <=1 mcg/mL СЕРГЕЙ Preliminary    Gentamicin Sensitive <=4 mcg/mL СЕРГЕЙ Preliminary    Pip/Tazobactam Sensitive <=16 mcg/mL СЕРГЕЙ Preliminary    Tobramycin Sensitive <=4 mcg/mL СЕРГЕЙ Preliminary    Amikacin Sensitive <=16 mcg/mL СЕРГЕЙ Preliminary    Imipenem Sensitive <=1 mcg/mL СЕРГЕЙ Preliminary                   GRAM STAIN [[id number]] Collected:  08/22/19 1755    Order Status:  Completed Specimen:  Wound Updated:  08/22/19 2245     Significant Indicator .     Source WND     Site Left Arm     Gram Stain Result Few WBCs.  Few Gram negative rods.  Few Gram positive cocci.      CULTURE WOUND W/ GRAM STAIN [[id number]] Collected:  08/22/19 1755    Order Status:  Completed Specimen:   "Other Updated:  19    GRAM STAIN [[id number]] Collected:  19    Order Status:  Completed Specimen:  Wound Updated:  19     Significant Indicator .     Source WND     Site LEFT ARM     Gram Stain Result Many WBCs.  Many Gram positive cocci.  Rare Gram negative rods.      Narrative:       Collected By:[id number] BRIJESH FREEMAN  Collected By:[id number] BRIJESH FREEMAN        MRSA nares swab if pneumonia is a concern (ordered/positive/negative/n-a): n/a    Recent Labs     19  1014 19  0537   WBC 8.2 6.8   NEUTSPOLYS  --  62.70     Recent Labs     19  1014 19  0537   BUN 13 13   CREATININE 0.85 1.28   ALBUMIN 3.1*  --      Recent Labs     19  1014   VANCORANDOM 24.9     Intake/Output Summary (Last 24 hours) at 2019 0913  Last data filed at 2019 0600  Gross per 24 hour   Intake 800 ml   Output 20 ml   Net 780 ml      /64   Pulse 78   Temp 36.5 °C (97.7 °F) (Temporal)   Resp 16   Ht 1.715 m (5' 7.5\")   Wt (!) 133 kg (293 lb 3.4 oz)   SpO2 98%  Temp (24hrs), Av.7 °C (98.1 °F), Min:36.4 °C (97.5 °F), Max:37.3 °C (99.2 °F)    Estimated Creatinine Clearance: 57 mL/min (by C-G formula based on SCr of 1.28 mg/dL).    A/P   1. Vancomycin dose change: vancomycin 2000 mg iv once (~15 mg/kg/dose)   2. Next vancomycin level: 19 @1600 (ordered; ~18 hours post-dose)  3. Goal trough: 12-16 mcg/mL  4. Comments: VS stable. Afebrile. WBC stable. CrCl ~57 mL/min (SCr with concerning uptrend). Microbiology pending but notes PSAR and C freundii both sensitive to piperacillin/tazobactam. Recent I&D noted 19. Concerns for accumulation of vancomycin noted. Most recent random vancomycin level noted and above goal (12 hours post-dose). Will pulse dose with ~15 mg/kg once and order level ~18 hours after planned dose to assess vancomycin clearance. BMP with AM labs. Recommend discontinuation of ibuprofen. Would consider ID consult. Would consider " de-escalation of vancomycin or move to alternative agent. Pharmacy will continue to follow.    Kilo Dove, PharmD
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
## 2019-08-23 NOTE — CARE PLAN
Problem: Safety  Goal: Will remain free from falls  Outcome: PROGRESSING AS EXPECTED  Intervention: Implement fall precautions  Flowsheets (Taken 8/22/2019 0930 by Surinder Lechuga [email or web address].)  Environmental Precautions: Treaded Slipper Socks on Patient;Personal Belongings, Wastebasket, Call Bell etc. in Easy Reach;Transferred to Stronger Side;Report Given to Other Health Care Providers Regarding Fall Risk;Bed in Low Position;Communication Sign for Patients & Families;Mobility Assessed & Appropriate Sign Placed     Problem: Pain Management  Goal: Pain level will decrease to patient's comfort goal  Intervention: Educate and implement non-pharmacologic comfort measures. Examples: relaxation, distration, play therapy, activity therapy, massage, etc.  Flowsheets (Taken 8/23/2019 0403)  Intervention: Medication (see MAR); Rest

## 2019-08-23 NOTE — ANESTHESIA PREPROCEDURE EVALUATION
Relevant Problems   CARDIAC   (+) Essential hypertension         (+) Renal insufficiency      ENDO   (+) Hypothyroid   (+) Hypothyroidism      Other   (+) Elbow wound, left, sequela   (+) Insulin dependent diabetes mellitus (HCC)   (+) Morbid obesity with BMI of 45.0-49.9, adult (HCC)       Physical Exam    Airway   Mallampati: I  TM distance: >3 FB  Neck ROM: full       Cardiovascular - normal exam  Rhythm: irregular  Rate: normal  (-) murmur     Dental   (+) upper dentures, lower dentures         Pulmonary - normal exam  Breath sounds clear to auscultation     Abdominal    Neurological - normal exam                 Anesthesia Plan    ASA 3   ASA physical status 3 criteria: morbid obesity - BMI greater than or equal to 40    Plan - general       Airway plan will be LMA        Induction: intravenous      Pertinent diagnostic labs and testing reviewed    Informed Consent:    Anesthetic plan and risks discussed with patient.

## 2019-08-23 NOTE — DISCHARGE PLANNING
*ATTN Discharge Planning team: This patient is currently on service with [facility]. Please submit a referral order and face-to-face prior to discharging the patient home. If you have any questions, contact our Transitional Care Specialist team at x [phone].

## 2019-08-23 NOTE — WOUND TEAM
Wound consult received, patient with NPWT dressing in place. Wound team to begin 3 x weekly NPWT dressing changes tomorrow.

## 2019-08-24 LAB
ANION GAP SERPL CALC-SCNC: 9 MMOL/L (ref 0–11.9)
BACTERIA WND AEROBE CULT: ABNORMAL
BASOPHILS # BLD AUTO: 0.7 % (ref 0–1.8)
BASOPHILS # BLD: 0.04 K/UL (ref 0–0.12)
BUN SERPL-MCNC: 22 MG/DL (ref 8–22)
CALCIUM SERPL-MCNC: 8.2 MG/DL (ref 8.5–10.5)
CHLORIDE SERPL-SCNC: 108 MMOL/L (ref 96–112)
CO2 SERPL-SCNC: 22 MMOL/L (ref 20–33)
CREAT SERPL-MCNC: 3.18 MG/DL (ref 0.5–1.4)
EOSINOPHIL # BLD AUTO: 0.15 K/UL (ref 0–0.51)
EOSINOPHIL NFR BLD: 2.5 % (ref 0–6.9)
ERYTHROCYTE [DISTWIDTH] IN BLOOD BY AUTOMATED COUNT: 52.8 FL (ref 35.9–50)
FERRITIN SERPL-MCNC: 66.8 NG/ML (ref 10–291)
GLUCOSE SERPL-MCNC: 147 MG/DL (ref 65–99)
GRAM STN SPEC: ABNORMAL
HCT VFR BLD AUTO: 34.5 % (ref 37–47)
HGB BLD-MCNC: 10.6 G/DL (ref 12–16)
IMM GRANULOCYTES # BLD AUTO: 0.02 K/UL (ref 0–0.11)
IMM GRANULOCYTES NFR BLD AUTO: 0.3 % (ref 0–0.9)
IRON SATN MFR SERPL: 21 % (ref 15–55)
IRON SERPL-MCNC: 50 UG/DL (ref 40–170)
LYMPHOCYTES # BLD AUTO: 2.11 K/UL (ref 1–4.8)
LYMPHOCYTES NFR BLD: 34.6 % (ref 22–41)
MCH RBC QN AUTO: 30.9 PG (ref 27–33)
MCHC RBC AUTO-ENTMCNC: 30.7 G/DL (ref 33.6–35)
MCV RBC AUTO: 100.6 FL (ref 81.4–97.8)
MONOCYTES # BLD AUTO: 0.59 K/UL (ref 0–0.85)
MONOCYTES NFR BLD AUTO: 9.7 % (ref 0–13.4)
NEUTROPHILS # BLD AUTO: 3.18 K/UL (ref 2–7.15)
NEUTROPHILS NFR BLD: 52.2 % (ref 44–72)
NRBC # BLD AUTO: 0 K/UL
NRBC BLD-RTO: 0 /100 WBC
PLATELET # BLD AUTO: 233 K/UL (ref 164–446)
PMV BLD AUTO: 10.5 FL (ref 9–12.9)
POTASSIUM SERPL-SCNC: 4 MMOL/L (ref 3.6–5.5)
RBC # BLD AUTO: 3.43 M/UL (ref 4.2–5.4)
SIGNIFICANT IND 70042: ABNORMAL
SITE SITE: ABNORMAL
SODIUM SERPL-SCNC: 139 MMOL/L (ref 135–145)
SOURCE SOURCE: ABNORMAL
TIBC SERPL-MCNC: 235 UG/DL (ref 250–450)
WBC # BLD AUTO: 6.1 K/UL (ref 4.8–10.8)

## 2019-08-24 PROCEDURE — 665999 HH PPS REVENUE DEBIT

## 2019-08-24 PROCEDURE — 665998 HH PPS REVENUE CREDIT

## 2019-08-24 PROCEDURE — 99233 SBSQ HOSP IP/OBS HIGH 50: CPT | Performed by: HOSPITALIST

## 2019-08-24 PROCEDURE — 700105 HCHG RX REV CODE 258: Performed by: INTERNAL MEDICINE

## 2019-08-24 PROCEDURE — 83550 IRON BINDING TEST: CPT

## 2019-08-24 PROCEDURE — 700111 HCHG RX REV CODE 636 W/ 250 OVERRIDE (IP): Performed by: INTERNAL MEDICINE

## 2019-08-24 PROCEDURE — 700102 HCHG RX REV CODE 250 W/ 637 OVERRIDE(OP): Performed by: HOSPITALIST

## 2019-08-24 PROCEDURE — 700102 HCHG RX REV CODE 250 W/ 637 OVERRIDE(OP): Performed by: INTERNAL MEDICINE

## 2019-08-24 PROCEDURE — A9270 NON-COVERED ITEM OR SERVICE: HCPCS | Performed by: INTERNAL MEDICINE

## 2019-08-24 PROCEDURE — 36415 COLL VENOUS BLD VENIPUNCTURE: CPT

## 2019-08-24 PROCEDURE — 770001 HCHG ROOM/CARE - MED/SURG/GYN PRIV*

## 2019-08-24 PROCEDURE — 83540 ASSAY OF IRON: CPT

## 2019-08-24 PROCEDURE — 80048 BASIC METABOLIC PNL TOTAL CA: CPT

## 2019-08-24 PROCEDURE — 85025 COMPLETE CBC W/AUTO DIFF WBC: CPT

## 2019-08-24 PROCEDURE — 99233 SBSQ HOSP IP/OBS HIGH 50: CPT | Performed by: INTERNAL MEDICINE

## 2019-08-24 PROCEDURE — 82728 ASSAY OF FERRITIN: CPT

## 2019-08-24 PROCEDURE — 97606 NEG PRS WND THER DME>50 SQCM: CPT

## 2019-08-24 PROCEDURE — A9270 NON-COVERED ITEM OR SERVICE: HCPCS | Performed by: HOSPITALIST

## 2019-08-24 PROCEDURE — 700105 HCHG RX REV CODE 258: Performed by: HOSPITALIST

## 2019-08-24 RX ADMIN — VITAMIN D, TAB 1000IU (100/BT) 2000 UNITS: 25 TAB at 04:59

## 2019-08-24 RX ADMIN — CARVEDILOL 6.25 MG: 6.25 TABLET, FILM COATED ORAL at 17:02

## 2019-08-24 RX ADMIN — LINEZOLID 600 MG: 600 TABLET, FILM COATED ORAL at 04:59

## 2019-08-24 RX ADMIN — HEPARIN SODIUM 5000 UNITS: 5000 INJECTION, SOLUTION INTRAVENOUS; SUBCUTANEOUS at 05:00

## 2019-08-24 RX ADMIN — OXYCODONE HYDROCHLORIDE 5 MG: 5 TABLET ORAL at 14:05

## 2019-08-24 RX ADMIN — PIPERACILLIN AND TAZOBACTAM 3.38 G: 3; .375 INJECTION, POWDER, LYOPHILIZED, FOR SOLUTION INTRAVENOUS; PARENTERAL at 04:58

## 2019-08-24 RX ADMIN — LEVOTHYROXINE SODIUM 200 MCG: 25 TABLET ORAL at 05:00

## 2019-08-24 RX ADMIN — TRAMADOL HYDROCHLORIDE 50 MG: 50 TABLET, FILM COATED ORAL at 04:58

## 2019-08-24 RX ADMIN — SODIUM CHLORIDE: 9 INJECTION, SOLUTION INTRAVENOUS at 15:22

## 2019-08-24 RX ADMIN — TRAMADOL HYDROCHLORIDE 50 MG: 50 TABLET, FILM COATED ORAL at 15:21

## 2019-08-24 RX ADMIN — HEPARIN SODIUM 5000 UNITS: 5000 INJECTION, SOLUTION INTRAVENOUS; SUBCUTANEOUS at 13:28

## 2019-08-24 RX ADMIN — CARVEDILOL 6.25 MG: 6.25 TABLET, FILM COATED ORAL at 04:59

## 2019-08-24 RX ADMIN — SENNOSIDES, DOCUSATE SODIUM 1 TABLET: 50; 8.6 TABLET, FILM COATED ORAL at 17:03

## 2019-08-24 RX ADMIN — PIPERACILLIN AND TAZOBACTAM 3.38 G: 3; .375 INJECTION, POWDER, LYOPHILIZED, FOR SOLUTION INTRAVENOUS; PARENTERAL at 13:27

## 2019-08-24 RX ADMIN — PIPERACILLIN AND TAZOBACTAM 3.38 G: 3; .375 INJECTION, POWDER, LYOPHILIZED, FOR SOLUTION INTRAVENOUS; PARENTERAL at 20:35

## 2019-08-24 RX ADMIN — OXYCODONE HYDROCHLORIDE 5 MG: 5 TABLET ORAL at 20:35

## 2019-08-24 RX ADMIN — HEPARIN SODIUM 5000 UNITS: 5000 INJECTION, SOLUTION INTRAVENOUS; SUBCUTANEOUS at 20:35

## 2019-08-24 ASSESSMENT — ENCOUNTER SYMPTOMS
SHORTNESS OF BREATH: 0
SPUTUM PRODUCTION: 0
FEVER: 0
MYALGIAS: 1
BACK PAIN: 0
FOCAL WEAKNESS: 1
EYE PAIN: 0
DIZZINESS: 0
SPEECH CHANGE: 0
NECK PAIN: 0
EYE DISCHARGE: 0
FOCAL WEAKNESS: 0
SORE THROAT: 0
COUGH: 0
WEAKNESS: 0
WHEEZING: 0
HEMOPTYSIS: 0
HEADACHES: 0
DEPRESSION: 0
CONSTIPATION: 0
CLAUDICATION: 0
LOSS OF CONSCIOUSNESS: 0
CHILLS: 0
PALPITATIONS: 0
ABDOMINAL PAIN: 0
NAUSEA: 0
DIARRHEA: 0
SENSORY CHANGE: 0
VOMITING: 0
DIAPHORESIS: 0
BRUISES/BLEEDS EASILY: 0

## 2019-08-24 ASSESSMENT — LIFESTYLE VARIABLES: SUBSTANCE_ABUSE: 0
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
## 2019-08-24 NOTE — CARE PLAN
Problem: Knowledge Deficit  Goal: Knowledge of disease process/condition, treatment plan, diagnostic tests, and medications will improve  Outcome: PROGRESSING AS EXPECTED    Discussed plan of care for today w/ pt this am, she is A+O, she verbalizes understanding of her plan of care, no questions. Emotional support given. Reinforcing education as necessary. Discussed pt's care with Hospitalist this am.        Problem: Pain Management  Goal: Pain level will decrease to patient's comfort goal  Outcome: PROGRESSING AS EXPECTED    Denies need for pain medication this am. Assessing every four hrs for pain per protocol; see doc flowsheets
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
## 2019-08-24 NOTE — ASSESSMENT & PLAN NOTE
-Creatinine continues to improve slowly.  Suspect likely due to vancomycin.  -Continue gentle hydration with IV NS at 125 cc.  Continue to trend renal function closely.  Hold ACE inhibitors.  - avoid nephrotoxins, and continue to renally dose all medications.

## 2019-08-24 NOTE — PROGRESS NOTES
"   Orthopaedic PA Progress Note    Interval changes:Did well povernight. In good spirits, family at bedside    ROS - Patient denies any new issues. No chest pain, dyspnea, or fever.  Pain well controlled.    BP (!) 97/45   Pulse 77   Temp 36.4 °C (97.5 °F) (Temporal)   Resp 17   Ht 1.715 m (5' 7.5\")   Wt (!) 135.8 kg (299 lb 6.2 oz)   SpO2 93%     Patient seen and examined  No acute distress  Breathing non labored  RRR  Surgical dressing is clean, dry, and intact. Patient clearly fires tibialis anterior, EHL, and gastrocnemius/soleus. Sensation is intact to light touch throughout superficial peroneal, deep peroneal, tibial, saphenous, and sural nerve distributions. Strong and palpable 2+ dorsalis pedis and posterior tibial pulses with capillary refill less than 2 seconds. No lower leg tenderness or discomfort.    Recent Labs     08/22/19  1014 08/23/19  0537 08/24/19  0411   WBC 8.2 6.8 6.1   RBC 3.86* 3.69* 3.43*   HEMOGLOBIN 11.7* 11.4* 10.6*   HEMATOCRIT 37.2 36.7* 34.5*   MCV 96.4 99.5* 100.6*   MCH 30.3 30.9 30.9   MCHC 31.5* 31.1* 30.7*   RDW 50.2* 52.4* 52.8*   PLATELETCT 296 252 233   MPV 10.2 10.2 10.5     Active Hospital Problems    Diagnosis   • Abscess of arm, left [L02.414]     Priority: High   • Morbid obesity with BMI of 45.0-49.9, adult ([facility]) [E66.01, Z68.42]     Priority: Medium   • Essential hypertension [I10]     Priority: Low   • ANTHONY (acute kidney injury) ([facility]) [N17.9]   • Insulin dependent diabetes mellitus ([facility]) [E11.9, Z79.4]     Assessment/Plan:  POD#2 S/P   1.  Excisional debridement of left open infected wound including skin and   subcutaneous tissue down to muscle through wound measuring 10x7x4 cm deep.  2.  Application of wound VAC greater than 50 sq cm, left arm wound.  Wt bearing status - AT  PT/OT-initiated  Wound care:WCT following NPWT  Drains - no  Arambula-no  Sutures/Staples out- 10-14 days post operatively  Antibiotics: per ID  DVT Prophylaxis- TEDS/SCDs/Foot pumps.   Case " Coordination for Discharge Planning - Disposition per Med/ID. Follow-Up: needs appointment with Dr. Richmond at  [facility] at 10-14 days post-op for re-evaluation, staple removal and radiographs.

## 2019-08-24 NOTE — CARE PLAN
Problem: Safety  Goal: Will remain free from falls  Outcome: PROGRESSING AS EXPECTED  Intervention: Implement fall precautions  Flowsheets (Taken 8/22/2019 0930 by Surinder Lechuga [email or web address].)  Environmental Precautions: Treaded Slipper Socks on Patient;Personal Belongings, Wastebasket, Call Bell etc. in Easy Reach;Transferred to Stronger Side;Report Given to Other Health Care Providers Regarding Fall Risk;Bed in Low Position;Communication Sign for Patients & Families;Mobility Assessed & Appropriate Sign Placed     Problem: Pain Management  Goal: Pain level will decrease to patient's comfort goal  Outcome: PROGRESSING AS EXPECTED  Intervention: Follow pain managment plan developed in collaboration with patient and Interdisciplinary Team  Note:   Non pharmacologic interventions and comfort measures in place

## 2019-08-25 LAB
ANION GAP SERPL CALC-SCNC: 7 MMOL/L (ref 0–11.9)
BACTERIA WND AEROBE CULT: ABNORMAL
BASOPHILS # BLD AUTO: 0.7 % (ref 0–1.8)
BASOPHILS # BLD: 0.04 K/UL (ref 0–0.12)
BUN SERPL-MCNC: 22 MG/DL (ref 8–22)
CALCIUM SERPL-MCNC: 8.2 MG/DL (ref 8.5–10.5)
CHLORIDE SERPL-SCNC: 112 MMOL/L (ref 96–112)
CO2 SERPL-SCNC: 21 MMOL/L (ref 20–33)
CREAT SERPL-MCNC: 3.3 MG/DL (ref 0.5–1.4)
EOSINOPHIL # BLD AUTO: 0.15 K/UL (ref 0–0.51)
EOSINOPHIL NFR BLD: 2.6 % (ref 0–6.9)
ERYTHROCYTE [DISTWIDTH] IN BLOOD BY AUTOMATED COUNT: 51.8 FL (ref 35.9–50)
GLUCOSE SERPL-MCNC: 150 MG/DL (ref 65–99)
GRAM STN SPEC: ABNORMAL
HCT VFR BLD AUTO: 34.3 % (ref 37–47)
HGB BLD-MCNC: 10.8 G/DL (ref 12–16)
IMM GRANULOCYTES # BLD AUTO: 0.02 K/UL (ref 0–0.11)
IMM GRANULOCYTES NFR BLD AUTO: 0.4 % (ref 0–0.9)
LYMPHOCYTES # BLD AUTO: 1.98 K/UL (ref 1–4.8)
LYMPHOCYTES NFR BLD: 34.9 % (ref 22–41)
MCH RBC QN AUTO: 31.3 PG (ref 27–33)
MCHC RBC AUTO-ENTMCNC: 31.5 G/DL (ref 33.6–35)
MCV RBC AUTO: 99.4 FL (ref 81.4–97.8)
MONOCYTES # BLD AUTO: 0.52 K/UL (ref 0–0.85)
MONOCYTES NFR BLD AUTO: 9.2 % (ref 0–13.4)
NEUTROPHILS # BLD AUTO: 2.96 K/UL (ref 2–7.15)
NEUTROPHILS NFR BLD: 52.2 % (ref 44–72)
NRBC # BLD AUTO: 0 K/UL
NRBC BLD-RTO: 0 /100 WBC
PLATELET # BLD AUTO: 229 K/UL (ref 164–446)
PMV BLD AUTO: 10.7 FL (ref 9–12.9)
POTASSIUM SERPL-SCNC: 3.9 MMOL/L (ref 3.6–5.5)
RBC # BLD AUTO: 3.45 M/UL (ref 4.2–5.4)
SIGNIFICANT IND 70042: ABNORMAL
SITE SITE: ABNORMAL
SODIUM SERPL-SCNC: 140 MMOL/L (ref 135–145)
SOURCE SOURCE: ABNORMAL
WBC # BLD AUTO: 5.7 K/UL (ref 4.8–10.8)

## 2019-08-25 PROCEDURE — 770001 HCHG ROOM/CARE - MED/SURG/GYN PRIV*

## 2019-08-25 PROCEDURE — 99232 SBSQ HOSP IP/OBS MODERATE 35: CPT | Performed by: INTERNAL MEDICINE

## 2019-08-25 PROCEDURE — 700105 HCHG RX REV CODE 258: Performed by: INTERNAL MEDICINE

## 2019-08-25 PROCEDURE — 99233 SBSQ HOSP IP/OBS HIGH 50: CPT | Performed by: HOSPITALIST

## 2019-08-25 PROCEDURE — 665999 HH PPS REVENUE DEBIT

## 2019-08-25 PROCEDURE — 700102 HCHG RX REV CODE 250 W/ 637 OVERRIDE(OP): Performed by: INTERNAL MEDICINE

## 2019-08-25 PROCEDURE — 700111 HCHG RX REV CODE 636 W/ 250 OVERRIDE (IP): Performed by: INTERNAL MEDICINE

## 2019-08-25 PROCEDURE — 85025 COMPLETE CBC W/AUTO DIFF WBC: CPT

## 2019-08-25 PROCEDURE — 700102 HCHG RX REV CODE 250 W/ 637 OVERRIDE(OP): Performed by: HOSPITALIST

## 2019-08-25 PROCEDURE — A9270 NON-COVERED ITEM OR SERVICE: HCPCS | Performed by: HOSPITALIST

## 2019-08-25 PROCEDURE — 700105 HCHG RX REV CODE 258: Performed by: HOSPITALIST

## 2019-08-25 PROCEDURE — A9270 NON-COVERED ITEM OR SERVICE: HCPCS | Performed by: INTERNAL MEDICINE

## 2019-08-25 PROCEDURE — 80048 BASIC METABOLIC PNL TOTAL CA: CPT

## 2019-08-25 PROCEDURE — 36415 COLL VENOUS BLD VENIPUNCTURE: CPT

## 2019-08-25 PROCEDURE — 665998 HH PPS REVENUE CREDIT

## 2019-08-25 RX ADMIN — PIPERACILLIN AND TAZOBACTAM 3.38 G: 3; .375 INJECTION, POWDER, LYOPHILIZED, FOR SOLUTION INTRAVENOUS; PARENTERAL at 13:24

## 2019-08-25 RX ADMIN — LEVOTHYROXINE SODIUM 200 MCG: 25 TABLET ORAL at 05:11

## 2019-08-25 RX ADMIN — CARVEDILOL 6.25 MG: 6.25 TABLET, FILM COATED ORAL at 16:56

## 2019-08-25 RX ADMIN — PIPERACILLIN AND TAZOBACTAM 3.38 G: 3; .375 INJECTION, POWDER, LYOPHILIZED, FOR SOLUTION INTRAVENOUS; PARENTERAL at 05:10

## 2019-08-25 RX ADMIN — PIPERACILLIN AND TAZOBACTAM 3.38 G: 3; .375 INJECTION, POWDER, LYOPHILIZED, FOR SOLUTION INTRAVENOUS; PARENTERAL at 21:56

## 2019-08-25 RX ADMIN — HEPARIN SODIUM 5000 UNITS: 5000 INJECTION, SOLUTION INTRAVENOUS; SUBCUTANEOUS at 21:56

## 2019-08-25 RX ADMIN — SODIUM CHLORIDE: 9 INJECTION, SOLUTION INTRAVENOUS at 08:29

## 2019-08-25 RX ADMIN — HEPARIN SODIUM 5000 UNITS: 5000 INJECTION, SOLUTION INTRAVENOUS; SUBCUTANEOUS at 05:10

## 2019-08-25 RX ADMIN — CARVEDILOL 6.25 MG: 6.25 TABLET, FILM COATED ORAL at 05:11

## 2019-08-25 RX ADMIN — HEPARIN SODIUM 5000 UNITS: 5000 INJECTION, SOLUTION INTRAVENOUS; SUBCUTANEOUS at 13:24

## 2019-08-25 RX ADMIN — OXYCODONE HYDROCHLORIDE 5 MG: 5 TABLET ORAL at 22:52

## 2019-08-25 RX ADMIN — VITAMIN D, TAB 1000IU (100/BT) 2000 UNITS: 25 TAB at 05:11

## 2019-08-25 ASSESSMENT — ENCOUNTER SYMPTOMS
CHILLS: 0
DIARRHEA: 0
PALPITATIONS: 0
BACK PAIN: 0
NECK PAIN: 0
SPEECH CHANGE: 0
COUGH: 0
EYE DISCHARGE: 0
EYE PAIN: 0
HEMOPTYSIS: 0
FOCAL WEAKNESS: 0
CLAUDICATION: 0
LOSS OF CONSCIOUSNESS: 0
SENSORY CHANGE: 0
BRUISES/BLEEDS EASILY: 0
WEAKNESS: 0
NAUSEA: 0
CONSTIPATION: 0
SPUTUM PRODUCTION: 0
SHORTNESS OF BREATH: 0
DIAPHORESIS: 0
MYALGIAS: 1
FEVER: 0
FOCAL WEAKNESS: 1
SORE THROAT: 0
DEPRESSION: 0
WHEEZING: 0
DIZZINESS: 0
HEADACHES: 0
ABDOMINAL PAIN: 0
VOMITING: 0

## 2019-08-25 ASSESSMENT — LIFESTYLE VARIABLES: SUBSTANCE_ABUSE: 0
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
## 2019-08-25 NOTE — PROGRESS NOTES
"   Orthopaedic PA Progress Note    Interval changes:feeling better, OOB in chair    ROS - Patient denies any new issues. No chest pain, dyspnea, or fever.  Pain well controlled.    /61   Pulse 80   Temp 36.3 °C (97.3 °F) (Temporal)   Resp 17   Ht 1.715 m (5' 7.5\")   Wt (!) 135.8 kg (299 lb 6.2 oz)   SpO2 96%     Patient seen and examined  No acute distress  Breathing non labored  RRR  Surgical dressing is clean, dry, and intact. Patient clearly fires forearm flexors, forearm extensors, and moves all five fingers without issue . Sensation is intact to light touch throughout median, ulnar, and radial nerve distributions. Strong and palpable radial pulses with capillary refill less than 2 seconds. No arm or hand discomfort.    Recent Labs     08/23/19  0537 08/24/19  0411 08/25/19  0417   WBC 6.8 6.1 5.7   RBC 3.69* 3.43* 3.45*   HEMOGLOBIN 11.4* 10.6* 10.8*   HEMATOCRIT 36.7* 34.5* 34.3*   MCV 99.5* 100.6* 99.4*   MCH 30.9 30.9 31.3   MCHC 31.1* 30.7* 31.5*   RDW 52.4* 52.8* 51.8*   PLATELETCT 252 233 229   MPV 10.2 10.5 10.7       Active Hospital Problems    Diagnosis   • Abscess of arm, left [L02.414]     Priority: High   • Morbid obesity with BMI of 45.0-49.9, adult ([facility]) [E66.01, Z68.42]     Priority: Medium   • Essential hypertension [I10]     Priority: Low   • ANTHONY (acute kidney injury) ([facility]) [N17.9]   • Insulin dependent diabetes mellitus ([facility]) [E11.9, Z79.4]     Assessment/Plan:  POD#3 S/P   1.  Excisional debridement of left open infected wound including skin and   subcutaneous tissue down to muscle through wound measuring 10x7x4 cm deep.  2.  Application of wound VAC greater than 50 sq cm, left arm wound.  Wt bearing status - AT  PT/OT-initiated  Wound care:WCT following NPWT  Drains - no  Arambula-no  Sutures/Staples out- 10-14 days post operatively  Antibiotics: per ID  DVT Prophylaxis- TEDS/SCDs/Foot pumps.   Case Coordination for Discharge Planning - Disposition per Med/ID. Follow-Up: needs " appointment with Dr. Richmond at  [facility] at 10-14 days post-op for re-evaluation, staple removal and radiographs.
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
## 2019-08-25 NOTE — CARE PLAN
Problem: Safety  Goal: Will remain free from injury  Outcome: PROGRESSING AS EXPECTED  Note:   Non-slip socks are on, bed is locked and in lowest position, personal belongings are within reach, room is free of clutter and spills, call light is in place. Patient educated on how to use the call light and how to call for assistance. Patient verbalized understanding.       Problem: Infection  Goal: Will remain free from infection  Outcome: PROGRESSING AS EXPECTED  Note:   Standard precautions in place

## 2019-08-26 LAB
ANION GAP SERPL CALC-SCNC: 9 MMOL/L (ref 0–11.9)
BACTERIA SPEC ANAEROBE CULT: NORMAL
BASOPHILS # BLD AUTO: 0.8 % (ref 0–1.8)
BASOPHILS # BLD: 0.05 K/UL (ref 0–0.12)
BUN SERPL-MCNC: 19 MG/DL (ref 8–22)
CALCIUM SERPL-MCNC: 8.4 MG/DL (ref 8.5–10.5)
CHLORIDE SERPL-SCNC: 113 MMOL/L (ref 96–112)
CO2 SERPL-SCNC: 18 MMOL/L (ref 20–33)
CREAT SERPL-MCNC: 2.25 MG/DL (ref 0.5–1.4)
EOSINOPHIL # BLD AUTO: 0.14 K/UL (ref 0–0.51)
EOSINOPHIL NFR BLD: 2.3 % (ref 0–6.9)
ERYTHROCYTE [DISTWIDTH] IN BLOOD BY AUTOMATED COUNT: 50.7 FL (ref 35.9–50)
FERRITIN SERPL-MCNC: 55.8 NG/ML (ref 10–291)
GLUCOSE SERPL-MCNC: 170 MG/DL (ref 65–99)
HCT VFR BLD AUTO: 35 % (ref 37–47)
HGB BLD-MCNC: 11 G/DL (ref 12–16)
IMM GRANULOCYTES # BLD AUTO: 0.02 K/UL (ref 0–0.11)
IMM GRANULOCYTES NFR BLD AUTO: 0.3 % (ref 0–0.9)
IRON SATN MFR SERPL: 27 % (ref 15–55)
IRON SERPL-MCNC: 65 UG/DL (ref 40–170)
LYMPHOCYTES # BLD AUTO: 2.18 K/UL (ref 1–4.8)
LYMPHOCYTES NFR BLD: 36.5 % (ref 22–41)
MCH RBC QN AUTO: 31 PG (ref 27–33)
MCHC RBC AUTO-ENTMCNC: 31.4 G/DL (ref 33.6–35)
MCV RBC AUTO: 98.6 FL (ref 81.4–97.8)
MONOCYTES # BLD AUTO: 0.53 K/UL (ref 0–0.85)
MONOCYTES NFR BLD AUTO: 8.9 % (ref 0–13.4)
NEUTROPHILS # BLD AUTO: 3.05 K/UL (ref 2–7.15)
NEUTROPHILS NFR BLD: 51.2 % (ref 44–72)
NRBC # BLD AUTO: 0 K/UL
NRBC BLD-RTO: 0 /100 WBC
PLATELET # BLD AUTO: 229 K/UL (ref 164–446)
PMV BLD AUTO: 10.6 FL (ref 9–12.9)
POTASSIUM SERPL-SCNC: 4.1 MMOL/L (ref 3.6–5.5)
RBC # BLD AUTO: 3.55 M/UL (ref 4.2–5.4)
SIGNIFICANT IND 70042: NORMAL
SITE SITE: NORMAL
SODIUM SERPL-SCNC: 140 MMOL/L (ref 135–145)
SOURCE SOURCE: NORMAL
TIBC SERPL-MCNC: 242 UG/DL (ref 250–450)
VIT B12 SERPL-MCNC: 588 PG/ML (ref 211–911)
WBC # BLD AUTO: 6 K/UL (ref 4.8–10.8)

## 2019-08-26 PROCEDURE — 700111 HCHG RX REV CODE 636 W/ 250 OVERRIDE (IP): Performed by: INTERNAL MEDICINE

## 2019-08-26 PROCEDURE — 99232 SBSQ HOSP IP/OBS MODERATE 35: CPT | Performed by: INTERNAL MEDICINE

## 2019-08-26 PROCEDURE — 80048 BASIC METABOLIC PNL TOTAL CA: CPT

## 2019-08-26 PROCEDURE — 700105 HCHG RX REV CODE 258: Performed by: INTERNAL MEDICINE

## 2019-08-26 PROCEDURE — 82607 VITAMIN B-12: CPT

## 2019-08-26 PROCEDURE — 665998 HH PPS REVENUE CREDIT

## 2019-08-26 PROCEDURE — 770001 HCHG ROOM/CARE - MED/SURG/GYN PRIV*

## 2019-08-26 PROCEDURE — A9270 NON-COVERED ITEM OR SERVICE: HCPCS | Performed by: INTERNAL MEDICINE

## 2019-08-26 PROCEDURE — 700105 HCHG RX REV CODE 258: Performed by: HOSPITALIST

## 2019-08-26 PROCEDURE — 83540 ASSAY OF IRON: CPT

## 2019-08-26 PROCEDURE — 82728 ASSAY OF FERRITIN: CPT

## 2019-08-26 PROCEDURE — 85025 COMPLETE CBC W/AUTO DIFF WBC: CPT

## 2019-08-26 PROCEDURE — 99232 SBSQ HOSP IP/OBS MODERATE 35: CPT | Performed by: HOSPITALIST

## 2019-08-26 PROCEDURE — 36415 COLL VENOUS BLD VENIPUNCTURE: CPT

## 2019-08-26 PROCEDURE — 83550 IRON BINDING TEST: CPT

## 2019-08-26 PROCEDURE — 665999 HH PPS REVENUE DEBIT

## 2019-08-26 PROCEDURE — A9270 NON-COVERED ITEM OR SERVICE: HCPCS | Performed by: HOSPITALIST

## 2019-08-26 PROCEDURE — 700102 HCHG RX REV CODE 250 W/ 637 OVERRIDE(OP): Performed by: HOSPITALIST

## 2019-08-26 PROCEDURE — 700102 HCHG RX REV CODE 250 W/ 637 OVERRIDE(OP): Performed by: INTERNAL MEDICINE

## 2019-08-26 RX ORDER — SODIUM CHLORIDE 9 MG/ML
INJECTION, SOLUTION INTRAVENOUS
Status: ACTIVE
Start: 2019-08-26 | End: 2019-08-27

## 2019-08-26 RX ADMIN — OXYCODONE HYDROCHLORIDE 5 MG: 5 TABLET ORAL at 21:14

## 2019-08-26 RX ADMIN — HEPARIN SODIUM 5000 UNITS: 5000 INJECTION, SOLUTION INTRAVENOUS; SUBCUTANEOUS at 21:14

## 2019-08-26 RX ADMIN — PIPERACILLIN AND TAZOBACTAM 3.38 G: 3; .375 INJECTION, POWDER, LYOPHILIZED, FOR SOLUTION INTRAVENOUS; PARENTERAL at 21:14

## 2019-08-26 RX ADMIN — HEPARIN SODIUM 5000 UNITS: 5000 INJECTION, SOLUTION INTRAVENOUS; SUBCUTANEOUS at 14:36

## 2019-08-26 RX ADMIN — LEVOTHYROXINE SODIUM 200 MCG: 25 TABLET ORAL at 05:04

## 2019-08-26 RX ADMIN — HEPARIN SODIUM 5000 UNITS: 5000 INJECTION, SOLUTION INTRAVENOUS; SUBCUTANEOUS at 04:47

## 2019-08-26 RX ADMIN — CARVEDILOL 6.25 MG: 6.25 TABLET, FILM COATED ORAL at 16:54

## 2019-08-26 RX ADMIN — PIPERACILLIN AND TAZOBACTAM 3.38 G: 3; .375 INJECTION, POWDER, LYOPHILIZED, FOR SOLUTION INTRAVENOUS; PARENTERAL at 04:47

## 2019-08-26 RX ADMIN — PIPERACILLIN AND TAZOBACTAM 3.38 G: 3; .375 INJECTION, POWDER, LYOPHILIZED, FOR SOLUTION INTRAVENOUS; PARENTERAL at 14:36

## 2019-08-26 RX ADMIN — SODIUM CHLORIDE: 9 INJECTION, SOLUTION INTRAVENOUS at 09:29

## 2019-08-26 RX ADMIN — CARVEDILOL 6.25 MG: 6.25 TABLET, FILM COATED ORAL at 04:48

## 2019-08-26 RX ADMIN — VITAMIN D, TAB 1000IU (100/BT) 2000 UNITS: 25 TAB at 04:48

## 2019-08-26 ASSESSMENT — ENCOUNTER SYMPTOMS
SORE THROAT: 0
MYALGIAS: 1
EYE PAIN: 0
CLAUDICATION: 0
PALPITATIONS: 0
DIARRHEA: 0
WEAKNESS: 0
WHEEZING: 0
BRUISES/BLEEDS EASILY: 0
DIZZINESS: 0
DEPRESSION: 0
NAUSEA: 0
SPUTUM PRODUCTION: 0
SENSORY CHANGE: 0
FOCAL WEAKNESS: 1
FEVER: 0
HEADACHES: 0
SHORTNESS OF BREATH: 0
LOSS OF CONSCIOUSNESS: 0
DIAPHORESIS: 0
NECK PAIN: 0
SPEECH CHANGE: 0
BACK PAIN: 0
HEMOPTYSIS: 0
CHILLS: 0
CONSTIPATION: 0
ABDOMINAL PAIN: 0
COUGH: 0
FOCAL WEAKNESS: 0
VOMITING: 0
EYE DISCHARGE: 0

## 2019-08-26 ASSESSMENT — LIFESTYLE VARIABLES: SUBSTANCE_ABUSE: 0
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
## 2019-08-26 NOTE — ASSESSMENT & PLAN NOTE
-Folate, and B12 levels are normal.  -Continue to trend hemoglobin closely.  Restrictive transfusion strategies, transfuse if drops below 7.   8

## 2019-08-26 NOTE — PROGRESS NOTES
"   Orthopaedic PA Progress Note    Interval changes:Awaits dressing change today by WCT, otherwise doing well    ROS - Patient denies any new issues. No chest pain, dyspnea, or fever.  Pain well controlled.    /74   Pulse 90   Temp 36.1 °C (97 °F) (Temporal)   Resp 20   Ht 1.715 m (5' 7.5\")   Wt (!) 135.8 kg (299 lb 6.2 oz)   SpO2 97%     Patient seen and examined  No acute distress  Breathing non labored  RRR  Surgical dressing is clean, dry, and intact. Patient clearly fires forearm flexors, forearm extensors, and moves all five fingers without issue . Sensation is intact to light touch throughout median, ulnar, and radial nerve distributions. Strong and palpable radial pulses with capillary refill less than 2 seconds. No arm or hand discomfort.      Recent Labs     08/24/19  0411 08/25/19  0417 08/26/19  0353   WBC 6.1 5.7 6.0   RBC 3.43* 3.45* 3.55*   HEMOGLOBIN 10.6* 10.8* 11.0*   HEMATOCRIT 34.5* 34.3* 35.0*   .6* 99.4* 98.6*   MCH 30.9 31.3 31.0   MCHC 30.7* 31.5* 31.4*   RDW 52.8* 51.8* 50.7*   PLATELETCT 233 229 229   MPV 10.5 10.7 10.6       Active Hospital Problems    Diagnosis   • Abscess of arm, left [L02.414]     Priority: High   • Morbid obesity with BMI of 45.0-49.9, adult ([facility]) [E66.01, Z68.42]     Priority: Medium   • Essential hypertension [I10]     Priority: Low   • ANTHONY (acute kidney injury) ([facility]) [N17.9]   • Insulin dependent diabetes mellitus ([facility]) [E11.9, Z79.4]   • Macrocytic anemia [D53.9]     Assessment/Plan:  POD#3 S/P   1.  Excisional debridement of left open infected wound including skin and   subcutaneous tissue down to muscle through wound measuring 10x7x4 cm deep.  2.  Application of wound VAC greater than 50 sq cm, left arm wound.  Wt bearing status - AT  PT/OT-initiated  Wound care:WCT following NPWT  Drains - no  Arambula-no  Sutures/Staples out- 10-14 days post operatively  Antibiotics: per ID  DVT Prophylaxis- TEDS/SCDs/Foot pumps.   Case Coordination for " Discharge Planning - Disposition per Med/ID. Follow-Up: needs appointment with Dr. Richmond at  [facility] at 10-14 days post-op for re-evaluation, staple removal and radiographs.

## 2019-08-26 NOTE — CARE PLAN
Pt educated on 0-10 pain scale. Pt able to call for pain medication appriopately. Pharmacological and non pharmacological interventions in place. Call light in reach.

## 2019-08-26 NOTE — CARE PLAN
Problem: Safety  Goal: Will remain free from falls  Outcome: PROGRESSING AS EXPECTED  Note:   Non-slip socks are on, bed is locked and in lowest position, personal belongings are within reach, room is free of clutter and spills, call light is in place. Patient educated on how to use the call light and how to call for assistance. Patient verbalized understanding.       Problem: Infection  Goal: Will remain free from infection  Outcome: PROGRESSING AS EXPECTED  Note:   Standard precautions in place.     Problem: Pain Management  Goal: Pain level will decrease to patient's comfort goal  Outcome: PROGRESSING AS EXPECTED  Note:   Patient rates 3/10. Declines medication at this time.

## 2019-08-26 NOTE — CARE PLAN
Problem: Nutritional:  Goal: Achieve adequate nutritional intake  Description  Patient will start diet and consume >50% of meals   Outcome: MET
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
## 2019-08-27 ENCOUNTER — APPOINTMENT (OUTPATIENT)
Dept: RADIOLOGY | Facility: MEDICAL CENTER | Age: 74
DRG: 501 | End: 2019-08-27
Attending: INTERNAL MEDICINE
Payer: COMMERCIAL

## 2019-08-27 LAB
ANION GAP SERPL CALC-SCNC: 12 MMOL/L (ref 0–11.9)
BASOPHILS # BLD AUTO: 0.8 % (ref 0–1.8)
BASOPHILS # BLD: 0.04 K/UL (ref 0–0.12)
BUN SERPL-MCNC: 15 MG/DL (ref 8–22)
CALCIUM SERPL-MCNC: 8.6 MG/DL (ref 8.5–10.5)
CHLORIDE SERPL-SCNC: 114 MMOL/L (ref 96–112)
CO2 SERPL-SCNC: 17 MMOL/L (ref 20–33)
CREAT SERPL-MCNC: 1.76 MG/DL (ref 0.5–1.4)
EOSINOPHIL # BLD AUTO: 0.14 K/UL (ref 0–0.51)
EOSINOPHIL NFR BLD: 2.7 % (ref 0–6.9)
ERYTHROCYTE [DISTWIDTH] IN BLOOD BY AUTOMATED COUNT: 53.2 FL (ref 35.9–50)
GLUCOSE BLD-MCNC: 183 MG/DL (ref 65–99)
GLUCOSE BLD-MCNC: 188 MG/DL (ref 65–99)
GLUCOSE SERPL-MCNC: 168 MG/DL (ref 65–99)
HCT VFR BLD AUTO: 35.9 % (ref 37–47)
HGB BLD-MCNC: 11 G/DL (ref 12–16)
IMM GRANULOCYTES # BLD AUTO: 0.02 K/UL (ref 0–0.11)
IMM GRANULOCYTES NFR BLD AUTO: 0.4 % (ref 0–0.9)
LYMPHOCYTES # BLD AUTO: 2 K/UL (ref 1–4.8)
LYMPHOCYTES NFR BLD: 39.2 % (ref 22–41)
MCH RBC QN AUTO: 31.2 PG (ref 27–33)
MCHC RBC AUTO-ENTMCNC: 30.6 G/DL (ref 33.6–35)
MCV RBC AUTO: 101.7 FL (ref 81.4–97.8)
MONOCYTES # BLD AUTO: 0.48 K/UL (ref 0–0.85)
MONOCYTES NFR BLD AUTO: 9.4 % (ref 0–13.4)
NEUTROPHILS # BLD AUTO: 2.42 K/UL (ref 2–7.15)
NEUTROPHILS NFR BLD: 47.5 % (ref 44–72)
NRBC # BLD AUTO: 0 K/UL
NRBC BLD-RTO: 0 /100 WBC
PLATELET # BLD AUTO: 205 K/UL (ref 164–446)
PMV BLD AUTO: 10.4 FL (ref 9–12.9)
POTASSIUM SERPL-SCNC: 3.9 MMOL/L (ref 3.6–5.5)
RBC # BLD AUTO: 3.53 M/UL (ref 4.2–5.4)
SODIUM SERPL-SCNC: 143 MMOL/L (ref 135–145)
WBC # BLD AUTO: 5.1 K/UL (ref 4.8–10.8)

## 2019-08-27 PROCEDURE — 700111 HCHG RX REV CODE 636 W/ 250 OVERRIDE (IP): Performed by: INTERNAL MEDICINE

## 2019-08-27 PROCEDURE — 99232 SBSQ HOSP IP/OBS MODERATE 35: CPT | Performed by: INTERNAL MEDICINE

## 2019-08-27 PROCEDURE — 85025 COMPLETE CBC W/AUTO DIFF WBC: CPT

## 2019-08-27 PROCEDURE — 97606 NEG PRS WND THER DME>50 SQCM: CPT

## 2019-08-27 PROCEDURE — 665999 HH PPS REVENUE DEBIT

## 2019-08-27 PROCEDURE — 700105 HCHG RX REV CODE 258: Performed by: INTERNAL MEDICINE

## 2019-08-27 PROCEDURE — A9270 NON-COVERED ITEM OR SERVICE: HCPCS | Performed by: INTERNAL MEDICINE

## 2019-08-27 PROCEDURE — 82962 GLUCOSE BLOOD TEST: CPT | Mod: 91

## 2019-08-27 PROCEDURE — 05HY33Z INSERTION OF INFUSION DEVICE INTO UPPER VEIN, PERCUTANEOUS APPROACH: ICD-10-PCS | Performed by: INTERNAL MEDICINE

## 2019-08-27 PROCEDURE — 665998 HH PPS REVENUE CREDIT

## 2019-08-27 PROCEDURE — 80048 BASIC METABOLIC PNL TOTAL CA: CPT

## 2019-08-27 PROCEDURE — 36415 COLL VENOUS BLD VENIPUNCTURE: CPT

## 2019-08-27 PROCEDURE — 700102 HCHG RX REV CODE 250 W/ 637 OVERRIDE(OP): Performed by: INTERNAL MEDICINE

## 2019-08-27 PROCEDURE — 700102 HCHG RX REV CODE 250 W/ 637 OVERRIDE(OP): Performed by: HOSPITALIST

## 2019-08-27 PROCEDURE — 770001 HCHG ROOM/CARE - MED/SURG/GYN PRIV*

## 2019-08-27 PROCEDURE — 306372 DRESSING,VAC SIMPLACE MED: Performed by: ORTHOPAEDIC SURGERY

## 2019-08-27 PROCEDURE — A9270 NON-COVERED ITEM OR SERVICE: HCPCS | Performed by: HOSPITALIST

## 2019-08-27 PROCEDURE — C1751 CATH, INF, PER/CENT/MIDLINE: HCPCS

## 2019-08-27 RX ADMIN — CARVEDILOL 6.25 MG: 6.25 TABLET, FILM COATED ORAL at 04:31

## 2019-08-27 RX ADMIN — INSULIN HUMAN 3 UNITS: 100 INJECTION, SOLUTION PARENTERAL at 22:57

## 2019-08-27 RX ADMIN — HEPARIN SODIUM 5000 UNITS: 5000 INJECTION, SOLUTION INTRAVENOUS; SUBCUTANEOUS at 14:42

## 2019-08-27 RX ADMIN — SENNOSIDES, DOCUSATE SODIUM 1 TABLET: 50; 8.6 TABLET, FILM COATED ORAL at 23:01

## 2019-08-27 RX ADMIN — HEPARIN SODIUM 5000 UNITS: 5000 INJECTION, SOLUTION INTRAVENOUS; SUBCUTANEOUS at 22:58

## 2019-08-27 RX ADMIN — VITAMIN D, TAB 1000IU (100/BT) 2000 UNITS: 25 TAB at 04:31

## 2019-08-27 RX ADMIN — PIPERACILLIN SODIUM AND TAZOBACTAM SODIUM 3.38 G: 3; .375 INJECTION, POWDER, FOR SOLUTION INTRAVENOUS at 18:15

## 2019-08-27 RX ADMIN — OXYCODONE HYDROCHLORIDE 5 MG: 5 TABLET ORAL at 10:38

## 2019-08-27 RX ADMIN — INSULIN HUMAN 3 UNITS: 100 INJECTION, SOLUTION PARENTERAL at 17:15

## 2019-08-27 RX ADMIN — PIPERACILLIN AND TAZOBACTAM 3.38 G: 3; .375 INJECTION, POWDER, LYOPHILIZED, FOR SOLUTION INTRAVENOUS; PARENTERAL at 10:38

## 2019-08-27 RX ADMIN — HEPARIN SODIUM 5000 UNITS: 5000 INJECTION, SOLUTION INTRAVENOUS; SUBCUTANEOUS at 04:31

## 2019-08-27 RX ADMIN — LEVOTHYROXINE SODIUM 200 MCG: 25 TABLET ORAL at 05:49

## 2019-08-27 RX ADMIN — CARVEDILOL 6.25 MG: 6.25 TABLET, FILM COATED ORAL at 16:24

## 2019-08-27 ASSESSMENT — ENCOUNTER SYMPTOMS
FEVER: 0
VOMITING: 0
MYALGIAS: 1
NAUSEA: 0
ABDOMINAL PAIN: 0

## 2019-08-27 NOTE — DISCHARGE PLANNING
Received Choice form at 1600  Agency/Facility Name: [facility]  Referral sent per Choice form at 1605

## 2019-08-27 NOTE — FACE TO FACE
Face to Face Supporting Documentation - Home Health    The encounter with this patient was in whole or in part the primary reason for home health admission.    Date of encounter:   Patient:                    MRN:                       YOB: 2017  Maira Dodd  [id number]  1945     Home health to see patient for:  Skilled Nursing care for assessment, interventions & education, Physical Therapy evaluation and treatment and Occupational therapy evaluation and treatment    Skilled need for:  Surgical Aftercare LUE abscessed wound    Skilled nursing interventions to include:  Home IV infusion therapy and Comment:   Skilled nursing care for assessment, intervention and education.       Homebound status evidenced by:  Needs the assistance of another person in order to leave the home. Leaving home requires a considerable and taxing effort. There is a normal inability to leave the home.    Community Physician to provide follow up care: Manan Ortiz M.D.     Optional Interventions? No      I certify the face to face encounter for this home health care referral meets the CMS requirements and the encounter/clinical assessment with the patient was, in whole, or in part, for the medical condition(s) listed above, which is the primary reason for home health care. Based on my clinical findings: the service(s) are medically necessary, support the need for home health care, and the homebound criteria are met.  I certify that this patient has had a face to face encounter by myself.  Chilo Gibbons M.D. - NPI: [id number]

## 2019-08-27 NOTE — PROGRESS NOTES
"   Orthopaedic PA Progress Note    Interval changes:Pt pleased with new vac dressing.     ROS - Patient denies any new issues. No chest pain, dyspnea, or fever.  Pain well controlled.    BP (!) 173/88   Pulse 86   Temp 36.2 °C (97.2 °F) (Temporal)   Resp 19   Ht 1.715 m (5' 7.5\")   Wt (!) 135.8 kg (299 lb 6.2 oz)   SpO2 96%     Patient seen and examined  No acute distress  Breathing non labored  RRR  Surgical dressing is clean, dry, and intact. Patient clearly fires forearm flexors, forearm extensors, and moves all five fingers without issue . Sensation is intact to light touch throughout median, ulnar, and radial nerve distributions. Strong and palpable radial pulses with capillary refill less than 2 seconds. No arm or hand discomfort.    Recent Labs     08/25/19  0417 08/26/19  0353 08/27/19  0400   WBC 5.7 6.0 5.1   RBC 3.45* 3.55* 3.53*   HEMOGLOBIN 10.8* 11.0* 11.0*   HEMATOCRIT 34.3* 35.0* 35.9*   MCV 99.4* 98.6* 101.7*   MCH 31.3 31.0 31.2   MCHC 31.5* 31.4* 30.6*   RDW 51.8* 50.7* 53.2*   PLATELETCT 229 229 205   MPV 10.7 10.6 10.4       Active Hospital Problems    Diagnosis   • Abscess of arm, left [L02.414]     Priority: High   • ANTHONY (acute kidney injury) ([facility]) [N17.9]     Priority: Medium   • Macrocytic anemia [D53.9]     Priority: Medium   • Insulin dependent diabetes mellitus ([facility]) [E11.9, Z79.4]     Priority: Low   • Essential hypertension [I10]     Priority: Low   • Morbid obesity with BMI of 45.0-49.9, adult ([facility]) [E66.01, Z68.42]     Priority: Low     Assessment/Plan:  POD#4 S/P   1.  Excisional debridement of left open infected wound including skin and   subcutaneous tissue down to muscle through wound measuring 10x7x4 cm deep.  2.  Application of wound VAC greater than 50 sq cm, left arm wound.  Wt bearing status - AT  PT/OT-initiated  Wound care:WCT following NPWT  Drains - no  Arambula-no  Sutures/Staples out- 10-14 days post operatively  Antibiotics: per ID  DVT Prophylaxis- TEDS/SCDs/Foot " pumps.   Case Coordination for Discharge Planning - Disposition per Med/ID. Follow-Up: needs appointment with Dr. Richmond at  [facility] at 10-14 days post-op for re-evaluation, staple removal and radiographs.

## 2019-08-27 NOTE — WOUND TEAM
"[facility]  Inpatient Services  Wound and Skin Care Progress Note    Admission Date:  8/21/2019   HPI, PMH, SH: Reviewed  Unit where seen by Wound Team: [id number]    WOUND FOLLOW UP/CONSULT RELATED TO:  Scheduled NPWT dressing change     SUBJECTIVE: \"I do remember you\"      Self Report / Pain Level:  Premedicated with good effect       OBJECTIVE:  Dressing intact    WOUND TYPE, LOCATION, CHARACTERISTICS (Pressure ulcers: location, stage, POA or date identified)        Negative Pressure Wound Therapy Elbow Left (Active)   NPWT Pump Mode / Pressure Setting Continuous;125 mmHg 8/27/2019 11:00 AM   Dressing Type Medium;Black foam 8/27/2019 11:00 AM   Number of Foam Pieces Used 3 8/27/2019 11:00 AM   Canister Changed No 8/27/2019 11:00 AM   Output (mL)         Wound 08/21/19 Full Thickness Wound Elbow open surgical site L posterior elbow (Active)   Wound Image   8/24/2019  3:00 PM   Site Assessment Clean;Red;Granulation tissue 8/27/2019 11:00 AM   Ashely-wound Assessment Clean;Pink 8/27/2019 11:00 AM   Margins Attached edges 8/27/2019 11:00 AM   Wound Length (cm) 8.5 cm 8/24/2019  3:00 PM   Wound Width (cm) 6.5 cm 8/24/2019  3:00 PM   Wound Depth (cm) 3.5 cm 8/24/2019  3:00 PM   Wound Surface Area (cm^2) 55.25 cm^2 8/24/2019  3:00 PM   Tunneling 0 cm 8/27/2019 11:00 AM   Undermining 0 cm 8/27/2019 11:00 AM   Closure Secondary intention 8/27/2019 11:00 AM   Drainage Amount Small 8/27/2019 11:00 AM   Drainage Description Serosanguineous 8/27/2019 11:00 AM   Non-staged Wound Description Full thickness 8/27/2019 11:00 AM   Treatments Cleansed;Site care 8/27/2019 11:00 AM   Cleansing Approved Wound Cleanser 8/27/2019 11:00 AM   Periwound Protectant Drape;Skin Protectant wipes to Periwound 8/27/2019 11:00 AM   Dressing Options Wound Vac 8/27/2019 11:00 AM   Dressing Cleansing/Solutions Not Applicable 8/27/2019 11:00 AM   Dressing Changed Changed 8/27/2019 11:00 AM   Dressing Status Intact 8/27/2019 11:00 AM "   Dressing Change Frequency Tuesday, Thursday, Saturday 8/27/2019 11:00 AM   NEXT Dressing Change  08/29/19 8/27/2019 11:00 AM   NEXT Weekly Photo (Inpatient Only) 08/31/19 8/27/2019 11:00 AM   WOUND NURSE ONLY - Odor None 8/27/2019 11:00 AM   WOUND NURSE ONLY - Exposed Structures None 8/27/2019 11:00 AM   WOUND NURSE ONLY - Tissue Type and Percentage red 100% 8/27/2019 11:00 AM   WOUND NURSE ONLY - Time Spent with Patient (mins) 45 8/27/2019 11:00 AM          Lab Values:    AIC:      Lab Results   Component Value Date/Time    HBA1C 6.2 (H) 08/23/2019 05:37 AM         Culture:  8/22/19  Citrobacter freundii   Moderate growth   See previous culture for sensitivity report.      Culture Result Abnormal    Staphylococcus aureus   Light growth   See previous culture for sensitivity report.     Culture Result Abnormal    Streptococcus agalactiae (Group B)   Light growth   Combination therapy with ampicillin, penicillin, or   vancomycin (for susceptible strains) plus an aminoglycoside   is usually indicated for serious enterococcal infections,   such as endocarditis unless high-level resistance to both   gentamicin and streptomycin is documented; such combinations   are predicted to result in synergistic killing of the   Enterococcus.      INTERVENTIONS BY WOUND TEAM:  Removed old dressing and cleansed wound with wound cleanser.  Applied skin prep and drape to phuc wound skin.  Filled wound with two pieces black foam and with assistance secured with tape.  Trac pad applied over black foam button and resumed NPWT at 125mmHg continuously.  Total black foam=3 pieces         Interdisciplinary consultation:  RN, patient     EVALUATION:  Clean appearing wound that should continue to progress with NPWT dressing promoting granulation tissue and pao wound while managing drainage.      Factors affecting wound healing:  Obesity,  DM  Goals:  Steady decrease in wound area and depth weekly     NURSING PLAN OF CARE ORDERS  (X):    Dressing changes: See Dressing Care orders:   X  Skin care: See Skin Care orders:   Rectal tube care: See Rectal Tube Care orders:   Other orders:    WOUND TEAM PLAN OF CARE (X):   NPWT change 3 x week:    X    Dressing changes by wound team:       Follow up as needed:       Other (explain):    Anticipated discharge plans (X):  SNF:           Home Care:           Outpatient Wound Center:          Self Care:            Other:   TBD with continued wound care
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
## 2019-08-27 NOTE — CARE PLAN
Problem: Infection  Goal: Will remain free from infection  Outcome: PROGRESSING AS EXPECTED  Note:   Standard precautions in place.     Problem: Pain Management  Goal: Pain level will decrease to patient's comfort goal  Outcome: PROGRESSING AS EXPECTED  Note:   Patient rates pain as a 5/10. Medicated per MAR. Patient resting more comfortably in bed.

## 2019-08-27 NOTE — PROCEDURES
Vascular Access Team    Date of Insertion: 8/27/19  Arm Circumference: 54  Internal length: 20  External Length: 0  Vein Occupancy %: 39  Reason for Midline: Antibiotic therapy  Labs: WBC 5.1, , BUN 15, Cr 1.76, GFR 28, INR n/a    Dr. Gibbons aware of pts GFR and approves midline placement at this time.    Orders confirmed, vessel patency confirmed with ultrasound. Risks and benefits of procedure explained to patient and education regarding line associated bloodstream infections provided. Questions answered.     Power Midline placed in RUE per MD order with ultrasound guidance, initial arm circumference 20cm. 4 Fr, single lumen Power Midline placed in cephalic vein after 1 attempt(s). 2 cc's of 1% lidocaine injected intradermally, 21 gauge microintroducer needle and modified Seldinger technique used. 17 cm catheter inserted with good blood return. Secured at 0 cm marker. Each lumen flushed without resistance with 10 mL 0.9% normal saline. Midline secured with Biopatch and Tegaderm.     Midline placement is confirmed by nurse using ultrasound and ability to flush and draw blood. Midline is appropriate for use at this time.  No X-ray is needed for placement confirmation. Pt tolerated procedure well.  Patient condition relayed to unit RN or ordering physician via this post procedure note in the EMR.     Ultrasound images uploaded to PACS and viewable in the EMR - yes  Ultrasound imaged printed and placed in paper chart - no     BARD Power Midline ref # [id number], Lot # [id number]
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
## 2019-08-27 NOTE — DISCHARGE PLANNING
Anticipated Discharge Disposition: LTAC vs SNF    Action: Discussed pt's case in MDT rounds, pt currently has midline place, IV abx Q 6 hours and has a wound vac. Discussed possible LTAC vs SNF. LSW met with pt at bedside and completed assessment. Pt lives in [place] with her  Felipe #[phone] and was IPTA. Pt was on services with [facility]. Discussed d/c planning and pt would like to consider placement at this time. Discussed LTAC ([facility] and [facility]) and SNF. Pt states she prefers [facility] location and if she is not accepted she would like to consider SNF in [facility]. Pt reviewed SNF list and would like to review with  later tonight when he comes to visit. Pt signed choice for [facility]. Faxed choice form to [facility]. LSW discussed with MD and he will place LTAC order. LSW called [facility] liaison Will and discussed pt's case and he will evaluate.     Barriers to Discharge: N/A.     Plan: As above, [facility] to evaluate. Pt will review [facility] list tonight with .     Care Transition Team Assessment    Information Source  Orientation : Oriented x 4  Information Given By: Patient  Informant's Name: Maira  Who is responsible for making decisions for patient? : Patient         Elopement Risk  Legal Hold: No  Ambulatory or Self Mobile in Wheelchair: Yes  Disoriented: No  Psychiatric Symptoms: None  History of Wandering: No  Elopement this Admit: No  Vocalizing Wanting to Leave: No  Displays Behaviors, Body Language Wanting to Leave: No-Not at Risk for Elopement  Elopement Risk: Not at Risk for Elopement    Interdisciplinary Discharge Planning  Does Admitting Nurse Feel This Could be a Complex Discharge?: No  Primary Care Physician: Dr. Manan Ortiz  Lives with - Patient's Self Care Capacity: Spouse, Adult Children  Patient or legal guardian wants to designate a caregiver (see row info): No  Support Systems: Family Member(s)  Housing / Facility: 1 Story House  Do You Take your  Prescribed Medications Regularly: Yes  Able to Return to Previous ADL's: Yes  Mobility Issues: No  Prior Services: Skilled Home Health Services, Intermittent Physical Support for ADL Per Family  Assistance Needed: No  Durable Medical Equipment: Not Applicable    Discharge Preparedness  What is your plan after discharge?: Uncertain - pending medical team collaboration  What are your discharge supports?: Child, Spouse  Prior Functional Level: Ambulatory, Independent with Activities of Daily Living, Independent with Medication Management  Difficulity with ADLs: None  Difficulity with IADLs: None    Functional Assesment  Prior Functional Level: Ambulatory, Independent with Activities of Daily Living, Independent with Medication Management    Finances  Financial Barriers to Discharge: No  Prescription Coverage: Yes    Vision / Hearing Impairment  Vision Impairment : No  Hearing Impairment : No              Domestic Abuse  Have you ever been the victim of abuse or violence?: No  Physical Abuse or Sexual Abuse: No  Verbal Abuse or Emotional Abuse: No  Possible Abuse Reported to:: Not Applicable         Discharge Risks or Barriers  Discharge risks or barriers?: Complex medical needs  Patient risk factors: Complex medical needs    Anticipated Discharge Information  Anticipated discharge disposition: SNF, LTAC(TBD)

## 2019-08-28 PROBLEM — E87.6 HYPOKALEMIA: Status: ACTIVE | Noted: 2019-08-28

## 2019-08-28 LAB
ANION GAP SERPL CALC-SCNC: 8 MMOL/L (ref 0–11.9)
BACTERIA BLD CULT: NORMAL
BACTERIA BLD CULT: NORMAL
BASOPHILS # BLD AUTO: 0.5 % (ref 0–1.8)
BASOPHILS # BLD: 0.03 K/UL (ref 0–0.12)
BUN SERPL-MCNC: 13 MG/DL (ref 8–22)
CALCIUM SERPL-MCNC: 8.6 MG/DL (ref 8.5–10.5)
CHLORIDE SERPL-SCNC: 112 MMOL/L (ref 96–112)
CO2 SERPL-SCNC: 20 MMOL/L (ref 20–33)
CREAT SERPL-MCNC: 1.5 MG/DL (ref 0.5–1.4)
EOSINOPHIL # BLD AUTO: 0.14 K/UL (ref 0–0.51)
EOSINOPHIL NFR BLD: 2.2 % (ref 0–6.9)
ERYTHROCYTE [DISTWIDTH] IN BLOOD BY AUTOMATED COUNT: 50.4 FL (ref 35.9–50)
GLUCOSE BLD-MCNC: 150 MG/DL (ref 65–99)
GLUCOSE BLD-MCNC: 157 MG/DL (ref 65–99)
GLUCOSE BLD-MCNC: 172 MG/DL (ref 65–99)
GLUCOSE BLD-MCNC: 198 MG/DL (ref 65–99)
GLUCOSE SERPL-MCNC: 177 MG/DL (ref 65–99)
HCT VFR BLD AUTO: 35.8 % (ref 37–47)
HGB BLD-MCNC: 11.1 G/DL (ref 12–16)
IMM GRANULOCYTES # BLD AUTO: 0.01 K/UL (ref 0–0.11)
IMM GRANULOCYTES NFR BLD AUTO: 0.2 % (ref 0–0.9)
LYMPHOCYTES # BLD AUTO: 2.31 K/UL (ref 1–4.8)
LYMPHOCYTES NFR BLD: 37 % (ref 22–41)
MAGNESIUM SERPL-MCNC: 1.3 MG/DL (ref 1.5–2.5)
MCH RBC QN AUTO: 30.2 PG (ref 27–33)
MCHC RBC AUTO-ENTMCNC: 31 G/DL (ref 33.6–35)
MCV RBC AUTO: 97.3 FL (ref 81.4–97.8)
MONOCYTES # BLD AUTO: 0.52 K/UL (ref 0–0.85)
MONOCYTES NFR BLD AUTO: 8.3 % (ref 0–13.4)
NEUTROPHILS # BLD AUTO: 3.24 K/UL (ref 2–7.15)
NEUTROPHILS NFR BLD: 51.8 % (ref 44–72)
NRBC # BLD AUTO: 0 K/UL
NRBC BLD-RTO: 0 /100 WBC
PLATELET # BLD AUTO: 228 K/UL (ref 164–446)
PMV BLD AUTO: 10.4 FL (ref 9–12.9)
POTASSIUM SERPL-SCNC: 3.5 MMOL/L (ref 3.6–5.5)
RBC # BLD AUTO: 3.68 M/UL (ref 4.2–5.4)
SIGNIFICANT IND 70042: NORMAL
SIGNIFICANT IND 70042: NORMAL
SITE SITE: NORMAL
SITE SITE: NORMAL
SODIUM SERPL-SCNC: 140 MMOL/L (ref 135–145)
SOURCE SOURCE: NORMAL
SOURCE SOURCE: NORMAL
WBC # BLD AUTO: 6.3 K/UL (ref 4.8–10.8)

## 2019-08-28 PROCEDURE — A9270 NON-COVERED ITEM OR SERVICE: HCPCS | Performed by: HOSPITALIST

## 2019-08-28 PROCEDURE — 665999 HH PPS REVENUE DEBIT

## 2019-08-28 PROCEDURE — 700105 HCHG RX REV CODE 258: Performed by: INTERNAL MEDICINE

## 2019-08-28 PROCEDURE — A9270 NON-COVERED ITEM OR SERVICE: HCPCS | Performed by: INTERNAL MEDICINE

## 2019-08-28 PROCEDURE — 700102 HCHG RX REV CODE 250 W/ 637 OVERRIDE(OP): Performed by: INTERNAL MEDICINE

## 2019-08-28 PROCEDURE — 80048 BASIC METABOLIC PNL TOTAL CA: CPT

## 2019-08-28 PROCEDURE — 36415 COLL VENOUS BLD VENIPUNCTURE: CPT

## 2019-08-28 PROCEDURE — 82962 GLUCOSE BLOOD TEST: CPT | Mod: 91

## 2019-08-28 PROCEDURE — 700111 HCHG RX REV CODE 636 W/ 250 OVERRIDE (IP): Performed by: INTERNAL MEDICINE

## 2019-08-28 PROCEDURE — 85025 COMPLETE CBC W/AUTO DIFF WBC: CPT

## 2019-08-28 PROCEDURE — 700102 HCHG RX REV CODE 250 W/ 637 OVERRIDE(OP): Performed by: HOSPITALIST

## 2019-08-28 PROCEDURE — 665998 HH PPS REVENUE CREDIT

## 2019-08-28 PROCEDURE — 83735 ASSAY OF MAGNESIUM: CPT

## 2019-08-28 PROCEDURE — 770001 HCHG ROOM/CARE - MED/SURG/GYN PRIV*

## 2019-08-28 PROCEDURE — 99232 SBSQ HOSP IP/OBS MODERATE 35: CPT | Performed by: INTERNAL MEDICINE

## 2019-08-28 RX ORDER — POTASSIUM CHLORIDE 20 MEQ/1
20 TABLET, EXTENDED RELEASE ORAL ONCE
Status: COMPLETED | OUTPATIENT
Start: 2019-08-28 | End: 2019-08-28

## 2019-08-28 RX ORDER — INSULIN GLARGINE 100 [IU]/ML
20 INJECTION, SOLUTION SUBCUTANEOUS 2 TIMES DAILY
Status: DISCONTINUED | OUTPATIENT
Start: 2019-08-28 | End: 2019-08-29 | Stop reason: HOSPADM

## 2019-08-28 RX ORDER — INSULIN GLARGINE 100 [IU]/ML
20 INJECTION, SOLUTION SUBCUTANEOUS 2 TIMES DAILY
Status: DISCONTINUED | OUTPATIENT
Start: 2019-08-28 | End: 2019-08-28

## 2019-08-28 RX ADMIN — HEPARIN SODIUM 5000 UNITS: 5000 INJECTION, SOLUTION INTRAVENOUS; SUBCUTANEOUS at 06:05

## 2019-08-28 RX ADMIN — LEVOTHYROXINE SODIUM 200 MCG: 25 TABLET ORAL at 06:04

## 2019-08-28 RX ADMIN — OXYCODONE HYDROCHLORIDE 5 MG: 5 TABLET ORAL at 23:30

## 2019-08-28 RX ADMIN — INSULIN HUMAN 3 UNITS: 100 INJECTION, SOLUTION PARENTERAL at 17:09

## 2019-08-28 RX ADMIN — VITAMIN D, TAB 1000IU (100/BT) 2000 UNITS: 25 TAB at 06:05

## 2019-08-28 RX ADMIN — INSULIN HUMAN 3 UNITS: 100 INJECTION, SOLUTION PARENTERAL at 08:04

## 2019-08-28 RX ADMIN — HEPARIN SODIUM 5000 UNITS: 5000 INJECTION, SOLUTION INTRAVENOUS; SUBCUTANEOUS at 20:55

## 2019-08-28 RX ADMIN — HEPARIN SODIUM 5000 UNITS: 5000 INJECTION, SOLUTION INTRAVENOUS; SUBCUTANEOUS at 15:16

## 2019-08-28 RX ADMIN — PIPERACILLIN SODIUM AND TAZOBACTAM SODIUM 3.38 G: 3; .375 INJECTION, POWDER, FOR SOLUTION INTRAVENOUS at 01:49

## 2019-08-28 RX ADMIN — PIPERACILLIN SODIUM AND TAZOBACTAM SODIUM 3.38 G: 3; .375 INJECTION, POWDER, FOR SOLUTION INTRAVENOUS at 17:09

## 2019-08-28 RX ADMIN — INSULIN GLARGINE 20 UNITS: 100 INJECTION, SOLUTION SUBCUTANEOUS at 17:09

## 2019-08-28 RX ADMIN — CARVEDILOL 6.25 MG: 6.25 TABLET, FILM COATED ORAL at 17:08

## 2019-08-28 RX ADMIN — SODIUM CHLORIDE: 9 INJECTION, SOLUTION INTRAVENOUS at 17:13

## 2019-08-28 RX ADMIN — CARVEDILOL 6.25 MG: 6.25 TABLET, FILM COATED ORAL at 06:04

## 2019-08-28 RX ADMIN — OXYCODONE HYDROCHLORIDE 5 MG: 5 TABLET ORAL at 15:16

## 2019-08-28 RX ADMIN — INSULIN GLARGINE 20 UNITS: 100 INJECTION, SOLUTION SUBCUTANEOUS at 12:03

## 2019-08-28 RX ADMIN — POTASSIUM CHLORIDE 20 MEQ: 20 TABLET, EXTENDED RELEASE ORAL at 08:04

## 2019-08-28 RX ADMIN — OXYCODONE HYDROCHLORIDE 5 MG: 5 TABLET ORAL at 01:49

## 2019-08-28 RX ADMIN — OXYCODONE HYDROCHLORIDE 5 MG: 5 TABLET ORAL at 08:55

## 2019-08-28 RX ADMIN — PIPERACILLIN SODIUM AND TAZOBACTAM SODIUM 3.38 G: 3; .375 INJECTION, POWDER, FOR SOLUTION INTRAVENOUS at 10:00

## 2019-08-28 RX ADMIN — INSULIN HUMAN 3 UNITS: 100 INJECTION, SOLUTION PARENTERAL at 12:02

## 2019-08-28 ASSESSMENT — ENCOUNTER SYMPTOMS
COUGH: 0
CHILLS: 0
VOMITING: 0
NAUSEA: 0
FEVER: 0
SHORTNESS OF BREATH: 0

## 2019-08-28 NOTE — DISCHARGE PLANNING
Received Choice form at 1130  Agency/Facility Name: [facility]   Referral sent per Choice form at 1140

## 2019-08-28 NOTE — DISCHARGE PLANNING
Met with patient aware that [facility] has denied acceptance because of length of stay would be too short. Obtained choice for #1 [facility] and #2 [facility] and faxed to [facility].    PASRR [id number]

## 2019-08-28 NOTE — DISCHARGE PLANNING
Anticipated Discharge Disposition: SNF    Action: Spoke with Elena Macias from [facility] and she spoke with patient and their facility has accepted patient and she is working on getting auth for wound vac. She will be in touch.     Barriers to Discharge: Wound vac authorization for SNF     Plan: SNF

## 2019-08-28 NOTE — PROGRESS NOTES
Pharmacy aware of Zosyn being held this morning d/t no IV access. Pharmacy to reschedule medication

## 2019-08-28 NOTE — DISCHARGE PLANNING
Agency/Facility Name: [facility]  Spoke To: Fax Transmission  Outcome: Patient accepted, pending insurance auth.     Agency/Facility Name: [facility]  Spoke To: Colleen  Outcome: Attempted to follow up, however no answer. Voicemail was left.
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
## 2019-08-28 NOTE — DISCHARGE PLANNING
Agency/Facility Name: [facility]  Spoke To: Will  Outcome: Patient declined due to duration being too short.
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
## 2019-08-28 NOTE — ASSESSMENT & PLAN NOTE
-Replace with 20 mg of oral K-Dur.  Check magnesium level and replace if low.  BMP in the morning.

## 2019-08-29 ENCOUNTER — APPOINTMENT (OUTPATIENT)
Dept: RADIOLOGY | Facility: MEDICAL CENTER | Age: 74
DRG: 501 | End: 2019-08-29
Attending: INTERNAL MEDICINE
Payer: COMMERCIAL

## 2019-08-29 VITALS
HEART RATE: 87 BPM | HEIGHT: 68 IN | BODY MASS INDEX: 44.41 KG/M2 | WEIGHT: 293 LBS | OXYGEN SATURATION: 94 % | SYSTOLIC BLOOD PRESSURE: 142 MMHG | TEMPERATURE: 97 F | RESPIRATION RATE: 16 BRPM | DIASTOLIC BLOOD PRESSURE: 67 MMHG

## 2019-08-29 PROBLEM — N17.9 AKI (ACUTE KIDNEY INJURY) (HCC): Status: RESOLVED | Noted: 2019-08-23 | Resolved: 2019-08-29

## 2019-08-29 PROBLEM — E83.42 HYPOMAGNESEMIA: Status: ACTIVE | Noted: 2019-08-29

## 2019-08-29 LAB
ANION GAP SERPL CALC-SCNC: 8 MMOL/L (ref 0–11.9)
BUN SERPL-MCNC: 12 MG/DL (ref 8–22)
CALCIUM SERPL-MCNC: 8.1 MG/DL (ref 8.5–10.5)
CHLORIDE SERPL-SCNC: 114 MMOL/L (ref 96–112)
CO2 SERPL-SCNC: 21 MMOL/L (ref 20–33)
CREAT SERPL-MCNC: 1.42 MG/DL (ref 0.5–1.4)
GLUCOSE BLD-MCNC: 118 MG/DL (ref 65–99)
GLUCOSE BLD-MCNC: 210 MG/DL (ref 65–99)
GLUCOSE SERPL-MCNC: 133 MG/DL (ref 65–99)
POTASSIUM SERPL-SCNC: 3.4 MMOL/L (ref 3.6–5.5)
SODIUM SERPL-SCNC: 143 MMOL/L (ref 135–145)

## 2019-08-29 PROCEDURE — 700102 HCHG RX REV CODE 250 W/ 637 OVERRIDE(OP): Performed by: HOSPITALIST

## 2019-08-29 PROCEDURE — 36415 COLL VENOUS BLD VENIPUNCTURE: CPT

## 2019-08-29 PROCEDURE — 700102 HCHG RX REV CODE 250 W/ 637 OVERRIDE(OP): Performed by: INTERNAL MEDICINE

## 2019-08-29 PROCEDURE — 93970 EXTREMITY STUDY: CPT

## 2019-08-29 PROCEDURE — 700111 HCHG RX REV CODE 636 W/ 250 OVERRIDE (IP): Performed by: INTERNAL MEDICINE

## 2019-08-29 PROCEDURE — 80048 BASIC METABOLIC PNL TOTAL CA: CPT

## 2019-08-29 PROCEDURE — A9270 NON-COVERED ITEM OR SERVICE: HCPCS | Performed by: INTERNAL MEDICINE

## 2019-08-29 PROCEDURE — 665999 HH PPS REVENUE DEBIT

## 2019-08-29 PROCEDURE — 82962 GLUCOSE BLOOD TEST: CPT

## 2019-08-29 PROCEDURE — 665998 HH PPS REVENUE CREDIT

## 2019-08-29 PROCEDURE — 700105 HCHG RX REV CODE 258: Performed by: INTERNAL MEDICINE

## 2019-08-29 PROCEDURE — 99233 SBSQ HOSP IP/OBS HIGH 50: CPT | Performed by: INTERNAL MEDICINE

## 2019-08-29 PROCEDURE — A9270 NON-COVERED ITEM OR SERVICE: HCPCS | Performed by: HOSPITALIST

## 2019-08-29 PROCEDURE — 99239 HOSP IP/OBS DSCHRG MGMT >30: CPT | Performed by: INTERNAL MEDICINE

## 2019-08-29 RX ORDER — POTASSIUM CHLORIDE 20 MEQ/1
20 TABLET, EXTENDED RELEASE ORAL DAILY
Qty: 60 TAB | Refills: 11
Start: 2019-08-30

## 2019-08-29 RX ORDER — AMOXICILLIN 250 MG
2 CAPSULE ORAL 2 TIMES DAILY
Qty: 30 TAB | Refills: 0
Start: 2019-08-29 | End: 2019-09-19

## 2019-08-29 RX ORDER — MAGNESIUM SULFATE HEPTAHYDRATE 40 MG/ML
2 INJECTION, SOLUTION INTRAVENOUS ONCE
Status: COMPLETED | OUTPATIENT
Start: 2019-08-29 | End: 2019-08-29

## 2019-08-29 RX ORDER — OXYCODONE HYDROCHLORIDE 10 MG/1
5-10 TABLET ORAL EVERY 4 HOURS PRN
Qty: 12 TAB | Refills: 0 | Status: SHIPPED | OUTPATIENT
Start: 2019-08-29 | End: 2019-09-01

## 2019-08-29 RX ORDER — POTASSIUM CHLORIDE 20 MEQ/1
20 TABLET, EXTENDED RELEASE ORAL DAILY
Status: DISCONTINUED | OUTPATIENT
Start: 2019-08-29 | End: 2019-08-29 | Stop reason: HOSPADM

## 2019-08-29 RX ADMIN — VITAMIN D, TAB 1000IU (100/BT) 2000 UNITS: 25 TAB at 04:52

## 2019-08-29 RX ADMIN — INSULIN GLARGINE 20 UNITS: 100 INJECTION, SOLUTION SUBCUTANEOUS at 06:02

## 2019-08-29 RX ADMIN — TRAMADOL HYDROCHLORIDE 50 MG: 50 TABLET, FILM COATED ORAL at 13:49

## 2019-08-29 RX ADMIN — MAGNESIUM SULFATE 2 G: 2 INJECTION INTRAVENOUS at 08:40

## 2019-08-29 RX ADMIN — SODIUM CHLORIDE: 9 INJECTION, SOLUTION INTRAVENOUS at 10:10

## 2019-08-29 RX ADMIN — PIPERACILLIN SODIUM AND TAZOBACTAM SODIUM 3.38 G: 3; .375 INJECTION, POWDER, FOR SOLUTION INTRAVENOUS at 01:32

## 2019-08-29 RX ADMIN — OXYCODONE HYDROCHLORIDE 5 MG: 5 TABLET ORAL at 10:09

## 2019-08-29 RX ADMIN — INSULIN HUMAN 4 UNITS: 100 INJECTION, SOLUTION PARENTERAL at 11:11

## 2019-08-29 RX ADMIN — POTASSIUM CHLORIDE 20 MEQ: 20 TABLET, EXTENDED RELEASE ORAL at 08:40

## 2019-08-29 RX ADMIN — LEVOTHYROXINE SODIUM 200 MCG: 25 TABLET ORAL at 05:03

## 2019-08-29 RX ADMIN — HEPARIN SODIUM 5000 UNITS: 5000 INJECTION, SOLUTION INTRAVENOUS; SUBCUTANEOUS at 04:52

## 2019-08-29 RX ADMIN — PIPERACILLIN SODIUM AND TAZOBACTAM SODIUM 3.38 G: 3; .375 INJECTION, POWDER, FOR SOLUTION INTRAVENOUS at 10:09

## 2019-08-29 RX ADMIN — CARVEDILOL 6.25 MG: 6.25 TABLET, FILM COATED ORAL at 04:52

## 2019-08-29 ASSESSMENT — ENCOUNTER SYMPTOMS
FEVER: 0
CHILLS: 0
MYALGIAS: 1
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
## 2019-08-29 NOTE — DISCHARGE PLANNING
Received Transport Form at 1015  Spoke to Elena at [facility]     Transport is scheduled for 8/29 at 1330 going to [facility].    Facility requested a wet to dry wound dressing.    ALEX Christie notified.

## 2019-08-29 NOTE — DISCHARGE PLANNING
Anticipated Discharge Disposition: [facility]    Action: Met with patient aware of transport to [facility] in [place] today at 1330 and she is agreeable, she will call her spouse and let him know.  Bedside RN and Wound team RN aware and will place a wet to dry dressing on for the transport. [facility] to have wound vac available when patient arrives.     Barriers to Discharge: none    Plan: [facility]
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
## 2019-08-29 NOTE — CARE PLAN
Problem: Safety  Goal: Will remain free from injury  Outcome: PROGRESSING AS EXPECTED  Goal: Will remain free from falls  Outcome: PROGRESSING AS EXPECTED     Problem: Infection  Goal: Will remain free from infection  Outcome: PROGRESSING AS EXPECTED     Problem: Venous Thromboembolism (VTW)/Deep Vein Thrombosis (DVT) Prevention:  Goal: Patient will participate in Venous Thrombosis (VTE)/Deep Vein Thrombosis (DVT)Prevention Measures  Outcome: PROGRESSING AS EXPECTED     Problem: Bowel/Gastric:  Goal: Normal bowel function is maintained or improved  Outcome: PROGRESSING AS EXPECTED  Goal: Will not experience complications related to bowel motility  Outcome: PROGRESSING AS EXPECTED     Problem: Knowledge Deficit  Goal: Knowledge of disease process/condition, treatment plan, diagnostic tests, and medications will improve  Outcome: PROGRESSING AS EXPECTED  Goal: Knowledge of the prescribed therapeutic regimen will improve  Outcome: PROGRESSING AS EXPECTED     Problem: Discharge Barriers/Planning  Goal: Patient's continuum of care needs will be met  Outcome: PROGRESSING AS EXPECTED     Problem: Respiratory:  Goal: Respiratory status will improve  Outcome: PROGRESSING AS EXPECTED     Problem: Pain Management  Goal: Pain level will decrease to patient's comfort goal  Outcome: PROGRESSING AS EXPECTED     Problem: Fluid Volume:  Goal: Will maintain balanced intake and output  Outcome: PROGRESSING AS EXPECTED

## 2019-08-29 NOTE — DISCHARGE PLANNING
Agency[facility]Facility Name[facility]  Spoke To: Vicki  Outcome: Discharge summary faxed to [phone] as requested.

## 2019-08-29 NOTE — WOUND TEAM
"[facility]  Inpatient Services  Wound and Skin Care Progress Note    Admission Date:  8/21/2019   HPI, PMH, SH: Reviewed  Unit where seen by Wound Team: [id number]    WOUND FOLLOW UP/CONSULT RELATED TO:  Scheduled NPWT dressing change     SUBJECTIVE: \"I think I'm leaving today\"     Self Report / Pain Level:  Premedicated with good effect       OBJECTIVE:  Dressing intact; case management arrived when I did reporting that patient to transfer to [facility] this afternoon; this facility doesn't accept NPWT dressing from other facilities so will remove NPWT dressing and place moist gauze    WOUND TYPE, LOCATION, CHARACTERISTICS (Pressure ulcers: location, stage, POA or date identified)       Wound 08/10/19 Partial Thickness Wound Abdomen;Other (Comment) (Active)   Site Assessment Pink;Clean 8/29/2019 11:00 AM   Ashely-wound Assessment Clean;Intact 8/29/2019 11:00 AM   Margins Attached edges 8/29/2019 11:00 AM   Tunneling 0 cm 8/29/2019 11:00 AM   Undermining 0 cm 8/29/2019 11:00 AM   Closure Secondary intention 8/29/2019 11:00 AM   Drainage Amount None 8/29/2019 11:00 AM   Non-staged Wound Description Partial thickness 8/29/2019 11:00 AM   Treatments Cleansed;Site care 8/29/2019 11:00 AM   Cleansing Normal Saline Irrigation 8/29/2019 11:00 AM   Periwound Protectant Not Applicable 8/29/2019 11:00 AM   Dressing Options Adhesive Foam 8/29/2019 11:00 AM   Dressing Cleansing/Solutions Not Applicable 8/29/2019 11:00 AM   Dressing Changed Changed 8/29/2019 11:00 AM   Dressing Status Intact 8/29/2019 11:00 AM   Dressing Change Frequency Every 72 hrs 8/29/2019 11:00 AM   NEXT Dressing Change  09/01/19 8/29/2019 11:00 AM         Wound 08/21/19 Full Thickness Wound Elbow open surgical site L posterior elbow (Active)   Wound Image   8/24/2019  3:00 PM   Site Assessment Clean;Red;Granulation tissue 8/29/2019 11:00 AM   Ashely-wound Assessment Clean;Intact 8/29/2019 11:00 AM   Margins Attached edges 8/29/2019 11:00 AM   Wound " Length (cm) 8.5 cm 8/24/2019  3:00 PM   Wound Width (cm) 6.5 cm 8/24/2019  3:00 PM   Wound Depth (cm) 3.5 cm 8/24/2019  3:00 PM   Wound Surface Area (cm^2) 55.25 cm^2 8/24/2019  3:00 PM   Tunneling 0 cm 8/29/2019 11:00 AM   Undermining 0 cm 8/29/2019 11:00 AM   Closure Secondary intention 8/29/2019 11:00 AM   Drainage Amount Small 8/29/2019 11:00 AM   Drainage Description Serosanguineous 8/29/2019 11:00 AM   Non-staged Wound Description Full thickness 8/29/2019 11:00 AM   Treatments Cleansed;Site care 8/29/2019 11:00 AM   Cleansing Approved Wound Cleanser 8/29/2019 11:00 AM   Periwound Protectant Skin Protectant wipes to Periwound 8/29/2019 11:00 AM   Dressing Options Moist Gauze;Absorbent Abdominal Pad;Roll Gauze 8/29/2019 11:00 AM   Dressing Cleansing/Solutions Normal Saline 8/29/2019 11:00 AM   Dressing Changed Changed 8/29/2019 11:00 AM   Dressing Status Intact 8/29/2019 11:00 AM   Dressing Change Frequency Daily 8/29/2019 11:00 AM   NEXT Dressing Change  08/29/19 8/29/2019 11:00 AM   NEXT Weekly Photo (Inpatient Only) 08/31/19 8/29/2019 11:00 AM   WOUND NURSE ONLY - Odor None 8/29/2019 11:00 AM   WOUND NURSE ONLY - Exposed Structures None 8/29/2019 11:00 AM   WOUND NURSE ONLY - Tissue Type and Percentage red 100% 8/29/2019 11:00 AM   WOUND NURSE ONLY - Time Spent with Patient (mins) 45 8/27/2019 11:00 AM       Lab Values:    AIC:      Lab Results   Component Value Date/Time    HBA1C 6.2 (H) 08/23/2019 05:37 AM         Culture:  8/22/19  Citrobacter freundii   Moderate growth   See previous culture for sensitivity report.      Culture Result Abnormal    Staphylococcus aureus   Light growth   See previous culture for sensitivity report.     Culture Result Abnormal    Streptococcus agalactiae (Group B)   Light growth   Combination therapy with ampicillin, penicillin, or   vancomycin (for susceptible strains) plus an aminoglycoside   is usually indicated for serious enterococcal infections,   such as endocarditis  unless high-level resistance to both   gentamicin and streptomycin is documented; such combinations   are predicted to result in synergistic killing of the   Enterococcus.      INTERVENTIONS BY WOUND TEAM:  Removed old dressing and cleansed wound with wound cleanser.  Applied skin prep and filled wound with NS moistened kerlix gauze.  Covered with ABD and secured with roll gauze. Also cleansed abdominal pannus and changed partial thickness wound with adhesive foam.  This wound is resolving.  Discussed with staff RN.         Interdisciplinary consultation:  RN, patient     EVALUATION:  Clean appearing wound that should continue to progress with NPWT; this dressing will be replaced with NPWT after transfer      Factors affecting wound healing:  Obesity,  DM  Goals:  Steady decrease in wound area and depth weekly     NURSING PLAN OF CARE ORDERS (X):    Dressing changes: See Dressing Care orders:   X  Skin care: See Skin Care orders:   Rectal tube care: See Rectal Tube Care orders:   Other orders:    WOUND TEAM PLAN OF CARE (X):   NPWT change 3 x week:        Dressing changes by wound team:       Follow up as needed:       Other (explain):  Patient discharging this afternoon    Anticipated discharge plans (X):  SNF:           Home Care:           Outpatient Wound Center:          Self Care:            Other:   TBD with continued wound care

## 2019-08-29 NOTE — DISCHARGE INSTRUCTIONS
Discharge Instructions    Discharged to other: SNF by medical transportation with escort. Discharged via wheelchair, hospital escort: Yes.  Special equipment needed: Not Applicable    Be sure to schedule a follow-up appointment with your primary care doctor or any specialists as instructed.     Discharge Plan:   Influenza Vaccine Indication: Not indicated: Previously immunized this influenza season and > 8 years of age    I understand that a diet low in cholesterol, fat, and sodium is recommended for good health. Unless I have been given specific instructions below for another diet, I accept this instruction as my diet prescription.   Other diet: Regular    Special Instructions: None    · Is patient discharged on Warfarin / Coumadin?   No     Depression / Suicide Risk    As you are discharged from this [facility] facility, it is important to learn how to keep safe from harming yourself.    Recognize the warning signs:  · Abrupt changes in personality, positive or negative- including increase in energy   · Giving away possessions  · Change in eating patterns- significant weight changes-  positive or negative  · Change in sleeping patterns- unable to sleep or sleeping all the time   · Unwillingness or inability to communicate  · Depression  · Unusual sadness, discouragement and loneliness  · Talk of wanting to die  · Neglect of personal appearance   · Rebelliousness- reckless behavior  · Withdrawal from people/activities they love  · Confusion- inability to concentrate     If you or a loved one observes any of these behaviors or has concerns about self-harm, here's what you can do:  · Talk about it- your feelings and reasons for harming yourself  · Remove any means that you might use to hurt yourself (examples: pills, rope, extension cords, firearm)  · Get professional help from the community (Mental Health, Substance Abuse, psychological counseling)  · Do not be alone:Call your Safe Contact- someone whom you trust  who will be there for you.  · Call your local CRISIS HOTLINE 868-8132 or 138-423-8302  · Call your local Children's Mobile Crisis Response Team [place] (324) 933-6464 or www.OneProvider.com  · Call the toll free National Suicide Prevention Hotlines   · National Suicide Prevention Lifeline 820-464-BLWN (8507)  · National [place] Line Network 800-SUICIDE (404-3972)    Incision and Drainage, Care After  Refer to this sheet in the next few weeks. These instructions provide you with information on caring for yourself after your procedure. Your caregiver may also give you more specific instructions. Your treatment has been planned according to current medical practices, but problems sometimes occur. Call your caregiver if you have any problems or questions after your procedure.  HOME CARE INSTRUCTIONS   · If antibiotic medicine is given, take it as directed. Finish it even if you start to feel better.  · Only take over-the-counter or prescription medicines for pain, discomfort, or fever as directed by your caregiver.  · Keep all follow-up appointments as directed by your caregiver.  · Change any bandages (dressings) as directed by your caregiver. Replace old dressings with clean dressings.  · Wash your hands before and after caring for your wound.  You will receive specific instructions for cleansing and caring for your wound.   SEEK MEDICAL CARE IF:   · You have increased pain, swelling, or redness around the wound.  · You have increased drainage, smell, or bleeding from the wound.  · You have muscle aches, chills, or you feel generally sick.  · You have a fever.  MAKE SURE YOU:   · Understand these instructions.  · Will watch your condition.  · Will get help right away if you are not doing well or get worse.     This information is not intended to replace advice given to you by your health care provider. Make sure you discuss any questions you have with your health care provider.     Document Released: 03/11/2013  Document Revised: 01/08/2016 Document Reviewed: 03/11/2013  [employer] Interactive Patient Education ©2016 [employer]

## 2019-08-29 NOTE — DISCHARGE PLANNING
Cobra complete and signed, chart copied, bedside RN aware. Wet to dry dressing in place, patient has called her spouse and he is aware of transport today.

## 2019-08-30 PROCEDURE — 665998 HH PPS REVENUE CREDIT

## 2019-08-30 PROCEDURE — 665999 HH PPS REVENUE DEBIT

## 2019-08-31 PROCEDURE — 665998 HH PPS REVENUE CREDIT

## 2019-08-31 PROCEDURE — 665999 HH PPS REVENUE DEBIT

## 2019-09-01 PROCEDURE — 665999 HH PPS REVENUE DEBIT

## 2019-09-01 PROCEDURE — 665998 HH PPS REVENUE CREDIT

## 2019-09-02 PROCEDURE — 665998 HH PPS REVENUE CREDIT

## 2019-09-02 PROCEDURE — 665999 HH PPS REVENUE DEBIT

## 2019-09-03 PROCEDURE — 665998 HH PPS REVENUE CREDIT

## 2019-09-03 PROCEDURE — 665999 HH PPS REVENUE DEBIT
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
## 2019-09-04 ENCOUNTER — TELEPHONE (OUTPATIENT)
Dept: INFECTIOUS DISEASES | Facility: MEDICAL CENTER | Age: 74
End: 2019-09-04

## 2019-09-04 PROCEDURE — 665998 HH PPS REVENUE CREDIT

## 2019-09-04 PROCEDURE — 665999 HH PPS REVENUE DEBIT

## 2019-09-04 NOTE — TELEPHONE ENCOUNTER
Called and LM for [facility] to return my call to schedule a hospital follow up appointment for pt with Infectious Disease.  -AMP

## 2019-09-05 PROCEDURE — 665998 HH PPS REVENUE CREDIT

## 2019-09-05 PROCEDURE — 665999 HH PPS REVENUE DEBIT

## 2019-09-06 PROCEDURE — 665998 HH PPS REVENUE CREDIT

## 2019-09-06 PROCEDURE — 665999 HH PPS REVENUE DEBIT

## 2019-09-06 NOTE — DOCUMENTATION QUERY
[facility]                                                                       Query Response Note      PATIENT:               ELIN JOHNSTON  ACCT #:                  [id number]  MRN:                     [id number]  :                      1945  ADMIT DATE:       2019 4:08 PM  DISCH DATE:        2019 2:19 PM  RESPONDING  PROVIDER #:        [id number]           QUERY TEXT:    Excisional debridement down to muscle per OP report.  Please further clarify if the muscle was included or not in the debridement procedure.   Please document the deepest level of debridement treated.      NOTE:  If an appropriate response is not listed below, please respond with a new note.        The patient's Clinical Indicators include:  ? Procedure performed:  Excisional debridement of left open infected wound including skin and subcutaneous tissue DOWN TO MUSCLE through wound measuring 10x7x4 cm deep  - a 10 blade scalpel was performed of the devitalized fat and infected tissue and devitalized skin.  - Thorough lavage of the wound was performed after debridement and there was healthy bleeding subcutaneous tissue, skin edges and  viable triceps muscle fascia.  ? Treatments: excisional debridement, wound vac, antibiotics   ? Risk factors: malnutrition, obesity, abscess  Options provided:   -- Deepest level of debridement treated - skin   -- Deepest level of debridement treated - subcutaneous tissue or fascia   -- Deepest level of debridement treated - muscle   -- Deepest level of debridement treated - bone   -- Unable to determine      Query created by: Brenna Bustamante on 2019 10:50 AM    RESPONSE TEXT:    Deepest level of debridement treated - muscle          Electronically signed by:  FEI ADAMS 2019 9:15 AM

## 2019-09-07 PROCEDURE — 665998 HH PPS REVENUE CREDIT

## 2019-09-07 PROCEDURE — 665999 HH PPS REVENUE DEBIT

## 2019-09-08 PROCEDURE — 665999 HH PPS REVENUE DEBIT

## 2019-09-08 PROCEDURE — 665998 HH PPS REVENUE CREDIT

## 2019-09-09 ENCOUNTER — OFFICE VISIT (OUTPATIENT)
Dept: INFECTIOUS DISEASES | Facility: MEDICAL CENTER | Age: 74
End: 2019-09-09
Payer: COMMERCIAL

## 2019-09-09 VITALS
HEIGHT: 68 IN | OXYGEN SATURATION: 94 % | TEMPERATURE: 97.7 F | HEART RATE: 79 BPM | SYSTOLIC BLOOD PRESSURE: 132 MMHG | DIASTOLIC BLOOD PRESSURE: 78 MMHG | BODY MASS INDEX: 44.1 KG/M2 | WEIGHT: 291 LBS

## 2019-09-09 DIAGNOSIS — E11.8 TYPE 2 DIABETES MELLITUS WITH COMPLICATION, UNSPECIFIED WHETHER LONG TERM INSULIN USE: ICD-10-CM

## 2019-09-09 DIAGNOSIS — L02.414 ABSCESS OF ARM, LEFT: ICD-10-CM

## 2019-09-09 PROCEDURE — 665998 HH PPS REVENUE CREDIT

## 2019-09-09 PROCEDURE — 99214 OFFICE O/P EST MOD 30 MIN: CPT | Performed by: INTERNAL MEDICINE

## 2019-09-09 PROCEDURE — 665999 HH PPS REVENUE DEBIT

## 2019-09-09 RX ORDER — OXYCODONE HYDROCHLORIDE 5 MG/1
5 TABLET ORAL EVERY 6 HOURS PRN
COMMUNITY
Start: 2019-09-18 | End: 2020-12-10

## 2019-09-09 ASSESSMENT — ENCOUNTER SYMPTOMS
COUGH: 0
VOMITING: 0
SHORTNESS OF BREATH: 0
HEADACHES: 0
NAUSEA: 0
FEVER: 0
DIARRHEA: 0
WEAKNESS: 0
ABDOMINAL PAIN: 0
DIZZINESS: 0
BACK PAIN: 0
SINUS PAIN: 0
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
## 2019-09-10 ENCOUNTER — APPOINTMENT (OUTPATIENT)
Dept: ENDOCRINOLOGY | Facility: MEDICAL CENTER | Age: 74
End: 2019-09-10
Payer: COMMERCIAL

## 2019-09-10 PROCEDURE — 665998 HH PPS REVENUE CREDIT

## 2019-09-10 PROCEDURE — 665999 HH PPS REVENUE DEBIT

## 2019-09-11 PROCEDURE — 665998 HH PPS REVENUE CREDIT

## 2019-09-11 PROCEDURE — 665999 HH PPS REVENUE DEBIT

## 2019-09-12 PROCEDURE — 665998 HH PPS REVENUE CREDIT

## 2019-09-12 PROCEDURE — 665999 HH PPS REVENUE DEBIT

## 2019-09-13 PROCEDURE — 665998 HH PPS REVENUE CREDIT

## 2019-09-13 PROCEDURE — 665999 HH PPS REVENUE DEBIT

## 2019-09-14 PROCEDURE — 665999 HH PPS REVENUE DEBIT

## 2019-09-14 PROCEDURE — 665998 HH PPS REVENUE CREDIT

## 2019-09-15 PROCEDURE — 665998 HH PPS REVENUE CREDIT

## 2019-09-15 PROCEDURE — 665999 HH PPS REVENUE DEBIT

## 2019-09-16 PROCEDURE — 665999 HH PPS REVENUE DEBIT

## 2019-09-16 PROCEDURE — 665998 HH PPS REVENUE CREDIT

## 2019-09-17 PROCEDURE — 665999 HH PPS REVENUE DEBIT

## 2019-09-17 PROCEDURE — 665998 HH PPS REVENUE CREDIT

## 2019-09-18 ENCOUNTER — HOME CARE VISIT (OUTPATIENT)
Dept: HOME HEALTH SERVICES | Facility: HOME HEALTHCARE | Age: 74
End: 2019-09-18
Payer: COMMERCIAL

## 2019-09-18 VITALS
RESPIRATION RATE: 18 BRPM | OXYGEN SATURATION: 98 % | SYSTOLIC BLOOD PRESSURE: 122 MMHG | WEIGHT: 284 LBS | DIASTOLIC BLOOD PRESSURE: 66 MMHG | TEMPERATURE: 97.3 F | HEART RATE: 81 BPM | HEIGHT: 68 IN | BODY MASS INDEX: 43.04 KG/M2

## 2019-09-18 PROCEDURE — 665998 HH PPS REVENUE CREDIT

## 2019-09-18 PROCEDURE — G0493 RN CARE EA 15 MIN HH/HOSPICE: HCPCS

## 2019-09-18 PROCEDURE — 665999 HH PPS REVENUE DEBIT

## 2019-09-18 ASSESSMENT — ENCOUNTER SYMPTOMS: DEBILITATING PAIN: 1

## 2019-09-19 ENCOUNTER — HOME CARE VISIT (OUTPATIENT)
Dept: HOME HEALTH SERVICES | Facility: HOME HEALTHCARE | Age: 74
End: 2019-09-19
Payer: COMMERCIAL

## 2019-09-19 ENCOUNTER — ANTICOAGULATION MONITORING (OUTPATIENT)
Dept: MEDICAL GROUP | Facility: PHYSICIAN GROUP | Age: 74
End: 2019-09-19

## 2019-09-19 PROCEDURE — 665998 HH PPS REVENUE CREDIT

## 2019-09-19 PROCEDURE — G0152 HHCP-SERV OF OT,EA 15 MIN: HCPCS

## 2019-09-19 PROCEDURE — G0156 HHCP-SVS OF AIDE,EA 15 MIN: HCPCS

## 2019-09-19 PROCEDURE — 665999 HH PPS REVENUE DEBIT

## 2019-09-19 ASSESSMENT — PATIENT HEALTH QUESTIONNAIRE - PHQ9
2. FEELING DOWN, DEPRESSED, IRRITABLE, OR HOPELESS: 00
1. LITTLE INTEREST OR PLEASURE IN DOING THINGS: 00
CLINICAL INTERPRETATION OF PHQ2 SCORE: 0

## 2019-09-19 ASSESSMENT — ENCOUNTER SYMPTOMS
VOMITING: DENIES
SHORTNESS OF BREATH: T
NAUSEA: DENIES

## 2019-09-19 ASSESSMENT — ACTIVITIES OF DAILY LIVING (ADL): OASIS_M1830: 03

## 2019-09-19 NOTE — PROGRESS NOTES
Received referral from [facility]. Medications reviewed. No clinically significant interactions noted.

## 2019-09-20 ENCOUNTER — HOME CARE VISIT (OUTPATIENT)
Dept: HOME HEALTH SERVICES | Facility: HOME HEALTHCARE | Age: 74
End: 2019-09-20
Payer: COMMERCIAL

## 2019-09-20 VITALS
HEART RATE: 95 BPM | SYSTOLIC BLOOD PRESSURE: 110 MMHG | OXYGEN SATURATION: 98 % | RESPIRATION RATE: 90 BRPM | TEMPERATURE: 99.2 F | DIASTOLIC BLOOD PRESSURE: 70 MMHG

## 2019-09-20 VITALS
RESPIRATION RATE: 18 BRPM | SYSTOLIC BLOOD PRESSURE: 110 MMHG | HEART RATE: 95 BPM | DIASTOLIC BLOOD PRESSURE: 70 MMHG | OXYGEN SATURATION: 98 % | TEMPERATURE: 99.2 F

## 2019-09-20 VITALS
RESPIRATION RATE: 18 BRPM | OXYGEN SATURATION: 98 % | HEART RATE: 95 BPM | DIASTOLIC BLOOD PRESSURE: 78 MMHG | SYSTOLIC BLOOD PRESSURE: 118 MMHG | TEMPERATURE: 97.6 F

## 2019-09-20 PROCEDURE — 665999 HH PPS REVENUE DEBIT

## 2019-09-20 PROCEDURE — G0151 HHCP-SERV OF PT,EA 15 MIN: HCPCS

## 2019-09-20 PROCEDURE — 665998 HH PPS REVENUE CREDIT

## 2019-09-20 PROCEDURE — G0299 HHS/HOSPICE OF RN EA 15 MIN: HCPCS

## 2019-09-20 ASSESSMENT — ACTIVITIES OF DAILY LIVING (ADL)
SHOPPING_ASSISTANCE: 6
HOUSEKEEPING_ASSISTANCE: 6
DRESSING_UB_ASSISTANCE: 4
TRANSPORTATION COMMENTS: REQUIRES ASSIST OF ANOTHER PERSON AND AD OUT OF HOME ON UNEVEN SURFACES
GROOMING_ASSISTANCE: 2
ORAL_CARE_ASSISTANCE: 2
TOILETING_ASSISTANCE: 0
TELEPHONE_ASSISTANCE: 0
BATHING_ASSISTIVE_EQUIPMENT_USED: SHOWER CHAIR
MEAL_PREP_ASSISTANCE: 5
EATING_ASSISTANCE: 2
LAUNDRY_ASSISTANCE: 6
DRESSING_LB_ASSISTANCE: 6
TRANSPORTATION_ASSISTANCE: 6

## 2019-09-21 PROCEDURE — 665999 HH PPS REVENUE DEBIT

## 2019-09-21 PROCEDURE — 665998 HH PPS REVENUE CREDIT

## 2019-09-22 VITALS
DIASTOLIC BLOOD PRESSURE: 78 MMHG | OXYGEN SATURATION: 98 % | SYSTOLIC BLOOD PRESSURE: 128 MMHG | TEMPERATURE: 97.8 F | HEART RATE: 77 BPM | RESPIRATION RATE: 18 BRPM

## 2019-09-22 PROCEDURE — 665998 HH PPS REVENUE CREDIT

## 2019-09-22 PROCEDURE — 665999 HH PPS REVENUE DEBIT

## 2019-09-22 ASSESSMENT — ENCOUNTER SYMPTOMS
VOMITING: DENIES
NAUSEA: DENIES

## 2019-09-23 ENCOUNTER — HOME CARE VISIT (OUTPATIENT)
Dept: HOME HEALTH SERVICES | Facility: HOME HEALTHCARE | Age: 74
End: 2019-09-23
Payer: COMMERCIAL

## 2019-09-23 VITALS
OXYGEN SATURATION: 98 % | TEMPERATURE: 97.5 F | RESPIRATION RATE: 18 BRPM | HEART RATE: 96 BPM | SYSTOLIC BLOOD PRESSURE: 124 MMHG | DIASTOLIC BLOOD PRESSURE: 70 MMHG

## 2019-09-23 VITALS
TEMPERATURE: 97.5 F | RESPIRATION RATE: 18 BRPM | HEART RATE: 96 BPM | SYSTOLIC BLOOD PRESSURE: 124 MMHG | OXYGEN SATURATION: 98 % | DIASTOLIC BLOOD PRESSURE: 70 MMHG

## 2019-09-23 PROCEDURE — 665999 HH PPS REVENUE DEBIT

## 2019-09-23 PROCEDURE — G0156 HHCP-SVS OF AIDE,EA 15 MIN: HCPCS

## 2019-09-23 PROCEDURE — 665998 HH PPS REVENUE CREDIT

## 2019-09-23 PROCEDURE — G0151 HHCP-SERV OF PT,EA 15 MIN: HCPCS

## 2019-09-23 PROCEDURE — G0299 HHS/HOSPICE OF RN EA 15 MIN: HCPCS

## 2019-09-23 ASSESSMENT — ACTIVITIES OF DAILY LIVING (ADL): TRANSPORTATION COMMENTS: REQUIRES ASSIST OF ANOTHER PERSON AND AD OUT OF HOME ON UNEVEN SURFACES AND STEPS

## 2019-09-23 ASSESSMENT — ENCOUNTER SYMPTOMS: MENTAL STATUS CHANGE: 0

## 2019-09-24 ENCOUNTER — HOME CARE VISIT (OUTPATIENT)
Dept: HOME HEALTH SERVICES | Facility: HOME HEALTHCARE | Age: 74
End: 2019-09-24
Payer: COMMERCIAL

## 2019-09-24 VITALS
RESPIRATION RATE: 18 BRPM | HEART RATE: 89 BPM | TEMPERATURE: 98.2 F | OXYGEN SATURATION: 98 % | DIASTOLIC BLOOD PRESSURE: 70 MMHG | SYSTOLIC BLOOD PRESSURE: 124 MMHG

## 2019-09-24 PROCEDURE — G0152 HHCP-SERV OF OT,EA 15 MIN: HCPCS

## 2019-09-24 PROCEDURE — 665998 HH PPS REVENUE CREDIT

## 2019-09-24 PROCEDURE — 665999 HH PPS REVENUE DEBIT

## 2019-09-24 ASSESSMENT — ENCOUNTER SYMPTOMS
NAUSEA: DENIES
VOMITING: DENIES

## 2019-09-25 ENCOUNTER — HOME CARE VISIT (OUTPATIENT)
Dept: HOME HEALTH SERVICES | Facility: HOME HEALTHCARE | Age: 74
End: 2019-09-25
Payer: COMMERCIAL

## 2019-09-25 VITALS
RESPIRATION RATE: 18 BRPM | OXYGEN SATURATION: 98 % | DIASTOLIC BLOOD PRESSURE: 70 MMHG | HEART RATE: 90 BPM | TEMPERATURE: 97.8 F | SYSTOLIC BLOOD PRESSURE: 124 MMHG

## 2019-09-25 VITALS
OXYGEN SATURATION: 98 % | DIASTOLIC BLOOD PRESSURE: 75 MMHG | SYSTOLIC BLOOD PRESSURE: 135 MMHG | TEMPERATURE: 97.8 F | HEART RATE: 96 BPM | RESPIRATION RATE: 18 BRPM

## 2019-09-25 VITALS
SYSTOLIC BLOOD PRESSURE: 124 MMHG | DIASTOLIC BLOOD PRESSURE: 70 MMHG | OXYGEN SATURATION: 96 % | TEMPERATURE: 97.5 F | RESPIRATION RATE: 18 BRPM | HEART RATE: 96 BPM

## 2019-09-25 PROCEDURE — G0299 HHS/HOSPICE OF RN EA 15 MIN: HCPCS

## 2019-09-25 PROCEDURE — G0151 HHCP-SERV OF PT,EA 15 MIN: HCPCS

## 2019-09-25 PROCEDURE — 665999 HH PPS REVENUE DEBIT

## 2019-09-25 PROCEDURE — 665998 HH PPS REVENUE CREDIT

## 2019-09-25 ASSESSMENT — ENCOUNTER SYMPTOMS
MENTAL STATUS CHANGE: 0
NAUSEA: DENIES
VOMITING: DENIES

## 2019-09-25 ASSESSMENT — ACTIVITIES OF DAILY LIVING (ADL): TRANSPORTATION COMMENTS: REQUIRES ASSIST OF ANOTHER PERSON OUT OF HOME ON UNEVEN SURFACES AND STEPS

## 2019-09-26 PROCEDURE — 665998 HH PPS REVENUE CREDIT

## 2019-09-26 PROCEDURE — 665999 HH PPS REVENUE DEBIT

## 2019-09-27 ENCOUNTER — HOME CARE VISIT (OUTPATIENT)
Dept: HOME HEALTH SERVICES | Facility: HOME HEALTHCARE | Age: 74
End: 2019-09-27
Payer: COMMERCIAL

## 2019-09-27 VITALS
TEMPERATURE: 98.5 F | RESPIRATION RATE: 18 BRPM | SYSTOLIC BLOOD PRESSURE: 140 MMHG | DIASTOLIC BLOOD PRESSURE: 80 MMHG | HEART RATE: 94 BPM | OXYGEN SATURATION: 98 %

## 2019-09-27 PROCEDURE — G0299 HHS/HOSPICE OF RN EA 15 MIN: HCPCS

## 2019-09-27 PROCEDURE — 665999 HH PPS REVENUE DEBIT

## 2019-09-27 PROCEDURE — G0152 HHCP-SERV OF OT,EA 15 MIN: HCPCS

## 2019-09-27 PROCEDURE — 665998 HH PPS REVENUE CREDIT

## 2019-09-27 ASSESSMENT — ENCOUNTER SYMPTOMS
NAUSEA: DENIES
VOMITING: DENIES

## 2019-09-28 PROCEDURE — 665999 HH PPS REVENUE DEBIT

## 2019-09-28 PROCEDURE — 665998 HH PPS REVENUE CREDIT

## 2019-09-29 VITALS
TEMPERATURE: 98.5 F | OXYGEN SATURATION: 98 % | RESPIRATION RATE: 18 BRPM | HEART RATE: 94 BPM | SYSTOLIC BLOOD PRESSURE: 140 MMHG | DIASTOLIC BLOOD PRESSURE: 80 MMHG

## 2019-09-29 PROCEDURE — 665998 HH PPS REVENUE CREDIT

## 2019-09-29 PROCEDURE — 665999 HH PPS REVENUE DEBIT

## 2019-09-30 ENCOUNTER — HOME CARE VISIT (OUTPATIENT)
Dept: HOME HEALTH SERVICES | Facility: HOME HEALTHCARE | Age: 74
End: 2019-09-30
Payer: COMMERCIAL

## 2019-09-30 ENCOUNTER — APPOINTMENT (OUTPATIENT)
Dept: INFECTIOUS DISEASES | Facility: MEDICAL CENTER | Age: 74
End: 2019-09-30
Payer: COMMERCIAL

## 2019-09-30 VITALS
TEMPERATURE: 98.6 F | DIASTOLIC BLOOD PRESSURE: 70 MMHG | SYSTOLIC BLOOD PRESSURE: 118 MMHG | HEART RATE: 99 BPM | OXYGEN SATURATION: 97 % | RESPIRATION RATE: 18 BRPM

## 2019-09-30 VITALS — RESPIRATION RATE: 18 BRPM | SYSTOLIC BLOOD PRESSURE: 118 MMHG | TEMPERATURE: 98.6 F | DIASTOLIC BLOOD PRESSURE: 70 MMHG

## 2019-09-30 VITALS
SYSTOLIC BLOOD PRESSURE: 118 MMHG | TEMPERATURE: 98.6 F | RESPIRATION RATE: 18 BRPM | HEART RATE: 97 BPM | DIASTOLIC BLOOD PRESSURE: 70 MMHG | OXYGEN SATURATION: 97 %

## 2019-09-30 PROCEDURE — 665998 HH PPS REVENUE CREDIT

## 2019-09-30 PROCEDURE — G0151 HHCP-SERV OF PT,EA 15 MIN: HCPCS

## 2019-09-30 PROCEDURE — 665999 HH PPS REVENUE DEBIT

## 2019-09-30 PROCEDURE — G0152 HHCP-SERV OF OT,EA 15 MIN: HCPCS

## 2019-09-30 PROCEDURE — G0299 HHS/HOSPICE OF RN EA 15 MIN: HCPCS

## 2019-09-30 ASSESSMENT — ENCOUNTER SYMPTOMS
NAUSEA: DENIES
MENTAL STATUS CHANGE: 0
VOMITING: DENIES

## 2019-09-30 ASSESSMENT — ACTIVITIES OF DAILY LIVING (ADL): TRANSPORTATION COMMENTS: REQUIRES ASSIST OF ANOTHER PERSON AND AD OUT OF HOME ON UNEVEN SURFACES

## 2019-10-01 PROCEDURE — 665998 HH PPS REVENUE CREDIT

## 2019-10-01 PROCEDURE — 665999 HH PPS REVENUE DEBIT

## 2019-10-02 ENCOUNTER — HOME CARE VISIT (OUTPATIENT)
Dept: HOME HEALTH SERVICES | Facility: HOME HEALTHCARE | Age: 74
End: 2019-10-02
Payer: COMMERCIAL

## 2019-10-02 VITALS
DIASTOLIC BLOOD PRESSURE: 60 MMHG | OXYGEN SATURATION: 95 % | HEART RATE: 98 BPM | TEMPERATURE: 98 F | RESPIRATION RATE: 18 BRPM | SYSTOLIC BLOOD PRESSURE: 104 MMHG

## 2019-10-02 PROCEDURE — G0151 HHCP-SERV OF PT,EA 15 MIN: HCPCS

## 2019-10-02 PROCEDURE — 665999 HH PPS REVENUE DEBIT

## 2019-10-02 PROCEDURE — 665998 HH PPS REVENUE CREDIT

## 2019-10-02 PROCEDURE — G0299 HHS/HOSPICE OF RN EA 15 MIN: HCPCS

## 2019-10-02 ASSESSMENT — ENCOUNTER SYMPTOMS
NAUSEA: DENIES
VOMITING: DENIES

## 2019-10-03 PROCEDURE — 665998 HH PPS REVENUE CREDIT

## 2019-10-03 PROCEDURE — 665999 HH PPS REVENUE DEBIT

## 2019-10-04 ENCOUNTER — HOME CARE VISIT (OUTPATIENT)
Dept: HOME HEALTH SERVICES | Facility: HOME HEALTHCARE | Age: 74
End: 2019-10-04
Payer: COMMERCIAL

## 2019-10-04 PROCEDURE — 665998 HH PPS REVENUE CREDIT

## 2019-10-04 PROCEDURE — 665999 HH PPS REVENUE DEBIT

## 2019-10-04 PROCEDURE — G0299 HHS/HOSPICE OF RN EA 15 MIN: HCPCS

## 2019-10-04 PROCEDURE — G0152 HHCP-SERV OF OT,EA 15 MIN: HCPCS

## 2019-10-05 VITALS
SYSTOLIC BLOOD PRESSURE: 138 MMHG | DIASTOLIC BLOOD PRESSURE: 70 MMHG | OXYGEN SATURATION: 96 % | RESPIRATION RATE: 18 BRPM | TEMPERATURE: 98.3 F | HEART RATE: 92 BPM

## 2019-10-05 PROCEDURE — 665998 HH PPS REVENUE CREDIT

## 2019-10-05 PROCEDURE — 665999 HH PPS REVENUE DEBIT

## 2019-10-05 ASSESSMENT — ENCOUNTER SYMPTOMS
VOMITING: DENIES
MENTAL STATUS CHANGE: 0
NAUSEA: DENIES

## 2019-10-06 VITALS
TEMPERATURE: 98.3 F | HEART RATE: 92 BPM | DIASTOLIC BLOOD PRESSURE: 70 MMHG | OXYGEN SATURATION: 96 % | RESPIRATION RATE: 18 BRPM | SYSTOLIC BLOOD PRESSURE: 138 MMHG

## 2019-10-06 PROCEDURE — 665998 HH PPS REVENUE CREDIT

## 2019-10-06 PROCEDURE — 665999 HH PPS REVENUE DEBIT

## 2019-10-07 ENCOUNTER — HOME CARE VISIT (OUTPATIENT)
Dept: HOME HEALTH SERVICES | Facility: HOME HEALTHCARE | Age: 74
End: 2019-10-07
Payer: COMMERCIAL

## 2019-10-07 VITALS
TEMPERATURE: 97.8 F | SYSTOLIC BLOOD PRESSURE: 130 MMHG | HEART RATE: 89 BPM | DIASTOLIC BLOOD PRESSURE: 66 MMHG | RESPIRATION RATE: 18 BRPM | OXYGEN SATURATION: 98 %

## 2019-10-07 VITALS
SYSTOLIC BLOOD PRESSURE: 138 MMHG | RESPIRATION RATE: 18 BRPM | HEART RATE: 89 BPM | TEMPERATURE: 97.5 F | OXYGEN SATURATION: 98 % | DIASTOLIC BLOOD PRESSURE: 65 MMHG

## 2019-10-07 PROCEDURE — 665998 HH PPS REVENUE CREDIT

## 2019-10-07 PROCEDURE — G0493 RN CARE EA 15 MIN HH/HOSPICE: HCPCS

## 2019-10-07 PROCEDURE — 665999 HH PPS REVENUE DEBIT

## 2019-10-07 PROCEDURE — G0151 HHCP-SERV OF PT,EA 15 MIN: HCPCS

## 2019-10-07 ASSESSMENT — ACTIVITIES OF DAILY LIVING (ADL): OASIS_M1830: 03

## 2019-10-07 ASSESSMENT — ENCOUNTER SYMPTOMS
NAUSEA: DENIES
VOMITING: DENIES

## 2019-10-07 ASSESSMENT — PATIENT HEALTH QUESTIONNAIRE - PHQ9: CLINICAL INTERPRETATION OF PHQ2 SCORE: 0

## 2019-10-08 ENCOUNTER — HOME CARE VISIT (OUTPATIENT)
Dept: HOME HEALTH SERVICES | Facility: HOME HEALTHCARE | Age: 74
End: 2019-10-08
Payer: COMMERCIAL

## 2019-10-08 PROCEDURE — 665997 HH PPS REVENUE ADJ

## 2019-10-08 PROCEDURE — G0152 HHCP-SERV OF OT,EA 15 MIN: HCPCS

## 2019-10-08 PROCEDURE — 665998 HH PPS REVENUE CREDIT

## 2019-10-08 PROCEDURE — 665999 HH PPS REVENUE DEBIT

## 2019-10-09 ENCOUNTER — HOME CARE VISIT (OUTPATIENT)
Dept: HOME HEALTH SERVICES | Facility: HOME HEALTHCARE | Age: 74
End: 2019-10-09
Payer: COMMERCIAL

## 2019-10-09 VITALS
TEMPERATURE: 98.9 F | HEART RATE: 92 BPM | SYSTOLIC BLOOD PRESSURE: 124 MMHG | DIASTOLIC BLOOD PRESSURE: 72 MMHG | RESPIRATION RATE: 18 BRPM | OXYGEN SATURATION: 98 %

## 2019-10-09 VITALS
BODY MASS INDEX: 43.36 KG/M2 | HEART RATE: 89 BPM | DIASTOLIC BLOOD PRESSURE: 70 MMHG | RESPIRATION RATE: 18 BRPM | OXYGEN SATURATION: 99 % | TEMPERATURE: 98.2 F | SYSTOLIC BLOOD PRESSURE: 124 MMHG | WEIGHT: 281 LBS

## 2019-10-09 PROCEDURE — 665999 HH PPS REVENUE DEBIT

## 2019-10-09 PROCEDURE — G0156 HHCP-SVS OF AIDE,EA 15 MIN: HCPCS

## 2019-10-09 PROCEDURE — 665998 HH PPS REVENUE CREDIT

## 2019-10-09 PROCEDURE — G0299 HHS/HOSPICE OF RN EA 15 MIN: HCPCS

## 2019-10-09 PROCEDURE — 665003 FOLLOW UP-HOME HEALTH

## 2019-10-09 ASSESSMENT — ACTIVITIES OF DAILY LIVING (ADL)
MEAL_PREP_ASSISTANCE: 2
BATHING_ASSISTANCE: 4
GROOMING_ASSISTANCE: 0
LAUNDRY_ASSISTANCE: 6
ORAL_CARE_ASSISTANCE: 0
DRESSING_LB_ASSISTANCE: 2
TELEPHONE_ASSISTANCE: 0
TOILETING_ASSISTANCE: 0
DRESSING_UB_ASSISTANCE: 2
TRANSPORTATION_ASSISTANCE: 6
SHOPPING_ASSISTANCE: 6
BATHING_ASSISTIVE_EQUIPMENT_USED: SHOWER CHAIR, GRAB BAR
HOUSEKEEPING_ASSISTANCE: 6
EATING_ASSISTANCE: 0

## 2019-10-09 ASSESSMENT — ENCOUNTER SYMPTOMS
VOMITING: DENIES
NAUSEA: DENIES

## 2019-10-10 ENCOUNTER — HOME CARE VISIT (OUTPATIENT)
Dept: HOME HEALTH SERVICES | Facility: HOME HEALTHCARE | Age: 74
End: 2019-10-10
Payer: COMMERCIAL

## 2019-10-10 VITALS
HEART RATE: 107 BPM | RESPIRATION RATE: 18 BRPM | SYSTOLIC BLOOD PRESSURE: 120 MMHG | DIASTOLIC BLOOD PRESSURE: 62 MMHG | TEMPERATURE: 98.5 F | OXYGEN SATURATION: 98 %

## 2019-10-10 VITALS
SYSTOLIC BLOOD PRESSURE: 142 MMHG | HEART RATE: 90 BPM | DIASTOLIC BLOOD PRESSURE: 88 MMHG | OXYGEN SATURATION: 99 % | TEMPERATURE: 98.2 F | RESPIRATION RATE: 17 BRPM

## 2019-10-10 PROCEDURE — G0152 HHCP-SERV OF OT,EA 15 MIN: HCPCS

## 2019-10-10 PROCEDURE — 665998 HH PPS REVENUE CREDIT

## 2019-10-10 PROCEDURE — 665999 HH PPS REVENUE DEBIT

## 2019-10-11 ENCOUNTER — HOME CARE VISIT (OUTPATIENT)
Dept: HOME HEALTH SERVICES | Facility: HOME HEALTHCARE | Age: 74
End: 2019-10-11
Payer: COMMERCIAL

## 2019-10-11 VITALS
RESPIRATION RATE: 18 BRPM | OXYGEN SATURATION: 97 % | TEMPERATURE: 97.6 F | SYSTOLIC BLOOD PRESSURE: 124 MMHG | HEART RATE: 89 BPM | DIASTOLIC BLOOD PRESSURE: 60 MMHG

## 2019-10-11 PROCEDURE — G0299 HHS/HOSPICE OF RN EA 15 MIN: HCPCS

## 2019-10-11 PROCEDURE — G0156 HHCP-SVS OF AIDE,EA 15 MIN: HCPCS

## 2019-10-11 PROCEDURE — 665999 HH PPS REVENUE DEBIT

## 2019-10-11 PROCEDURE — 665998 HH PPS REVENUE CREDIT

## 2019-10-11 ASSESSMENT — ENCOUNTER SYMPTOMS
VOMITING: DENIES
NAUSEA: DENIES

## 2019-10-12 PROCEDURE — 665998 HH PPS REVENUE CREDIT

## 2019-10-12 PROCEDURE — 665999 HH PPS REVENUE DEBIT

## 2019-10-13 VITALS
HEART RATE: 90 BPM | SYSTOLIC BLOOD PRESSURE: 145 MMHG | TEMPERATURE: 98.2 F | DIASTOLIC BLOOD PRESSURE: 70 MMHG | RESPIRATION RATE: 16 BRPM | OXYGEN SATURATION: 99 %

## 2019-10-13 PROCEDURE — 665998 HH PPS REVENUE CREDIT

## 2019-10-13 PROCEDURE — 665999 HH PPS REVENUE DEBIT

## 2019-10-14 ENCOUNTER — HOME CARE VISIT (OUTPATIENT)
Dept: HOME HEALTH SERVICES | Facility: HOME HEALTHCARE | Age: 74
End: 2019-10-14
Payer: COMMERCIAL

## 2019-10-14 VITALS
HEART RATE: 90 BPM | TEMPERATURE: 98.1 F | DIASTOLIC BLOOD PRESSURE: 60 MMHG | SYSTOLIC BLOOD PRESSURE: 116 MMHG | OXYGEN SATURATION: 98 % | RESPIRATION RATE: 18 BRPM

## 2019-10-14 PROCEDURE — G0299 HHS/HOSPICE OF RN EA 15 MIN: HCPCS

## 2019-10-14 PROCEDURE — 665998 HH PPS REVENUE CREDIT

## 2019-10-14 PROCEDURE — 665999 HH PPS REVENUE DEBIT

## 2019-10-14 ASSESSMENT — ENCOUNTER SYMPTOMS
NAUSEA: DENIES
VOMITING: DENIES

## 2019-10-15 ENCOUNTER — HOME CARE VISIT (OUTPATIENT)
Dept: HOME HEALTH SERVICES | Facility: HOME HEALTHCARE | Age: 74
End: 2019-10-15
Payer: COMMERCIAL

## 2019-10-15 PROCEDURE — 665998 HH PPS REVENUE CREDIT

## 2019-10-15 PROCEDURE — G0152 HHCP-SERV OF OT,EA 15 MIN: HCPCS

## 2019-10-15 PROCEDURE — 665999 HH PPS REVENUE DEBIT

## 2019-10-16 ENCOUNTER — HOME CARE VISIT (OUTPATIENT)
Dept: HOME HEALTH SERVICES | Facility: HOME HEALTHCARE | Age: 74
End: 2019-10-16
Payer: COMMERCIAL

## 2019-10-16 VITALS
SYSTOLIC BLOOD PRESSURE: 100 MMHG | DIASTOLIC BLOOD PRESSURE: 62 MMHG | RESPIRATION RATE: 18 BRPM | OXYGEN SATURATION: 98 % | HEART RATE: 82 BPM | TEMPERATURE: 98.2 F

## 2019-10-16 VITALS
TEMPERATURE: 97.8 F | DIASTOLIC BLOOD PRESSURE: 70 MMHG | OXYGEN SATURATION: 98 % | RESPIRATION RATE: 18 BRPM | SYSTOLIC BLOOD PRESSURE: 130 MMHG | HEART RATE: 88 BPM

## 2019-10-16 PROCEDURE — 665998 HH PPS REVENUE CREDIT

## 2019-10-16 PROCEDURE — 665999 HH PPS REVENUE DEBIT

## 2019-10-16 PROCEDURE — G0299 HHS/HOSPICE OF RN EA 15 MIN: HCPCS

## 2019-10-16 ASSESSMENT — ENCOUNTER SYMPTOMS
NAUSEA: DENIES
VOMITING: DENIES

## 2019-10-17 ENCOUNTER — HOME CARE VISIT (OUTPATIENT)
Dept: HOME HEALTH SERVICES | Facility: HOME HEALTHCARE | Age: 74
End: 2019-10-17
Payer: COMMERCIAL

## 2019-10-17 VITALS
OXYGEN SATURATION: 95 % | TEMPERATURE: 98.6 F | DIASTOLIC BLOOD PRESSURE: 72 MMHG | SYSTOLIC BLOOD PRESSURE: 124 MMHG | HEART RATE: 92 BPM | RESPIRATION RATE: 18 BRPM

## 2019-10-17 PROCEDURE — G0152 HHCP-SERV OF OT,EA 15 MIN: HCPCS

## 2019-10-17 PROCEDURE — 665999 HH PPS REVENUE DEBIT

## 2019-10-17 PROCEDURE — 665998 HH PPS REVENUE CREDIT

## 2019-10-18 ENCOUNTER — HOME CARE VISIT (OUTPATIENT)
Dept: HOME HEALTH SERVICES | Facility: HOME HEALTHCARE | Age: 74
End: 2019-10-18
Payer: COMMERCIAL

## 2019-10-18 PROCEDURE — 665998 HH PPS REVENUE CREDIT

## 2019-10-18 PROCEDURE — G0156 HHCP-SVS OF AIDE,EA 15 MIN: HCPCS

## 2019-10-18 PROCEDURE — G0299 HHS/HOSPICE OF RN EA 15 MIN: HCPCS

## 2019-10-18 PROCEDURE — 665999 HH PPS REVENUE DEBIT

## 2019-10-18 ASSESSMENT — ENCOUNTER SYMPTOMS
MENTAL STATUS CHANGE: 0
VOMITING: DENIES
NAUSEA: DENIES

## 2019-10-19 VITALS
TEMPERATURE: 97.8 F | DIASTOLIC BLOOD PRESSURE: 60 MMHG | OXYGEN SATURATION: 94 % | HEART RATE: 94 BPM | RESPIRATION RATE: 18 BRPM | SYSTOLIC BLOOD PRESSURE: 118 MMHG

## 2019-10-19 PROCEDURE — 665999 HH PPS REVENUE DEBIT

## 2019-10-19 PROCEDURE — 665998 HH PPS REVENUE CREDIT

## 2019-10-20 VITALS
SYSTOLIC BLOOD PRESSURE: 120 MMHG | OXYGEN SATURATION: 99 % | RESPIRATION RATE: 18 BRPM | TEMPERATURE: 99.2 F | DIASTOLIC BLOOD PRESSURE: 60 MMHG | HEART RATE: 100 BPM

## 2019-10-20 PROCEDURE — 665999 HH PPS REVENUE DEBIT

## 2019-10-20 PROCEDURE — 665998 HH PPS REVENUE CREDIT

## 2019-10-20 ASSESSMENT — ENCOUNTER SYMPTOMS: MENTAL STATUS CHANGE: 0

## 2019-10-21 ENCOUNTER — HOME CARE VISIT (OUTPATIENT)
Dept: HOME HEALTH SERVICES | Facility: HOME HEALTHCARE | Age: 74
End: 2019-10-21
Payer: COMMERCIAL

## 2019-10-21 PROCEDURE — 665999 HH PPS REVENUE DEBIT

## 2019-10-21 PROCEDURE — G0299 HHS/HOSPICE OF RN EA 15 MIN: HCPCS

## 2019-10-21 PROCEDURE — 665998 HH PPS REVENUE CREDIT

## 2019-10-22 ENCOUNTER — HOME CARE VISIT (OUTPATIENT)
Dept: HOME HEALTH SERVICES | Facility: HOME HEALTHCARE | Age: 74
End: 2019-10-22
Payer: COMMERCIAL

## 2019-10-22 VITALS
HEART RATE: 84 BPM | SYSTOLIC BLOOD PRESSURE: 118 MMHG | OXYGEN SATURATION: 98 % | RESPIRATION RATE: 18 BRPM | TEMPERATURE: 98.1 F | DIASTOLIC BLOOD PRESSURE: 60 MMHG

## 2019-10-22 PROCEDURE — G0156 HHCP-SVS OF AIDE,EA 15 MIN: HCPCS

## 2019-10-22 PROCEDURE — 665999 HH PPS REVENUE DEBIT

## 2019-10-22 PROCEDURE — 665998 HH PPS REVENUE CREDIT

## 2019-10-22 ASSESSMENT — ENCOUNTER SYMPTOMS
NAUSEA: DENIES
VOMITING: DENIES

## 2019-10-23 ENCOUNTER — HOME CARE VISIT (OUTPATIENT)
Dept: HOME HEALTH SERVICES | Facility: HOME HEALTHCARE | Age: 74
End: 2019-10-23
Payer: COMMERCIAL

## 2019-10-23 VITALS
DIASTOLIC BLOOD PRESSURE: 70 MMHG | HEART RATE: 86 BPM | RESPIRATION RATE: 18 BRPM | TEMPERATURE: 98.1 F | OXYGEN SATURATION: 98 % | SYSTOLIC BLOOD PRESSURE: 128 MMHG

## 2019-10-23 VITALS
TEMPERATURE: 98.4 F | DIASTOLIC BLOOD PRESSURE: 60 MMHG | SYSTOLIC BLOOD PRESSURE: 110 MMHG | HEART RATE: 72 BPM | RESPIRATION RATE: 16 BRPM | OXYGEN SATURATION: 99 %

## 2019-10-23 PROCEDURE — G0299 HHS/HOSPICE OF RN EA 15 MIN: HCPCS

## 2019-10-23 PROCEDURE — 665999 HH PPS REVENUE DEBIT

## 2019-10-23 PROCEDURE — 665998 HH PPS REVENUE CREDIT

## 2019-10-23 ASSESSMENT — ENCOUNTER SYMPTOMS
VOMITING: DENIES
NAUSEA: DENIES

## 2019-10-24 ENCOUNTER — HOME CARE VISIT (OUTPATIENT)
Dept: HOME HEALTH SERVICES | Facility: HOME HEALTHCARE | Age: 74
End: 2019-10-24
Payer: COMMERCIAL

## 2019-10-24 VITALS
HEART RATE: 92 BPM | TEMPERATURE: 99.1 F | DIASTOLIC BLOOD PRESSURE: 64 MMHG | RESPIRATION RATE: 16 BRPM | OXYGEN SATURATION: 98 % | SYSTOLIC BLOOD PRESSURE: 112 MMHG

## 2019-10-24 PROCEDURE — G0152 HHCP-SERV OF OT,EA 15 MIN: HCPCS

## 2019-10-24 PROCEDURE — 665999 HH PPS REVENUE DEBIT

## 2019-10-24 PROCEDURE — 665998 HH PPS REVENUE CREDIT

## 2019-10-25 ENCOUNTER — HOME CARE VISIT (OUTPATIENT)
Dept: HOME HEALTH SERVICES | Facility: HOME HEALTHCARE | Age: 74
End: 2019-10-25
Payer: COMMERCIAL

## 2019-10-25 VITALS
OXYGEN SATURATION: 95 % | SYSTOLIC BLOOD PRESSURE: 124 MMHG | TEMPERATURE: 97.8 F | DIASTOLIC BLOOD PRESSURE: 60 MMHG | RESPIRATION RATE: 18 BRPM | HEART RATE: 86 BPM

## 2019-10-25 PROCEDURE — G0299 HHS/HOSPICE OF RN EA 15 MIN: HCPCS

## 2019-10-25 PROCEDURE — 665999 HH PPS REVENUE DEBIT

## 2019-10-25 PROCEDURE — 665998 HH PPS REVENUE CREDIT

## 2019-10-25 PROCEDURE — G0156 HHCP-SVS OF AIDE,EA 15 MIN: HCPCS

## 2019-10-25 ASSESSMENT — ENCOUNTER SYMPTOMS
NAUSEA: DENIES
VOMITING: DENIES

## 2019-10-26 VITALS
OXYGEN SATURATION: 16 % | RESPIRATION RATE: 16 BRPM | DIASTOLIC BLOOD PRESSURE: 62 MMHG | TEMPERATURE: 98.4 F | HEART RATE: 84 BPM | SYSTOLIC BLOOD PRESSURE: 110 MMHG

## 2019-10-26 PROCEDURE — 665998 HH PPS REVENUE CREDIT

## 2019-10-26 PROCEDURE — 665999 HH PPS REVENUE DEBIT

## 2019-10-27 PROCEDURE — 665998 HH PPS REVENUE CREDIT

## 2019-10-27 PROCEDURE — 665999 HH PPS REVENUE DEBIT

## 2019-10-28 ENCOUNTER — HOME CARE VISIT (OUTPATIENT)
Dept: HOME HEALTH SERVICES | Facility: HOME HEALTHCARE | Age: 74
End: 2019-10-28
Payer: COMMERCIAL

## 2019-10-28 PROCEDURE — G0299 HHS/HOSPICE OF RN EA 15 MIN: HCPCS

## 2019-10-28 PROCEDURE — 665999 HH PPS REVENUE DEBIT

## 2019-10-28 PROCEDURE — 665998 HH PPS REVENUE CREDIT

## 2019-10-28 PROCEDURE — G0156 HHCP-SVS OF AIDE,EA 15 MIN: HCPCS

## 2019-10-29 ENCOUNTER — HOME CARE VISIT (OUTPATIENT)
Dept: HOME HEALTH SERVICES | Facility: HOME HEALTHCARE | Age: 74
End: 2019-10-29
Payer: COMMERCIAL

## 2019-10-29 VITALS
HEART RATE: 89 BPM | RESPIRATION RATE: 18 BRPM | TEMPERATURE: 98.1 F | DIASTOLIC BLOOD PRESSURE: 60 MMHG | OXYGEN SATURATION: 95 % | SYSTOLIC BLOOD PRESSURE: 116 MMHG

## 2019-10-29 VITALS
OXYGEN SATURATION: 95 % | TEMPERATURE: 98.1 F | DIASTOLIC BLOOD PRESSURE: 60 MMHG | SYSTOLIC BLOOD PRESSURE: 116 MMHG | HEART RATE: 89 BPM | RESPIRATION RATE: 18 BRPM

## 2019-10-29 PROCEDURE — G0152 HHCP-SERV OF OT,EA 15 MIN: HCPCS

## 2019-10-29 PROCEDURE — 665998 HH PPS REVENUE CREDIT

## 2019-10-29 PROCEDURE — 665999 HH PPS REVENUE DEBIT

## 2019-10-29 ASSESSMENT — ENCOUNTER SYMPTOMS
VOMITING: DENIES
NAUSEA: DENIES

## 2019-10-30 ENCOUNTER — HOME CARE VISIT (OUTPATIENT)
Dept: HOME HEALTH SERVICES | Facility: HOME HEALTHCARE | Age: 74
End: 2019-10-30
Payer: COMMERCIAL

## 2019-10-30 VITALS
RESPIRATION RATE: 18 BRPM | DIASTOLIC BLOOD PRESSURE: 70 MMHG | HEART RATE: 94 BPM | SYSTOLIC BLOOD PRESSURE: 124 MMHG | TEMPERATURE: 97.8 F | OXYGEN SATURATION: 97 %

## 2019-10-30 VITALS
DIASTOLIC BLOOD PRESSURE: 80 MMHG | TEMPERATURE: 98.7 F | RESPIRATION RATE: 18 BRPM | OXYGEN SATURATION: 99 % | HEART RATE: 101 BPM | SYSTOLIC BLOOD PRESSURE: 136 MMHG

## 2019-10-30 PROCEDURE — 665998 HH PPS REVENUE CREDIT

## 2019-10-30 PROCEDURE — 665999 HH PPS REVENUE DEBIT

## 2019-10-30 PROCEDURE — G0299 HHS/HOSPICE OF RN EA 15 MIN: HCPCS

## 2019-10-31 ENCOUNTER — HOME CARE VISIT (OUTPATIENT)
Dept: HOME HEALTH SERVICES | Facility: HOME HEALTHCARE | Age: 74
End: 2019-10-31
Payer: COMMERCIAL

## 2019-10-31 PROCEDURE — G0156 HHCP-SVS OF AIDE,EA 15 MIN: HCPCS

## 2019-10-31 PROCEDURE — 665999 HH PPS REVENUE DEBIT

## 2019-10-31 PROCEDURE — 665998 HH PPS REVENUE CREDIT

## 2019-10-31 ASSESSMENT — ENCOUNTER SYMPTOMS
VOMITING: DENIES
NAUSEA: DENIES

## 2019-11-01 ENCOUNTER — HOME CARE VISIT (OUTPATIENT)
Dept: HOME HEALTH SERVICES | Facility: HOME HEALTHCARE | Age: 74
End: 2019-11-01
Payer: COMMERCIAL

## 2019-11-01 PROCEDURE — 665999 HH PPS REVENUE DEBIT

## 2019-11-01 PROCEDURE — G0299 HHS/HOSPICE OF RN EA 15 MIN: HCPCS

## 2019-11-01 PROCEDURE — G0152 HHCP-SERV OF OT,EA 15 MIN: HCPCS

## 2019-11-01 PROCEDURE — 665998 HH PPS REVENUE CREDIT

## 2019-11-02 VITALS
DIASTOLIC BLOOD PRESSURE: 64 MMHG | SYSTOLIC BLOOD PRESSURE: 120 MMHG | OXYGEN SATURATION: 98 % | TEMPERATURE: 98 F | HEART RATE: 80 BPM | RESPIRATION RATE: 16 BRPM

## 2019-11-02 VITALS
OXYGEN SATURATION: 97 % | HEART RATE: 88 BPM | DIASTOLIC BLOOD PRESSURE: 70 MMHG | TEMPERATURE: 97.8 F | RESPIRATION RATE: 18 BRPM | SYSTOLIC BLOOD PRESSURE: 130 MMHG

## 2019-11-02 PROCEDURE — 665999 HH PPS REVENUE DEBIT

## 2019-11-02 PROCEDURE — 665998 HH PPS REVENUE CREDIT

## 2019-11-02 ASSESSMENT — ENCOUNTER SYMPTOMS
NAUSEA: DENIES
VOMITING: DENIES

## 2019-11-03 PROCEDURE — 665999 HH PPS REVENUE DEBIT

## 2019-11-03 PROCEDURE — 665998 HH PPS REVENUE CREDIT

## 2019-11-04 ENCOUNTER — HOME CARE VISIT (OUTPATIENT)
Dept: HOME HEALTH SERVICES | Facility: HOME HEALTHCARE | Age: 74
End: 2019-11-04
Payer: COMMERCIAL

## 2019-11-04 VITALS
DIASTOLIC BLOOD PRESSURE: 70 MMHG | RESPIRATION RATE: 16 BRPM | SYSTOLIC BLOOD PRESSURE: 128 MMHG | OXYGEN SATURATION: 94 % | HEART RATE: 89 BPM | TEMPERATURE: 97.9 F

## 2019-11-04 VITALS
OXYGEN SATURATION: 97 % | RESPIRATION RATE: 16 BRPM | TEMPERATURE: 97.8 F | DIASTOLIC BLOOD PRESSURE: 70 MMHG | HEART RATE: 88 BPM | SYSTOLIC BLOOD PRESSURE: 130 MMHG

## 2019-11-04 PROCEDURE — 665998 HH PPS REVENUE CREDIT

## 2019-11-04 PROCEDURE — 665999 HH PPS REVENUE DEBIT

## 2019-11-04 PROCEDURE — G0299 HHS/HOSPICE OF RN EA 15 MIN: HCPCS

## 2019-11-04 ASSESSMENT — ENCOUNTER SYMPTOMS
MUSCLE WEAKNESS: 1
VOMITING: DENIES
NAUSEA: DENIES

## 2019-11-05 ENCOUNTER — HOME CARE VISIT (OUTPATIENT)
Dept: HOME HEALTH SERVICES | Facility: HOME HEALTHCARE | Age: 74
End: 2019-11-05
Payer: COMMERCIAL

## 2019-11-05 VITALS
SYSTOLIC BLOOD PRESSURE: 120 MMHG | RESPIRATION RATE: 16 BRPM | TEMPERATURE: 98.4 F | OXYGEN SATURATION: 98 % | HEART RATE: 90 BPM | DIASTOLIC BLOOD PRESSURE: 80 MMHG

## 2019-11-05 PROCEDURE — G0156 HHCP-SVS OF AIDE,EA 15 MIN: HCPCS

## 2019-11-05 PROCEDURE — 665998 HH PPS REVENUE CREDIT

## 2019-11-05 PROCEDURE — G0152 HHCP-SERV OF OT,EA 15 MIN: HCPCS

## 2019-11-05 PROCEDURE — 665999 HH PPS REVENUE DEBIT

## 2019-11-06 ENCOUNTER — HOME CARE VISIT (OUTPATIENT)
Dept: HOME HEALTH SERVICES | Facility: HOME HEALTHCARE | Age: 74
End: 2019-11-06
Payer: COMMERCIAL

## 2019-11-06 VITALS
DIASTOLIC BLOOD PRESSURE: 80 MMHG | OXYGEN SATURATION: 98 % | SYSTOLIC BLOOD PRESSURE: 120 MMHG | RESPIRATION RATE: 16 BRPM | HEART RATE: 90 BPM | TEMPERATURE: 98.4 F

## 2019-11-06 VITALS
DIASTOLIC BLOOD PRESSURE: 60 MMHG | OXYGEN SATURATION: 98 % | HEART RATE: 89 BPM | RESPIRATION RATE: 16 BRPM | SYSTOLIC BLOOD PRESSURE: 120 MMHG | TEMPERATURE: 97.8 F

## 2019-11-06 PROCEDURE — 665998 HH PPS REVENUE CREDIT

## 2019-11-06 PROCEDURE — G0299 HHS/HOSPICE OF RN EA 15 MIN: HCPCS

## 2019-11-06 PROCEDURE — 665999 HH PPS REVENUE DEBIT

## 2019-11-06 ASSESSMENT — ENCOUNTER SYMPTOMS
MUSCLE WEAKNESS: 1
NAUSEA: DENIES
VOMITING: DENIES

## 2019-11-07 ENCOUNTER — HOME CARE VISIT (OUTPATIENT)
Dept: HOME HEALTH SERVICES | Facility: HOME HEALTHCARE | Age: 74
End: 2019-11-07
Payer: COMMERCIAL

## 2019-11-07 PROCEDURE — 665999 HH PPS REVENUE DEBIT

## 2019-11-07 PROCEDURE — G0152 HHCP-SERV OF OT,EA 15 MIN: HCPCS

## 2019-11-07 PROCEDURE — 665998 HH PPS REVENUE CREDIT

## 2019-11-07 PROCEDURE — G0156 HHCP-SVS OF AIDE,EA 15 MIN: HCPCS

## 2019-11-08 ENCOUNTER — HOME CARE VISIT (OUTPATIENT)
Dept: HOME HEALTH SERVICES | Facility: HOME HEALTHCARE | Age: 74
End: 2019-11-08
Payer: COMMERCIAL

## 2019-11-08 PROCEDURE — G0299 HHS/HOSPICE OF RN EA 15 MIN: HCPCS

## 2019-11-08 PROCEDURE — 665999 HH PPS REVENUE DEBIT

## 2019-11-08 PROCEDURE — 665998 HH PPS REVENUE CREDIT

## 2019-11-09 PROCEDURE — 665998 HH PPS REVENUE CREDIT

## 2019-11-09 PROCEDURE — 665999 HH PPS REVENUE DEBIT

## 2019-11-10 VITALS
OXYGEN SATURATION: 98 % | HEART RATE: 86 BPM | TEMPERATURE: 98.8 F | SYSTOLIC BLOOD PRESSURE: 118 MMHG | RESPIRATION RATE: 16 BRPM | DIASTOLIC BLOOD PRESSURE: 78 MMHG

## 2019-11-10 VITALS
HEART RATE: 86 BPM | TEMPERATURE: 98.8 F | RESPIRATION RATE: 16 BRPM | OXYGEN SATURATION: 98 % | SYSTOLIC BLOOD PRESSURE: 118 MMHG | DIASTOLIC BLOOD PRESSURE: 78 MMHG

## 2019-11-10 VITALS
SYSTOLIC BLOOD PRESSURE: 132 MMHG | OXYGEN SATURATION: 95 % | TEMPERATURE: 98.1 F | DIASTOLIC BLOOD PRESSURE: 80 MMHG | HEART RATE: 90 BPM | RESPIRATION RATE: 18 BRPM

## 2019-11-10 PROCEDURE — 665999 HH PPS REVENUE DEBIT

## 2019-11-10 PROCEDURE — 665998 HH PPS REVENUE CREDIT

## 2019-11-10 ASSESSMENT — ENCOUNTER SYMPTOMS
MUSCLE WEAKNESS: 1
MENTAL STATUS CHANGE: 0
NAUSEA: DENIES
VOMITING: DENIES

## 2019-11-11 ENCOUNTER — HOME CARE VISIT (OUTPATIENT)
Dept: HOME HEALTH SERVICES | Facility: HOME HEALTHCARE | Age: 74
End: 2019-11-11
Payer: COMMERCIAL

## 2019-11-11 VITALS
TEMPERATURE: 97.9 F | DIASTOLIC BLOOD PRESSURE: 80 MMHG | HEART RATE: 88 BPM | RESPIRATION RATE: 16 BRPM | SYSTOLIC BLOOD PRESSURE: 124 MMHG | OXYGEN SATURATION: 98 %

## 2019-11-11 PROCEDURE — G0299 HHS/HOSPICE OF RN EA 15 MIN: HCPCS

## 2019-11-11 PROCEDURE — 665998 HH PPS REVENUE CREDIT

## 2019-11-11 PROCEDURE — 665999 HH PPS REVENUE DEBIT

## 2019-11-11 ASSESSMENT — ENCOUNTER SYMPTOMS
VOMITING: DENIES
NAUSEA: DENIES
MUSCLE WEAKNESS: 1

## 2019-11-12 ENCOUNTER — HOME CARE VISIT (OUTPATIENT)
Dept: HOME HEALTH SERVICES | Facility: HOME HEALTHCARE | Age: 74
End: 2019-11-12
Payer: COMMERCIAL

## 2019-11-12 VITALS
HEART RATE: 90 BPM | OXYGEN SATURATION: 98 % | RESPIRATION RATE: 16 BRPM | DIASTOLIC BLOOD PRESSURE: 70 MMHG | SYSTOLIC BLOOD PRESSURE: 120 MMHG | TEMPERATURE: 98.8 F

## 2019-11-12 PROCEDURE — 665999 HH PPS REVENUE DEBIT

## 2019-11-12 PROCEDURE — 665998 HH PPS REVENUE CREDIT

## 2019-11-12 PROCEDURE — G0152 HHCP-SERV OF OT,EA 15 MIN: HCPCS

## 2019-11-13 ENCOUNTER — HOME CARE VISIT (OUTPATIENT)
Dept: HOME HEALTH SERVICES | Facility: HOME HEALTHCARE | Age: 74
End: 2019-11-13
Payer: COMMERCIAL

## 2019-11-13 PROCEDURE — G0299 HHS/HOSPICE OF RN EA 15 MIN: HCPCS

## 2019-11-13 PROCEDURE — 665998 HH PPS REVENUE CREDIT

## 2019-11-13 PROCEDURE — 665999 HH PPS REVENUE DEBIT

## 2019-11-14 ENCOUNTER — HOME CARE VISIT (OUTPATIENT)
Dept: HOME HEALTH SERVICES | Facility: HOME HEALTHCARE | Age: 74
End: 2019-11-14
Payer: COMMERCIAL

## 2019-11-14 VITALS
DIASTOLIC BLOOD PRESSURE: 84 MMHG | HEART RATE: 92 BPM | SYSTOLIC BLOOD PRESSURE: 120 MMHG | RESPIRATION RATE: 16 BRPM | OXYGEN SATURATION: 98 % | TEMPERATURE: 99.7 F

## 2019-11-14 VITALS
OXYGEN SATURATION: 97 % | RESPIRATION RATE: 16 BRPM | DIASTOLIC BLOOD PRESSURE: 70 MMHG | TEMPERATURE: 97.7 F | HEART RATE: 84 BPM | SYSTOLIC BLOOD PRESSURE: 130 MMHG

## 2019-11-14 PROCEDURE — G0152 HHCP-SERV OF OT,EA 15 MIN: HCPCS

## 2019-11-14 PROCEDURE — 665999 HH PPS REVENUE DEBIT

## 2019-11-14 PROCEDURE — 665998 HH PPS REVENUE CREDIT

## 2019-11-14 PROCEDURE — G0156 HHCP-SVS OF AIDE,EA 15 MIN: HCPCS

## 2019-11-14 ASSESSMENT — ENCOUNTER SYMPTOMS
NAUSEA: DENIES
VOMITING: DENIES
MUSCLE WEAKNESS: 1

## 2019-11-15 ENCOUNTER — HOME CARE VISIT (OUTPATIENT)
Dept: HOME HEALTH SERVICES | Facility: HOME HEALTHCARE | Age: 74
End: 2019-11-15
Payer: COMMERCIAL

## 2019-11-15 PROCEDURE — 665998 HH PPS REVENUE CREDIT

## 2019-11-15 PROCEDURE — G0299 HHS/HOSPICE OF RN EA 15 MIN: HCPCS

## 2019-11-15 PROCEDURE — 665999 HH PPS REVENUE DEBIT

## 2019-11-16 VITALS
DIASTOLIC BLOOD PRESSURE: 80 MMHG | TEMPERATURE: 98 F | SYSTOLIC BLOOD PRESSURE: 128 MMHG | RESPIRATION RATE: 16 BRPM | HEART RATE: 92 BPM | OXYGEN SATURATION: 98 %

## 2019-11-16 PROCEDURE — 665999 HH PPS REVENUE DEBIT

## 2019-11-16 PROCEDURE — 665998 HH PPS REVENUE CREDIT

## 2019-11-16 ASSESSMENT — ENCOUNTER SYMPTOMS
VOMITING: DENIES
MUSCLE WEAKNESS: 1
NAUSEA: DENIES

## 2019-11-17 VITALS
SYSTOLIC BLOOD PRESSURE: 120 MMHG | OXYGEN SATURATION: 98 % | RESPIRATION RATE: 16 BRPM | HEART RATE: 82 BPM | TEMPERATURE: 99.7 F | DIASTOLIC BLOOD PRESSURE: 84 MMHG

## 2019-11-17 PROCEDURE — 665998 HH PPS REVENUE CREDIT

## 2019-11-17 PROCEDURE — 665999 HH PPS REVENUE DEBIT

## 2019-11-18 ENCOUNTER — HOME CARE VISIT (OUTPATIENT)
Dept: HOME HEALTH SERVICES | Facility: HOME HEALTHCARE | Age: 74
End: 2019-11-18
Payer: COMMERCIAL

## 2019-11-18 VITALS
TEMPERATURE: 98.5 F | SYSTOLIC BLOOD PRESSURE: 118 MMHG | OXYGEN SATURATION: 97 % | RESPIRATION RATE: 16 BRPM | DIASTOLIC BLOOD PRESSURE: 60 MMHG | HEART RATE: 84 BPM

## 2019-11-18 PROCEDURE — G0299 HHS/HOSPICE OF RN EA 15 MIN: HCPCS

## 2019-11-18 PROCEDURE — 665998 HH PPS REVENUE CREDIT

## 2019-11-18 PROCEDURE — 665999 HH PPS REVENUE DEBIT

## 2019-11-18 ASSESSMENT — ENCOUNTER SYMPTOMS
VOMITING: DENIES
NAUSEA: DENIES

## 2019-11-19 ENCOUNTER — HOME CARE VISIT (OUTPATIENT)
Dept: HOME HEALTH SERVICES | Facility: HOME HEALTHCARE | Age: 74
End: 2019-11-19
Payer: COMMERCIAL

## 2019-11-19 PROCEDURE — 665998 HH PPS REVENUE CREDIT

## 2019-11-19 PROCEDURE — G0152 HHCP-SERV OF OT,EA 15 MIN: HCPCS

## 2019-11-19 PROCEDURE — 665999 HH PPS REVENUE DEBIT

## 2019-11-20 ENCOUNTER — HOME CARE VISIT (OUTPATIENT)
Dept: HOME HEALTH SERVICES | Facility: HOME HEALTHCARE | Age: 74
End: 2019-11-20
Payer: COMMERCIAL

## 2019-11-20 VITALS
SYSTOLIC BLOOD PRESSURE: 128 MMHG | RESPIRATION RATE: 16 BRPM | DIASTOLIC BLOOD PRESSURE: 70 MMHG | HEART RATE: 86 BPM | TEMPERATURE: 98.1 F | OXYGEN SATURATION: 93 %

## 2019-11-20 VITALS
TEMPERATURE: 98.9 F | RESPIRATION RATE: 16 BRPM | HEART RATE: 90 BPM | DIASTOLIC BLOOD PRESSURE: 70 MMHG | SYSTOLIC BLOOD PRESSURE: 120 MMHG | OXYGEN SATURATION: 99 %

## 2019-11-20 PROCEDURE — G0299 HHS/HOSPICE OF RN EA 15 MIN: HCPCS

## 2019-11-20 PROCEDURE — 665998 HH PPS REVENUE CREDIT

## 2019-11-20 PROCEDURE — 665999 HH PPS REVENUE DEBIT

## 2019-11-20 ASSESSMENT — ENCOUNTER SYMPTOMS
VOMITING: DENIES
NAUSEA: DENIES
MUSCLE WEAKNESS: 1

## 2019-11-21 PROCEDURE — 665999 HH PPS REVENUE DEBIT

## 2019-11-21 PROCEDURE — 665998 HH PPS REVENUE CREDIT

## 2019-11-22 ENCOUNTER — HOME CARE VISIT (OUTPATIENT)
Dept: HOME HEALTH SERVICES | Facility: HOME HEALTHCARE | Age: 74
End: 2019-11-22
Payer: COMMERCIAL

## 2019-11-22 VITALS
RESPIRATION RATE: 16 BRPM | TEMPERATURE: 97.3 F | HEART RATE: 89 BPM | OXYGEN SATURATION: 95 % | SYSTOLIC BLOOD PRESSURE: 138 MMHG | DIASTOLIC BLOOD PRESSURE: 70 MMHG

## 2019-11-22 PROCEDURE — 665998 HH PPS REVENUE CREDIT

## 2019-11-22 PROCEDURE — G0152 HHCP-SERV OF OT,EA 15 MIN: HCPCS

## 2019-11-22 PROCEDURE — G0299 HHS/HOSPICE OF RN EA 15 MIN: HCPCS

## 2019-11-22 PROCEDURE — 665999 HH PPS REVENUE DEBIT

## 2019-11-23 VITALS
DIASTOLIC BLOOD PRESSURE: 70 MMHG | SYSTOLIC BLOOD PRESSURE: 132 MMHG | RESPIRATION RATE: 16 BRPM | TEMPERATURE: 97.3 F | OXYGEN SATURATION: 95 % | HEART RATE: 89 BPM

## 2019-11-23 PROCEDURE — 665999 HH PPS REVENUE DEBIT

## 2019-11-23 PROCEDURE — 665998 HH PPS REVENUE CREDIT

## 2019-11-23 ASSESSMENT — ACTIVITIES OF DAILY LIVING (ADL)
PREPARING MEALS: INDEPENDENT
DRESSING_UB_CURRENT_FUNCTION: INDEPENDENT
HOUSEKEEPING ASSESSED: 1
BATHING_WITHIN_DEFINED_LIMITS: 1
GROOMING_WITHIN_DEFINED_LIMITS: 1
TOILETING: INDEPENDENT
TOILETING: 1
GROOMING ASSESSED: 1
FEEDING_WITHIN_DEFINED_LIMITS: 1
ORAL_CARE_ASSESSED: 1
TELEPHONE USE ASSESSED: 1
GROOMING_CURRENT_FUNCTION: INDEPENDENT

## 2019-11-24 PROCEDURE — 665999 HH PPS REVENUE DEBIT

## 2019-11-24 PROCEDURE — 665998 HH PPS REVENUE CREDIT

## 2019-11-25 ENCOUNTER — HOME CARE VISIT (OUTPATIENT)
Dept: HOME HEALTH SERVICES | Facility: HOME HEALTHCARE | Age: 74
End: 2019-11-25
Payer: COMMERCIAL

## 2019-11-25 PROCEDURE — G0152 HHCP-SERV OF OT,EA 15 MIN: HCPCS

## 2019-11-25 PROCEDURE — 665998 HH PPS REVENUE CREDIT

## 2019-11-25 PROCEDURE — 665999 HH PPS REVENUE DEBIT

## 2019-11-25 PROCEDURE — 665997 HH PPS REVENUE ADJ

## 2019-11-26 VITALS
HEART RATE: 101 BPM | RESPIRATION RATE: 16 BRPM | TEMPERATURE: 96.7 F | OXYGEN SATURATION: 98 % | SYSTOLIC BLOOD PRESSURE: 160 MMHG | DIASTOLIC BLOOD PRESSURE: 88 MMHG

## 2019-11-26 PROCEDURE — 665998 HH PPS REVENUE CREDIT

## 2019-11-26 PROCEDURE — 665999 HH PPS REVENUE DEBIT

## 2019-11-26 ASSESSMENT — ACTIVITIES OF DAILY LIVING (ADL)
HOME_HEALTH_OASIS: 00
OASIS_M1830: 01

## 2019-11-26 ASSESSMENT — PATIENT HEALTH QUESTIONNAIRE - PHQ9: CLINICAL INTERPRETATION OF PHQ2 SCORE: 0

## 2019-11-27 PROCEDURE — 665999 HH PPS REVENUE DEBIT

## 2019-11-27 PROCEDURE — 665998 HH PPS REVENUE CREDIT

## 2019-11-28 PROCEDURE — 665998 HH PPS REVENUE CREDIT

## 2019-11-28 PROCEDURE — 665999 HH PPS REVENUE DEBIT

## 2019-11-29 PROCEDURE — 665998 HH PPS REVENUE CREDIT

## 2019-11-29 PROCEDURE — 665999 HH PPS REVENUE DEBIT

## 2019-11-30 PROCEDURE — 665999 HH PPS REVENUE DEBIT

## 2019-11-30 PROCEDURE — 665998 HH PPS REVENUE CREDIT

## 2019-12-01 PROCEDURE — 665998 HH PPS REVENUE CREDIT

## 2019-12-01 PROCEDURE — 665999 HH PPS REVENUE DEBIT

## 2019-12-02 PROCEDURE — 665998 HH PPS REVENUE CREDIT

## 2019-12-02 PROCEDURE — 665999 HH PPS REVENUE DEBIT

## 2019-12-03 PROCEDURE — 665998 HH PPS REVENUE CREDIT

## 2019-12-03 PROCEDURE — 665999 HH PPS REVENUE DEBIT

## 2019-12-04 PROCEDURE — 665998 HH PPS REVENUE CREDIT

## 2019-12-04 PROCEDURE — 665999 HH PPS REVENUE DEBIT

## 2019-12-05 PROCEDURE — 665999 HH PPS REVENUE DEBIT

## 2019-12-05 PROCEDURE — 665998 HH PPS REVENUE CREDIT

## 2019-12-06 PROCEDURE — 665998 HH PPS REVENUE CREDIT

## 2019-12-06 PROCEDURE — 665999 HH PPS REVENUE DEBIT

## 2019-12-07 PROCEDURE — 665997 HH PPS REVENUE ADJ

## 2019-12-07 PROCEDURE — 665998 HH PPS REVENUE CREDIT

## 2019-12-07 PROCEDURE — 665999 HH PPS REVENUE DEBIT

## 2020-01-27 RX ORDER — DULAGLUTIDE 1.5 MG/.5ML
INJECTION, SOLUTION SUBCUTANEOUS
Qty: 2 ML | Refills: 5 | Status: SHIPPED | OUTPATIENT
Start: 2020-01-27 | End: 2020-07-27

## 2020-01-27 NOTE — TELEPHONE ENCOUNTER
Was the patient seen in the last year in this department? Yes LOV: 04/08/19     Does patient have an active prescription for medications requested? No     Received Request Via: Pharmacy     TRULICITY 1.5 MG/0.5ML Solution Pen-injector        Sig: INJECT 0.5ML (1.5MG) AS INSTRUCTED EVERY 7 DAYS

## 2020-02-22 NOTE — THERAPY
"Occupational Therapy  Daily Treatment     Patient Name: Maira Dodd  Age:  74 y.o., Sex:  female  Medical Record #: [id number]  Today's Date: 2019     Precautions  Precautions: Cervical Collar  , Spinal / Back Precautions , Fall Risk, Other (See Comments), Non Weight Bearing Left Upper Extremity  Comments: L UE wounds, orthostatic    Safety   ADL Safety : Requires Supervision for Safety, Requires Physical Assist for Safety  Bathroom Safety: Requires Supervision for Safety, Requires Physical Assist for Safety  Comments: See FIMs     Subjective    \" I did it all on my own!\" referring to toileting task       Objective        19 0831   Precautions   Precautions Cervical Collar  ;Spinal / Back Precautions ;Fall Risk;Other (See Comments);Non Weight Bearing Left Upper Extremity   Comments L UE wounds, orthostatic   Interdisciplinary Plan of Care Collaboration   Patient Position at End of Therapy In Bed;Call Light within Reach;Tray Table within Reach   OT Total Time Spent   OT Individual Total Time Spent (Mins) 30   OT Charge Group   OT Self Care / ADL 2       FIM Grooming Score:  7 - Independent  Grooming Description:  ( seated and standing at sink)    FIM Toiletin - Modified Independent  Toileting Description:  Grab bar(extra time  completed all tasks with out assist . patient reports good quality with wiping post BM )    FIM Bed/Chair/Wheelchair Transfers Score: 6 - Modified Independent  Bed/Chair/Wheelchair Transfers Description:       FIM Toilet Transfer Score:  6 - Modified Independent  Toilet Transfer Description:  Grab bar      Assessment    Improved independence ( per patient report) with toileting this session. Patient completed the task while therapist was attempting to obtain a toilet aid .     Plan    Shower in PM per patient request       " Normal for race

## 2020-03-25 ENCOUNTER — TELEPHONE (OUTPATIENT)
Dept: ENDOCRINOLOGY | Facility: MEDICAL CENTER | Age: 75
End: 2020-03-25

## 2020-03-25 NOTE — TELEPHONE ENCOUNTER
Patient request to have corrected dose for Metformin ER 500mg    2 tabs by mouth two times per day prescription sent to pharmacy on file [facility] on [address]  Pt scheduled to see Dr. Montez 5/21/2020.    Okay to leave a detailed message

## 2020-03-25 NOTE — TELEPHONE ENCOUNTER
Received request via: Patient    Was the patient seen in the last year in this department? Yes    Does the patient have an active prescription (recently filled or refills available) for medication(s) requested? No     Has upcoming appt to establish with you in May

## 2020-03-27 RX ORDER — METFORMIN HYDROCHLORIDE 500 MG/1
1000 TABLET, EXTENDED RELEASE ORAL 2 TIMES DAILY
Qty: 360 TAB | Refills: 4 | Status: SHIPPED | OUTPATIENT
Start: 2020-03-27 | End: 2021-04-12

## 2020-05-21 ENCOUNTER — APPOINTMENT (OUTPATIENT)
Dept: ENDOCRINOLOGY | Facility: MEDICAL CENTER | Age: 75
End: 2020-05-21
Payer: MEDICARE

## 2020-07-26 DIAGNOSIS — E11.65 TYPE 2 DIABETES MELLITUS WITH HYPERGLYCEMIA, WITH LONG-TERM CURRENT USE OF INSULIN (HCC): ICD-10-CM

## 2020-07-26 DIAGNOSIS — Z79.4 TYPE 2 DIABETES MELLITUS WITH HYPERGLYCEMIA, WITH LONG-TERM CURRENT USE OF INSULIN (HCC): ICD-10-CM

## 2020-07-27 RX ORDER — DULAGLUTIDE 1.5 MG/.5ML
INJECTION, SOLUTION SUBCUTANEOUS
Qty: 2 ML | Refills: 0 | Status: SHIPPED | OUTPATIENT
Start: 2020-07-27 | End: 2020-12-10

## 2020-08-12 ENCOUNTER — APPOINTMENT (OUTPATIENT)
Dept: ENDOCRINOLOGY | Facility: MEDICAL CENTER | Age: 75
End: 2020-08-12
Attending: INTERNAL MEDICINE
Payer: COMMERCIAL

## 2020-12-10 ENCOUNTER — OFFICE VISIT (OUTPATIENT)
Dept: ENDOCRINOLOGY | Facility: MEDICAL CENTER | Age: 75
End: 2020-12-10
Attending: INTERNAL MEDICINE
Payer: COMMERCIAL

## 2020-12-10 VITALS — BODY MASS INDEX: 39.5 KG/M2 | WEIGHT: 256 LBS

## 2020-12-10 DIAGNOSIS — E03.9 ACQUIRED HYPOTHYROIDISM: ICD-10-CM

## 2020-12-10 DIAGNOSIS — E55.9 VITAMIN D DEFICIENCY: ICD-10-CM

## 2020-12-10 DIAGNOSIS — Z79.4 ENCOUNTER FOR LONG-TERM (CURRENT) USE OF INSULIN (HCC): ICD-10-CM

## 2020-12-10 DIAGNOSIS — E11.65 TYPE 2 DIABETES MELLITUS WITH HYPERGLYCEMIA, WITH LONG-TERM CURRENT USE OF INSULIN (HCC): ICD-10-CM

## 2020-12-10 DIAGNOSIS — Z79.4 TYPE 2 DIABETES MELLITUS WITH HYPERGLYCEMIA, WITH LONG-TERM CURRENT USE OF INSULIN (HCC): ICD-10-CM

## 2020-12-10 DIAGNOSIS — E11.649 UNCONTROLLED TYPE 2 DIABETES MELLITUS WITH HYPOGLYCEMIA, UNSPECIFIED HYPOGLYCEMIA COMA STATUS (HCC): ICD-10-CM

## 2020-12-10 PROCEDURE — 99443 PR PHYSICIAN TELEPHONE EVALUATION 21-30 MIN: CPT | Mod: CR | Performed by: INTERNAL MEDICINE

## 2020-12-10 ASSESSMENT — FIBROSIS 4 INDEX: FIB4 SCORE: 1.6
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
## 2020-12-22 ENCOUNTER — HOSPITAL ENCOUNTER (OUTPATIENT)
Dept: LAB | Facility: MEDICAL CENTER | Age: 75
End: 2020-12-22
Attending: STUDENT IN AN ORGANIZED HEALTH CARE EDUCATION/TRAINING PROGRAM
Payer: COMMERCIAL

## 2020-12-22 DIAGNOSIS — E03.9 ACQUIRED HYPOTHYROIDISM: ICD-10-CM

## 2020-12-22 DIAGNOSIS — E11.649 UNCONTROLLED TYPE 2 DIABETES MELLITUS WITH HYPOGLYCEMIA, UNSPECIFIED HYPOGLYCEMIA COMA STATUS (HCC): ICD-10-CM

## 2020-12-22 DIAGNOSIS — E55.9 VITAMIN D DEFICIENCY: ICD-10-CM

## 2020-12-22 LAB
ALBUMIN SERPL BCP-MCNC: 3.6 G/DL (ref 3.2–4.9)
ALBUMIN/GLOB SERPL: 1.1 G/DL
ALP SERPL-CCNC: 115 U/L (ref 30–99)
ALT SERPL-CCNC: 17 U/L (ref 2–50)
ANION GAP SERPL CALC-SCNC: 13 MMOL/L (ref 7–16)
AST SERPL-CCNC: 22 U/L (ref 12–45)
BASOPHILS # BLD AUTO: 1.1 % (ref 0–1.8)
BASOPHILS # BLD: 0.08 K/UL (ref 0–0.12)
BILIRUB SERPL-MCNC: 0.6 MG/DL (ref 0.1–1.5)
BUN SERPL-MCNC: 13 MG/DL (ref 8–22)
CALCIUM SERPL-MCNC: 9.2 MG/DL (ref 8.4–10.2)
CHLORIDE SERPL-SCNC: 107 MMOL/L (ref 96–112)
CHOLEST SERPL-MCNC: 184 MG/DL (ref 100–199)
CO2 SERPL-SCNC: 18 MMOL/L (ref 20–33)
CREAT SERPL-MCNC: 0.91 MG/DL (ref 0.5–1.4)
CREAT UR-MCNC: 189.57 MG/DL
EOSINOPHIL # BLD AUTO: 0.26 K/UL (ref 0–0.51)
EOSINOPHIL NFR BLD: 3.4 % (ref 0–6.9)
ERYTHROCYTE [DISTWIDTH] IN BLOOD BY AUTOMATED COUNT: 42.6 FL (ref 35.9–50)
EST. AVERAGE GLUCOSE BLD GHB EST-MCNC: 243 MG/DL
FASTING STATUS PATIENT QL REPORTED: NORMAL
GLOBULIN SER CALC-MCNC: 3.4 G/DL (ref 1.9–3.5)
GLUCOSE SERPL-MCNC: 157 MG/DL (ref 65–99)
HBA1C MFR BLD: 10.1 % (ref 0–5.6)
HCT VFR BLD AUTO: 40.6 % (ref 37–47)
HDLC SERPL-MCNC: 42 MG/DL
HGB BLD-MCNC: 13.5 G/DL (ref 12–16)
IMM GRANULOCYTES # BLD AUTO: 0.02 K/UL (ref 0–0.11)
IMM GRANULOCYTES NFR BLD AUTO: 0.3 % (ref 0–0.9)
LDLC SERPL CALC-MCNC: 105 MG/DL
LYMPHOCYTES # BLD AUTO: 2.42 K/UL (ref 1–4.8)
LYMPHOCYTES NFR BLD: 32 % (ref 22–41)
MCH RBC QN AUTO: 30.7 PG (ref 27–33)
MCHC RBC AUTO-ENTMCNC: 33.3 G/DL (ref 33.6–35)
MCV RBC AUTO: 92.3 FL (ref 81.4–97.8)
MICROALBUMIN UR-MCNC: 4.5 MG/DL
MICROALBUMIN/CREAT UR: 24 MG/G (ref 0–30)
MONOCYTES # BLD AUTO: 0.45 K/UL (ref 0–0.85)
MONOCYTES NFR BLD AUTO: 5.9 % (ref 0–13.4)
NEUTROPHILS # BLD AUTO: 4.34 K/UL (ref 2–7.15)
NEUTROPHILS NFR BLD: 57.3 % (ref 44–72)
NRBC # BLD AUTO: 0 K/UL
NRBC BLD-RTO: 0 /100 WBC
PLATELET # BLD AUTO: 235 K/UL (ref 164–446)
PMV BLD AUTO: 10.6 FL (ref 9–12.9)
POTASSIUM SERPL-SCNC: 4.3 MMOL/L (ref 3.6–5.5)
PROT SERPL-MCNC: 7 G/DL (ref 6–8.2)
RBC # BLD AUTO: 4.4 M/UL (ref 4.2–5.4)
SODIUM SERPL-SCNC: 138 MMOL/L (ref 135–145)
T4 FREE SERPL-MCNC: 1.47 NG/DL (ref 0.93–1.7)
TRIGL SERPL-MCNC: 187 MG/DL (ref 0–149)
TSH SERPL DL<=0.005 MIU/L-ACNC: 0.03 UIU/ML (ref 0.38–5.33)
WBC # BLD AUTO: 7.6 K/UL (ref 4.8–10.8)

## 2020-12-22 PROCEDURE — 36415 COLL VENOUS BLD VENIPUNCTURE: CPT

## 2020-12-22 PROCEDURE — 83036 HEMOGLOBIN GLYCOSYLATED A1C: CPT

## 2020-12-22 PROCEDURE — 82043 UR ALBUMIN QUANTITATIVE: CPT

## 2020-12-22 PROCEDURE — 84439 ASSAY OF FREE THYROXINE: CPT

## 2020-12-22 PROCEDURE — 80061 LIPID PANEL: CPT

## 2020-12-22 PROCEDURE — 82306 VITAMIN D 25 HYDROXY: CPT

## 2020-12-22 PROCEDURE — 84443 ASSAY THYROID STIM HORMONE: CPT

## 2020-12-22 PROCEDURE — 82570 ASSAY OF URINE CREATININE: CPT

## 2020-12-22 PROCEDURE — 80053 COMPREHEN METABOLIC PANEL: CPT

## 2020-12-22 PROCEDURE — 85025 COMPLETE CBC W/AUTO DIFF WBC: CPT

## 2020-12-23 LAB — 25(OH)D3 SERPL-MCNC: 16 NG/ML (ref 30–100)

## 2021-01-13 ENCOUNTER — TELEMEDICINE (OUTPATIENT)
Dept: ENDOCRINOLOGY | Facility: MEDICAL CENTER | Age: 76
End: 2021-01-13
Attending: INTERNAL MEDICINE
Payer: COMMERCIAL

## 2021-01-13 DIAGNOSIS — E11.649 UNCONTROLLED TYPE 2 DIABETES MELLITUS WITH HYPOGLYCEMIA, UNSPECIFIED HYPOGLYCEMIA COMA STATUS (HCC): ICD-10-CM

## 2021-01-13 DIAGNOSIS — E55.9 VITAMIN D DEFICIENCY: ICD-10-CM

## 2021-01-13 DIAGNOSIS — E03.9 ACQUIRED HYPOTHYROIDISM: ICD-10-CM

## 2021-01-13 DIAGNOSIS — Z78.9 STATIN INTOLERANCE: ICD-10-CM

## 2021-01-13 DIAGNOSIS — Z79.4 ENCOUNTER FOR LONG-TERM (CURRENT) USE OF INSULIN (HCC): ICD-10-CM

## 2021-01-13 DIAGNOSIS — E78.5 DYSLIPIDEMIA: ICD-10-CM

## 2021-01-13 PROCEDURE — 99214 OFFICE O/P EST MOD 30 MIN: CPT | Mod: 95,CR | Performed by: INTERNAL MEDICINE

## 2021-01-13 RX ORDER — EZETIMIBE 10 MG/1
10 TABLET ORAL DAILY
Qty: 90 TAB | Refills: 3 | Status: SHIPPED | OUTPATIENT
Start: 2021-01-13
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
## 2021-04-10 DIAGNOSIS — E11.649 UNCONTROLLED TYPE 2 DIABETES MELLITUS WITH HYPOGLYCEMIA, UNSPECIFIED HYPOGLYCEMIA COMA STATUS (HCC): ICD-10-CM

## 2021-04-12 RX ORDER — METFORMIN HYDROCHLORIDE 500 MG/1
TABLET, EXTENDED RELEASE ORAL
Qty: 360 TABLET | Refills: 1 | Status: SHIPPED | OUTPATIENT
Start: 2021-04-12

## 2021-04-12 NOTE — TELEPHONE ENCOUNTER
Received request via: Pharmacy    Was the patient seen in the last year in this department? Yes    Does the patient have an active prescription (recently filled or refills available) for medication(s) requested? No       metFORMIN ER (GLUCOPHAGE XR) 500 MG TABLET SR 24 HR       Sig: TAKE TWO TABLETS BY MOUTH TWICE DAILY

## 2021-04-14 DIAGNOSIS — E11.649 UNCONTROLLED TYPE 2 DIABETES MELLITUS WITH HYPOGLYCEMIA, UNSPECIFIED HYPOGLYCEMIA COMA STATUS (HCC): ICD-10-CM

## 2021-04-16 ENCOUNTER — APPOINTMENT (OUTPATIENT)
Dept: ENDOCRINOLOGY | Facility: MEDICAL CENTER | Age: 76
End: 2021-04-16
Attending: INTERNAL MEDICINE
Payer: MEDICARE

## 2021-08-13 ENCOUNTER — TELEPHONE (OUTPATIENT)
Dept: ENDOCRINOLOGY | Facility: MEDICAL CENTER | Age: 76
End: 2021-08-13

## 2021-08-13 DIAGNOSIS — E11.649 UNCONTROLLED TYPE 2 DIABETES MELLITUS WITH HYPOGLYCEMIA, UNSPECIFIED HYPOGLYCEMIA COMA STATUS (HCC): ICD-10-CM

## 2021-08-13 RX ORDER — INSULIN GLARGINE 100 [IU]/ML
20 INJECTION, SOLUTION SUBCUTANEOUS EVERY EVENING
Qty: 15 ML | Refills: 6 | Status: SHIPPED | OUTPATIENT
Start: 2021-08-13

## 2021-08-13 NOTE — TELEPHONE ENCOUNTER
[facility] pharmacy called because the last prescription they received from Dr. Montez in Dec. For patient was for insulin glargine. They do not carry that any longer. They were wondering if Dr. Montez could send a script for Lantus or basaglar for patient and it could be sent electronically to [facility] in [place].

## 2021-08-17 NOTE — TELEPHONE ENCOUNTER
Phone Number Called: Maira Lisy Carlson      Call outcome: Left detailed message for patient. Informed to call back with any additional questions.    Message: Contacted patient and asked for patient to please call us to schedule an appointment. I let her know Dr. Montez sent her insulin to pharmacy.

## 2023-05-01 NOTE — CARE PLAN
Problem: Safety  Goal: Will remain free from falls  Intervention: Assess risk factors for falls  Note:   Pt uses call light consistently and appropriately. Waits for assistance does not attempt self transfer this shift. Able to verbalize needs.      Problem: Skin Integrity  Goal: Risk for impaired skin integrity will decrease  Intervention: Assess and monitor skin integrity, appearance and/or temperature  Note:   Patient dressing on left elbow changed this am, redressed per order. Abdominal wound midline dressing changed and redressed.       Dressing (No Sutures): dry sterile dressing

## 2023-08-14 NOTE — TELEPHONE ENCOUNTER
Kindly change Rx for Lantus to Basaglar.    Rx will be send to [facility] mail order    Thank you  Ana  
14-Aug-2023 17:11
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
## 2025-04-17 NOTE — PROGRESS NOTES
2 RN skin check complete with ALEX Osorio.  Devices in place:              *C-collar              *nasal cannula              *BP cuff              *Pulse ox              *Cardiac leads    *PIVs              *SCDs  Skin assessed under the devices listed above     Following areas of concern:               *Right ear abrasion, scant drainage/old drainage.              *Hair on right side of scalp has dried blood              *Bruising and redness around c-collar              *Brusing noted on back and scattered on trunk              *LUE abrasion, scant drainage. LUE forearm laceration closed with staples              *RUE bruising               *Area under panus is moist and has skin tears on the right region.               *Sacral region is blanching and intact              *Lower extremities have scattered bruising R>L     The following interventions in place:              *Saline irrigated abrasion on right ear              *Soap and water to hair               *Preventative mepilex placed around c-collar              *Redressed right forearm with roll gauze. CDI              *Area around panus; nystatin powder and interdry applied               *Preventative mepilex placed on sacral region              *Pillows used to float elbows and heels.              *Patient participating in Q 2 hour turning      Wound consult placedYES/NO: yes    Wound reported YES/NO: yes  Appropriate LDAs opened YES/NO: yes         PA approved

## (undated) DEVICE — HANDPIECE 10FT INTPLS SCT PLS IRRIGATION HAND CONTROL SET (6/PK)

## (undated) DEVICE — SLEEVE, VASO, THIGH, MED

## (undated) DEVICE — TOWELS CLOTH SURGICAL - (4/PK 20PK/CA)

## (undated) DEVICE — ELECTRODE DUAL RETURN W/ CORD - (50/PK)

## (undated) DEVICE — KIT ANESTHESIA W/CIRCUIT & 3/LT BAG W/FILTER (20EA/CA)

## (undated) DEVICE — DRAPE LARGE 3 QUARTER - (20/CA)

## (undated) DEVICE — SODIUM CHL. IRRIGATION 0.9% 3000ML (4EA/CA 65CA/PF)

## (undated) DEVICE — GLOVE BIOGEL SZ 6.5 SURGICAL PF LTX (50PR/BX 4BX/CA)

## (undated) DEVICE — DRAPE STRLE REG TOWEL 18X24 - (10/BX 4BX/CA)"

## (undated) DEVICE — DRESSING TEGADERM 8 X 12 - (10/BX 8BX/CA)

## (undated) DEVICE — PADDING CAST 4 IN STERILE - 4 X 4 YDS (24/CA)

## (undated) DEVICE — BANDAGE ELASTIC 6 HONEYCOMB - 6X5YD LF (20/CA)"

## (undated) DEVICE — SENSOR SPO2 NEO LNCS ADHESIVE (20/BX) SEE USER NOTES

## (undated) DEVICE — SUTURE 2-0 VICRYL PLUS CT-1 36 (36PK/BX)"

## (undated) DEVICE — SUTURE 3-0 ETHILON FS-1 - (36/BX) 30 INCH

## (undated) DEVICE — CANISTER SUCTION 3000ML MECHANICAL FILTER AUTO SHUTOFF MEDI-VAC NONSTERILE LF DISP  (40EA/CA)

## (undated) DEVICE — GLOVE BIOGEL INDICATOR SZ 8 SURGICAL PF LTX - (50/BX 4BX/CA)

## (undated) DEVICE — PAD LAP STERILE 18 X 18 - (5/PK 40PK/CA)

## (undated) DEVICE — BANDAGE ELASTIC 4 HONEYCOMB - 4"X5YD LF (20/CA)"

## (undated) DEVICE — PACK LOWER EXTREMITY - (2/CA)

## (undated) DEVICE — SYRINGE 30 ML LL (56/BX)

## (undated) DEVICE — HEAD HOLDER JUNIOR/ADULT

## (undated) DEVICE — PACK MAJOR ORTHO - (2EA/CA)

## (undated) DEVICE — ELECTRODE 850 FOAM ADHESIVE - HYDROGEL RADIOTRNSPRNT (50/PK)

## (undated) DEVICE — SET EXTENSION WITH 2 PORTS (48EA/CA) ***PART #2C8610 IS A SUBSTITUTE*****

## (undated) DEVICE — TUBING CLEARLINK DUO-VENT - C-FLO (48EA/CA)

## (undated) DEVICE — NEPTUNE 4 PORT MANIFOLD - (20/PK)

## (undated) DEVICE — SET LEADWIRE 5 LEAD BEDSIDE DISPOSABLE ECG (1SET OF 5/EA)

## (undated) DEVICE — SET IRRIGATION CYSTOSCOPY TUBE L80 IN (20EA/CA)

## (undated) DEVICE — BOVIE BLADE COATED - (50/PK)

## (undated) DEVICE — MASK ANESTHESIA ADULT  - (100/CA)

## (undated) DEVICE — SODIUM CHL IRRIGATION 0.9% 1000ML (12EA/CA)

## (undated) DEVICE — SUTURE GENERAL

## (undated) DEVICE — KIT ROOM DECONTAMINATION

## (undated) DEVICE — SUTURE 0 VICRYL PLUS CT-1 - 36 INCH (36/BX)

## (undated) DEVICE — SUCTION INSTRUMENT YANKAUER BULBOUS TIP W/O VENT (50EA/CA)

## (undated) DEVICE — SUTURE 2-0 PDS II CT-2 - (36/BX)

## (undated) DEVICE — STAPLER SKIN DISP - (6/BX 10BX/CA) VISISTAT

## (undated) DEVICE — DRESSING, WOUND VAC MED.

## (undated) DEVICE — GLOVE BIOGEL INDICATOR SZ 6.5 SURGICAL PF LTX - (50PR/BX 4BX/CA)

## (undated) DEVICE — PROTECTOR ULNA NERVE - (36PR/CA)

## (undated) DEVICE — GLOVE BIOGEL INDICATOR SZ 7.5 SURGICAL PF LTX - (50PR/BX 4BX/CA)

## (undated) DEVICE — CHLORAPREP 26 ML APPLICATOR - ORANGE TINT(25/CA)

## (undated) DEVICE — PAD PREP 24 X 48 CUFFED - (100/CA)

## (undated) DEVICE — DRESSING TRANSPARENT FILM TEGADERM 4 X 4.75" (50EA/BX)"

## (undated) DEVICE — SLING ORTH UNV TIETX VLFM ARM

## (undated) DEVICE — GOWN WARMING STANDARD FLEX - (30/CA)

## (undated) DEVICE — LACTATED RINGERS INJ 1000 ML - (14EA/CA 60CA/PF)

## (undated) DEVICE — DRILL BIT 2.5 TWIST 310.25 (8TX2+1TX3=19)

## (undated) DEVICE — DRAPE C-ARM LARGE 41IN X 74 IN - (10/BX 2BX/CA)

## (undated) DEVICE — WRAP COBAN SELF-ADHERENT 6 IN X  5YDS STERILE TAN (12/CA)

## (undated) DEVICE — NEEDLE NON SAFETY HYPO 22 GA X 1 1/2 IN (100/BX)

## (undated) DEVICE — DRAPE SURGICAL U 77X120 - (10/CA)

## (undated) DEVICE — TIP INTPLS HFLO ML ORFC BTRY - (12/CS)  FOR SURGILAV

## (undated) DEVICE — SWAB ANAEROBIC SPEC.COLLECTOR - (25/PK 4PK/CA 100EA/CA)

## (undated) DEVICE — DRILL BIT SYN 2.8 165MM (5TX2+3TX1=13)

## (undated) DEVICE — DRAPE SURG STERI-DRAPE 7X11OD - (40EA/CA)

## (undated) DEVICE — GLOVE BIOGEL PI ORTHO SZ 7.5 PF LF (40PR/BX)

## (undated) DEVICE — STOCKINET TUBULAR 6IN STERILE - 6 X 48YDS (25/CA)

## (undated) DEVICE — WATER IRRIG. STER. 1500 ML - (9/CA)

## (undated) DEVICE — TRAY SKIN SCRUB PVP WET (20EA/CA) PART #DYND70356 DISCONTINUED

## (undated) DEVICE — DRAPE U ORTHOPEDIC - (10/BX)